# Patient Record
Sex: FEMALE | Race: WHITE | Employment: OTHER | URBAN - METROPOLITAN AREA
[De-identification: names, ages, dates, MRNs, and addresses within clinical notes are randomized per-mention and may not be internally consistent; named-entity substitution may affect disease eponyms.]

---

## 2017-02-22 ENCOUNTER — OFFICE VISIT (OUTPATIENT)
Dept: FAMILY MEDICINE CLINIC | Age: 82
End: 2017-02-22

## 2017-02-22 VITALS
HEIGHT: 60 IN | SYSTOLIC BLOOD PRESSURE: 160 MMHG | WEIGHT: 149.8 LBS | BODY MASS INDEX: 29.41 KG/M2 | DIASTOLIC BLOOD PRESSURE: 72 MMHG | HEART RATE: 70 BPM

## 2017-02-22 DIAGNOSIS — N18.4 CHRONIC KIDNEY DISEASE (CKD), STAGE IV (SEVERE) (HCC): Primary | ICD-10-CM

## 2017-02-22 DIAGNOSIS — H61.23 CERUMINOSIS, BILATERAL: ICD-10-CM

## 2017-02-22 DIAGNOSIS — M54.50 ACUTE BILATERAL LOW BACK PAIN WITHOUT SCIATICA: ICD-10-CM

## 2017-02-22 DIAGNOSIS — I25.10 CORONARY ARTERY DISEASE INVOLVING NATIVE CORONARY ARTERY OF NATIVE HEART WITHOUT ANGINA PECTORIS: ICD-10-CM

## 2017-02-22 DIAGNOSIS — I10 HTN (HYPERTENSION), MALIGNANT: ICD-10-CM

## 2017-02-22 PROCEDURE — 4040F PNEUMOC VAC/ADMIN/RCVD: CPT | Performed by: FAMILY MEDICINE

## 2017-02-22 PROCEDURE — G8427 DOCREV CUR MEDS BY ELIG CLIN: HCPCS | Performed by: FAMILY MEDICINE

## 2017-02-22 PROCEDURE — G8420 CALC BMI NORM PARAMETERS: HCPCS | Performed by: FAMILY MEDICINE

## 2017-02-22 PROCEDURE — G8598 ASA/ANTIPLAT THER USED: HCPCS | Performed by: FAMILY MEDICINE

## 2017-02-22 PROCEDURE — 99214 OFFICE O/P EST MOD 30 MIN: CPT | Performed by: FAMILY MEDICINE

## 2017-02-22 PROCEDURE — 1090F PRES/ABSN URINE INCON ASSESS: CPT | Performed by: FAMILY MEDICINE

## 2017-02-22 PROCEDURE — G8400 PT W/DXA NO RESULTS DOC: HCPCS | Performed by: FAMILY MEDICINE

## 2017-02-22 PROCEDURE — 1036F TOBACCO NON-USER: CPT | Performed by: FAMILY MEDICINE

## 2017-02-22 PROCEDURE — G8484 FLU IMMUNIZE NO ADMIN: HCPCS | Performed by: FAMILY MEDICINE

## 2017-02-22 PROCEDURE — 1123F ACP DISCUSS/DSCN MKR DOCD: CPT | Performed by: FAMILY MEDICINE

## 2017-02-22 RX ORDER — TIZANIDINE HYDROCHLORIDE 2 MG/1
2 CAPSULE, GELATIN COATED ORAL NIGHTLY PRN
Qty: 20 CAPSULE | Refills: 0 | Status: SHIPPED | OUTPATIENT
Start: 2017-02-22 | End: 2018-01-08

## 2017-02-22 RX ORDER — CARVEDILOL 12.5 MG/1
12.5 TABLET ORAL 2 TIMES DAILY WITH MEALS
Qty: 60 TABLET | Refills: 5 | Status: SHIPPED | OUTPATIENT
Start: 2017-02-22 | End: 2018-03-23 | Stop reason: SDUPTHER

## 2017-05-08 ENCOUNTER — OFFICE VISIT (OUTPATIENT)
Dept: FAMILY MEDICINE CLINIC | Age: 82
End: 2017-05-08

## 2017-05-08 VITALS
HEIGHT: 60 IN | HEART RATE: 71 BPM | SYSTOLIC BLOOD PRESSURE: 176 MMHG | DIASTOLIC BLOOD PRESSURE: 66 MMHG | WEIGHT: 143 LBS | BODY MASS INDEX: 28.07 KG/M2

## 2017-05-08 DIAGNOSIS — I10 HTN (HYPERTENSION), BENIGN: Primary | ICD-10-CM

## 2017-05-08 DIAGNOSIS — I25.10 CORONARY ARTERY DISEASE INVOLVING NATIVE CORONARY ARTERY OF NATIVE HEART WITHOUT ANGINA PECTORIS: ICD-10-CM

## 2017-05-08 DIAGNOSIS — N18.4 CHRONIC KIDNEY DISEASE (CKD), STAGE IV (SEVERE) (HCC): ICD-10-CM

## 2017-05-08 DIAGNOSIS — M1A.0710 IDIOPATHIC CHRONIC GOUT OF RIGHT FOOT WITHOUT TOPHUS: ICD-10-CM

## 2017-05-08 LAB
A/G RATIO: 1.6 (ref 1.1–2.2)
ALBUMIN SERPL-MCNC: 4.4 G/DL (ref 3.4–5)
ALP BLD-CCNC: 74 U/L (ref 40–129)
ALT SERPL-CCNC: 10 U/L (ref 10–40)
ANION GAP SERPL CALCULATED.3IONS-SCNC: 18 MMOL/L (ref 3–16)
AST SERPL-CCNC: 17 U/L (ref 15–37)
BILIRUB SERPL-MCNC: 0.4 MG/DL (ref 0–1)
BUN BLDV-MCNC: 24 MG/DL (ref 7–20)
CALCIUM SERPL-MCNC: 9.3 MG/DL (ref 8.3–10.6)
CHLORIDE BLD-SCNC: 101 MMOL/L (ref 99–110)
CO2: 22 MMOL/L (ref 21–32)
CREAT SERPL-MCNC: 1.2 MG/DL (ref 0.6–1.2)
GFR AFRICAN AMERICAN: 52
GFR NON-AFRICAN AMERICAN: 43
GLOBULIN: 2.8 G/DL
GLUCOSE BLD-MCNC: 98 MG/DL (ref 70–99)
POTASSIUM SERPL-SCNC: 4.8 MMOL/L (ref 3.5–5.1)
SODIUM BLD-SCNC: 141 MMOL/L (ref 136–145)
TOTAL PROTEIN: 7.2 G/DL (ref 6.4–8.2)
URIC ACID, SERUM: 6.7 MG/DL (ref 2.6–6)

## 2017-05-08 PROCEDURE — G8420 CALC BMI NORM PARAMETERS: HCPCS | Performed by: FAMILY MEDICINE

## 2017-05-08 PROCEDURE — 1036F TOBACCO NON-USER: CPT | Performed by: FAMILY MEDICINE

## 2017-05-08 PROCEDURE — G8598 ASA/ANTIPLAT THER USED: HCPCS | Performed by: FAMILY MEDICINE

## 2017-05-08 PROCEDURE — 4040F PNEUMOC VAC/ADMIN/RCVD: CPT | Performed by: FAMILY MEDICINE

## 2017-05-08 PROCEDURE — G8427 DOCREV CUR MEDS BY ELIG CLIN: HCPCS | Performed by: FAMILY MEDICINE

## 2017-05-08 PROCEDURE — 1123F ACP DISCUSS/DSCN MKR DOCD: CPT | Performed by: FAMILY MEDICINE

## 2017-05-08 PROCEDURE — 99214 OFFICE O/P EST MOD 30 MIN: CPT | Performed by: FAMILY MEDICINE

## 2017-05-08 PROCEDURE — G8400 PT W/DXA NO RESULTS DOC: HCPCS | Performed by: FAMILY MEDICINE

## 2017-05-08 PROCEDURE — 1090F PRES/ABSN URINE INCON ASSESS: CPT | Performed by: FAMILY MEDICINE

## 2017-05-08 PROCEDURE — 36415 COLL VENOUS BLD VENIPUNCTURE: CPT | Performed by: FAMILY MEDICINE

## 2017-05-08 RX ORDER — AMLODIPINE BESYLATE 2.5 MG/1
2.5 TABLET ORAL DAILY
Qty: 30 TABLET | Refills: 3 | Status: SHIPPED | OUTPATIENT
Start: 2017-05-08 | End: 2017-11-15 | Stop reason: SDUPTHER

## 2017-05-08 RX ORDER — ATORVASTATIN CALCIUM 20 MG/1
TABLET, FILM COATED ORAL
Qty: 30 TABLET | Refills: 6 | Status: SHIPPED | OUTPATIENT
Start: 2017-05-08 | End: 2018-01-08 | Stop reason: SDUPTHER

## 2017-05-08 RX ORDER — LISINOPRIL 10 MG/1
TABLET ORAL
Qty: 30 TABLET | Refills: 5 | Status: SHIPPED | OUTPATIENT
Start: 2017-05-08 | End: 2018-03-23 | Stop reason: SDUPTHER

## 2017-05-08 RX ORDER — ALLOPURINOL 100 MG/1
100 TABLET ORAL DAILY
Qty: 30 TABLET | Refills: 5 | Status: SHIPPED | OUTPATIENT
Start: 2017-05-08 | End: 2018-03-23 | Stop reason: SDUPTHER

## 2017-06-27 RX ORDER — ASPIRIN 81 MG/1
81 TABLET, CHEWABLE ORAL DAILY
Qty: 30 TABLET | Refills: 9 | Status: ON HOLD | OUTPATIENT
Start: 2017-06-27 | End: 2021-09-12 | Stop reason: HOSPADM

## 2017-11-15 DIAGNOSIS — I10 HTN (HYPERTENSION), BENIGN: ICD-10-CM

## 2017-11-16 RX ORDER — AMLODIPINE BESYLATE 2.5 MG/1
TABLET ORAL
Qty: 90 TABLET | Refills: 3 | Status: SHIPPED | OUTPATIENT
Start: 2017-11-16 | End: 2017-12-04 | Stop reason: SDUPTHER

## 2017-12-04 ENCOUNTER — OFFICE VISIT (OUTPATIENT)
Dept: FAMILY MEDICINE CLINIC | Age: 82
End: 2017-12-04

## 2017-12-04 VITALS
DIASTOLIC BLOOD PRESSURE: 70 MMHG | HEART RATE: 79 BPM | WEIGHT: 149 LBS | HEIGHT: 60 IN | BODY MASS INDEX: 29.25 KG/M2 | OXYGEN SATURATION: 97 % | SYSTOLIC BLOOD PRESSURE: 134 MMHG | RESPIRATION RATE: 20 BRPM

## 2017-12-04 DIAGNOSIS — I10 HTN (HYPERTENSION), BENIGN: ICD-10-CM

## 2017-12-04 DIAGNOSIS — I25.10 CORONARY ARTERY DISEASE INVOLVING NATIVE CORONARY ARTERY OF NATIVE HEART WITHOUT ANGINA PECTORIS: Primary | ICD-10-CM

## 2017-12-04 DIAGNOSIS — M1A.0710 IDIOPATHIC CHRONIC GOUT OF RIGHT FOOT WITHOUT TOPHUS: ICD-10-CM

## 2017-12-04 DIAGNOSIS — R41.3 MEMORY LOSS: ICD-10-CM

## 2017-12-04 DIAGNOSIS — N18.4 CHRONIC KIDNEY DISEASE (CKD), STAGE IV (SEVERE) (HCC): ICD-10-CM

## 2017-12-04 LAB
A/G RATIO: 1.2 (ref 1.1–2.2)
ALBUMIN SERPL-MCNC: 4.3 G/DL (ref 3.4–5)
ALP BLD-CCNC: 63 U/L (ref 40–129)
ALT SERPL-CCNC: 11 U/L (ref 10–40)
ANION GAP SERPL CALCULATED.3IONS-SCNC: 21 MMOL/L (ref 3–16)
AST SERPL-CCNC: 18 U/L (ref 15–37)
BILIRUB SERPL-MCNC: <0.2 MG/DL (ref 0–1)
BUN BLDV-MCNC: 26 MG/DL (ref 7–20)
CALCIUM SERPL-MCNC: 9.6 MG/DL (ref 8.3–10.6)
CHLORIDE BLD-SCNC: 99 MMOL/L (ref 99–110)
CO2: 23 MMOL/L (ref 21–32)
CREAT SERPL-MCNC: 1.1 MG/DL (ref 0.6–1.2)
FOLATE: 14.73 NG/ML (ref 4.78–24.2)
GFR AFRICAN AMERICAN: 57
GFR NON-AFRICAN AMERICAN: 48
GLOBULIN: 3.5 G/DL
GLUCOSE BLD-MCNC: 86 MG/DL (ref 70–99)
POTASSIUM SERPL-SCNC: 4.3 MMOL/L (ref 3.5–5.1)
SODIUM BLD-SCNC: 143 MMOL/L (ref 136–145)
TOTAL PROTEIN: 7.8 G/DL (ref 6.4–8.2)
TSH REFLEX: 2.67 UIU/ML (ref 0.27–4.2)
URIC ACID, SERUM: 6.2 MG/DL (ref 2.6–6)
VITAMIN B-12: 447 PG/ML (ref 211–911)

## 2017-12-04 PROCEDURE — G8419 CALC BMI OUT NRM PARAM NOF/U: HCPCS | Performed by: FAMILY MEDICINE

## 2017-12-04 PROCEDURE — 36415 COLL VENOUS BLD VENIPUNCTURE: CPT | Performed by: FAMILY MEDICINE

## 2017-12-04 PROCEDURE — G8598 ASA/ANTIPLAT THER USED: HCPCS | Performed by: FAMILY MEDICINE

## 2017-12-04 PROCEDURE — 90732 PPSV23 VACC 2 YRS+ SUBQ/IM: CPT | Performed by: FAMILY MEDICINE

## 2017-12-04 PROCEDURE — 4040F PNEUMOC VAC/ADMIN/RCVD: CPT | Performed by: FAMILY MEDICINE

## 2017-12-04 PROCEDURE — 1036F TOBACCO NON-USER: CPT | Performed by: FAMILY MEDICINE

## 2017-12-04 PROCEDURE — 99214 OFFICE O/P EST MOD 30 MIN: CPT | Performed by: FAMILY MEDICINE

## 2017-12-04 PROCEDURE — G0009 ADMIN PNEUMOCOCCAL VACCINE: HCPCS | Performed by: FAMILY MEDICINE

## 2017-12-04 PROCEDURE — 1090F PRES/ABSN URINE INCON ASSESS: CPT | Performed by: FAMILY MEDICINE

## 2017-12-04 PROCEDURE — G8484 FLU IMMUNIZE NO ADMIN: HCPCS | Performed by: FAMILY MEDICINE

## 2017-12-04 PROCEDURE — G8428 CUR MEDS NOT DOCUMENT: HCPCS | Performed by: FAMILY MEDICINE

## 2017-12-04 PROCEDURE — 1123F ACP DISCUSS/DSCN MKR DOCD: CPT | Performed by: FAMILY MEDICINE

## 2017-12-04 PROCEDURE — G8400 PT W/DXA NO RESULTS DOC: HCPCS | Performed by: FAMILY MEDICINE

## 2017-12-04 RX ORDER — AMLODIPINE BESYLATE 2.5 MG/1
TABLET ORAL
Qty: 30 TABLET | Refills: 5 | Status: SHIPPED | OUTPATIENT
Start: 2017-12-04 | End: 2020-10-19

## 2017-12-04 ASSESSMENT — PATIENT HEALTH QUESTIONNAIRE - PHQ9
2. FEELING DOWN, DEPRESSED OR HOPELESS: 1
SUM OF ALL RESPONSES TO PHQ9 QUESTIONS 1 & 2: 1
SUM OF ALL RESPONSES TO PHQ QUESTIONS 1-9: 1
1. LITTLE INTEREST OR PLEASURE IN DOING THINGS: 0

## 2017-12-04 NOTE — PROGRESS NOTES
Chief Complaint   Patient presents with    Memory Loss     Having a hard time more recently/Missing whole episodes of time    Agitation     Because of constant reminding or questioning        HPI: Franki Gilnahider presents for evaluation and management of Memory loss. Angelica Vasquez is brought in by her daughter for complaints of progressive and worsening memory loss over the last 6 months. She notes she is having trouble losing time. But also having trouble with attention and concentration and short-term memory. She denies any depressed mood and has not had any injury or other neurologic deficits. She is taking and tolerating her medicines for her blood pressure and her cholesterol consistently. She notes no chest pain or palpitations. She is not having any gout attacks either and is compliant with her medicine for that.     She does note chronic bilateral knee pain that limits her ability to get around    ROS: no fever or chills, no cough, no sob, no chest pain, no palpitation, no abd pain, no n/v/d/c, no urinary frequency or dysuria,     Allergies   Allergen Reactions    Codeine Shortness Of Breath     Rash and dyspnea     New Prescriptions    No medications on file     Current Outpatient Prescriptions   Medication Sig Dispense Refill    amLODIPine (NORVASC) 2.5 MG tablet TAKE 1 TABLET BY MOUTH ONE TIME A DAY 30 tablet 5    aspirin 81 MG chewable tablet Take 1 tablet by mouth daily 30 tablet 9    allopurinol (ZYLOPRIM) 100 MG tablet Take 1 tablet by mouth daily 30 tablet 5    atorvastatin (LIPITOR) 20 MG tablet TAKE 1 TABLET BY MOUTH ONE TIME A DAY 30 tablet 6    lisinopril (PRINIVIL;ZESTRIL) 10 MG tablet TAKE 1 TABLET BY MOUTH ONE TIME A DAY 30 tablet 5    carvedilol (COREG) 12.5 MG tablet Take 1 tablet by mouth 2 times daily (with meals) 60 tablet 5    furosemide (LASIX) 40 MG tablet Take 0.5 tablets by mouth daily 15 tablet 5    Handicap Placard MISC by Does not apply route Patient is unable to walk side effects of prescribed medications. Barriers to medication compliance addressed. All patient questions answered. Pt voiced understanding.        RTC in one month

## 2017-12-28 ENCOUNTER — HOSPITAL ENCOUNTER (OUTPATIENT)
Dept: CT IMAGING | Age: 82
Discharge: HOME | End: 2017-12-28
Attending: FAMILY MEDICINE | Admitting: FAMILY MEDICINE

## 2017-12-28 DIAGNOSIS — R41.3 MEMORY LOSS: ICD-10-CM

## 2017-12-28 DIAGNOSIS — R41.3 OTHER AMNESIA: ICD-10-CM

## 2017-12-29 NOTE — PROGRESS NOTES
Call :  Her CT of her head showed some chronic small vessel disease. Need to work on controlling risk factors such as blood pressure and cholesterol.

## 2018-01-08 ENCOUNTER — OFFICE VISIT (OUTPATIENT)
Dept: FAMILY MEDICINE CLINIC | Age: 83
End: 2018-01-08

## 2018-01-08 VITALS
OXYGEN SATURATION: 96 % | WEIGHT: 146 LBS | HEART RATE: 81 BPM | HEIGHT: 60 IN | DIASTOLIC BLOOD PRESSURE: 82 MMHG | SYSTOLIC BLOOD PRESSURE: 136 MMHG | BODY MASS INDEX: 28.66 KG/M2

## 2018-01-08 DIAGNOSIS — N18.30 CKD (CHRONIC KIDNEY DISEASE) STAGE 3, GFR 30-59 ML/MIN (HCC): ICD-10-CM

## 2018-01-08 DIAGNOSIS — I50.22 CHRONIC SYSTOLIC HEART FAILURE (HCC): ICD-10-CM

## 2018-01-08 DIAGNOSIS — J30.1 CHRONIC ALLERGIC RHINITIS DUE TO POLLEN, UNSPECIFIED SEASONALITY: ICD-10-CM

## 2018-01-08 DIAGNOSIS — I25.10 CORONARY ARTERY DISEASE INVOLVING NATIVE CORONARY ARTERY OF NATIVE HEART WITHOUT ANGINA PECTORIS: ICD-10-CM

## 2018-01-08 DIAGNOSIS — I10 HTN (HYPERTENSION), MALIGNANT: Primary | ICD-10-CM

## 2018-01-08 DIAGNOSIS — I67.82 SUBCORTICAL MICROVASCULAR ISCHEMIC OCCLUSIVE DISEASE: ICD-10-CM

## 2018-01-08 DIAGNOSIS — R41.3 MEMORY LOSS: ICD-10-CM

## 2018-01-08 DIAGNOSIS — F32.1 MODERATE SINGLE CURRENT EPISODE OF MAJOR DEPRESSIVE DISORDER (HCC): ICD-10-CM

## 2018-01-08 PROCEDURE — G8400 PT W/DXA NO RESULTS DOC: HCPCS | Performed by: FAMILY MEDICINE

## 2018-01-08 PROCEDURE — 99214 OFFICE O/P EST MOD 30 MIN: CPT | Performed by: FAMILY MEDICINE

## 2018-01-08 PROCEDURE — G8598 ASA/ANTIPLAT THER USED: HCPCS | Performed by: FAMILY MEDICINE

## 2018-01-08 PROCEDURE — 4040F PNEUMOC VAC/ADMIN/RCVD: CPT | Performed by: FAMILY MEDICINE

## 2018-01-08 PROCEDURE — G8419 CALC BMI OUT NRM PARAM NOF/U: HCPCS | Performed by: FAMILY MEDICINE

## 2018-01-08 PROCEDURE — G8484 FLU IMMUNIZE NO ADMIN: HCPCS | Performed by: FAMILY MEDICINE

## 2018-01-08 PROCEDURE — 1123F ACP DISCUSS/DSCN MKR DOCD: CPT | Performed by: FAMILY MEDICINE

## 2018-01-08 PROCEDURE — G8427 DOCREV CUR MEDS BY ELIG CLIN: HCPCS | Performed by: FAMILY MEDICINE

## 2018-01-08 PROCEDURE — 1036F TOBACCO NON-USER: CPT | Performed by: FAMILY MEDICINE

## 2018-01-08 PROCEDURE — 1090F PRES/ABSN URINE INCON ASSESS: CPT | Performed by: FAMILY MEDICINE

## 2018-01-08 RX ORDER — AZELASTINE 1 MG/ML
2 SPRAY, METERED NASAL 2 TIMES DAILY
Qty: 1 BOTTLE | Refills: 3 | Status: SHIPPED | OUTPATIENT
Start: 2018-01-08 | End: 2020-10-19

## 2018-01-08 RX ORDER — FUROSEMIDE 40 MG/1
TABLET ORAL
Qty: 45 TABLET | Refills: 3 | Status: SHIPPED | OUTPATIENT
Start: 2018-01-08 | End: 2019-01-28 | Stop reason: SDUPTHER

## 2018-01-08 RX ORDER — ATORVASTATIN CALCIUM 20 MG/1
TABLET, FILM COATED ORAL
Qty: 90 TABLET | Refills: 3 | Status: SHIPPED | OUTPATIENT
Start: 2018-01-08 | End: 2021-12-08

## 2018-01-08 RX ORDER — FLUOXETINE HYDROCHLORIDE 20 MG/1
20 CAPSULE ORAL DAILY
Qty: 30 CAPSULE | Refills: 11 | Status: SHIPPED | OUTPATIENT
Start: 2018-01-08 | End: 2019-01-28 | Stop reason: SDUPTHER

## 2018-01-08 NOTE — PROGRESS NOTES
Chief Complaint   Patient presents with    Results     Review Lab & CT of Head       HPI: Dianne Saunders presents for evaluation and management of Poor memory, depression, and sinus congestion. Michelle Brewer notes she is feeling pretty good but has been having sinus congestion and pressure for a couple weeks now. Given her high blood pressure she has been reluctant to try any over-the-counter decongestants for this. She also notes chronic history of decreasing memory function. Struggling with recipes that require remembering addition of spices. And getting frustrated and irritable with this. She also notes that she is feeling more depressed lately, has said things like \"I don't know why I'm still alive \"to her daughter when talking about how all of her friends are dying. She denies any suicidal or homicidal ideation but mood is down.       ROS: no fever or chills, no cough, no sob, no chest pain, no palpitation, no abd pain, no n/v/d/c    Allergies   Allergen Reactions    Codeine Shortness Of Breath     Rash and dyspnea     New Prescriptions    AZELASTINE (ASTELIN) 0.1 % NASAL SPRAY    2 sprays by Nasal route 2 times daily Use in each nostril as directed    FLUOXETINE (PROZAC) 20 MG CAPSULE    Take 1 capsule by mouth daily     Current Outpatient Prescriptions   Medication Sig Dispense Refill    azelastine (ASTELIN) 0.1 % nasal spray 2 sprays by Nasal route 2 times daily Use in each nostril as directed 1 Bottle 3    FLUoxetine (PROZAC) 20 MG capsule Take 1 capsule by mouth daily 30 capsule 11    amLODIPine (NORVASC) 2.5 MG tablet TAKE 1 TABLET BY MOUTH ONE TIME A DAY 30 tablet 5    aspirin 81 MG chewable tablet Take 1 tablet by mouth daily 30 tablet 9    allopurinol (ZYLOPRIM) 100 MG tablet Take 1 tablet by mouth daily 30 tablet 5    atorvastatin (LIPITOR) 20 MG tablet TAKE 1 TABLET BY MOUTH ONE TIME A DAY 30 tablet 6    lisinopril (PRINIVIL;ZESTRIL) 10 MG tablet TAKE 1 TABLET BY MOUTH ONE TIME A DAY 30 tablet 5    carvedilol (COREG) 12.5 MG tablet Take 1 tablet by mouth 2 times daily (with meals) 60 tablet 5    furosemide (LASIX) 40 MG tablet Take 0.5 tablets by mouth daily 15 tablet 5    Handicap Placard MISC by Does not apply route Patient is unable to walk more than 250 feet before stopping to rest.    Not to exceed 5 years. DX: CHF 1 each 0    ranitidine (ZANTAC) 150 MG tablet Take 1 tablet by mouth 2 times daily. 60 tablet 1    Glucosamine-Chondroitin 500-400 MG CAPS Take 1 tablet by mouth daily. 30 capsule 1     No current facility-administered medications for this visit.         Past Medical History:   Diagnosis Date    Chronic kidney disease (CKD), stage IV (severe) ([facility])     Coronary artery disease involving native coronary artery of native heart without angina pectoris 1/5/2016    GERD (gastroesophageal reflux disease) acid reflux    Gout, chronic     Histoplasmosis     Hyperlipidemia     Hypertension     Irregular heart beat     Pneumonia     Unspecified cerebral artery occlusion with cerebral infarction 2014 , ~ 2012         Objective   /82   Pulse 81   Ht 5' (1.524 m)   Wt 146 lb (66.2 kg)   SpO2 96%   BMI 28.51 kg/m²   Wt Readings from Last 3 Encounters:   01/08/18 146 lb (66.2 kg)   12/04/17 149 lb (67.6 kg)   05/08/17 143 lb (64.9 kg)       WDWN in NAD  HEENT: NCAT, PERRL, TM's neg, Canals patent, Nares Patent , Op/OC: pink and patent   Lungs: CTAB BS Equal and Easy, no accessory muscle use  CV: RRR w/o M,R,G, PP2+, no edema  Abd:  BS+, S, ND, NT, no hsm  Psych: Judgement and insight are intact, Nl Speech and motor activity, no JESÚS, no FOI, dressed casually in street clothes  Neuro: Mini-Mental status exam is 28 out of 30, no focal motor or sensory deficits cranial nerves II through XII are intact      Chemistry        Component Value Date/Time     12/04/2017 1719    K 4.3 12/04/2017 1719    CL 99 12/04/2017 1719    CO2 23 12/04/2017 1719    BUN 26 (H) 12/04/2017 weeks  - FLUoxetine (PROZAC) 20 MG capsule; Take 1 capsule by mouth daily  Dispense: 30 capsule; Refill: 11    Discussed use, benefit, and side effects of prescribed medications. Barriers to medication compliance addressed. All patient questions answered. Pt voiced understanding.    Quality & Risk Score Accuracy - MEDICARE ADVANTAGE    Last edited 01/08/18 14:45 EST by Toby Bentley MD         RTC 4-6 weeks*

## 2018-03-23 DIAGNOSIS — M1A.0710 IDIOPATHIC CHRONIC GOUT OF RIGHT FOOT WITHOUT TOPHUS: ICD-10-CM

## 2018-03-23 DIAGNOSIS — I10 HTN (HYPERTENSION), MALIGNANT: ICD-10-CM

## 2018-03-23 DIAGNOSIS — I25.10 CORONARY ARTERY DISEASE INVOLVING NATIVE CORONARY ARTERY OF NATIVE HEART WITHOUT ANGINA PECTORIS: ICD-10-CM

## 2018-03-23 DIAGNOSIS — I10 HTN (HYPERTENSION), BENIGN: ICD-10-CM

## 2018-03-26 ENCOUNTER — TELEPHONE (OUTPATIENT)
Dept: FAMILY MEDICINE CLINIC | Age: 83
End: 2018-03-26

## 2018-03-26 DIAGNOSIS — I10 HTN (HYPERTENSION), MALIGNANT: ICD-10-CM

## 2018-03-26 DIAGNOSIS — I25.10 CORONARY ARTERY DISEASE INVOLVING NATIVE CORONARY ARTERY OF NATIVE HEART WITHOUT ANGINA PECTORIS: ICD-10-CM

## 2018-03-26 RX ORDER — CARVEDILOL 12.5 MG/1
TABLET ORAL
Qty: 30 TABLET | Refills: 4 | Status: SHIPPED | OUTPATIENT
Start: 2018-03-26 | End: 2018-03-26 | Stop reason: SDUPTHER

## 2018-03-26 RX ORDER — CARVEDILOL 12.5 MG/1
12.5 TABLET ORAL 2 TIMES DAILY
Qty: 60 TABLET | Refills: 2 | Status: SHIPPED | OUTPATIENT
Start: 2018-03-26 | End: 2020-10-19

## 2018-03-26 RX ORDER — LISINOPRIL 10 MG/1
TABLET ORAL
Qty: 30 TABLET | Refills: 4 | Status: SHIPPED | OUTPATIENT
Start: 2018-03-26 | End: 2018-11-30 | Stop reason: SDUPTHER

## 2018-03-26 RX ORDER — ALLOPURINOL 100 MG/1
TABLET ORAL
Qty: 30 TABLET | Refills: 4 | Status: SHIPPED | OUTPATIENT
Start: 2018-03-26 | End: 2018-11-30 | Stop reason: SDUPTHER

## 2018-04-24 ENCOUNTER — OFFICE VISIT (OUTPATIENT)
Dept: FAMILY MEDICINE CLINIC | Age: 83
End: 2018-04-24

## 2018-04-24 ENCOUNTER — TELEPHONE (OUTPATIENT)
Dept: FAMILY MEDICINE CLINIC | Age: 83
End: 2018-04-24

## 2018-04-24 VITALS
HEIGHT: 60 IN | BODY MASS INDEX: 29.25 KG/M2 | HEART RATE: 83 BPM | WEIGHT: 149 LBS | SYSTOLIC BLOOD PRESSURE: 192 MMHG | DIASTOLIC BLOOD PRESSURE: 70 MMHG

## 2018-04-24 DIAGNOSIS — I25.10 CORONARY ARTERY DISEASE INVOLVING NATIVE CORONARY ARTERY OF NATIVE HEART WITHOUT ANGINA PECTORIS: ICD-10-CM

## 2018-04-24 DIAGNOSIS — M1A.0710 IDIOPATHIC CHRONIC GOUT OF RIGHT FOOT WITHOUT TOPHUS: ICD-10-CM

## 2018-04-24 DIAGNOSIS — I10 HTN (HYPERTENSION), BENIGN: Primary | ICD-10-CM

## 2018-04-24 DIAGNOSIS — F32.1 MODERATE SINGLE CURRENT EPISODE OF MAJOR DEPRESSIVE DISORDER (HCC): ICD-10-CM

## 2018-04-24 LAB
A/G RATIO: 1.4 (ref 1.1–2.2)
ALBUMIN SERPL-MCNC: 4.6 G/DL (ref 3.4–5)
ALP BLD-CCNC: 64 U/L (ref 40–129)
ALT SERPL-CCNC: 15 U/L (ref 10–40)
ANION GAP SERPL CALCULATED.3IONS-SCNC: 15 MMOL/L (ref 3–16)
AST SERPL-CCNC: 21 U/L (ref 15–37)
BILIRUB SERPL-MCNC: 0.4 MG/DL (ref 0–1)
BUN BLDV-MCNC: 23 MG/DL (ref 7–20)
CALCIUM SERPL-MCNC: 9.5 MG/DL (ref 8.3–10.6)
CHLORIDE BLD-SCNC: 99 MMOL/L (ref 99–110)
CHOLESTEROL, TOTAL: 191 MG/DL (ref 0–199)
CO2: 24 MMOL/L (ref 21–32)
CREAT SERPL-MCNC: 1 MG/DL (ref 0.6–1.2)
GFR AFRICAN AMERICAN: >60
GFR NON-AFRICAN AMERICAN: 53
GLOBULIN: 3.2 G/DL
GLUCOSE BLD-MCNC: 115 MG/DL (ref 70–99)
HDLC SERPL-MCNC: 47 MG/DL (ref 40–60)
LDL CHOLESTEROL CALCULATED: 85 MG/DL
POTASSIUM SERPL-SCNC: 4.7 MMOL/L (ref 3.5–5.1)
SODIUM BLD-SCNC: 138 MMOL/L (ref 136–145)
TOTAL PROTEIN: 7.8 G/DL (ref 6.4–8.2)
TRIGL SERPL-MCNC: 295 MG/DL (ref 0–150)
URIC ACID, SERUM: 7 MG/DL (ref 2.6–6)
VLDLC SERPL CALC-MCNC: 59 MG/DL

## 2018-04-24 PROCEDURE — 99214 OFFICE O/P EST MOD 30 MIN: CPT | Performed by: FAMILY MEDICINE

## 2018-04-24 PROCEDURE — 36415 COLL VENOUS BLD VENIPUNCTURE: CPT | Performed by: FAMILY MEDICINE

## 2018-04-24 ASSESSMENT — ENCOUNTER SYMPTOMS
BACK PAIN: 1
SINUS PAIN: 1
WHEEZING: 0
CONSTIPATION: 0
ABDOMINAL PAIN: 0
SINUS PRESSURE: 1
VOMITING: 0
DIARRHEA: 1
COUGH: 1
SHORTNESS OF BREATH: 1
NAUSEA: 0
RHINORRHEA: 1

## 2018-05-04 ENCOUNTER — TELEPHONE (OUTPATIENT)
Dept: FAMILY MEDICINE CLINIC | Age: 83
End: 2018-05-04

## 2018-05-07 ENCOUNTER — OFFICE VISIT (OUTPATIENT)
Dept: FAMILY MEDICINE CLINIC | Age: 83
End: 2018-05-07

## 2018-05-07 VITALS
SYSTOLIC BLOOD PRESSURE: 121 MMHG | DIASTOLIC BLOOD PRESSURE: 68 MMHG | HEIGHT: 58 IN | WEIGHT: 154 LBS | BODY MASS INDEX: 32.32 KG/M2 | HEART RATE: 60 BPM

## 2018-05-07 DIAGNOSIS — M19.041 PRIMARY OSTEOARTHRITIS OF RIGHT HAND: Primary | ICD-10-CM

## 2018-05-07 DIAGNOSIS — M15.1 HEBERDEN NODE: ICD-10-CM

## 2018-05-07 PROCEDURE — 99213 OFFICE O/P EST LOW 20 MIN: CPT | Performed by: FAMILY MEDICINE

## 2018-05-07 RX ORDER — METHYLPREDNISOLONE 4 MG/1
TABLET ORAL
Qty: 1 KIT | Refills: 0 | Status: SHIPPED | OUTPATIENT
Start: 2018-05-07 | End: 2018-07-04

## 2018-05-07 ASSESSMENT — ENCOUNTER SYMPTOMS
SHORTNESS OF BREATH: 0
VOMITING: 0
DIARRHEA: 0
CONSTIPATION: 0
ABDOMINAL PAIN: 0
COUGH: 0
NAUSEA: 0

## 2018-10-23 ENCOUNTER — APPOINTMENT (OUTPATIENT)
Dept: GENERAL RADIOLOGY | Age: 83
End: 2018-10-23
Payer: MEDICARE

## 2018-10-23 ENCOUNTER — HOSPITAL ENCOUNTER (EMERGENCY)
Age: 83
Discharge: HOME | End: 2018-10-23
Attending: EMERGENCY MEDICINE
Payer: MEDICARE

## 2018-10-23 ENCOUNTER — APPOINTMENT (OUTPATIENT)
Dept: CT IMAGING | Age: 83
End: 2018-10-23
Payer: MEDICARE

## 2018-10-23 VITALS
DIASTOLIC BLOOD PRESSURE: 83 MMHG | TEMPERATURE: 98.3 F | BODY MASS INDEX: 28.8 KG/M2 | HEIGHT: 61 IN | HEART RATE: 64 BPM | SYSTOLIC BLOOD PRESSURE: 130 MMHG | WEIGHT: 152.56 LBS | RESPIRATION RATE: 21 BRPM | OXYGEN SATURATION: 96 %

## 2018-10-23 DIAGNOSIS — N28.9 ACUTE ON CHRONIC RENAL INSUFFICIENCY: ICD-10-CM

## 2018-10-23 DIAGNOSIS — G45.9 TIA (TRANSIENT ISCHEMIC ATTACK): Primary | ICD-10-CM

## 2018-10-23 DIAGNOSIS — N39.0 URINARY TRACT INFECTION WITHOUT HEMATURIA, SITE UNSPECIFIED: ICD-10-CM

## 2018-10-23 DIAGNOSIS — R07.9 CHEST PAIN, UNSPECIFIED TYPE: ICD-10-CM

## 2018-10-23 DIAGNOSIS — N18.9 ACUTE ON CHRONIC RENAL INSUFFICIENCY: ICD-10-CM

## 2018-10-23 LAB
A/G RATIO: 1.1 (ref 1.1–2.2)
ALBUMIN SERPL-MCNC: 4 G/DL (ref 3.4–5)
ALP BLD-CCNC: 59 U/L (ref 40–129)
ALT SERPL-CCNC: 18 U/L (ref 10–40)
ANION GAP SERPL CALCULATED.3IONS-SCNC: 17 MMOL/L (ref 3–16)
AST SERPL-CCNC: 21 U/L (ref 15–37)
BACTERIA: ABNORMAL /HPF
BASOPHILS ABSOLUTE: 0.1 K/UL (ref 0–0.2)
BASOPHILS RELATIVE PERCENT: 0.9 %
BILIRUB SERPL-MCNC: 0.6 MG/DL (ref 0–1)
BILIRUBIN URINE: NEGATIVE
BLOOD, URINE: ABNORMAL
BUN BLDV-MCNC: 29 MG/DL (ref 7–20)
CALCIUM SERPL-MCNC: 9.1 MG/DL (ref 8.3–10.6)
CHLORIDE BLD-SCNC: 99 MMOL/L (ref 99–110)
CLARITY: ABNORMAL
CO2: 23 MMOL/L (ref 21–32)
COLOR: YELLOW
CREAT SERPL-MCNC: 1.7 MG/DL (ref 0.6–1.2)
EOSINOPHILS ABSOLUTE: 0.2 K/UL (ref 0–0.6)
EOSINOPHILS RELATIVE PERCENT: 2.8 %
EPITHELIAL CELLS, UA: ABNORMAL /HPF
GFR AFRICAN AMERICAN: 35
GFR NON-AFRICAN AMERICAN: 29
GLOBULIN: 3.7 G/DL
GLUCOSE BLD-MCNC: 103 MG/DL (ref 70–99)
GLUCOSE BLD-MCNC: 98 MG/DL (ref 70–99)
GLUCOSE URINE: NEGATIVE MG/DL
HCT VFR BLD CALC: 41.4 % (ref 36–48)
HEMOGLOBIN: 14.1 G/DL (ref 12–16)
KETONES, URINE: NEGATIVE MG/DL
LEUKOCYTE ESTERASE, URINE: ABNORMAL
LIPASE: 41 U/L (ref 13–60)
LYMPHOCYTES ABSOLUTE: 2.3 K/UL (ref 1–5.1)
LYMPHOCYTES RELATIVE PERCENT: 29.5 %
MCH RBC QN AUTO: 30.7 PG (ref 26–34)
MCHC RBC AUTO-ENTMCNC: 34.1 G/DL (ref 31–36)
MCV RBC AUTO: 90.2 FL (ref 80–100)
MICROSCOPIC EXAMINATION: YES
MONOCYTES ABSOLUTE: 1.1 K/UL (ref 0–1.3)
MONOCYTES RELATIVE PERCENT: 13.3 %
NEUTROPHILS ABSOLUTE: 4.3 K/UL (ref 1.7–7.7)
NEUTROPHILS RELATIVE PERCENT: 53.5 %
NITRITE, URINE: NEGATIVE
PDW BLD-RTO: 13.5 % (ref 12.4–15.4)
PERFORMED ON: NORMAL
PH UA: 6
PLATELET # BLD: 289 K/UL (ref 135–450)
PMV BLD AUTO: 9.1 FL (ref 5–10.5)
POTASSIUM REFLEX MAGNESIUM: 4.3 MMOL/L (ref 3.5–5.1)
PRO-BNP: 1555 PG/ML (ref 0–449)
PROTEIN UA: ABNORMAL MG/DL
RBC # BLD: 4.59 M/UL (ref 4–5.2)
RBC UA: ABNORMAL /HPF (ref 0–2)
SODIUM BLD-SCNC: 139 MMOL/L (ref 136–145)
SPECIFIC GRAVITY UA: 1.02
TOTAL PROTEIN: 7.7 G/DL (ref 6.4–8.2)
TROPONIN: 0.01 NG/ML
URINE REFLEX TO CULTURE: YES
URINE TYPE: ABNORMAL
UROBILINOGEN, URINE: 0.2 E.U./DL
WBC # BLD: 8 K/UL (ref 4–11)
WBC UA: >100 /HPF (ref 0–5)

## 2018-10-23 PROCEDURE — 70450 CT HEAD/BRAIN W/O DYE: CPT

## 2018-10-23 PROCEDURE — 81001 URINALYSIS AUTO W/SCOPE: CPT

## 2018-10-23 PROCEDURE — 83690 ASSAY OF LIPASE: CPT

## 2018-10-23 PROCEDURE — 99285 EMERGENCY DEPT VISIT HI MDM: CPT

## 2018-10-23 PROCEDURE — 85025 COMPLETE CBC W/AUTO DIFF WBC: CPT

## 2018-10-23 PROCEDURE — G0378 HOSPITAL OBSERVATION PER HR: HCPCS

## 2018-10-23 PROCEDURE — 96361 HYDRATE IV INFUSION ADD-ON: CPT

## 2018-10-23 PROCEDURE — 83880 ASSAY OF NATRIURETIC PEPTIDE: CPT

## 2018-10-23 PROCEDURE — 93005 ELECTROCARDIOGRAM TRACING: CPT | Performed by: EMERGENCY MEDICINE

## 2018-10-23 PROCEDURE — 2580000003 HC RX 258: Performed by: PHYSICIAN ASSISTANT

## 2018-10-23 PROCEDURE — 87086 URINE CULTURE/COLONY COUNT: CPT

## 2018-10-23 PROCEDURE — 80053 COMPREHEN METABOLIC PANEL: CPT

## 2018-10-23 PROCEDURE — 87077 CULTURE AEROBIC IDENTIFY: CPT

## 2018-10-23 PROCEDURE — 2500000003 HC RX 250 WO HCPCS: Performed by: PHYSICIAN ASSISTANT

## 2018-10-23 PROCEDURE — 84484 ASSAY OF TROPONIN QUANT: CPT

## 2018-10-23 PROCEDURE — S0028 INJECTION, FAMOTIDINE, 20 MG: HCPCS | Performed by: PHYSICIAN ASSISTANT

## 2018-10-23 PROCEDURE — 71045 X-RAY EXAM CHEST 1 VIEW: CPT

## 2018-10-23 PROCEDURE — 96374 THER/PROPH/DIAG INJ IV PUSH: CPT

## 2018-10-23 PROCEDURE — 87186 SC STD MICRODIL/AGAR DIL: CPT

## 2018-10-23 PROCEDURE — 36415 COLL VENOUS BLD VENIPUNCTURE: CPT

## 2018-10-23 PROCEDURE — 6360000002 HC RX W HCPCS: Performed by: PHYSICIAN ASSISTANT

## 2018-10-23 PROCEDURE — 96375 TX/PRO/DX INJ NEW DRUG ADDON: CPT

## 2018-10-23 RX ORDER — ONDANSETRON 2 MG/ML
4 INJECTION INTRAMUSCULAR; INTRAVENOUS EVERY 6 HOURS PRN
Status: CANCELLED | OUTPATIENT
Start: 2018-10-23

## 2018-10-23 RX ORDER — ASPIRIN 325 MG
325 TABLET ORAL ONCE
Status: DISCONTINUED | OUTPATIENT
Start: 2018-10-23 | End: 2018-10-23 | Stop reason: HOSPADM

## 2018-10-23 RX ORDER — SODIUM CHLORIDE, SODIUM LACTATE, POTASSIUM CHLORIDE, AND CALCIUM CHLORIDE .6; .31; .03; .02 G/100ML; G/100ML; G/100ML; G/100ML
500 INJECTION, SOLUTION INTRAVENOUS ONCE
Status: COMPLETED | OUTPATIENT
Start: 2018-10-23 | End: 2018-10-23

## 2018-10-23 RX ORDER — SODIUM CHLORIDE 0.9 % (FLUSH) 0.9 %
10 SYRINGE (ML) INJECTION PRN
Status: CANCELLED | OUTPATIENT
Start: 2018-10-23

## 2018-10-23 RX ORDER — FLUOXETINE HYDROCHLORIDE 20 MG/1
20 CAPSULE ORAL DAILY
Status: CANCELLED | OUTPATIENT
Start: 2018-10-23

## 2018-10-23 RX ORDER — FAMOTIDINE 20 MG/1
20 TABLET, FILM COATED ORAL DAILY
Status: CANCELLED | OUTPATIENT
Start: 2018-10-23

## 2018-10-23 RX ORDER — ACETAMINOPHEN 325 MG/1
650 TABLET ORAL EVERY 4 HOURS PRN
Status: CANCELLED | OUTPATIENT
Start: 2018-10-23

## 2018-10-23 RX ORDER — SODIUM CHLORIDE 0.9 % (FLUSH) 0.9 %
10 SYRINGE (ML) INJECTION EVERY 12 HOURS SCHEDULED
Status: CANCELLED | OUTPATIENT
Start: 2018-10-23

## 2018-10-23 RX ORDER — ATORVASTATIN CALCIUM 20 MG/1
1 TABLET, FILM COATED ORAL DAILY
Status: CANCELLED | OUTPATIENT
Start: 2018-10-23

## 2018-10-23 RX ORDER — ONDANSETRON 2 MG/ML
4 INJECTION INTRAMUSCULAR; INTRAVENOUS EVERY 30 MIN PRN
Status: DISCONTINUED | OUTPATIENT
Start: 2018-10-23 | End: 2018-10-23 | Stop reason: HOSPADM

## 2018-10-23 RX ORDER — CARVEDILOL 6.25 MG/1
12.5 TABLET ORAL 2 TIMES DAILY
Status: CANCELLED | OUTPATIENT
Start: 2018-10-23

## 2018-10-23 RX ORDER — ALLOPURINOL 100 MG/1
1 TABLET ORAL DAILY
Status: CANCELLED | OUTPATIENT
Start: 2018-10-24

## 2018-10-23 RX ORDER — AMLODIPINE BESYLATE 5 MG/1
2.5 TABLET ORAL DAILY
Status: CANCELLED | OUTPATIENT
Start: 2018-10-24

## 2018-10-23 RX ORDER — CEFUROXIME AXETIL 250 MG/1
250 TABLET ORAL 2 TIMES DAILY
Qty: 14 TABLET | Refills: 0 | Status: SHIPPED | OUTPATIENT
Start: 2018-10-23 | End: 2018-10-30

## 2018-10-23 RX ORDER — ASPIRIN 81 MG/1
81 TABLET ORAL DAILY
Status: CANCELLED | OUTPATIENT
Start: 2018-10-24

## 2018-10-23 RX ADMIN — ONDANSETRON 4 MG: 2 INJECTION INTRAMUSCULAR; INTRAVENOUS at 18:56

## 2018-10-23 RX ADMIN — SODIUM CHLORIDE, POTASSIUM CHLORIDE, SODIUM LACTATE AND CALCIUM CHLORIDE 500 ML: 600; 310; 30; 20 INJECTION, SOLUTION INTRAVENOUS at 18:55

## 2018-10-23 RX ADMIN — FAMOTIDINE 20 MG: 10 INJECTION, SOLUTION INTRAVENOUS at 18:57

## 2018-10-23 ASSESSMENT — PAIN DESCRIPTION - LOCATION: LOCATION: CHEST

## 2018-10-23 ASSESSMENT — PAIN SCALES - GENERAL
PAINLEVEL_OUTOF10: 3
PAINLEVEL_OUTOF10: 0

## 2018-10-25 LAB
ORGANISM: ABNORMAL
URINE CULTURE, ROUTINE: ABNORMAL
URINE CULTURE, ROUTINE: ABNORMAL

## 2018-10-26 PROCEDURE — 93010 ELECTROCARDIOGRAM REPORT: CPT | Performed by: INTERNAL MEDICINE

## 2018-10-29 LAB
EKG ATRIAL RATE: 74 BPM
EKG DIAGNOSIS: NORMAL
EKG P AXIS: 2 DEGREES
EKG P-R INTERVAL: 208 MS
EKG Q-T INTERVAL: 412 MS
EKG QRS DURATION: 114 MS
EKG QTC CALCULATION (BAZETT): 457 MS
EKG R AXIS: -47 DEGREES
EKG T AXIS: 89 DEGREES
EKG VENTRICULAR RATE: 74 BPM

## 2018-10-30 ENCOUNTER — OFFICE VISIT (OUTPATIENT)
Dept: FAMILY MEDICINE CLINIC | Age: 83
End: 2018-10-30
Payer: MEDICARE

## 2018-10-30 VITALS
SYSTOLIC BLOOD PRESSURE: 125 MMHG | DIASTOLIC BLOOD PRESSURE: 72 MMHG | HEIGHT: 60 IN | BODY MASS INDEX: 30.63 KG/M2 | HEART RATE: 65 BPM | WEIGHT: 156 LBS

## 2018-10-30 DIAGNOSIS — R55 SYNCOPE, UNSPECIFIED SYNCOPE TYPE: ICD-10-CM

## 2018-10-30 DIAGNOSIS — I25.10 CORONARY ARTERY DISEASE INVOLVING NATIVE CORONARY ARTERY OF NATIVE HEART WITHOUT ANGINA PECTORIS: ICD-10-CM

## 2018-10-30 DIAGNOSIS — I10 HTN (HYPERTENSION), BENIGN: Primary | ICD-10-CM

## 2018-10-30 DIAGNOSIS — F32.1 MODERATE SINGLE CURRENT EPISODE OF MAJOR DEPRESSIVE DISORDER (HCC): ICD-10-CM

## 2018-10-30 DIAGNOSIS — N30.00 ACUTE CYSTITIS WITHOUT HEMATURIA: ICD-10-CM

## 2018-10-30 DIAGNOSIS — Z23 NEED FOR VACCINATION: ICD-10-CM

## 2018-10-30 DIAGNOSIS — K21.9 GASTROESOPHAGEAL REFLUX DISEASE WITHOUT ESOPHAGITIS: ICD-10-CM

## 2018-10-30 PROCEDURE — 90682 RIV4 VACC RECOMBINANT DNA IM: CPT | Performed by: FAMILY MEDICINE

## 2018-10-30 PROCEDURE — 99215 OFFICE O/P EST HI 40 MIN: CPT | Performed by: FAMILY MEDICINE

## 2018-10-30 PROCEDURE — G0008 ADMIN INFLUENZA VIRUS VAC: HCPCS | Performed by: FAMILY MEDICINE

## 2018-10-30 RX ORDER — PANTOPRAZOLE SODIUM 40 MG/1
40 TABLET, DELAYED RELEASE ORAL DAILY
Qty: 30 TABLET | Refills: 3 | Status: SHIPPED | OUTPATIENT
Start: 2018-10-30 | End: 2019-04-08 | Stop reason: SDUPTHER

## 2018-10-30 ASSESSMENT — ENCOUNTER SYMPTOMS
ABDOMINAL PAIN: 0
NAUSEA: 0
COUGH: 0
CONSTIPATION: 0
EYE PAIN: 0
VOMITING: 0
SORE THROAT: 0
SHORTNESS OF BREATH: 0
COLOR CHANGE: 0
RHINORRHEA: 0
DIARRHEA: 0

## 2018-11-30 DIAGNOSIS — I10 HTN (HYPERTENSION), BENIGN: ICD-10-CM

## 2018-11-30 DIAGNOSIS — M1A.0710 IDIOPATHIC CHRONIC GOUT OF RIGHT FOOT WITHOUT TOPHUS: ICD-10-CM

## 2018-11-30 DIAGNOSIS — I25.10 CORONARY ARTERY DISEASE INVOLVING NATIVE CORONARY ARTERY OF NATIVE HEART WITHOUT ANGINA PECTORIS: ICD-10-CM

## 2018-12-03 RX ORDER — ALLOPURINOL 100 MG/1
TABLET ORAL
Qty: 30 TABLET | Refills: 2 | Status: SHIPPED | OUTPATIENT
Start: 2018-12-03 | End: 2019-04-08 | Stop reason: SDUPTHER

## 2018-12-03 RX ORDER — LISINOPRIL 10 MG/1
TABLET ORAL
Qty: 30 TABLET | Refills: 2 | Status: SHIPPED | OUTPATIENT
Start: 2018-12-03 | End: 2019-04-08 | Stop reason: SDUPTHER

## 2018-12-26 ENCOUNTER — TELEPHONE (OUTPATIENT)
Dept: FAMILY MEDICINE CLINIC | Age: 83
End: 2018-12-26

## 2019-01-28 DIAGNOSIS — I50.22 CHRONIC SYSTOLIC HEART FAILURE (HCC): ICD-10-CM

## 2019-01-28 DIAGNOSIS — F32.1 MODERATE SINGLE CURRENT EPISODE OF MAJOR DEPRESSIVE DISORDER (HCC): ICD-10-CM

## 2019-01-29 RX ORDER — FUROSEMIDE 40 MG/1
TABLET ORAL
Qty: 45 TABLET | Refills: 0 | Status: SHIPPED | OUTPATIENT
Start: 2019-01-29 | End: 2020-10-19

## 2019-01-29 RX ORDER — FLUOXETINE HYDROCHLORIDE 20 MG/1
CAPSULE ORAL
Qty: 90 CAPSULE | Refills: 0 | Status: SHIPPED | OUTPATIENT
Start: 2019-01-29 | End: 2021-12-13 | Stop reason: SDUPTHER

## 2019-02-28 PROBLEM — M19.90 OSTEOARTHRITIS: Status: ACTIVE | Noted: 2019-02-28

## 2019-03-21 ENCOUNTER — CARE COORDINATION (OUTPATIENT)
Dept: CARE COORDINATION | Age: 84
End: 2019-03-21

## 2019-04-01 DIAGNOSIS — M1A.0710 IDIOPATHIC CHRONIC GOUT OF RIGHT FOOT WITHOUT TOPHUS: ICD-10-CM

## 2019-04-01 DIAGNOSIS — I10 HTN (HYPERTENSION), BENIGN: ICD-10-CM

## 2019-04-01 DIAGNOSIS — I25.10 CORONARY ARTERY DISEASE INVOLVING NATIVE CORONARY ARTERY OF NATIVE HEART WITHOUT ANGINA PECTORIS: ICD-10-CM

## 2019-04-01 DIAGNOSIS — K21.9 GASTROESOPHAGEAL REFLUX DISEASE WITHOUT ESOPHAGITIS: ICD-10-CM

## 2019-04-02 RX ORDER — ALLOPURINOL 100 MG/1
TABLET ORAL
Qty: 30 TABLET | Refills: 1 | OUTPATIENT
Start: 2019-04-02

## 2019-04-02 RX ORDER — LISINOPRIL 10 MG/1
TABLET ORAL
Qty: 30 TABLET | Refills: 1 | OUTPATIENT
Start: 2019-04-02

## 2019-04-02 RX ORDER — PANTOPRAZOLE SODIUM 40 MG/1
TABLET, DELAYED RELEASE ORAL
Qty: 30 TABLET | Refills: 2 | OUTPATIENT
Start: 2019-04-02

## 2019-04-08 RX ORDER — ALLOPURINOL 100 MG/1
TABLET ORAL
Qty: 30 TABLET | Refills: 2 | Status: ON HOLD | OUTPATIENT
Start: 2019-04-08 | End: 2020-10-23 | Stop reason: HOSPADM

## 2019-04-08 RX ORDER — LISINOPRIL 10 MG/1
TABLET ORAL
Qty: 30 TABLET | Refills: 2 | Status: SHIPPED | OUTPATIENT
Start: 2019-04-08 | End: 2020-10-19

## 2019-04-08 RX ORDER — PANTOPRAZOLE SODIUM 40 MG/1
40 TABLET, DELAYED RELEASE ORAL DAILY
Qty: 30 TABLET | Refills: 3 | Status: ON HOLD | OUTPATIENT
Start: 2019-04-08 | End: 2021-09-12 | Stop reason: HOSPADM

## 2019-04-26 ENCOUNTER — HOSPITAL ENCOUNTER (OUTPATIENT)
Dept: WOMENS IMAGING | Age: 84
Discharge: HOME | End: 2019-04-26
Payer: MEDICARE

## 2019-04-26 DIAGNOSIS — M81.0 OSTEOPOROSIS, POST-MENOPAUSAL: ICD-10-CM

## 2019-04-26 PROBLEM — M85.859 OSTEOPENIA OF HIP: Status: ACTIVE | Noted: 2019-04-26

## 2019-04-26 PROCEDURE — 77080 DXA BONE DENSITY AXIAL: CPT

## 2020-10-19 ENCOUNTER — APPOINTMENT (OUTPATIENT)
Dept: CT IMAGING | Age: 85
DRG: 291 | End: 2020-10-19
Payer: MEDICARE

## 2020-10-19 ENCOUNTER — APPOINTMENT (OUTPATIENT)
Dept: GENERAL RADIOLOGY | Age: 85
DRG: 291 | End: 2020-10-19
Payer: MEDICARE

## 2020-10-19 ENCOUNTER — HOSPITAL ENCOUNTER (INPATIENT)
Age: 85
LOS: 7 days | Discharge: HOME HEALTH | DRG: 291 | End: 2020-10-26
Attending: STUDENT IN AN ORGANIZED HEALTH CARE EDUCATION/TRAINING PROGRAM | Admitting: STUDENT IN AN ORGANIZED HEALTH CARE EDUCATION/TRAINING PROGRAM
Payer: MEDICARE

## 2020-10-19 PROBLEM — I50.9 ACUTE ON CHRONIC HEART FAILURE (HCC): Status: ACTIVE | Noted: 2020-10-19

## 2020-10-19 LAB
A/G RATIO: 1.1 (ref 1.1–2.2)
ALBUMIN SERPL-MCNC: 3.9 G/DL (ref 3.4–5)
ALP BLD-CCNC: 85 U/L (ref 40–129)
ALT SERPL-CCNC: 11 U/L (ref 10–40)
ANION GAP SERPL CALCULATED.3IONS-SCNC: 14 MMOL/L (ref 3–16)
AST SERPL-CCNC: 17 U/L (ref 15–37)
BASOPHILS ABSOLUTE: 0 K/UL (ref 0–0.2)
BASOPHILS RELATIVE PERCENT: 0 %
BILIRUB SERPL-MCNC: 0.4 MG/DL (ref 0–1)
BUN BLDV-MCNC: 32 MG/DL (ref 7–20)
CALCIUM SERPL-MCNC: 8.8 MG/DL (ref 8.3–10.6)
CHLORIDE BLD-SCNC: 99 MMOL/L (ref 99–110)
CO2: 24 MMOL/L (ref 21–32)
CREAT SERPL-MCNC: 1.4 MG/DL (ref 0.6–1.2)
EOSINOPHILS ABSOLUTE: 0.3 K/UL (ref 0–0.6)
EOSINOPHILS RELATIVE PERCENT: 2 %
GFR AFRICAN AMERICAN: 43
GFR NON-AFRICAN AMERICAN: 36
GLOBULIN: 3.7 G/DL
GLUCOSE BLD-MCNC: 111 MG/DL (ref 70–99)
HCT VFR BLD CALC: 38.5 % (ref 36–48)
HEMOGLOBIN: 12.6 G/DL (ref 12–16)
LACTIC ACID, SEPSIS: 1.5 MMOL/L (ref 0.4–1.9)
LYMPHOCYTES ABSOLUTE: 2.7 K/UL (ref 1–5.1)
LYMPHOCYTES RELATIVE PERCENT: 21 %
MCH RBC QN AUTO: 30.9 PG (ref 26–34)
MCHC RBC AUTO-ENTMCNC: 32.7 G/DL (ref 31–36)
MCV RBC AUTO: 94.3 FL (ref 80–100)
MONOCYTES ABSOLUTE: 1 K/UL (ref 0–1.3)
MONOCYTES RELATIVE PERCENT: 8 %
NEUTROPHILS ABSOLUTE: 8.9 K/UL (ref 1.7–7.7)
NEUTROPHILS RELATIVE PERCENT: 69 %
PDW BLD-RTO: 14.6 % (ref 12.4–15.4)
PLATELET # BLD: 286 K/UL (ref 135–450)
PMV BLD AUTO: 9.7 FL (ref 5–10.5)
POTASSIUM REFLEX MAGNESIUM: 4.1 MMOL/L (ref 3.5–5.1)
PRO-BNP: 6270 PG/ML (ref 0–449)
RBC # BLD: 4.09 M/UL (ref 4–5.2)
SARS-COV-2, NAAT: NOT DETECTED
SODIUM BLD-SCNC: 137 MMOL/L (ref 136–145)
TOTAL PROTEIN: 7.6 G/DL (ref 6.4–8.2)
TROPONIN: <0.01 NG/ML
TSH REFLEX: 1.65 UIU/ML (ref 0.27–4.2)
WBC # BLD: 12.9 K/UL (ref 4–11)

## 2020-10-19 PROCEDURE — 96374 THER/PROPH/DIAG INJ IV PUSH: CPT

## 2020-10-19 PROCEDURE — 94761 N-INVAS EAR/PLS OXIMETRY MLT: CPT

## 2020-10-19 PROCEDURE — 71260 CT THORAX DX C+: CPT

## 2020-10-19 PROCEDURE — 96375 TX/PRO/DX INJ NEW DRUG ADDON: CPT

## 2020-10-19 PROCEDURE — 93005 ELECTROCARDIOGRAM TRACING: CPT | Performed by: NURSE PRACTITIONER

## 2020-10-19 PROCEDURE — 6360000004 HC RX CONTRAST MEDICATION: Performed by: NURSE PRACTITIONER

## 2020-10-19 PROCEDURE — 1200000000 HC SEMI PRIVATE

## 2020-10-19 PROCEDURE — 6360000002 HC RX W HCPCS: Performed by: NURSE PRACTITIONER

## 2020-10-19 PROCEDURE — 84443 ASSAY THYROID STIM HORMONE: CPT

## 2020-10-19 PROCEDURE — 94660 CPAP INITIATION&MGMT: CPT

## 2020-10-19 PROCEDURE — 99285 EMERGENCY DEPT VISIT HI MDM: CPT

## 2020-10-19 PROCEDURE — 81001 URINALYSIS AUTO W/SCOPE: CPT

## 2020-10-19 PROCEDURE — 85025 COMPLETE CBC W/AUTO DIFF WBC: CPT

## 2020-10-19 PROCEDURE — 6370000000 HC RX 637 (ALT 250 FOR IP): Performed by: NURSE PRACTITIONER

## 2020-10-19 PROCEDURE — U0003 INFECTIOUS AGENT DETECTION BY NUCLEIC ACID (DNA OR RNA); SEVERE ACUTE RESPIRATORY SYNDROME CORONAVIRUS 2 (SARS-COV-2) (CORONAVIRUS DISEASE [COVID-19]), AMPLIFIED PROBE TECHNIQUE, MAKING USE OF HIGH THROUGHPUT TECHNOLOGIES AS DESCRIBED BY CMS-2020-01-R: HCPCS

## 2020-10-19 PROCEDURE — 94640 AIRWAY INHALATION TREATMENT: CPT

## 2020-10-19 PROCEDURE — 80053 COMPREHEN METABOLIC PANEL: CPT

## 2020-10-19 PROCEDURE — 83880 ASSAY OF NATRIURETIC PEPTIDE: CPT

## 2020-10-19 PROCEDURE — 83605 ASSAY OF LACTIC ACID: CPT

## 2020-10-19 PROCEDURE — U0002 COVID-19 LAB TEST NON-CDC: HCPCS

## 2020-10-19 PROCEDURE — 84484 ASSAY OF TROPONIN QUANT: CPT

## 2020-10-19 PROCEDURE — 2700000000 HC OXYGEN THERAPY PER DAY

## 2020-10-19 PROCEDURE — 71045 X-RAY EXAM CHEST 1 VIEW: CPT

## 2020-10-19 PROCEDURE — 36415 COLL VENOUS BLD VENIPUNCTURE: CPT

## 2020-10-19 RX ORDER — FUROSEMIDE 10 MG/ML
40 INJECTION INTRAMUSCULAR; INTRAVENOUS ONCE
Status: COMPLETED | OUTPATIENT
Start: 2020-10-19 | End: 2020-10-19

## 2020-10-19 RX ORDER — IPRATROPIUM BROMIDE AND ALBUTEROL SULFATE 2.5; .5 MG/3ML; MG/3ML
1 SOLUTION RESPIRATORY (INHALATION) ONCE
Status: DISCONTINUED | OUTPATIENT
Start: 2020-10-19 | End: 2020-10-19

## 2020-10-19 RX ORDER — DONEPEZIL HYDROCHLORIDE 5 MG/1
5 TABLET, FILM COATED ORAL NIGHTLY
COMMUNITY
End: 2021-12-13 | Stop reason: SDUPTHER

## 2020-10-19 RX ORDER — METHYLPREDNISOLONE SODIUM SUCCINATE 125 MG/2ML
125 INJECTION, POWDER, LYOPHILIZED, FOR SOLUTION INTRAMUSCULAR; INTRAVENOUS ONCE
Status: COMPLETED | OUTPATIENT
Start: 2020-10-19 | End: 2020-10-19

## 2020-10-19 RX ORDER — METOPROLOL SUCCINATE 50 MG/1
50 TABLET, EXTENDED RELEASE ORAL DAILY
Status: ON HOLD | COMMUNITY
End: 2020-10-23 | Stop reason: HOSPADM

## 2020-10-19 RX ORDER — ALBUTEROL SULFATE 2.5 MG/3ML
5 SOLUTION RESPIRATORY (INHALATION) ONCE
Status: COMPLETED | OUTPATIENT
Start: 2020-10-19 | End: 2020-10-19

## 2020-10-19 RX ORDER — DILTIAZEM HYDROCHLORIDE 5 MG/ML
10 INJECTION INTRAVENOUS ONCE
Status: DISCONTINUED | OUTPATIENT
Start: 2020-10-19 | End: 2020-10-19

## 2020-10-19 RX ORDER — LORAZEPAM 2 MG/ML
0.5 INJECTION INTRAMUSCULAR ONCE
Status: COMPLETED | OUTPATIENT
Start: 2020-10-19 | End: 2020-10-19

## 2020-10-19 RX ORDER — LORATADINE 10 MG/1
10 CAPSULE, LIQUID FILLED ORAL DAILY PRN
COMMUNITY

## 2020-10-19 RX ORDER — OMEGA-3S/DHA/EPA/FISH OIL/D3 300MG-1000
400 CAPSULE ORAL DAILY
COMMUNITY
End: 2021-12-08

## 2020-10-19 RX ORDER — ASPIRIN 81 MG/1
324 TABLET, CHEWABLE ORAL ONCE
Status: COMPLETED | OUTPATIENT
Start: 2020-10-19 | End: 2020-10-19

## 2020-10-19 RX ORDER — M-VIT,TX,IRON,MINS/CALC/FOLIC 27MG-0.4MG
1 TABLET ORAL DAILY
COMMUNITY

## 2020-10-19 RX ORDER — DILTIAZEM HYDROCHLORIDE 5 MG/ML
5 INJECTION INTRAVENOUS ONCE
Status: DISCONTINUED | OUTPATIENT
Start: 2020-10-19 | End: 2020-10-26 | Stop reason: HOSPADM

## 2020-10-19 RX ORDER — DILTIAZEM HYDROCHLORIDE 5 MG/ML
INJECTION INTRAVENOUS
Status: DISPENSED
Start: 2020-10-19 | End: 2020-10-20

## 2020-10-19 RX ORDER — FUROSEMIDE 40 MG/1
40 TABLET ORAL DAILY
Status: ON HOLD | COMMUNITY
End: 2021-10-31 | Stop reason: SDUPTHER

## 2020-10-19 RX ORDER — LISINOPRIL 10 MG/1
10 TABLET ORAL DAILY
Status: ON HOLD | COMMUNITY
End: 2021-10-31 | Stop reason: HOSPADM

## 2020-10-19 RX ORDER — IPRATROPIUM BROMIDE AND ALBUTEROL SULFATE 2.5; .5 MG/3ML; MG/3ML
3 SOLUTION RESPIRATORY (INHALATION) ONCE
Status: COMPLETED | OUTPATIENT
Start: 2020-10-19 | End: 2020-10-19

## 2020-10-19 RX ADMIN — FUROSEMIDE 40 MG: 10 INJECTION, SOLUTION INTRAMUSCULAR; INTRAVENOUS at 23:26

## 2020-10-19 RX ADMIN — ALBUTEROL SULFATE 5 MG: 2.5 SOLUTION RESPIRATORY (INHALATION) at 23:34

## 2020-10-19 RX ADMIN — NITROGLYCERIN 0.5 INCH: 20 OINTMENT TOPICAL at 23:55

## 2020-10-19 RX ADMIN — LORAZEPAM 0.5 MG: 2 INJECTION INTRAMUSCULAR; INTRAVENOUS at 23:25

## 2020-10-19 RX ADMIN — IOPAMIDOL 75 ML: 755 INJECTION, SOLUTION INTRAVENOUS at 21:56

## 2020-10-19 RX ADMIN — IPRATROPIUM BROMIDE AND ALBUTEROL SULFATE 3 AMPULE: .5; 3 SOLUTION RESPIRATORY (INHALATION) at 20:30

## 2020-10-19 RX ADMIN — ASPIRIN 324 MG: 81 TABLET, CHEWABLE ORAL at 22:31

## 2020-10-19 RX ADMIN — METHYLPREDNISOLONE SODIUM SUCCINATE 125 MG: 125 INJECTION, POWDER, FOR SOLUTION INTRAMUSCULAR; INTRAVENOUS at 20:45

## 2020-10-19 RX ADMIN — FUROSEMIDE 40 MG: 10 INJECTION, SOLUTION INTRAMUSCULAR; INTRAVENOUS at 21:43

## 2020-10-19 ASSESSMENT — PAIN DESCRIPTION - ONSET
ONSET: ON-GOING
ONSET: ON-GOING

## 2020-10-19 ASSESSMENT — PAIN DESCRIPTION - FREQUENCY
FREQUENCY: CONTINUOUS
FREQUENCY: CONTINUOUS

## 2020-10-19 ASSESSMENT — PAIN DESCRIPTION - DESCRIPTORS
DESCRIPTORS: DISCOMFORT;ACHING
DESCRIPTORS: ACHING

## 2020-10-19 ASSESSMENT — PAIN - FUNCTIONAL ASSESSMENT
PAIN_FUNCTIONAL_ASSESSMENT: ACTIVITIES ARE NOT PREVENTED
PAIN_FUNCTIONAL_ASSESSMENT: ACTIVITIES ARE NOT PREVENTED

## 2020-10-19 ASSESSMENT — PAIN DESCRIPTION - LOCATION
LOCATION: CHEST
LOCATION: CHEST

## 2020-10-19 ASSESSMENT — PAIN DESCRIPTION - PAIN TYPE
TYPE: ACUTE PAIN
TYPE: ACUTE PAIN

## 2020-10-19 ASSESSMENT — PAIN DESCRIPTION - ORIENTATION
ORIENTATION: MID
ORIENTATION: MID

## 2020-10-19 ASSESSMENT — HEART SCORE: ECG: 1

## 2020-10-19 ASSESSMENT — PAIN SCALES - GENERAL
PAINLEVEL_OUTOF10: 4
PAINLEVEL_OUTOF10: 7

## 2020-10-19 ASSESSMENT — PAIN DESCRIPTION - PROGRESSION
CLINICAL_PROGRESSION: RAPIDLY WORSENING
CLINICAL_PROGRESSION: NOT CHANGED

## 2020-10-20 LAB
ANION GAP SERPL CALCULATED.3IONS-SCNC: 16 MMOL/L (ref 3–16)
BACTERIA: ABNORMAL /HPF
BASE EXCESS ARTERIAL: -4.5 MMOL/L (ref -3–3)
BASE EXCESS ARTERIAL: -5.2 MMOL/L (ref -3–3)
BASOPHILS ABSOLUTE: 0 K/UL (ref 0–0.2)
BASOPHILS RELATIVE PERCENT: 0.1 %
BILIRUBIN URINE: NEGATIVE
BLOOD, URINE: NEGATIVE
BUN BLDV-MCNC: 34 MG/DL (ref 7–20)
C DIFF TOXIN/ANTIGEN: NORMAL
CALCIUM SERPL-MCNC: 8.9 MG/DL (ref 8.3–10.6)
CARBOXYHEMOGLOBIN ARTERIAL: 0.6 % (ref 0–1.5)
CARBOXYHEMOGLOBIN ARTERIAL: 0.7 % (ref 0–1.5)
CHLORIDE BLD-SCNC: 100 MMOL/L (ref 99–110)
CLARITY: CLEAR
CO2: 21 MMOL/L (ref 21–32)
COLOR: YELLOW
CREAT SERPL-MCNC: 1.6 MG/DL (ref 0.6–1.2)
EKG ATRIAL RATE: 119 BPM
EKG ATRIAL RATE: 89 BPM
EKG ATRIAL RATE: 91 BPM
EKG DIAGNOSIS: NORMAL
EKG P AXIS: 36 DEGREES
EKG P AXIS: 54 DEGREES
EKG P-R INTERVAL: 152 MS
EKG P-R INTERVAL: 90 MS
EKG Q-T INTERVAL: 390 MS
EKG Q-T INTERVAL: 416 MS
EKG Q-T INTERVAL: 420 MS
EKG QRS DURATION: 158 MS
EKG QRS DURATION: 162 MS
EKG QRS DURATION: 166 MS
EKG QTC CALCULATION (BAZETT): 511 MS
EKG QTC CALCULATION (BAZETT): 516 MS
EKG QTC CALCULATION (BAZETT): 548 MS
EKG R AXIS: -39 DEGREES
EKG R AXIS: -44 DEGREES
EKG R AXIS: -47 DEGREES
EKG T AXIS: 113 DEGREES
EKG T AXIS: 115 DEGREES
EKG T AXIS: 121 DEGREES
EKG VENTRICULAR RATE: 119 BPM
EKG VENTRICULAR RATE: 91 BPM
EKG VENTRICULAR RATE: 91 BPM
EOSINOPHILS ABSOLUTE: 0 K/UL (ref 0–0.6)
EOSINOPHILS RELATIVE PERCENT: 0 %
EPITHELIAL CELLS, UA: 1 /HPF (ref 0–5)
GFR AFRICAN AMERICAN: 37
GFR NON-AFRICAN AMERICAN: 31
GLUCOSE BLD-MCNC: 160 MG/DL (ref 70–99)
GLUCOSE URINE: NEGATIVE MG/DL
HCO3 ARTERIAL: 21.3 MMOL/L (ref 21–29)
HCO3 ARTERIAL: 21.7 MMOL/L (ref 21–29)
HCT VFR BLD CALC: 36.6 % (ref 36–48)
HEMOGLOBIN, ART, EXTENDED: 12.6 G/DL (ref 12–16)
HEMOGLOBIN, ART, EXTENDED: 12.9 G/DL (ref 12–16)
HEMOGLOBIN: 11.8 G/DL (ref 12–16)
HYALINE CASTS: 0 /LPF (ref 0–8)
KETONES, URINE: NEGATIVE MG/DL
LEUKOCYTE ESTERASE, URINE: ABNORMAL
LYMPHOCYTES ABSOLUTE: 0.5 K/UL (ref 1–5.1)
LYMPHOCYTES RELATIVE PERCENT: 6.2 %
MCH RBC QN AUTO: 30.7 PG (ref 26–34)
MCHC RBC AUTO-ENTMCNC: 32.3 G/DL (ref 31–36)
MCV RBC AUTO: 94.9 FL (ref 80–100)
METHEMOGLOBIN ARTERIAL: 0.5 %
METHEMOGLOBIN ARTERIAL: 0.7 %
MICROSCOPIC EXAMINATION: YES
MONOCYTES ABSOLUTE: 0.1 K/UL (ref 0–1.3)
MONOCYTES RELATIVE PERCENT: 1.1 %
NEUTROPHILS ABSOLUTE: 7.4 K/UL (ref 1.7–7.7)
NEUTROPHILS RELATIVE PERCENT: 92.6 %
NITRITE, URINE: NEGATIVE
O2 CONTENT ARTERIAL: 18 ML/DL
O2 CONTENT ARTERIAL: 19 ML/DL
O2 SAT, ARTERIAL: 100 %
O2 SAT, ARTERIAL: 100 %
O2 THERAPY: ABNORMAL
O2 THERAPY: ABNORMAL
PCO2 ARTERIAL: 43.3 MMHG (ref 35–45)
PCO2 ARTERIAL: 44.2 MMHG (ref 35–45)
PDW BLD-RTO: 14.7 % (ref 12.4–15.4)
PH ARTERIAL: 7.29 (ref 7.35–7.45)
PH ARTERIAL: 7.31 (ref 7.35–7.45)
PH UA: 5.5 (ref 5–8)
PLATELET # BLD: 260 K/UL (ref 135–450)
PMV BLD AUTO: 9.9 FL (ref 5–10.5)
PO2 ARTERIAL: 156 MMHG (ref 75–108)
PO2 ARTERIAL: 408 MMHG (ref 75–108)
POTASSIUM REFLEX MAGNESIUM: 4.4 MMOL/L (ref 3.5–5.1)
PROTEIN UA: 30 MG/DL
RBC # BLD: 3.86 M/UL (ref 4–5.2)
RBC UA: 0 /HPF (ref 0–4)
SARS-COV-2: NOT DETECTED
SODIUM BLD-SCNC: 137 MMOL/L (ref 136–145)
SPECIFIC GRAVITY UA: 1.02 (ref 1–1.03)
TCO2 ARTERIAL: 22.6 MMOL/L
TCO2 ARTERIAL: 23 MMOL/L
TROPONIN: <0.01 NG/ML
URINE REFLEX TO CULTURE: ABNORMAL
URINE TYPE: ABNORMAL
UROBILINOGEN, URINE: 0.2 E.U./DL
WBC # BLD: 8 K/UL (ref 4–11)
WBC UA: 9 /HPF (ref 0–5)

## 2020-10-20 PROCEDURE — 6370000000 HC RX 637 (ALT 250 FOR IP): Performed by: NURSE PRACTITIONER

## 2020-10-20 PROCEDURE — 80048 BASIC METABOLIC PNL TOTAL CA: CPT

## 2020-10-20 PROCEDURE — 36415 COLL VENOUS BLD VENIPUNCTURE: CPT

## 2020-10-20 PROCEDURE — 87449 NOS EACH ORGANISM AG IA: CPT

## 2020-10-20 PROCEDURE — 6360000002 HC RX W HCPCS: Performed by: NURSE PRACTITIONER

## 2020-10-20 PROCEDURE — 2580000003 HC RX 258: Performed by: NURSE PRACTITIONER

## 2020-10-20 PROCEDURE — 2060000000 HC ICU INTERMEDIATE R&B

## 2020-10-20 PROCEDURE — 87324 CLOSTRIDIUM AG IA: CPT

## 2020-10-20 PROCEDURE — 94761 N-INVAS EAR/PLS OXIMETRY MLT: CPT

## 2020-10-20 PROCEDURE — 6360000002 HC RX W HCPCS: Performed by: INTERNAL MEDICINE

## 2020-10-20 PROCEDURE — 85025 COMPLETE CBC W/AUTO DIFF WBC: CPT

## 2020-10-20 PROCEDURE — 94660 CPAP INITIATION&MGMT: CPT

## 2020-10-20 PROCEDURE — 2700000000 HC OXYGEN THERAPY PER DAY

## 2020-10-20 PROCEDURE — 6360000002 HC RX W HCPCS: Performed by: STUDENT IN AN ORGANIZED HEALTH CARE EDUCATION/TRAINING PROGRAM

## 2020-10-20 PROCEDURE — 99223 1ST HOSP IP/OBS HIGH 75: CPT | Performed by: INTERNAL MEDICINE

## 2020-10-20 PROCEDURE — 93010 ELECTROCARDIOGRAM REPORT: CPT | Performed by: INTERNAL MEDICINE

## 2020-10-20 PROCEDURE — 82803 BLOOD GASES ANY COMBINATION: CPT

## 2020-10-20 PROCEDURE — 36600 WITHDRAWAL OF ARTERIAL BLOOD: CPT

## 2020-10-20 RX ORDER — PROMETHAZINE HYDROCHLORIDE 25 MG/1
12.5 TABLET ORAL EVERY 6 HOURS PRN
Status: DISCONTINUED | OUTPATIENT
Start: 2020-10-20 | End: 2020-10-26 | Stop reason: HOSPADM

## 2020-10-20 RX ORDER — FLUOXETINE HYDROCHLORIDE 20 MG/1
20 CAPSULE ORAL DAILY
Status: DISCONTINUED | OUTPATIENT
Start: 2020-10-21 | End: 2020-10-26 | Stop reason: HOSPADM

## 2020-10-20 RX ORDER — ASPIRIN 81 MG/1
81 TABLET, CHEWABLE ORAL DAILY
Status: DISCONTINUED | OUTPATIENT
Start: 2020-10-20 | End: 2020-10-26 | Stop reason: HOSPADM

## 2020-10-20 RX ORDER — METOPROLOL SUCCINATE 50 MG/1
50 TABLET, EXTENDED RELEASE ORAL DAILY
Status: DISCONTINUED | OUTPATIENT
Start: 2020-10-20 | End: 2020-10-21

## 2020-10-20 RX ORDER — FUROSEMIDE 10 MG/ML
60 INJECTION INTRAMUSCULAR; INTRAVENOUS ONCE
Status: COMPLETED | OUTPATIENT
Start: 2020-10-20 | End: 2020-10-20

## 2020-10-20 RX ORDER — POLYETHYLENE GLYCOL 3350 17 G/17G
17 POWDER, FOR SOLUTION ORAL DAILY PRN
Status: DISCONTINUED | OUTPATIENT
Start: 2020-10-20 | End: 2020-10-26 | Stop reason: HOSPADM

## 2020-10-20 RX ORDER — FUROSEMIDE 10 MG/ML
60 INJECTION INTRAMUSCULAR; INTRAVENOUS 2 TIMES DAILY
Status: DISCONTINUED | OUTPATIENT
Start: 2020-10-20 | End: 2020-10-21

## 2020-10-20 RX ORDER — DONEPEZIL HYDROCHLORIDE 5 MG/1
5 TABLET, FILM COATED ORAL NIGHTLY
Status: DISCONTINUED | OUTPATIENT
Start: 2020-10-20 | End: 2020-10-26 | Stop reason: HOSPADM

## 2020-10-20 RX ORDER — SODIUM CHLORIDE 0.9 % (FLUSH) 0.9 %
10 SYRINGE (ML) INJECTION EVERY 12 HOURS SCHEDULED
Status: DISCONTINUED | OUTPATIENT
Start: 2020-10-20 | End: 2020-10-26 | Stop reason: HOSPADM

## 2020-10-20 RX ORDER — ACETAMINOPHEN 650 MG/1
650 SUPPOSITORY RECTAL EVERY 6 HOURS PRN
Status: DISCONTINUED | OUTPATIENT
Start: 2020-10-20 | End: 2020-10-26 | Stop reason: HOSPADM

## 2020-10-20 RX ORDER — ATORVASTATIN CALCIUM 20 MG/1
20 TABLET, FILM COATED ORAL NIGHTLY
Status: DISCONTINUED | OUTPATIENT
Start: 2020-10-20 | End: 2020-10-26 | Stop reason: HOSPADM

## 2020-10-20 RX ORDER — ALLOPURINOL 100 MG/1
50 TABLET ORAL DAILY
Status: DISCONTINUED | OUTPATIENT
Start: 2020-10-20 | End: 2020-10-26 | Stop reason: HOSPADM

## 2020-10-20 RX ORDER — PANTOPRAZOLE SODIUM 40 MG/1
40 TABLET, DELAYED RELEASE ORAL
Status: DISCONTINUED | OUTPATIENT
Start: 2020-10-20 | End: 2020-10-26 | Stop reason: HOSPADM

## 2020-10-20 RX ORDER — ONDANSETRON 2 MG/ML
4 INJECTION INTRAMUSCULAR; INTRAVENOUS EVERY 6 HOURS PRN
Status: DISCONTINUED | OUTPATIENT
Start: 2020-10-20 | End: 2020-10-26 | Stop reason: HOSPADM

## 2020-10-20 RX ORDER — FUROSEMIDE 10 MG/ML
40 INJECTION INTRAMUSCULAR; INTRAVENOUS DAILY
Status: DISCONTINUED | OUTPATIENT
Start: 2020-10-20 | End: 2020-10-20

## 2020-10-20 RX ORDER — ACETAMINOPHEN 325 MG/1
650 TABLET ORAL EVERY 6 HOURS PRN
Status: DISCONTINUED | OUTPATIENT
Start: 2020-10-20 | End: 2020-10-26 | Stop reason: HOSPADM

## 2020-10-20 RX ORDER — SODIUM CHLORIDE 0.9 % (FLUSH) 0.9 %
10 SYRINGE (ML) INJECTION PRN
Status: DISCONTINUED | OUTPATIENT
Start: 2020-10-20 | End: 2020-10-26 | Stop reason: HOSPADM

## 2020-10-20 RX ORDER — FUROSEMIDE 10 MG/ML
60 INJECTION INTRAMUSCULAR; INTRAVENOUS DAILY
Status: DISCONTINUED | OUTPATIENT
Start: 2020-10-21 | End: 2020-10-20

## 2020-10-20 RX ADMIN — SODIUM CHLORIDE, PRESERVATIVE FREE 10 ML: 5 INJECTION INTRAVENOUS at 20:27

## 2020-10-20 RX ADMIN — ENOXAPARIN SODIUM 40 MG: 40 INJECTION SUBCUTANEOUS at 01:57

## 2020-10-20 RX ADMIN — METOPROLOL SUCCINATE 50 MG: 50 TABLET, EXTENDED RELEASE ORAL at 16:07

## 2020-10-20 RX ADMIN — SODIUM CHLORIDE, PRESERVATIVE FREE 10 ML: 5 INJECTION INTRAVENOUS at 08:38

## 2020-10-20 RX ADMIN — ATORVASTATIN CALCIUM 20 MG: 20 TABLET, FILM COATED ORAL at 20:25

## 2020-10-20 RX ADMIN — ENOXAPARIN SODIUM 30 MG: 30 INJECTION SUBCUTANEOUS at 08:33

## 2020-10-20 RX ADMIN — ASPIRIN 81 MG: 81 TABLET, CHEWABLE ORAL at 08:41

## 2020-10-20 RX ADMIN — DONEPEZIL HYDROCHLORIDE 5 MG: 5 TABLET, FILM COATED ORAL at 20:25

## 2020-10-20 RX ADMIN — FUROSEMIDE 60 MG: 10 INJECTION, SOLUTION INTRAMUSCULAR; INTRAVENOUS at 22:28

## 2020-10-20 RX ADMIN — ACETAMINOPHEN 650 MG: 325 TABLET ORAL at 20:25

## 2020-10-20 RX ADMIN — PANTOPRAZOLE SODIUM 40 MG: 40 TABLET, DELAYED RELEASE ORAL at 13:12

## 2020-10-20 RX ADMIN — FUROSEMIDE 40 MG: 10 INJECTION, SOLUTION INTRAMUSCULAR; INTRAVENOUS at 08:38

## 2020-10-20 RX ADMIN — ALLOPURINOL 50 MG: 100 TABLET ORAL at 13:12

## 2020-10-20 RX ADMIN — FUROSEMIDE 60 MG: 10 INJECTION, SOLUTION INTRAMUSCULAR; INTRAVENOUS at 18:38

## 2020-10-20 ASSESSMENT — PAIN SCALES - GENERAL
PAINLEVEL_OUTOF10: 0
PAINLEVEL_OUTOF10: 3
PAINLEVEL_OUTOF10: 0

## 2020-10-20 ASSESSMENT — PAIN DESCRIPTION - FREQUENCY: FREQUENCY: INTERMITTENT

## 2020-10-20 ASSESSMENT — PAIN DESCRIPTION - ONSET: ONSET: PROGRESSIVE

## 2020-10-20 ASSESSMENT — PAIN DESCRIPTION - PROGRESSION: CLINICAL_PROGRESSION: RAPIDLY WORSENING

## 2020-10-20 ASSESSMENT — PAIN DESCRIPTION - LOCATION: LOCATION: HEAD

## 2020-10-20 ASSESSMENT — PAIN DESCRIPTION - ORIENTATION: ORIENTATION: ANTERIOR;UPPER

## 2020-10-20 ASSESSMENT — PAIN - FUNCTIONAL ASSESSMENT: PAIN_FUNCTIONAL_ASSESSMENT: ACTIVITIES ARE NOT PREVENTED

## 2020-10-20 ASSESSMENT — PAIN DESCRIPTION - DESCRIPTORS: DESCRIPTORS: ACHING;HEADACHE

## 2020-10-20 ASSESSMENT — PAIN DESCRIPTION - PAIN TYPE: TYPE: ACUTE PAIN

## 2020-10-20 NOTE — CARE COORDINATION
INITIAL CASE MANAGEMENT ASSESSMENT    Reviewed chart, patient is confused, call to her daughter, Karsten Tracy, who states [facility]. POA for the patient and also the patient's spouse, to assess possible discharge needs. Explained Case Management role/services. Living Situation: confirmed address, pt and her spouse live with her daughter and grandchildren and great-grandchildren in a 2 story home with 1st floor living, 3 steps to enter    ADLs: independent with family assisting as needed due to forgetfulness     DME: has a rollator and cane that she uses as needed    PT/OT Recs: not ordered at this time, may need evaluation prior to D/C - [employer] message sent to Dr. Natalie Corrales to see if order is needed     Active Services: none     Transportation: daughter and granddaughter transport,  also drives as needed, patient does not drive     Medications: confirmed [employer], uses [facility] [address] without issues    PCP: FERNANDO Boateng      HD/PD: N/A    PLAN/COMMENTS: Plan is for her to return home with family. Request sent for possible PT/OT evaluation. Will need to follow for recommendations. CM provided contact information for patient or family to call with any questions. CM will follow and assist as needed.     Electronically signed by Yordan Houston RN on 10/20/2020 at 4:30 PM

## 2020-10-20 NOTE — CONSULTS
[address]    [facility]  1935 October 20, 2020    Reason for Consult: CHF    CC: Shortness of breath    HPI:  The patient is 80 y.o. female with a past medical history significant for dementia, CKD and essential hypertension who presented to [facility] ED with shortness of breath and chest pain. Renata Gross states that evens family thought she was having a heart attack. She says rudy couldn't breath and she had pain in the middle of her chest. She was coughing. The patient diud noit think she was having a heart attack. She does not knw why she was short of breath but had jhust woken up. She states that she normall sleeps a little elevated. She does relate occasional episodic chest pain. The pain weill occur at rest. She us unsure of how long the pain lasted. The pain did worsen with exertion. She was \"haviong a hard time \"catching my breath\". Review of Systems:  Constitutional: No fatigue, weakness, night sweats or fever. HEENT: No new vision difficulties or ringing in the ears. Respiratory: No new SOB, PND, orthopnea or cough. Cardiovascular: See HPI   GI: No n/v, diarrhea, constipation, abdominal pain or changes in bowel habits. No melena, no hematochezia  : No urinary frequency, urgency, incontinence, hematuria or dysuria. Skin: No cyanosis or skin lesions. Musculoskeletal: No new muscle or joint pain. Neurological: No syncope or TIA-like symptoms.   Psychiatric: No anxiety, insomnia or depression     Past Medical History:   Diagnosis Date    Chronic kidney disease [facility]CKD[facility] IV (severe) ([facility])     Coronary artery disease involving native coronary artery of native heart without angina pectoris 1/5/2016    GERD (gastroesophageal reflux disease) acid reflux    Gout, chronic     Histoplasmosis     Hyperlipidemia     Hypertension     Irregular heart beat     Osteoarthritis 2/28/2019    Knee, leg     Osteopenia of hip 04/26/2019    Pneumonia  Unspecified cerebral artery occlusion with cerebral infarction 2014 , ~ 2012     Past Surgical History:   Procedure Laterality Date    BLADDER SUSPENSION      CHOLECYSTECTOMY  Orthopedic surgery Left arm    HYSTERECTOMY      TUBAL LIGATION       Family History   Problem Relation Age of Onset    High Blood Pressure Mother     Heart Disease Mother     Diabetes Mother     High Blood Pressure Father     Heart Disease Father     Cancer Father         Brain    Cancer Sister         lymphoma    Cancer Brother         colon    Cancer Maternal Uncle      Social History     Tobacco Use    Smoking status: Passive Smoke Exposure - Never Smoker    Smokeless tobacco: Never Used   Substance Use Topics    Alcohol use: No     Comment: 1 yearly    Drug use: No       Allergies   Allergen Reactions    Codeine Shortness Of Breath     Rash and dyspnea    Morphine And Related      Current Facility-Administered Medications   Medication Dose Route Frequency Provider Last Rate Last Dose    allopurinol (ZYLOPRIM) tablet 50 mg  50 mg Oral Daily Katie Latif, APRN - CNP        aspirin chewable tablet 81 mg  81 mg Oral Daily Katie Latif APRN - CNP   81 mg at 10/20/20 0841    atorvastatin (LIPITOR) tablet 20 mg  1 tablet Oral Daily Katie Latif, APRN - CNP        donepezil (ARICEPT) tablet 5 mg  5 mg Oral Nightly Katie Latif APRN - CNP        FLUoxetine (PROZAC) capsule 20 mg  20 mg Oral Daily Katie Latif, APRN - CNP        metoprolol succinate (TOPROL XL) extended release tablet 50 mg  50 mg Oral Daily Katie Latif, APRN - CNP        pantoprazole (PROTONIX) tablet 40 mg  40 mg Oral Daily Katie Latif, APRN - CNP        furosemide (LASIX) injection 40 mg  40 mg Intravenous Daily FERNANDO Brenner - CNP   40 mg at 10/20/20 0838    sodium chloride flush 0.9 % injection 10 mL  10 mL Intravenous 2 times per day FERNANDO Gtz CNP   10 mL at 10/20/20 0838    sodium chloride flush 0.9 % injection 10 mL  10 mL Intravenous PRN Katie S Puthoff, APRN - CNP        acetaminophen (TYLENOL) tablet 650 mg  650 mg Oral Q6H PRN Katie S Puthoff, APRN - CNP        Or    acetaminophen (TYLENOL) suppository 650 mg  650 mg Rectal Q6H PRN Katie S Puthoff, APRN - CNP        polyethylene glycol (GLYCOLAX) packet 17 g  17 g Oral Daily PRN Katie S Puthoff, APRN - CNP        promethazine (PHENERGAN) tablet 12.5 mg  12.5 mg Oral Q6H PRN Katie S Puthoff, APRN - CNP        Or    ondansetron (ZOFRAN) injection 4 mg  4 mg Intravenous Q6H PRN Katie S Puthoff, APRN - CNP        perflutren lipid microspheres (DEFINITY) injection 1.65 mg  1.5 mL Intravenous ONCE PRN Katie S Puthoff, APRN - CNP        enoxaparin (LOVENOX) injection 30 mg  30 mg Subcutaneous Daily Samir Carmichael DO   30 mg at 10/20/20 9685    dilTIAZem injection 5 mg  5 mg Intravenous Once Marciana Erp, APRN - CNP        Followed by   Danae Antunez dilTIAZem 125 mg in dextrose 5 % 125 mL infusion  5 mg/hr Intravenous Continuous Marciana Erp, APRN - CNP   Stopped at 10/20/20 0131       Physical Exam:   /64   Pulse 71   Temp 97.6 °F (36.4 °C) (Axillary)   Resp 18   Ht 5' (1.524 m)   Wt 147 lb 14.9 oz (67.1 kg)   SpO2 99%   BMI 28.89 kg/m²     Intake/Output Summary (Last 24 hours) at 10/20/2020 0848  Last data filed at 10/20/2020 1387  Gross per 24 hour   Intake 10 ml   Output 550 ml   Net -540 ml     Wt Readings from Last 2 Encounters:   10/20/20 147 lb 14.9 oz (67.1 kg)   10/30/18 156 lb (70.8 kg)     Constitutional: She is oriented to person, place, and time. She appears well-developed and well-nourished. In no acute distress. Head: Normocephalic and atraumatic. Neck: Neck supple. No JVD present. Carotid bruit is not present. No mass and no thyromegaly present. No lymphadenopathy present. Cardiovascular: Normal rate, regular rhythm, normal heart sounds and intact distal pulses.   Exam reveals no gallop and no friction rub. No murmur heard. Pulmonary/Chest: Diminished breath sounds bilaterally with crackles and some wheezes. No access muscle use. .   Abdominal: Soft, non-tender. Bowel sounds and aorta are normal. She exhibits no organomegaly, mass or bruit. Extremities: No edema, cyanosis, or clubbing. Pulses are 2+ radial/carotid/dorsalis pedis and posterior tibial bilaterally. Neurological: She is alert and oriented to person, place, and time. She has normal reflexes. No cranial nerve deficit. Coordination normal.   Skin: Skin is warm and dry. There is no rash or diaphoresis. Psychiatric: She has a normal mood and affect. Her speech is normal and behavior is normal.     EKG Interpretation: Sinus rhythm with LBBB, occasional PVC    Lab Review:   Lab Results   Component Value Date    TRIG 295 04/24/2018    HDL 47 04/24/2018    HDL 38 11/03/2011    LDLCALC 85 04/24/2018    LDLDIRECT 67 09/28/2016    LABVLDL 59 04/24/2018     Lab Results   Component Value Date     10/20/2020    K 4.4 10/20/2020    BUN 34 10/20/2020    CREATININE 1.6 10/20/2020     NT pro-BNP 6270  Troponin negative    Recent Labs     10/19/20  2037 10/20/20  0619   WBC 12.9* 8.0   HGB 12.6 11.8*   HCT 38.5 36.6    260     Echo 5/20/14:  Normal left ventricle size, wall thickness and systolic function with an    estimated ejection fraction of 55%. No regional wall motion abnormalities    are seen.   Rachel Seller is reversal of E/A inflow velocities across the mitral valve    suggesting impaired left ventricular relaxation.    Mitral annular calcification is present.    Aortic valve appears sclerotic but opens adequately.      CTPA 10/19/20:  Negative for acute pulmonary embolism.         Features of volume overload/heart failure, with severe interstitial and    alveolar pulmonary edema with small bilateral pleural effusions.         Multivessel coronary calcifications, mild-to-moderate along the LAD territory.         Shotty

## 2020-10-20 NOTE — ED NOTES
Patient called out stating she needed to use the bathroom. Patient had an incontinent episode of stool. Patient cleaned up, clean brief applied, pure wick placed. Patient resting comfortably on stretcher, no needs at this time.       Alysia Grant RN  10/20/20 8378

## 2020-10-20 NOTE — ED PROVIDER NOTES
EKG Interpretation    Interpreted by emergency department physician  Time read: 2023    Rhythm: Atrial fibrillation with controlled response  Ventricular Rate: 91  QRS Axis: -44  Ectopy: Occasional PVC  Conduction: Atrial fibrillation with controlled ventricular response and left bundle branch block with LVH and left axis deviation  ST Segments: Consistent with left bundle branch block  T Waves: Consistent with left bundle branch block  Q Waves: Consistent with left bundle branch block    Other findings: Motion artifact but EKG is readable    Compared to EKG on: 10/23/2018 and appears changed which was sinus rhythm at that time and there was not a left bundle branch block    Clinical Impression: Atrial fibrillation with controlled ventricular response and left bundle branch block with LVH left axis deviation which is new from previous EKG on 10/23/2018. [address]     EKG Interpretation    Interpreted by emergency department physician  Time read: 2113    Rhythm: Atrial fibrillation with controlled response  Ventricular Rate: 91  QRS Axis: -47  Ectopy: None  Conduction: Atrial fibrillation with controlled ventricular response. There is left bundle branch block with left axis deviation  ST Segments: Consistent with left bundle branch block  T Waves: Consistent with left bundle branch block  Q Waves: Consistent with left bundle branch block    Other findings: Motion artifact but EKG is readable    Compared to EKG on: 10/23/2018 and appears changed    Clinical Impression: Atrial fibrillation with controlled ventricular spots with left bundle branch block and left axis deviation which is new from the previous EKG on 10/23/2018. However, this EKG is unchanged from the previous EKG earlier today at 2023.     [address] [place] [address]  10/19/20 2121

## 2020-10-20 NOTE — ED NOTES
Patient arrived from home for SOB. Patient has NRB in place upon arrival with EMS. Her o2 saturation is 100% on NRB. Work of breathing in normal. Patient weaned to 4 liters per NC and tolerating well. Able to speak in full sentences, answers questions appropriately.       Arsen Duran RN  10/20/20 0173

## 2020-10-20 NOTE — H&P
Hospital Medicine History & Physical      PCP: Carlo Simmons MD    Date of Admission: 10/19/2020    Date of Service: Pt seen/examined on 10/19/2020 and Admitted to Inpatient     Chief Complaint: Chest pain, shortness of breath      History Of Present Illness: The patient is a 80 y.o. female who presents to [facility] with history of CKD, CAD, GERD, gout, histoplasmosis, hyperlipidemia, hypertension    Presented to the emergency department with an acute onset of chest pain shortness of breath. Was transported from home via EMS. Patient lives with daughter and granddaughter. She states that she developed some chest pain earlier this morning. Has had worsening trouble breathing over the course of the day. No history of COPD, or cigarette abuse. Denies any cough, fevers or chills. Chest pain is substernal and does not radiate. Cannot tell me whether or not it worsens with exertion. No nausea or vomiting. No abdominal pain. The duration has been constant progressively worsening since the onset. The context was that the symptoms started spontaneously. No alleviating factors. Arrived on oxygen satting 94% and was clearly tachypneic with some accessory muscle use. Does not typically require oxygen supplementation. Came to the ED via EMS for further evaluation and treatment. Did receive 1 DuoNeb in route. ED work-up revealed: Acute on chronic heart failure with a BNP of 6270. EKG indicates left bundle branch block with a left axis deviation. Slightly leukocytosis at 12.9. Patient was given several nebulizer treatments, aspirin and Lasix. Tsai catheter had been ordered but not placed. He was also noted to be consistent with CKD and a BUN of 32 creatinine of 1.44 and a GFR of 36.   Chest x-ray reveals pulmonary vascular congestion and the CTA of the chest was negative. ED also notes that although the patient does not smoke she has multiple family members within the household that do smoke and she is exposed to secondhand smoke. On my exam: Patient was tachypneic, dyspneic, and respiratory distress. I ordered BiPAP, nebulizer treatment, Nitropaste, repeat EKG and repeat troponin. I stated patient's bedside until she stabilized. Blood pressure did improve. I did not identify her to be in atrial fibrillation but rather sinus tach with a left bundle branch block and a left axis deviation which is new from an EKG comparison in 2018. CRITICAL CARE: Because of high probability of sudden clinical deterioration of the patient's condition or  further deterioration, critical care time included my full attention to the patient's condition, including chart data review, documentation, medication ordering, reviewing the patient's old records, reevaluation patient's cardiac, pulmonary and neurological status. Reassessment of vital signs. Consultations with my attending physician. Ordering, interpreting reviewing diagnostic testing. Therefore, my critical care time was 60 minutes of direct attention to the patient's condition did not include time spent on procedures.     CODE STATUS: Full  H POA: Unknown        Past Medical History:        Diagnosis Date    Chronic kidney disease (CKD), stage IV (severe) (HCC)     Coronary artery disease involving native coronary artery of native heart without angina pectoris 1/5/2016    GERD (gastroesophageal reflux disease) acid reflux    Gout, chronic     Histoplasmosis     Hyperlipidemia     Hypertension     Irregular heart beat     Osteoarthritis 2/28/2019    Knee, leg     Osteopenia of hip 04/26/2019    Pneumonia     Unspecified cerebral artery occlusion with cerebral infarction 2014 , ~ 2012       Past Surgical History:        Procedure Laterality Date    BLADDER SUSPENSION      CHOLECYSTECTOMY  Orthopedic surgery Left arm    HYSTERECTOMY      TUBAL LIGATION         Medications Prior to Admission:    Prior to Admission medications    Medication Sig Start Date End Date Taking? Authorizing Provider   Multiple Vitamins-Minerals (THERAPEUTIC MULTIVITAMIN-MINERALS) tablet Take 1 tablet by mouth daily    Historical Provider, MD   donepezil (ARICEPT) 5 MG tablet Take 5 mg by mouth nightly    Historical Provider, MD   furosemide (LASIX) 20 MG tablet Take 20 mg by mouth daily    Historical Provider, MD   lisinopril (PRINIVIL;ZESTRIL) 2.5 MG tablet Take 2.5 mg by mouth daily    Historical Provider, MD   loratadine (CLARITIN) 10 MG capsule Take 10 mg by mouth daily    Historical Provider, MD   metoprolol succinate (TOPROL XL) 50 MG extended release tablet Take 50 mg by mouth daily    Historical Provider, MD   vitamin D3 (CHOLECALCIFEROL) 10 MCG (400 UNIT) TABS tablet Take 400 Units by mouth daily    Historical Provider, MD   allopurinol (ZYLOPRIM) 100 MG tablet TAKE 1 TABLET BY MOUTH ONE TIME A DAY  Patient taking differently: Take 50 mg by mouth daily TAKE 1 TABLET BY MOUTH ONE TIME A DAY  4/8/19   Gurdeep Carbajal MD   pantoprazole (PROTONIX) 40 MG tablet Take 1 tablet by mouth daily 4/8/19 4/7/20  Gurdeep Carbajal MD   FLUoxetine (PROZAC) 20 MG capsule TAKE 1 CAPSULE BY MOUTH ONE TIME A DAY  1/29/19   Gurdeep Carbajal MD   atorvastatin (LIPITOR) 20 MG tablet TAKE 1 TABLET BY MOUTH ONE TIME A DAY  1/8/18   Gurdeep Carbajal MD   aspirin 81 MG chewable tablet Take 1 tablet by mouth daily 6/27/17   Colton Bains MD   Handicap Placard MISC by Does not apply route Patient is unable to walk more than 250 feet before stopping to rest.    Not to exceed 5 years. DX: CHF 8/17/16   Gurdeep Carbajal MD       Allergies:  Codeine and Morphine and related    Social History:  The patient currently lives at home with family. TOBACCO:   reports that she is a non-smoker but has been exposed to tobacco smoke.  She has never used smokeless tobacco.  ETOH:   reports no history of alcohol use. Family History:  Reviewed in detail and negative for DM, Early CAD, Cancer, CVA. Positive as follows:        Problem Relation Age of Onset    High Blood Pressure Mother     Heart Disease Mother     Diabetes Mother     High Blood Pressure Father     Heart Disease Father     Cancer Father         Brain    Cancer Sister         lymphoma    Cancer Brother         colon    Cancer Maternal Uncle        REVIEW OF SYSTEMS:   Positive for shortness of breath and chest pain and as noted in the HPI. All other systems reviewed and negative. PHYSICAL EXAM:    BP (!) 135/51   Pulse 80   Temp 97.4 °F (36.3 °C) (Oral)   Resp 17   Wt 154 lb 12.2 oz (70.2 kg)   SpO2 100%   BMI 30.23 kg/m²     General appearance: Respiratory distress  HEENT Normal cephalic, atraumatic without obvious deformity. Pupils equal, round, and reactive to light. Extra ocular muscles intact. Conjunctivae/corneas clear. Neck: Supple,   Trachea midline without thyromegaly or adenopathy with full range of motion. Positive JVD  Lungs: Respiratory distress, tachypneic, dyspneic, retracting. Heart: Grade III murmur LSB 2nd ICS. Tachycardic, regular rate and rhythm, diaphoretic  Central peripheral pulses  Abdomen: Soft, non-tender or non-distended without rigidity or guarding and positive bowel sounds all four quadrants. Extremities: No clubbing, cyanosis, or edema bilaterally. Full range of motion without deformity and normal gait intact. Skin: Skin color, texture, turgor normal.  No rashes or lesions. Neurologic: Alert and oriented X 3, neurovascularly intact with sensory/motor intact upper extremities/lower extremities, bilaterally. Cranial nerves: II-XII intact, grossly non-focal.  Mental status: Alert, oriented, thought content appropriate.   Capillary Refill: Acceptable  < 3 seconds  Peripheral Pulses: +3 Easily felt, not easily obliterated with pressure      CXR:  I have reviewed the CXR with the following interpretation: Pulmonary vascular congestion  EKG:  I have reviewed the EKG with the following interpretation: Left bundle branch block, tachycardia, left axis deviation. All to Po EKGs done in the ED no additional changes. EKG from 2018 sinus rhythm narrow complex    CBC   Recent Labs     10/19/20  2037   WBC 12.9*   HGB 12.6   HCT 38.5         RENAL  Recent Labs     10/19/20  2037      K 4.1   CL 99   CO2 24   BUN 32*   CREATININE 1.4*     LFT'S  Recent Labs     10/19/20  2037   AST 17   ALT 11   BILITOT 0.4   ALKPHOS 85     COAG  No results for input(s): INR in the last 72 hours.   CARDIAC ENZYMES  Recent Labs     10/19/20  2037 10/19/20  2325   TROPONINI <0.01 <0.01       U/A:    Lab Results   Component Value Date    COLORU YELLOW 10/19/2020    WBCUA 9 10/19/2020    RBCUA 0 10/19/2020    BACTERIA 1+ 10/19/2020    CLARITYU Clear 10/19/2020    SPECGRAV 1.016 10/19/2020    LEUKOCYTESUR SMALL 10/19/2020    BLOODU Negative 10/19/2020    GLUCOSEU Negative 10/19/2020    GLUCOSEU Negative 06/13/2011    AMORPHOUS Present 06/13/2011       ABG    Lab Results   Component Value Date    LIG0HBA 21.7 10/20/2020    BEART -4.5 10/20/2020    T3NJQHSA 100.0 10/20/2020    PHART 7.308 10/20/2020    KTS2EQL 43.3 10/20/2020    PO2ART 408.0 10/20/2020    ZYT8DXR 23.0 10/20/2020           Active Hospital Problems    Diagnosis Date Noted    Acute on chronic heart failure ([facility] [address] [I50.9] 10/19/2020         PHYSICIANS CERTIFICATION:    I certify that Cinda Martin is expected to be hospitalized for more than 2 midnights based on the following assessment and plan:      ASSESSMENT/PLAN:    Acute respiratory distress: 2/2-> acute on chronic heart failure vs ? MI  Placed on BiPAP-> wean as tolerates   Nitropaste, Lasix  ABG pending    Acute on chronic heart failure: BNP: Greater than 6270  Nitropaste, BiPAP, Lasix  Tsai catheter and strict I&O, daily weight  ECHO in am  Cardiology consulted      Chest pain: Unstable angina, acute coronary syndrome, evolving MI  Cardiology consulted from ED. New LBBB  Given new EKG changes they do not recommend heparinization at this time but will rather trend her response and troponins and see her in the a.m. Trop x2 <0.01, will trend  Anticoagulation therapy: Lovenox  Nitropaste    LHC: 2011  GXT stress test: 2011  Echo 2014:  Normal left ventricle size, wall thickness and systolic function with an    estimated ejection fraction of 55%. No regional wall motion abnormalities    are seen.    There is reversal of E/A inflow velocities across the mitral valve    suggesting impaired left ventricular relaxation.    Mitral annular calcification is present.    Aortic valve appears sclerotic but opens adequately      Shortness of breath: 2/2 A on C HF vs evolving  MI  Continue nebulizer treatments and BiPAP    CKD: BUN 32, creatinine 144, GFR 36  Hold lisinopril 2.5 mg        DVT Prophylaxis: Lovenox  Diet: No diet orders on file  Code Status: Prior  PT/OT Eval Status: to be determined, normally independent at home. Dispo - admitted, PCU       Lucille Latif, APRN - CNP    Thank you Delta Wilks MD for the opportunity to be involved in this patient's care. If you have any questions or concerns please feel free to contact me at [phone].

## 2020-10-20 NOTE — PLAN OF CARE
Problem: Skin Integrity:  Goal: Absence of new skin breakdown  Description: Absence of new skin breakdown  Outcome: Ongoing  Skin assessment completed every shift. Pt assessed for incontinence, appropriate barrier cream applied prn. Pt encouraged to turn/rotate every 2 hours. Assistance provided if pt unable to do so themselves. Problem: Falls - Risk of:  Goal: Will remain free from falls  Description: Will remain free from falls  Outcome: Ongoing  Fall risk precautions in place. Bed in lowest position with wheels locked,bed alarm in place and activated, non-skid socks on pt, fall risk ID on pt, call light in reach, pt encouraged to call before getting out of bed and for any other needs or complaints. Problem: OXYGENATION/RESPIRATORY FUNCTION  Goal: Patient will achieve/maintain normal respiratory rate/effort  Description: Respiratory rate and effort will be within normal limits for the patient  Outcome: Ongoing  Pt is having SOB, oxygen being administered per protocol. On BiPAP, being monitored by respiratory therapy. Breathing treatments being administered as ordered by MD. Pt instructed to notify nurse of increase SOB. Assess mucosa for signs of cyanosis, monitor O2 sats per protocol.

## 2020-10-20 NOTE — PROGRESS NOTES
4 Eyes Skin Assessment     NAME:  Cinda Martin  YOB: 1935  MEDICAL RECORD NUMBER:  [id number]    The patient is being assess for  Admission    I agree that 2 RN's have performed a thorough Head to Toe Skin Assessment on the patient. ALL assessment sites listed below have been assessed. Areas assessed by both nurses:    Head, Face, Ears, Shoulders, Back, Chest, Arms, Elbows, Hands, Sacrum. Buttock, Coccyx, Ischium and Legs. Feet and Heels        Does the Patient have a Wound?  No noted wound(s); blanchable redness to coccyx and heels       Edwardo Prevention initiated:  Yes   Wound Care Orders initiated:  NA    Pressure Injury (Stage 3,4, Unstageable, DTI, NWPT, and Complex wounds) if present place consult order under [de-identified] NA    New and Established Ostomies if present place consult order under : NA      Nurse 1 eSignature: Electronically signed by Gweneth Schilder, RN on 10/20/20 at 2:35 AM EDT    **SHARE this note so that the co-signing nurse is able to place an eSignature**    Nurse 2 eSignature: Electronically signed by Sally Lundberg RN on 10/20/20 at 4:54 AM EDT

## 2020-10-20 NOTE — ED PROVIDER NOTES
 Hyperlipidemia     Hypertension     Irregular heart beat     Osteoarthritis 2/28/2019    Knee, leg     Osteopenia of hip 04/26/2019    Pneumonia     Unspecified cerebral artery occlusion with cerebral infarction 2014 , ~ 2012     Past Surgical History:   Procedure Laterality Date    BLADDER SUSPENSION      CHOLECYSTECTOMY  Orthopedic surgery Left arm    HYSTERECTOMY      TUBAL LIGATION         CURRENT MEDICATIONS  (may include discharge medications prescribed in the ED)  Current Outpatient Rx   Medication Sig Dispense Refill    Multiple Vitamins-Minerals (THERAPEUTIC MULTIVITAMIN-MINERALS) tablet Take 1 tablet by mouth daily      donepezil (ARICEPT) 5 MG tablet Take 5 mg by mouth nightly      furosemide (LASIX) 20 MG tablet Take 20 mg by mouth daily      lisinopril (PRINIVIL;ZESTRIL) 2.5 MG tablet Take 2.5 mg by mouth daily      loratadine (CLARITIN) 10 MG capsule Take 10 mg by mouth daily      metoprolol succinate (TOPROL XL) 50 MG extended release tablet Take 50 mg by mouth daily      vitamin D3 (CHOLECALCIFEROL) 10 MCG (400 UNIT) TABS tablet Take 400 Units by mouth daily      allopurinol (ZYLOPRIM) 100 MG tablet TAKE 1 TABLET BY MOUTH ONE TIME A DAY (Patient taking differently: Take 50 mg by mouth daily TAKE 1 TABLET BY MOUTH ONE TIME A DAY ) 30 tablet 2    pantoprazole (PROTONIX) 40 MG tablet Take 1 tablet by mouth daily 30 tablet 3    FLUoxetine (PROZAC) 20 MG capsule TAKE 1 CAPSULE BY MOUTH ONE TIME A DAY  90 capsule 0    atorvastatin (LIPITOR) 20 MG tablet TAKE 1 TABLET BY MOUTH ONE TIME A DAY  90 tablet 3    aspirin 81 MG chewable tablet Take 1 tablet by mouth daily 30 tablet 9    Handicap Placard MISC by Does not apply route Patient is unable to walk more than 250 feet before stopping to rest.    Not to exceed 5 years.   DX: CHF 1 each 0       ALLERGIES    Allergies   Allergen Reactions    Codeine Shortness Of Breath     Rash and dyspnea    Morphine And Related        SOCIAL & FAMILY HISTORY    Social History     Socioeconomic History    Marital status:      Spouse name: Not on file    Number of children: Not on file    Years of education: Not on file    Highest education level: Not on file   Occupational History    Not on file   Social Needs    Financial resource strain: Not on file    Food insecurity     Worry: Not on file     Inability: Not on file    Transportation needs     Medical: Not on file     Non-medical: Not on file   Tobacco Use    Smoking status: Passive Smoke Exposure - Never Smoker    Smokeless tobacco: Never Used   Substance and Sexual Activity    Alcohol use: No     Comment: 1 yearly    Drug use: No    Sexual activity: Yes     Partners: Male   Lifestyle    Physical activity     Days per week: Not on file     Minutes per session: Not on file    Stress: Not on file   Relationships    Social connections     Talks on phone: Not on file     Gets together: Not on file     Attends Bahai service: Not on file     Active member of club or organization: Not on file     Attends meetings of clubs or organizations: Not on file     Relationship status: Not on file    Intimate partner violence     Fear of current or ex partner: Not on file     Emotionally abused: Not on file     Physically abused: Not on file     Forced sexual activity: Not on file   Other Topics Concern    Not on file   Social History Narrative    Not on file     Family History   Problem Relation Age of Onset    High Blood Pressure Mother     Heart Disease Mother     Diabetes Mother     High Blood Pressure Father     Heart Disease Father     Cancer Father         Brain    Cancer Sister         lymphoma    Cancer Brother         colon    Cancer Maternal Uncle        PHYSICAL EXAM    VITAL SIGNS: BP (!) 202/174   Pulse 94   Temp 97.4 °F (36.3 °C) (Oral)   Resp 22   Wt 154 lb 12.2 oz (70.2 kg)   SpO2 100%   BMI 30.23 kg/m²    Constitutional:  Well developed, well nourished, tachypneic, with some accessory muscle use, conversational dyspnea as well   HENT:  Atraumatic, dry mucus membranes  Neck: supple, no JVD   Respiratory:  Lungs are diminished in bilateral posterior bases, expiratory wheezes throughout the rest of the lung fields anterior and posteriorly, as stated above some accessory muscle use with conversational dyspnea  Cardiovascular:  regular rate, hypertensive at 202/147, no murmurs  Vascular: Radial and DP pulses 2+ and equal bilaterally  GI:  Soft, nontender, normal bowel sounds  Musculoskeletal:  no lower extremity edema, no lower extremity asymmetry, no calf tenderness, no thigh tenderness, no acute deformities  Integument:  Skin is warm and dry, no petechiae   Neurologic:  Alert & oriented, no slurred speech  Psych: Pleasant affect, no hallucinations    EKG    Please see the physician note for EKG interpretation.     LABS  Results for orders placed or performed during the hospital encounter of 10/19/20   CBC Auto Differential   Result Value Ref Range    WBC 12.9 (H) 4.0 - 11.0 K/uL    RBC 4.09 4.00 - 5.20 M/uL    Hemoglobin 12.6 12.0 - 16.0 g/dL    Hematocrit 38.5 36.0 - 48.0 %    MCV 94.3 80.0 - 100.0 fL    MCH 30.9 26.0 - 34.0 pg    MCHC 32.7 31.0 - 36.0 g/dL    RDW 14.6 12.4 - 15.4 %    Platelets 957 048 - 448 K/uL    MPV 9.7 5.0 - 10.5 fL    Neutrophils % 69.0 %    Lymphocytes % 21.0 %    Monocytes % 8.0 %    Eosinophils % 2.0 %    Basophils % 0.0 %    Neutrophils Absolute 8.9 (H) 1.7 - 7.7 K/uL    Lymphocytes Absolute 2.7 1.0 - 5.1 K/uL    Monocytes Absolute 1.0 0.0 - 1.3 K/uL    Eosinophils Absolute 0.3 0.0 - 0.6 K/uL    Basophils Absolute 0.0 0.0 - 0.2 K/uL   Comprehensive Metabolic Panel w/ Reflex to MG   Result Value Ref Range    Sodium 137 136 - 145 mmol/L    Potassium reflex Magnesium 4.1 3.5 - 5.1 mmol/L    Chloride 99 99 - 110 mmol/L    CO2 24 21 - 32 mmol/L    Anion Gap 14 3 - 16    Glucose 111 (H) 70 - 99 mg/dL    BUN 32 (H) 7 - 20 mg/dL    CREATININE 1.4 significant life-threatening deterioration of the patient's condition requiring my urgent intervention. Total critical care time was at least 25 minutes. This includes vital sign monitoring, pulse oximetry monitoring, telemetry monitoring, clinical response to the IV medications, reviewing the nursing notes, consultation time, dictation/documentation time, and interpretation of the labwork. This excludes any separately billable procedures performed. The critical care time above also includes time spent obtaining history from electronic chart, as the patient was unable to provide the history AND the history obtained was directly relevant to the care of the patient. Patient is afebrile and nontoxic in appearance however does appear to have some new oxygen requirements as well as conversational dyspnea. She has expiratory wheezes and does appear to sound tight. Solu-Medrol and duo nebs were ordered as well as 324 mg of aspirin given her complaint of chest pain. Labs reveal a slight leukocytosis of 12.9. No anemia. No severe electrolyte derangements. Stable CKD with creatinine 1.4, BUN 32 and GFR 36. TSH not elevated. No lactic acidosis. CXR findings as above. CT findings as above. EKG interpreted by physician. Troponin negative. BNP is elevated at 6270 with evidence of pulmonary vascular congestion and acute CHF on both CT scan and chest x-ray. Therefore Tsai catheter order was placed as well as IV diuresis. Heart score 6 with no stress test or echo or cath since 2011. Covid-19 was done rapid, this is pending for admission. Consultation with hospitalist at [phone]: I contacted the hospitalist via [employer], and the patient was accepted for admission. FINAL IMPRESSION    1. New onset a-fib ([address]    2. Congestive heart failure, unspecified HF chronicity, unspecified heart failure type ([address]    3. Dyspnea and respiratory abnormalities    4.  Chest pain, unspecified type PLAN  Admission to the hospital    (Please note that this note was completed with a voice recognition program.  Every attempt was made to edit the dictations, but inevitably there remain words that are mis-transcribed.)       FERNANDO Pizano - CNP  10/19/20 2445

## 2020-10-20 NOTE — PLAN OF CARE
Problem: Skin Integrity:  Goal: Will show no infection signs and symptoms  Description: Will show no infection signs and symptoms  Outcome: Ongoing  Goal: Absence of new skin breakdown  Description: Absence of new skin breakdown  Outcome: Ongoing     Problem: Falls - Risk of:  Goal: Will remain free from falls  Description: Will remain free from falls  Outcome: Ongoing  Goal: Absence of physical injury  Description: Absence of physical injury  Outcome: Ongoing     Problem: OXYGENATION/RESPIRATORY FUNCTION  Goal: Patient will maintain patent airway  Outcome: Ongoing  Goal: Patient will achieve/maintain normal respiratory rate/effort  Description: Respiratory rate and effort will be within normal limits for the patient  Outcome: Ongoing     Problem: HEMODYNAMIC STATUS  Goal: Patient has stable vital signs and fluid balance  Outcome: Ongoing     Problem: FLUID AND ELECTROLYTE IMBALANCE  Goal: Fluid and electrolyte balance are achieved/maintained  Outcome: Ongoing     Problem: ACTIVITY INTOLERANCE/IMPAIRED MOBILITY  Goal: Mobility/activity is maintained at optimum level for patient  Outcome: Ongoing     Problem: Airway Clearance - Ineffective  Goal: Achieve or maintain patent airway  Outcome: Ongoing     Problem: Gas Exchange - Impaired  Goal: Absence of hypoxia  Outcome: Ongoing  Goal: Promote optimal lung function  Outcome: Ongoing     Problem: Breathing Pattern - Ineffective  Goal: Ability to achieve and maintain a regular respiratory rate  Outcome: Ongoing     Problem:  Body Temperature -  Risk of, Imbalanced  Goal: Ability to maintain a body temperature within defined limits  Outcome: Ongoing  Goal: Will regain or maintain usual level of consciousness  Outcome: Ongoing  Goal: Complications related to the disease process, condition or treatment will be avoided or minimized  Outcome: Ongoing     Problem: Isolation Precautions - Risk of Spread of Infection  Goal: Prevent transmission of infection  Outcome: Ongoing Problem: Nutrition Deficits  Goal: Optimize nutrtional status  Outcome: Ongoing     Problem: Risk for Fluid Volume Deficit  Goal: Maintain normal heart rhythm  Outcome: Ongoing  Goal: Maintain absence of muscle cramping  Outcome: Ongoing  Goal: Maintain normal serum potassium, sodium, calcium, phosphorus, and pH  Outcome: Ongoing     Problem: Loneliness or Risk for Loneliness  Goal: Demonstrate positive use of time alone when socialization is not possible  Outcome: Ongoing     Problem: Fatigue  Goal: Verbalize increase energy and improved vitality  Outcome: Ongoing     Problem: Patient Education: Go to Patient Education Activity  Goal: Patient/Family Education  Outcome: Ongoing

## 2020-10-20 NOTE — PROGRESS NOTES
Comprehensive Nutrition Assessment    Type and Reason for Visit:  Initial(CHF protocol)    Nutrition Recommendations/Plan:   Have available in discharge paperwork, diet therapy for CHF  Attempt to f/u with pt & family regarding ed needs for CHF    Nutrition Assessment:  Pt with pmh of dementia, CKD (4), HTN, HLD, CVA, CHF, adm with chest pain & sob. Found to be in new onset A-fib with heart failure, but is also a COVID rule out. Diet adv to 2 gm Na. With altered mental status, will offer written information in discharge packet, for reference, should pt be discharged to home with family assistance. Malnutrition Assessment:  Malnutrition Status:  Insufficient data    Context:  Acute Illness      Estimated Daily Nutrient Needs:  Energy (kcal):  0500-0281 (25-30 x ABW 67 kg); Weight Used for Energy Requirements:        Protein (g):  54-67 (.8-1 x ABW (adj for CKD); Weight Used for Protein Requirements:           Fluid (ml/day):  per provider; Weight Used for Fluid Requirements:         Nutrition Related Findings:  Noted BM on 10/20. Noted trace edema to BLE      Wounds:  None       Current Nutrition Therapies:    DIET LOW SODIUM 2 GM; Anthropometric Measures:  · Height: 5' (152.4 cm)  · Current Body Weight: 148 lb (67.1 kg)   · Admission Body Weight: 155 lb (70.3 kg)    · Usual Body Weight: (140-150s)     · Ideal Body Weight: 100 lbs; % Ideal Body Weight 148 %   · BMI: 28.9  · Adjusted Body Weight:  ; No Adjustment   · BMI Categories: Overweight (BMI 25.0-29. 9)       Nutrition Diagnosis:   No nutrition diagnosis at this time     Nutrition Interventions:   Food and/or Nutrient Delivery:  Continue Current Diet  Nutrition Education/Counseling:  (education not appropriate at this time)   Coordination of Nutrition Care:  Continued Inpatient Monitoring    Goals:  consume >/= to 50 %       Nutrition Monitoring and Evaluation:   Food/Nutrient Intake Outcomes:  Food and Nutrient Intake  Physical Signs/Symptoms Outcomes:  Biochemical Data, Diarrhea, Constipation, Fluid Status or Edema, Weight, Skin, Nutrition Focused Physical Findings     Discharge Planning:     Too soon to determine     Electronically signed by Essie Harden RD, LD on 10/20/20 at 2:04 PM EDT    Contact: [phone]

## 2020-10-20 NOTE — EC ADMISSION CRITERIA
Admission Criteria    [facility] Guideline: Heart Failure was utilized. Based on the indications selected for the patient, the bed status of Admit to Inpatient was determined to be MET    The following indications were selected as present at the time of evaluation of the patient:   - Admission is indicated by:    - Cardiac arrhythmias or findings of immediate concern as indicated by:     - Any heart rhythm and:      - Continuous long-term ECG monitoring needed (eg, initiation of drug requiring monitoring for more than 24 hours)    - Tachypnea that persists despite emergency department and observation care treatment    Admission Criteria documentation entered by: [address], 24th edition, Copyright © 2020 [address] [employer] All Rights Reserved.  2020-10-19T23:06:12-04:00

## 2020-10-20 NOTE — PROGRESS NOTES
Current ABG      Results for Willy Thomas (MRN [id number]) as of 10/20/2020 03:24   Ref.  Range 10/20/2020 02:00   pH, Arterial Latest Ref Range: 7.350 - 7.450  7.292 (L)   pCO2, Arterial Latest Ref Range: 35.0 - 45.0 mmHg 44.2   pO2, Arterial Latest Ref Range: 75.0 - 108.0 mmHg 156.0 (H)   HCO3, Arterial Latest Ref Range: 21.0 - 29.0 mmol/L 21.3     Message sent to physician

## 2020-10-20 NOTE — ED NOTES
Bed: A-15  Expected date:   Expected time:   Means of arrival:   Comments:  [id number] Shawn Tafoya RN  10/19/20 2009

## 2020-10-20 NOTE — PROGRESS NOTES
Pt arrived to floor via stretcher from ED and transferred to bed. Telemetry box #74 activated and confirmed with [facility]. Patient oriented to room and use of call light. Call light and personal items within reach. Admission and assessment initiated. POC and education initiated and reviewed with patient. Patient currently on BiPAP, tolerating well. Drowsy but easily arousable. Denied further needs or questions at this time. Will continue to monitor.     Electronically signed by Luis Haider RN on 10/20/2020 at 2:34 AM

## 2020-10-20 NOTE — PROGRESS NOTES
Bedside eval: Blood pressure 116/48, heart rate 87, pulse oximetry 100%, respiratory rate 16-18. Skin is warm and dry. Patient awakens but is no longer distressed. Central peripheral pulses are strong and regular. She is tolerating the BiPAP rather well. Heart tones revealing a positive murmur left sternal border grade 3/5, second intercostal space. We will continue BiPAP therapy. ABG has been drawn and will be evaluated. We will plan on ordering an echocardiogram for the a.m.

## 2020-10-20 NOTE — PROGRESS NOTES
Hospitalist Progress Note      PCP: Heide Garcia MD    Date of Admission: 10/19/2020    Hospital Course: 81yo F hx CKD,CAD,GERD who presents with acute chest pain and dyspnea. She is noted to have new onset CHF. Subjective:  She has difficulty hearing which is chronic. She has cough. She has chronic chest pain. No n/v. She had bowel movement today. Medications:  Reviewed    Infusion Medications    dilTIAZem Stopped (10/20/20 0131)     Scheduled Medications    allopurinol  50 mg Oral Daily    aspirin  81 mg Oral Daily    atorvastatin  20 mg Oral Nightly    donepezil  5 mg Oral Nightly    FLUoxetine  20 mg Oral Daily    metoprolol succinate  50 mg Oral Daily    pantoprazole  40 mg Oral QAM AC    sodium chloride flush  10 mL Intravenous 2 times per day    enoxaparin  30 mg Subcutaneous Daily    [START ON 10/21/2020] furosemide  60 mg Intravenous Daily    dilTIAZem  5 mg Intravenous Once     PRN Meds: sodium chloride flush, acetaminophen **OR** acetaminophen, polyethylene glycol, promethazine **OR** ondansetron, perflutren lipid microspheres      Intake/Output Summary (Last 24 hours) at 10/20/2020 1125  Last data filed at 10/20/2020 1124  Gross per 24 hour   Intake 10 ml   Output 850 ml   Net -840 ml       Physical Exam Performed:    /64   Pulse 71   Temp 97.6 °F (36.4 °C) (Axillary)   Resp 18   Ht 5' (1.524 m)   Wt 147 lb 14.9 oz (67.1 kg)   SpO2 99%   BMI 28.89 kg/m²     General appearance: No apparent distress, appears stated age and cooperative. Elderly female  HEENT: Pupils equal, round, and reactive to light. Conjunctivae/corneas clear. Neck: Supple, with full range of motion. No jugular venous distention. Trachea midline. Respiratory:  Normal respiratory effort. Clear to auscultation, bilaterally without Rales/Wheezes/Rhonchi. Cardiovascular: Regular rate and rhythm with normal S1/S2,+murmur, rubs or gallops.   Abdomen: Soft, non-tender, non-distended with normal bowel sounds. Musculoskeletal: No clubbing, cyanosis or edema bilaterally. Full range of motion without deformity. Skin: Skin color, texture, turgor normal.  No rashes or lesions. Neurologic:  Neurovascularly intact without any focal sensory/motor deficits. Cranial nerves: II-XII intact, grossly non-focal.  Psychiatric: Alert and oriented, thought content appropriate, normal insight      Labs:   Recent Labs     10/19/20  2037 10/20/20  0619   WBC 12.9* 8.0   HGB 12.6 11.8*   HCT 38.5 36.6    260     Recent Labs     10/19/20  2037 10/20/20  0618    137   K 4.1 4.4   CL 99 100   CO2 24 21   BUN 32* 34*   CREATININE 1.4* 1.6*   CALCIUM 8.8 8.9     Recent Labs     10/19/20  2037   AST 17   ALT 11   BILITOT 0.4   ALKPHOS 85     No results for input(s): INR in the last 72 hours. Recent Labs     10/19/20  2037 10/19/20  2325   TROPONINI <0.01 <0.01       Urinalysis:      Lab Results   Component Value Date    NITRU Negative 10/19/2020    WBCUA 9 10/19/2020    BACTERIA 1+ 10/19/2020    RBCUA 0 10/19/2020    BLOODU Negative 10/19/2020    SPECGRAV 1.016 10/19/2020    GLUCOSEU Negative 10/19/2020    GLUCOSEU Negative 06/13/2011       Radiology:  CT CHEST PULMONARY EMBOLISM W CONTRAST   Final Result   Negative for acute pulmonary embolism. Features of volume overload/heart failure, with severe interstitial and   alveolar pulmonary edema with small bilateral pleural effusions. Multivessel coronary calcifications, mild-to-moderate along the LAD territory. Shotty mediastinal lymph nodes are presumably reactive. XR CHEST PORTABLE   Final Result   Vascular congestion and interstitial opacities, suggestive of pulmonary   edema. Atypical pneumonia is a differential, less likely possibility.                  Assessment/Plan:    Active Hospital Problems    Diagnosis    Acute on chronic heart failure ([facility] [address] [I50.9]     Acute hypoxic respiratory failure-due to CHF,seen on CT chest.No PE on CTA chest.  -Placed on BIPAP on admission,weaned off. Off oxygen.  -Continue IV diuresis     Acute on chronic heart failure EF 55%  -Continue lasix  -D/C nitropaste from ER  -FU Echo  -Maintain goodwin  -strict I&O, daily weights  -Appreciate Cardiology Consultation    Chest pain-?angina vs musculoskeletal.Calcifications noted in multivessels on CTA chest.  -Troponin negative  -No ischemic workup per Cardiology  -Continue ASA, statin and B blocker     CKD stage 3  -Cr stable  -Pt will need Nephrology consultation for establishment of care  -Hold lisinopril for now    Addendum:  Diarrhea  -C.diff negative  -Supportive care        DVT Prophylaxis: Lovenox  Diet: DIET LOW SODIUM 2 GM;  Code Status: Full Code    PT/OT Eval Status: as needed    [facility]

## 2020-10-21 LAB
ANION GAP SERPL CALCULATED.3IONS-SCNC: 15 MMOL/L (ref 3–16)
BUN BLDV-MCNC: 54 MG/DL (ref 7–20)
CALCIUM SERPL-MCNC: 9.2 MG/DL (ref 8.3–10.6)
CHLORIDE BLD-SCNC: 97 MMOL/L (ref 99–110)
CO2: 26 MMOL/L (ref 21–32)
CREAT SERPL-MCNC: 1.9 MG/DL (ref 0.6–1.2)
GFR AFRICAN AMERICAN: 30
GFR NON-AFRICAN AMERICAN: 25
GLUCOSE BLD-MCNC: 98 MG/DL (ref 70–99)
POTASSIUM REFLEX MAGNESIUM: 3.8 MMOL/L (ref 3.5–5.1)
PRO-BNP: 8277 PG/ML (ref 0–449)
SODIUM BLD-SCNC: 138 MMOL/L (ref 136–145)

## 2020-10-21 PROCEDURE — 99232 SBSQ HOSP IP/OBS MODERATE 35: CPT | Performed by: NURSE PRACTITIONER

## 2020-10-21 PROCEDURE — 97535 SELF CARE MNGMENT TRAINING: CPT

## 2020-10-21 PROCEDURE — 6370000000 HC RX 637 (ALT 250 FOR IP): Performed by: NURSE PRACTITIONER

## 2020-10-21 PROCEDURE — 80048 BASIC METABOLIC PNL TOTAL CA: CPT

## 2020-10-21 PROCEDURE — 6370000000 HC RX 637 (ALT 250 FOR IP): Performed by: INTERNAL MEDICINE

## 2020-10-21 PROCEDURE — 36415 COLL VENOUS BLD VENIPUNCTURE: CPT

## 2020-10-21 PROCEDURE — 6360000002 HC RX W HCPCS: Performed by: STUDENT IN AN ORGANIZED HEALTH CARE EDUCATION/TRAINING PROGRAM

## 2020-10-21 PROCEDURE — 2060000000 HC ICU INTERMEDIATE R&B

## 2020-10-21 PROCEDURE — 97165 OT EVAL LOW COMPLEX 30 MIN: CPT

## 2020-10-21 PROCEDURE — 83880 ASSAY OF NATRIURETIC PEPTIDE: CPT

## 2020-10-21 PROCEDURE — 97116 GAIT TRAINING THERAPY: CPT

## 2020-10-21 PROCEDURE — 6370000000 HC RX 637 (ALT 250 FOR IP): Performed by: HOSPITALIST

## 2020-10-21 PROCEDURE — 93306 TTE W/DOPPLER COMPLETE: CPT

## 2020-10-21 PROCEDURE — 6360000002 HC RX W HCPCS: Performed by: INTERNAL MEDICINE

## 2020-10-21 PROCEDURE — 97530 THERAPEUTIC ACTIVITIES: CPT

## 2020-10-21 PROCEDURE — 94760 N-INVAS EAR/PLS OXIMETRY 1: CPT

## 2020-10-21 PROCEDURE — 97161 PT EVAL LOW COMPLEX 20 MIN: CPT

## 2020-10-21 PROCEDURE — 2580000003 HC RX 258: Performed by: NURSE PRACTITIONER

## 2020-10-21 RX ORDER — HYDRALAZINE HYDROCHLORIDE 20 MG/ML
10 INJECTION INTRAMUSCULAR; INTRAVENOUS EVERY 6 HOURS PRN
Status: DISCONTINUED | OUTPATIENT
Start: 2020-10-21 | End: 2020-10-21

## 2020-10-21 RX ORDER — LABETALOL HYDROCHLORIDE 5 MG/ML
10 INJECTION, SOLUTION INTRAVENOUS EVERY 4 HOURS PRN
Status: DISCONTINUED | OUTPATIENT
Start: 2020-10-21 | End: 2020-10-26 | Stop reason: HOSPADM

## 2020-10-21 RX ORDER — HYDRALAZINE HYDROCHLORIDE 10 MG/1
10 TABLET, FILM COATED ORAL EVERY 12 HOURS SCHEDULED
Status: DISCONTINUED | OUTPATIENT
Start: 2020-10-21 | End: 2020-10-21

## 2020-10-21 RX ORDER — HYDRALAZINE HYDROCHLORIDE 10 MG/1
10 TABLET, FILM COATED ORAL EVERY 8 HOURS SCHEDULED
Status: DISCONTINUED | OUTPATIENT
Start: 2020-10-21 | End: 2020-10-26 | Stop reason: HOSPADM

## 2020-10-21 RX ORDER — CARVEDILOL 12.5 MG/1
12.5 TABLET ORAL 2 TIMES DAILY WITH MEALS
Status: DISCONTINUED | OUTPATIENT
Start: 2020-10-21 | End: 2020-10-26 | Stop reason: HOSPADM

## 2020-10-21 RX ORDER — HEPARIN SODIUM 5000 [USP'U]/ML
5000 INJECTION, SOLUTION INTRAVENOUS; SUBCUTANEOUS EVERY 8 HOURS SCHEDULED
Status: DISCONTINUED | OUTPATIENT
Start: 2020-10-21 | End: 2020-10-26 | Stop reason: HOSPADM

## 2020-10-21 RX ADMIN — PANTOPRAZOLE SODIUM 40 MG: 40 TABLET, DELAYED RELEASE ORAL at 06:48

## 2020-10-21 RX ADMIN — ATORVASTATIN CALCIUM 20 MG: 20 TABLET, FILM COATED ORAL at 20:31

## 2020-10-21 RX ADMIN — CARVEDILOL 12.5 MG: 12.5 TABLET, FILM COATED ORAL at 09:35

## 2020-10-21 RX ADMIN — HYDRALAZINE HYDROCHLORIDE 10 MG: 10 TABLET, FILM COATED ORAL at 14:45

## 2020-10-21 RX ADMIN — FLUOXETINE 20 MG: 20 CAPSULE ORAL at 09:35

## 2020-10-21 RX ADMIN — ALLOPURINOL 50 MG: 100 TABLET ORAL at 09:35

## 2020-10-21 RX ADMIN — HEPARIN SODIUM 5000 UNITS: 5000 INJECTION INTRAVENOUS; SUBCUTANEOUS at 20:32

## 2020-10-21 RX ADMIN — SODIUM CHLORIDE, PRESERVATIVE FREE 10 ML: 5 INJECTION INTRAVENOUS at 09:34

## 2020-10-21 RX ADMIN — SODIUM CHLORIDE, PRESERVATIVE FREE 10 ML: 5 INJECTION INTRAVENOUS at 20:33

## 2020-10-21 RX ADMIN — CARVEDILOL 12.5 MG: 12.5 TABLET, FILM COATED ORAL at 18:03

## 2020-10-21 RX ADMIN — MICONAZOLE NITRATE: 20 CREAM TOPICAL at 23:49

## 2020-10-21 RX ADMIN — DONEPEZIL HYDROCHLORIDE 5 MG: 5 TABLET, FILM COATED ORAL at 20:31

## 2020-10-21 RX ADMIN — ENOXAPARIN SODIUM 30 MG: 30 INJECTION SUBCUTANEOUS at 09:34

## 2020-10-21 RX ADMIN — HYDRALAZINE HYDROCHLORIDE 10 MG: 10 TABLET, FILM COATED ORAL at 20:31

## 2020-10-21 RX ADMIN — ASPIRIN 81 MG: 81 TABLET, CHEWABLE ORAL at 09:35

## 2020-10-21 NOTE — PLAN OF CARE
Problem: Skin Integrity:  Goal: Absence of new skin breakdown  10/21/2020 0134 by Ashok Blevins RN  Outcome: Met This Shift  Note: Skin assessment completed every shift. Pt assessed for incontinence, appropriate barrier cream applied prn. Pt encouraged to turn/rotate every 2 hours. Assistance provided if pt unable to do so themselves. Problem: Falls - Risk of:  Goal: Will remain free from falls  10/21/2020 0134 by Ashok Blevins RN  Outcome: Met This Shift  Note: Fall risk assessment completed every shift. All precautions in place. Pt has call light within reach at all times. Room clear of clutter. Pt aware to call for assistance when getting up. Bed alarm on. No falls this shift. Problem: OXYGENATION/RESPIRATORY FUNCTION  Goal: Patient will achieve/maintain normal respiratory rate/effort  10/21/2020 0134 by Ashok Blevins RN  Outcome: Met This Shift  Note: No respiratory distress noted. Problem: OXYGENATION/RESPIRATORY FUNCTION  Goal: Patient will maintain patent airway  10/21/2020 0134 by Ashok Blevins RN  Note: Is on RA. Respirations easy with saturations in the normal limits. Problem: Pain:  Goal: Pain level will decrease  Outcome: Ongoing  Note: Pain level and characteristics of pain assessed. Patient included in decisions related to pain management. Pain medications given as ordered after other non-medication management options have been attempted. Effectiveness of pain medications assessed and ineffective pain management reported to MD as needed. Tylenol was effective for HA. No further complaints voiced.      Problem: Breathing Pattern - Ineffective  Goal: Ability to achieve and maintain a regular respiratory rate  10/21/2020 0134 by Ashok Blevins RN  Outcome: Completed  10/20/2020 1949 by Lawrence Belcher RN  Outcome: Ongoing     Problem: Isolation Precautions - Risk of Spread of Infection  Goal: Prevent transmission of infection  10/21/2020 0134 by

## 2020-10-21 NOTE — PROGRESS NOTES
mood  Peripheral Pulses: +2 palpable, equal bilaterally       Labs:   Recent Labs     10/19/20  2037 10/20/20  0619   WBC 12.9* 8.0   HGB 12.6 11.8*   HCT 38.5 36.6    260     Recent Labs     10/19/20  2037 10/20/20  0618 10/21/20  0625    137 138   K 4.1 4.4 3.8   CL 99 100 97*   CO2 24 21 26   BUN 32* 34* 54*   CREATININE 1.4* 1.6* 1.9*   CALCIUM 8.8 8.9 9.2     Recent Labs     10/19/20  2037   AST 17   ALT 11   BILITOT 0.4   ALKPHOS 85     No results for input(s): INR in the last 72 hours. Recent Labs     10/19/20  2037 10/19/20  2325   TROPONINI <0.01 <0.01       Urinalysis:      Lab Results   Component Value Date    NITRU Negative 10/19/2020    WBCUA 9 10/19/2020    BACTERIA 1+ 10/19/2020    RBCUA 0 10/19/2020    BLOODU Negative 10/19/2020    SPECGRAV 1.016 10/19/2020    GLUCOSEU Negative 10/19/2020    GLUCOSEU Negative 06/13/2011       Radiology:  CT CHEST PULMONARY EMBOLISM W CONTRAST   Final Result   Negative for acute pulmonary embolism. Features of volume overload/heart failure, with severe interstitial and   alveolar pulmonary edema with small bilateral pleural effusions. Multivessel coronary calcifications, mild-to-moderate along the LAD territory. Shotty mediastinal lymph nodes are presumably reactive. XR CHEST PORTABLE   Final Result   Vascular congestion and interstitial opacities, suggestive of pulmonary   edema. Atypical pneumonia is a differential, less likely possibility. Assessment/Plan:    Active Hospital Problems    Diagnosis    Acute on chronic heart failure ([facility] [address] [I50.9]     Patient is a 30-year-old female with past medical history of CAD, hyperlipidemia and dementia CKD hypertension who presents to hospital for suspected new onset CHF.     Assessment  Acute hypoxic respiratory failure on admission likely secondary to CHF  Acute on chronic diastolic heart failure  Chest pain  SULY on CKD stage III    Plan  Patient is currently on room air, creatinine trending up, will hold off on IV diuresis for now, continue Coreg, statin, Cardizem  Patient is status post 60 IV Lasix once 10/20  Continue hydralazine, Protonix, continue to monitor BMP  Nephrology was consulted, cardiology, CHF coordinator following  EF shows normal ventricular size, with slightly decreased systolic function, EF 11%, mild aortic stenosis  DVT prophylaxis on heparin  Continue on low-sodium diet  Diet: DIET LOW SODIUM 2 GM; 1500 ml  Code Status: Full Code    PT/OT Eval Status: ordered    Dispo -likely discharge 1 to 2 days, continue to monitor Hilario Peña MD

## 2020-10-21 NOTE — PROGRESS NOTES
[address]   Daily Progress Note      Admit Date:  10/19/2020    CC: SOB    HPI:   True Lopez is a 80 y.o. female with PMH CAD, histoplasmosis, HLP, dementia, CKD, HTN. Presented to [facility] with SOB and atypical chest pain. CXR and Chest CT suggested pulm edema. Sob improved with diuresis but developed SULY. Dr. Brennen Lockhart consulted today. Patient is pleasantly confused. Today she denies SOB and chest pain. No edema. She is on RA. She is ambulating in the room without difficulty. Lasix is held D/T SULY. Review of Systems:   General: Denies fever, chills, fatigue, weakness  Skin: Denies skin changes, rash, itching, lesions.   HEENT: Denies headache, dizziness, vision changes, nosebleeds, sore throat, nasal drainage  RESP: Denies cough, sputum, dyspnea, wheeze, snoring  CARD: Denies palpitations,  Murmur, chest pain  GI:Denies nausea, vomiting, heartburn, loss of appetite, change in bowels  : Denies frequency, pain, incontinence, polyuria  VASC: Denies claudication, leg cramps, clots   MUSC/SKEL: Denies pain, stiffness, arthritis  PSYCH: Denies anxiety, depression, stress  NEURO: Denies numbness, tingling, weakness,change in mood or memory  HEME: Denies abn bruising, bleeding, anemia  ENDO: Denies intolerance to heat, cold, excessive thirst or hunger, hx thyroid disease    Objective:   BP (!) 151/66   Pulse 69   Temp 98.1 °F (36.7 °C) (Oral)   Resp 18   Ht 5' (1.524 m)   Wt 146 lb 9.7 oz (66.5 kg)   SpO2 96%   BMI 28.63 kg/m²        Intake/Output Summary (Last 24 hours) at 10/21/2020 1341  Last data filed at 10/21/2020 0173  Gross per 24 hour   Intake 390 ml   Output 2900 ml   Net -2510 ml     I/O since adm: Neg 3.3L    WEIGHT:Admit Weight: 154 lb 12.2 oz (70.2 kg)         Today  Weight: 146 lb 9.7 oz (66.5 kg)   DRY WEIGHT:  Wt Readings from Last 3 Encounters:   10/21/20 146 lb 9.7 oz (66.5 kg)   10/30/18 156 lb (70.8 kg)   10/23/18 152 lb 8.9 oz (69.2 kg)       Physical Exam:  GEN: Results   Component Value Date    APTT 39.4 06/12/2011     Pro-BNP:    Lab Results   Component Value Date    PROBNP 8,277 10/21/2020    PROBNP 6,270 10/19/2020    PROBNP 1,555 10/23/2018     Parameters:   > 450 pg/mL under age 48  > 900 pg/mL ages 54-65  > 1800 pg/mL over age 76    ENZYMES:  Lab Results   Component Value Date    CKTOTAL 36 03/07/2014    CKMB <0.2 11/03/2011    TROPONINI <0.01 10/19/2020   Results for Ivonne Diana (MRN [id number]) as of 10/21/2020 13:37   Ref. Range 10/19/2020 20:37 10/19/2020 23:25   Troponin Latest Ref Range: <0.01 ng/mL <0.01 <0.01       FASTING LIPID PANEL:  Lab Results   Component Value Date    CHOL 191 04/24/2018    HDL 47 04/24/2018    HDL 38 11/03/2011    LDLCALC 85 04/24/2018    TRIG 295 04/24/2018       Diagnostics:    EKG:   Sinus tachycardia with short WI with occasional Premature ventricular complexes and Fusion complexesLeft axis deviationLeft bundle branch blockConfirmed by JAXON MCMAHON, Krystyna Orlando ([id number]) on 10/20/2020 9:18:14 AM     Echo 5/20/14:  Normal left ventricle size, wall thickness and systolic function with an    estimated ejection fraction of 55%. No regional wall motion abnormalities    are seen.   Elysia Land is reversal of E/A inflow velocities across the mitral valve    suggesting impaired left ventricular relaxation.    Mitral annular calcification is present.    Aortic valve appears sclerotic but opens adequately.      CTPA 10/19/20:  Negative for acute pulmonary embolism.         Features of volume overload/heart failure, with severe interstitial and    alveolar pulmonary edema with small bilateral pleural effusions.         Multivessel coronary calcifications, mild-to-moderate along the LAD territory.         Shotty mediastinal lymph nodes are presumably reactive.       CXR 10/19/20:  Impression    Vascular congestion and interstitial opacities, suggestive of pulmonary    edema.  Atypical pneumonia is a differential, less likely possibility. Assessment/Plan:    1.) Acute on chronic diastolic heart failure: SOB improved but continues with crackles bilateral bases. On RA. BUN/creat suggest intravascularly dry. BNP elevated- possible from SULY. Discussed with Dr. Perri Massey, [address] [place] [address]. Plan to continue to hold lasix today, monitor S/S and renal fx. May benefit from [address] [employer] could be from histoplasmosis? NYHA Class II   Stage B  Diuretic: none  Beta Blocker: coreg  SGLT2 Inhibitor: outpatient  2gm Na diet, daily weight, 64 oz fluid restriction  Avoid NSAIDS and other nephrotoxic meds  Cardiac Rehab: Not indicated for EF>35%  ICD: Not indicated for EF>35%  [facility] Referral: NO- dementia, disoriented   HF RN referral for education     2.) Chest pain: Atypical, troponins normal, EKG without ischemia    3.) Hypertension: Lisinopril PTA. Hold for SULY. Start hydralazine. Goal BP <130/80.   Non pharmacologic interventions include:   -weight loss  -heart healthy low sodium and low fat diet that consist of mostly fruits, vegetables and grains (Dash diet)  -limited amount of alcohol (no more than 1 drink/day for women, 2 drinks/day for men)  -regular physical activity  -no smoking  -stress reduction    Electronically signed by FERNANDO Barajas - CNP on 10/21/2020 at 1:41 PM

## 2020-10-21 NOTE — PROGRESS NOTES
Physical Therapy    Facility/Department: [address] PROGRESSIVE CARE  Initial Assessment/Treatment Note    NAME: Vinod Arnold  : 1935  MRN: [id number]    Date of Service: 10/21/2020    Discharge Recommendations:  Vinod Arnold scored a 20/24 on the AM-PAC short mobility form. Current research shows that an AM-PAC score of 18 or greater is typically associated with a discharge to the patient's home setting. Based on the patient's AM-PAC score and their current functional mobility deficits, it is recommended that the patient have 2-3 sessions per week of Physical Therapy at d/c to increase the patient's independence. At this time, this patient demonstrates the endurance and safety to discharge home. Please see assessment section for further patient specific details. If patient discharges prior to next session this note will serve as a discharge summary. Please see below for the latest assessment towards goals. 24 hour supervision or assist   PT Equipment Recommendations  Equipment Needed: No    Assessment   Body structures, Functions, Activity limitations: Decreased functional mobility ; Decreased safe awareness  Assessment: Pt is an 80 y.o. female with diagnosis of acute on chronic heart failure presenting with decreased functional mobility and safety awareness. Pt is currently requiring CGA-SBA for functional mobility, which is a decline from reported baseline. Pt reporting that she is functioning near her baseline and will have 24 hr supervision from her  at d/c, as well as intermittent assist from her daughter and grandkids. Pt will benefit from continued therapy to maximize safety and independence.   Treatment Diagnosis: decreased functional mobility due to acute on chronic heart failure  Prognosis: Good  Decision Making: Low Complexity  Patient Education: Role of PT, goals, d/c recommendations, pt verbalized understanding  REQUIRES PT FOLLOW UP: Yes  Activity Tolerance  Activity Tolerance: Patient Tolerated treatment well       Patient Diagnosis(es): The primary encounter diagnosis was New onset a-fib ([facility] [address] Diagnoses of Congestive heart failure, unspecified HF chronicity, unspecified heart failure type ([address] Dyspnea and respiratory abnormalities, and Chest pain, unspecified type were also pertinent to this visit. has a past medical history of Chronic kidney disease (CKD), stage IV (severe) ([address] Coronary artery disease involving native coronary artery of native heart without angina pectoris, GERD (gastroesophageal reflux disease), Gout, chronic, Histoplasmosis, Hyperlipidemia, Hypertension, Irregular heart beat, Osteoarthritis, Osteopenia of hip, Pneumonia, and Unspecified cerebral artery occlusion with cerebral infarction. has a past surgical history that includes Hysterectomy; Cholecystectomy (Orthopedic surgery Left arm); Tubal ligation; and bladder suspension. Restrictions  Position Activity Restriction  Other position/activity restrictions: up with assist  Vision/Hearing  Vision: Impaired  Vision Exceptions: Wears glasses at all times  Hearing: Exceptions to [facility]  Hearing Exceptions: Bilateral hearing aid     Subjective  General  Chart Reviewed: Yes  Patient assessed for rehabilitation services?: Yes  Additional Pertinent Hx: Pt is an 80 y.o. female admitted to [facility] on 10/19/20 with complaints of SOB and chest pain. Pt found to have new onset of CHF. PMH: cerebral artery occlusion, OA, HTN, hyperlipidemia, CAD. Family / Caregiver Present: No  Referring Practitioner: Nawaf Mcgraw MD  Diagnosis: acute on chronic heart failure  Follows Commands: Within Functional Limits(however pt impulsive)  General Comment  Comments: Pt found supine in bed upon PT arrival.  Pt agreeable to therapy session. Subjective  Subjective: \"I use my cane when I think about it. \"  Pain Screening  Patient Currently in Pain: Denies  Vital Signs  Patient Currently in Pain: Denies Orientation  Orientation  Overall Orientation Status: Impaired  Orientation Level: Oriented to place;Oriented to person(oriented to date with increased time and cues, can name the president, confusion throughout session)  Social/Functional History  Social/Functional History  Lives With: Spouse(daughter and grandchildren)  Type of Home: House  Home Layout: Multi-level, Able to Live on Main level with bedroom/bathroom  Home Access: Stairs to enter with rails  Entrance Stairs - Number of Steps: 3 DOUG  Home Equipment: Gillermina Mitten, 4 wheeled walker  ADL Assistance: Independent  Homemaking Assistance: Needs assistance(shares with housekeeping, does not cook)  Ambulation Assistance: Independent(sometimes with cane or walker)  Transfer Assistance: Independent  Active : No  Leisure & Hobbies: watching tv, listening to the radio, [employer]  Additional Comments: Pt states that her  is home 24 hrs a day. Her daughter works during the day. She has older grandchildren that are able to assist intermittently.   Cognition        Objective          AROM RLE (degrees)  RLE AROM: WFL  AROM LLE (degrees)  LLE AROM : WFL  Strength RLE  Strength RLE: WFL  Comment: based on functional mobility  Strength LLE  Strength LLE: WFL  Comment: based on functional mobility     Sensation  Overall Sensation Status: WFL(intact to light touch over all BLE dermatomes, pt denies numbness/tingling)  Bed mobility  Supine to Sit: Supervision(HOB elevated, use of bedrail, impulsive )  Sit to Supine: Unable to assess(left in chair at end of session)  Scooting: Supervision(to scoot to EOB in sitting)  Transfers  Sit to Stand: Contact guard assistance(from EOB to no AD)  Stand to sit: Contact guard assistance(cues for hand placement)  Ambulation  Ambulation?: Yes  Ambulation 1  Surface: level tile  Device: No Device  Assistance: Contact guard assistance  Quality of Gait: impulsive, wide TESFAYE, decreased B step height/length  Distance: 36'  Comments: Cues for awareness of christa. Pt reporting that she is ambulating at her baseline. Stairs/Curb  Stairs?: No     Balance  Comments: CGA-SBA for standing balance with no UE support        Plan   Plan  Times per week: 2-3  Times per day: Daily  Current Treatment Recommendations: Strengthening, Neuromuscular Re-education, Home Exercise Program, ROM, Safety Education & Training, Balance Training, Endurance Training, Patient/Caregiver Education & Training, Functional Mobility Training, Equipment Evaluation, Education, & procurement, Transfer Training, Gait Training, Stair training  Safety Devices  Type of devices: All fall risk precautions in place, Call light within reach, Chair alarm in place, Gait belt, Left in chair, Nurse notified    G-Code       OutComes Score                                                  AM-PAC Score  AM-PAC Inpatient Mobility Raw Score : 20 (10/21/20 0855)  AM-PAC Inpatient T-Scale Score : 47.67 (10/21/20 0855)  Mobility Inpatient CMS 0-100% Score: 35.83 (10/21/20 0855)  Mobility Inpatient CMS G-Code Modifier : Vicky Moe (10/21/20 6402)          Goals  Short term goals  Time Frame for Short term goals: Discharge  Short term goal 1: Pt will perform all bed mobility with Johann  Short term goal 2: Pt will perform sit to/from stand with or without LRAD and supervision  Short term goal 3: Pt will ambulate 100' with or without LRAD and supervision  Short term goal 4: Pt will ascend/descend 3 stairs with unilateral HR and SBA  Patient Goals   Patient goals : none stated       Therapy Time   Individual Concurrent Group Co-treatment   Time In 0800         Time Out 0850         Minutes 50              Timed Code Treatment Minutes:   35    Total Treatment Minutes:  [address], PT    This note to serve as discharge summary if patient discharged before next session.

## 2020-10-21 NOTE — CARE COORDINATION
Sw call to pt daughter, Nathan Arnold ([phone]) to discuss PT rec for HHPT. Daughter in agreement at this time. Discussed options for Luis  and CMS list. Daughter would like referral to [facility]. The Plan for Transition of Care is related to the following treatment goals: independent at home    The patient representative  was provided with a choice of provider and agrees with the discharge plan. [x] Yes [] No    Freedom of choice list was provided with basic dialogue that supports the patient's individualized plan of care/goals, treatment preferences and shares the quality data associated with the providers. [x] Yes [] No    Referral faxed to [facility], call to [facility] to confirm referral - [facility] to follow.      Electronically signed by LANRE Diaz, LUIGI on 10/21/2020 at 10:26 AM

## 2020-10-21 NOTE — CONSULTS
Office: [phone]       Fax: [phone]      Nephrology Initial Consult Note        Patient's Name: Amauri Salazar Date: 10/19/2020  Date of Visit: 10/21/2020    Reason for Consult: SULY/CKD  Requesting Physician:  Columba Rosas MD  PCP: FERNANDO Rodas NP    Chief Complaint:  shortness of breath      History of Present Illness:      Cheyenne Roblero is a 80 y.o. female with PMHx of hypertension, CKD, coronary artery disease, gout who was hospitalized on 10/19/2020 with complaints of shortness of breath   Patient unable to provide full history due to confusion. Had chest pain. Denies dysuria, hematuria. Noted to be tachypneic. Chest x-ray reveals pulmonary vascular congestion and the CTA of the chest was negative for pulmonary embolism. Given iv lasix. Creat rising.   Now having diarrhea  NSAID use: unknown  IV contrast: CT chest 10/219  Home meds reviewed and noted to be on lasix, ACE inhibitor     Past Medical History:   Diagnosis Date    Chronic kidney disease (CKD), stage IV (severe) (HCC)     Coronary artery disease involving native coronary artery of native heart without angina pectoris 1/5/2016    GERD (gastroesophageal reflux disease) acid reflux    Gout, chronic     Histoplasmosis     Hyperlipidemia     Hypertension     Irregular heart beat     Osteoarthritis 2/28/2019    Knee, leg     Osteopenia of hip 04/26/2019    Pneumonia     Unspecified cerebral artery occlusion with cerebral infarction 2014 , ~ 2012       Past Surgical History:   Procedure Laterality Date    BLADDER SUSPENSION      CHOLECYSTECTOMY  Orthopedic surgery Left arm    HYSTERECTOMY      TUBAL LIGATION         Family History   Problem Relation Age of Onset    High Blood Pressure Mother     Heart Disease Mother     Diabetes Mother     High Blood Pressure Father     Heart Disease Father     Cancer Father         Brain    Cancer Sister         lymphoma    Cancer Brother         colon    Cancer Maternal Uncle         reports that she is a non-smoker but has been exposed to tobacco smoke. She has never used smokeless tobacco. She reports that she does not drink alcohol or use drugs. Medications: Allergies:  Codeine and Morphine and related    Scheduled Meds:   carvedilol  12.5 mg Oral BID WC    allopurinol  50 mg Oral Daily    aspirin  81 mg Oral Daily    atorvastatin  20 mg Oral Nightly    donepezil  5 mg Oral Nightly    FLUoxetine  20 mg Oral Daily    pantoprazole  40 mg Oral QAM AC    sodium chloride flush  10 mL Intravenous 2 times per day    enoxaparin  30 mg Subcutaneous Daily    dilTIAZem  5 mg Intravenous Once     Continuous Infusions:    Labs:  CBC:   Recent Labs     10/19/20  2037 10/20/20  0619   WBC 12.9* 8.0   HGB 12.6 11.8*    260     Ca/Mg/Phos:   Recent Labs     10/19/20  2037 10/20/20  0618 10/21/20  0625   CALCIUM 8.8 8.9 9.2     UA:  Recent Labs     10/19/20  2357   COLORU YELLOW   CLARITYU Clear   GLUCOSEU Negative   BILIRUBINUR Negative   KETUA Negative   SPECGRAV 1.016   BLOODU Negative   PHUR 5.5   PROTEINU 30*   UROBILINOGEN 0.2   NITRU Negative   LEUKOCYTESUR SMALL*   LABMICR YES   URINETYPE NotGiven      Urine Microscopic:   Recent Labs     10/19/20  2357   BACTERIA 1+*   HYALCAST 0   WBCUA 9*   RBCUA 0   EPIU 1     Urine Chemistry: No results for input(s): CLUR, LABCREA, PROTEINUR, NAUR in the last 72 hours.       ROS:     All systems reviewed and negative except as in HPI    Objective:     Vitals: BP (!) 170/79   Pulse 61   Temp 97.8 °F (36.6 °C) (Oral)   Resp 18   Ht 5' (1.524 m)   Wt 146 lb 9.7 oz (66.5 kg)   SpO2 94%   BMI 28.63 kg/m²    Wt Readings from Last 3 Encounters:   10/21/20 146 lb 9.7 oz (66.5 kg)   10/30/18 156 lb (70.8 kg)   10/23/18 152 lb 8.9 oz (69.2 kg)      24HR INTAKE/OUTPUT: Intake/Output Summary (Last 24 hours) at 10/21/2020 0925  Last data filed at 10/21/2020 5809  Gross per 24 hour   Intake 390 ml   Output 3200 ml   Net -2810 ml     Constitutional:  awake, NAD  HEENT:  MMM, No icterus  Neck: no bruits, No JVD  Cardiovascular:  S1, S2 reg  Respiratory: Diminished at bases   Abdomen:  +BS, soft, NT, ND  Ext: + lower extremity edema  Psychiatric: mood and affect anxious  Skin: dry/intact  CNS: confused, hard of hearing, no agitation    IMAGING:  CT CHEST PULMONARY EMBOLISM W CONTRAST   Final Result   Negative for acute pulmonary embolism. Features of volume overload/heart failure, with severe interstitial and   alveolar pulmonary edema with small bilateral pleural effusions. Multivessel coronary calcifications, mild-to-moderate along the LAD territory. Shotty mediastinal lymph nodes are presumably reactive. XR CHEST PORTABLE   Final Result   Vascular congestion and interstitial opacities, suggestive of pulmonary   edema. Atypical pneumonia is a differential, less likely possibility. Assessment :     1. SULY on CKD 3b  -Non-Oliguric  -Baseline creat: 1.4-1.7 Now 1.6->1.9  -UA prot +, otherwise bland  -Volume: Hypervolemic   -Electrolytes: No Dyskalemia  -Acid-Base: AGMA and Metabolic alkalosis    Recent Labs     10/19/20  2037 10/20/20  0618 10/21/20  0625   BUN 32* 34* 54*   CREATININE 1.4* 1.6* 1.9*     Recent Labs     10/19/20  2037 10/20/20  0618 10/21/20  0625    137 138   K 4.1 4.4 3.8   CO2 24 21 26         2. HTN  -Blood pressure high  -on Coreg, hydralazine    BP Readings from Last 1 Encounters:   10/21/20 (!) 151/66       3. HFrEF  -TTE (10/21/2020): LVEF 40-45%, G2DD, mod MR, mild AS    4. CAD    5. Gout  -takes Allopurinol    Plan:     - Hold Lisinopril  - May hold lasix today  - increase Hydralazine  - monitor BMP    -Monitor I/O, UOP  -Maintain MAP>65  -Avoid nephrotoxin, if able.   -Dose meds to current eGFR    Thank you for allowing us to participate in care of Shayy Geronimo . We will continue to follow. Feel free to contact me with any questions.       Ernie Partida  10/21/2020    [facility] [address]  Office : [phone]  Fax :[phone]

## 2020-10-21 NOTE — PROGRESS NOTES
Occupational Therapy   Occupational Therapy Initial Assessment and Discharge Summary  Date: 10/21/2020   Patient Name: Derik Lindo  MRN: [id number]     : 1935    Date of Service: 10/21/2020    Discharge Recommendations:  Defer OT at this time, Home with assist PRN       Assessment   Assessment: Pt presents with SOB, found with new onset Afib and CHF. Pt was independent PTA with mobility and self care with occasional cues due to dementia. Pt demonstrated supervision with mobility and self care. No OT goals identified, has sufficient supervision at home. D/C OT  Prognosis: Good  Decision Making: Low Complexity  History: see above  Exam: mobility, LB dressing, ROM/MMT  Assistance / Modification: supervision  OT Education: OT Role;Plan of Care  Barriers to Learning: STM deficits  REQUIRES OT FOLLOW UP: No  Activity Tolerance  Activity Tolerance: Patient Tolerated treatment well  Safety Devices  Safety Devices in place: Yes  Type of devices: Call light within reach; Chair alarm in place;Nurse notified; Left in chair           Patient Diagnosis(es): The primary encounter diagnosis was New onset a-fib ([address] Diagnoses of Congestive heart failure, unspecified HF chronicity, unspecified heart failure type ([address] Dyspnea and respiratory abnormalities, and Chest pain, unspecified type were also pertinent to this visit. has a past medical history of Chronic kidney disease (CKD), stage IV (severe) ([address] Coronary artery disease involving native coronary artery of native heart without angina pectoris, GERD (gastroesophageal reflux disease), Gout, chronic, Histoplasmosis, Hyperlipidemia, Hypertension, Irregular heart beat, Osteoarthritis, Osteopenia of hip, Pneumonia, and Unspecified cerebral artery occlusion with cerebral infarction. has a past surgical history that includes Hysterectomy; Cholecystectomy (Orthopedic surgery Left arm); Tubal ligation; and bladder suspension.            Restrictions  Position Activity Restriction  Other position/activity restrictions: up with assist    Subjective   General  Chart Reviewed: Yes, Orders, Progress Notes  Patient assessed for rehabilitation services?: Yes  Additional Pertinent Hx: PEr Leslee Pabon CNP, \"Daisy Calabrese is a 80 y.o. female who presents with shortness of breath. Onset was a. She states that she developed some chest pain earlier this morning. Has had worsening trouble breathing over the course of the day. No history of COPD, or cigarette abuse. Denies any cough, fevers or chills. Chest pain is substernal and does not radiate. Cannot tell me whether or not it worsens with exertion. No nausea or vomiting. No abdominal pain. The duration has been constant progressively worsening since the onset. The context was that the symptoms started spontaneously. No alleviating factors. Arrived on oxygen satting 94% and was clearly tachypneic with some accessory muscle use. Does not typically require oxygen supplementation. Came to the ED via EMS for further evaluation and treatment. Did receive 1 DuoNeb in route. \"  Family / Caregiver Present: No  Referring Practitioner: Dr. Claudette Mederos  Subjective  Subjective: Pt seen in room, no complaints    Social/Functional History  Social/Functional History  Lives With: Spouse(daughter and grandchildren)  Type of Home: House  Home Layout: Multi-level, Able to Live on Main level with bedroom/bathroom  Home Access: Stairs to enter with rails  Entrance Stairs - Number of Steps: 3 DOUG  Home Equipment: Agnes Newcomer, 4 wheeled walker  ADL Assistance: Independent  Homemaking Assistance: Needs assistance(shares with housekeeping, does not cook)  Ambulation Assistance: Independent(sometimes with cane or walker)  Transfer Assistance: Independent  Active : No  Leisure & Hobbies: watching tv, listening to the radio, embroidery  Additional Comments: Pt states that her  is home 24 hrs a day.   Her daughter works identified.   Has sufficient assist at home  Patient Goals   Patient goals : Pt's goal is to return home asap       Therapy Time   Individual Concurrent Group Co-treatment   Time In 0800         Time Out 0850         Minutes 50         Timed Code Treatment Minutes: Arslan 50, OT

## 2020-10-21 NOTE — CONSULTS
Consult received  Full note to follow    Kera Enter  10/21/2020    [facility] [address]  Office : [phone]  Fax :[phone]

## 2020-10-22 LAB
ANION GAP SERPL CALCULATED.3IONS-SCNC: 15 MMOL/L (ref 3–16)
BUN BLDV-MCNC: 55 MG/DL (ref 7–20)
CALCIUM SERPL-MCNC: 9 MG/DL (ref 8.3–10.6)
CHLORIDE BLD-SCNC: 99 MMOL/L (ref 99–110)
CO2: 24 MMOL/L (ref 21–32)
CREAT SERPL-MCNC: 1.5 MG/DL (ref 0.6–1.2)
GFR AFRICAN AMERICAN: 40
GFR NON-AFRICAN AMERICAN: 33
GLUCOSE BLD-MCNC: 110 MG/DL (ref 70–99)
POTASSIUM REFLEX MAGNESIUM: 3.7 MMOL/L (ref 3.5–5.1)
SODIUM BLD-SCNC: 138 MMOL/L (ref 136–145)

## 2020-10-22 PROCEDURE — 6370000000 HC RX 637 (ALT 250 FOR IP): Performed by: INTERNAL MEDICINE

## 2020-10-22 PROCEDURE — 6370000000 HC RX 637 (ALT 250 FOR IP): Performed by: HOSPITALIST

## 2020-10-22 PROCEDURE — 6360000002 HC RX W HCPCS: Performed by: INTERNAL MEDICINE

## 2020-10-22 PROCEDURE — 99232 SBSQ HOSP IP/OBS MODERATE 35: CPT | Performed by: NURSE PRACTITIONER

## 2020-10-22 PROCEDURE — 94760 N-INVAS EAR/PLS OXIMETRY 1: CPT

## 2020-10-22 PROCEDURE — 6370000000 HC RX 637 (ALT 250 FOR IP): Performed by: NURSE PRACTITIONER

## 2020-10-22 PROCEDURE — 2060000000 HC ICU INTERMEDIATE R&B

## 2020-10-22 PROCEDURE — 2580000003 HC RX 258: Performed by: NURSE PRACTITIONER

## 2020-10-22 PROCEDURE — 80048 BASIC METABOLIC PNL TOTAL CA: CPT

## 2020-10-22 RX ORDER — FUROSEMIDE 20 MG/1
20 TABLET ORAL DAILY
Status: DISCONTINUED | OUTPATIENT
Start: 2020-10-23 | End: 2020-10-26 | Stop reason: HOSPADM

## 2020-10-22 RX ADMIN — CARVEDILOL 12.5 MG: 12.5 TABLET, FILM COATED ORAL at 17:19

## 2020-10-22 RX ADMIN — SODIUM CHLORIDE, PRESERVATIVE FREE 10 ML: 5 INJECTION INTRAVENOUS at 09:31

## 2020-10-22 RX ADMIN — MICONAZOLE NITRATE: 20 CREAM TOPICAL at 09:32

## 2020-10-22 RX ADMIN — ASPIRIN 81 MG: 81 TABLET, CHEWABLE ORAL at 09:30

## 2020-10-22 RX ADMIN — HYDRALAZINE HYDROCHLORIDE 10 MG: 10 TABLET, FILM COATED ORAL at 06:29

## 2020-10-22 RX ADMIN — HEPARIN SODIUM 5000 UNITS: 5000 INJECTION INTRAVENOUS; SUBCUTANEOUS at 15:11

## 2020-10-22 RX ADMIN — HYDRALAZINE HYDROCHLORIDE 10 MG: 10 TABLET, FILM COATED ORAL at 21:21

## 2020-10-22 RX ADMIN — ATORVASTATIN CALCIUM 20 MG: 20 TABLET, FILM COATED ORAL at 21:15

## 2020-10-22 RX ADMIN — PANTOPRAZOLE SODIUM 40 MG: 40 TABLET, DELAYED RELEASE ORAL at 06:29

## 2020-10-22 RX ADMIN — FLUOXETINE 20 MG: 20 CAPSULE ORAL at 09:30

## 2020-10-22 RX ADMIN — HEPARIN SODIUM 5000 UNITS: 5000 INJECTION INTRAVENOUS; SUBCUTANEOUS at 21:21

## 2020-10-22 RX ADMIN — DONEPEZIL HYDROCHLORIDE 5 MG: 5 TABLET, FILM COATED ORAL at 21:15

## 2020-10-22 RX ADMIN — ALLOPURINOL 50 MG: 100 TABLET ORAL at 09:30

## 2020-10-22 RX ADMIN — MICONAZOLE NITRATE: 20 CREAM TOPICAL at 21:16

## 2020-10-22 RX ADMIN — CARVEDILOL 12.5 MG: 12.5 TABLET, FILM COATED ORAL at 09:30

## 2020-10-22 RX ADMIN — SODIUM CHLORIDE, PRESERVATIVE FREE 10 ML: 5 INJECTION INTRAVENOUS at 21:16

## 2020-10-22 RX ADMIN — HYDRALAZINE HYDROCHLORIDE 10 MG: 10 TABLET, FILM COATED ORAL at 15:11

## 2020-10-22 RX ADMIN — HEPARIN SODIUM 5000 UNITS: 5000 INJECTION INTRAVENOUS; SUBCUTANEOUS at 06:29

## 2020-10-22 ASSESSMENT — PAIN SCALES - GENERAL
PAINLEVEL_OUTOF10: 0
PAINLEVEL_OUTOF10: 0

## 2020-10-22 NOTE — PLAN OF CARE
Problem: Skin Integrity:  Goal: Will show no infection signs and symptoms  Description: Will show no infection signs and symptoms  Outcome: Ongoing  Goal: Absence of new skin breakdown  Description: Absence of new skin breakdown  Outcome: Ongoing     Problem: Falls - Risk of:  Goal: Will remain free from falls  Description: Will remain free from falls  Outcome: Ongoing  Goal: Absence of physical injury  Description: Absence of physical injury  Outcome: Ongoing     Problem: OXYGENATION/RESPIRATORY FUNCTION  Goal: Patient will maintain patent airway  Outcome: Ongoing  Goal: Patient will achieve/maintain normal respiratory rate/effort  Description: Respiratory rate and effort will be within normal limits for the patient  Outcome: Ongoing     Problem: HEMODYNAMIC STATUS  Goal: Patient has stable vital signs and fluid balance  Outcome: Ongoing     Problem: FLUID AND ELECTROLYTE IMBALANCE  Goal: Fluid and electrolyte balance are achieved/maintained  Outcome: Ongoing     Problem: ACTIVITY INTOLERANCE/IMPAIRED MOBILITY  Goal: Mobility/activity is maintained at optimum level for patient  Outcome: Ongoing     Problem: Pain:  Goal: Pain level will decrease  Description: Pain level will decrease  Outcome: Ongoing

## 2020-10-22 NOTE — PROGRESS NOTES
Tsai cath removed per protocol, pt complaining of burning at site prior to removal. Pt tolerated removal well.  Call light given and informed to call when needed to use the bathroom, bed alarm on, verbalized understanding  Electronically signed by Belén Mckeon RN on 10/22/2020 at 1:03 PM

## 2020-10-22 NOTE — PLAN OF CARE
Problem: Skin Integrity:  Goal: Will show no infection signs and symptoms  Description: Will show no infection signs and symptoms  Outcome: Ongoing  Goal: Absence of new skin breakdown  Description: Absence of new skin breakdown  Outcome: Ongoing     Problem: Falls - Risk of:  Goal: Will remain free from falls  Description: Will remain free from falls  Outcome: Ongoing  Goal: Absence of physical injury  Description: Absence of physical injury  Outcome: Ongoing     Problem: OXYGENATION/RESPIRATORY FUNCTION  Goal: Patient will maintain patent airway  Outcome: Ongoing  Goal: Patient will achieve/maintain normal respiratory rate/effort  Description: Respiratory rate and effort will be within normal limits for the patient  Outcome: Ongoing     Problem: HEMODYNAMIC STATUS  Goal: Patient has stable vital signs and fluid balance  Outcome: Ongoing     Problem: FLUID AND ELECTROLYTE IMBALANCE  Goal: Fluid and electrolyte balance are achieved/maintained  Outcome: Ongoing     Problem: ACTIVITY INTOLERANCE/IMPAIRED MOBILITY  Goal: Mobility/activity is maintained at optimum level for patient  Outcome: Ongoing     Problem: Pain:  Goal: Pain level will decrease  Description: Pain level will decrease  Outcome: Ongoing  Goal: Control of acute pain  Description: Control of acute pain  Outcome: Ongoing  Goal: Control of chronic pain  Description: Control of chronic pain  Outcome: Ongoing

## 2020-10-22 NOTE — PROGRESS NOTES
Office: [phone]       Fax: [phone]      Nephrology Progress Note        Patient's Name: Perez Martinez Date: 10/19/2020  Date of Visit: 10/22/2020    Reason for Consult: SULY/CKD       Subjective:      Saad Saba is a 80 y.o. female with PMHx of hypertension, CKD, coronary artery disease, gout who was hospitalized on 10/19/2020 with complaints of shortness of breath   Patient unable to provide full history due to confusion. Had chest pain. Denies dysuria, hematuria. Noted to be tachypneic. Chest x-ray reveals pulmonary vascular congestion and the CTA of the chest was negative for pulmonary embolism. Given iv lasix. Creat rising. Now having diarrhea  NSAID use: unknown  IV contrast: CT chest 10/219  Home meds reviewed and noted to be on lasix, ACE inhibitor     INTERVAL HISTORY    Feels same  Shortness of breath: No   UOP: Fair  Creat: trending down  confused      Medications:    Allergies:  Codeine and Morphine and related    Scheduled Meds:   carvedilol  12.5 mg Oral BID WC    hydrALAZINE  10 mg Oral 3 times per day    heparin (porcine)  5,000 Units Subcutaneous 3 times per day    miconazole   Topical BID    allopurinol  50 mg Oral Daily    aspirin  81 mg Oral Daily    atorvastatin  20 mg Oral Nightly    donepezil  5 mg Oral Nightly    FLUoxetine  20 mg Oral Daily    pantoprazole  40 mg Oral QAM AC    sodium chloride flush  10 mL Intravenous 2 times per day    dilTIAZem  5 mg Intravenous Once     Continuous Infusions:    Labs:  CBC:   Recent Labs     10/19/20  2037 10/20/20  0619   WBC 12.9* 8.0   HGB 12.6 11.8*    260     Ca/Mg/Phos:   Recent Labs     10/20/20  0618 10/21/20  0625 10/22/20  0557   CALCIUM 8.9 9.2 9.0     UA:  Recent Labs     10/19/20  [address] Negative   BILIRUBINUR Negative   KETUA Negative   SPECGRAV 1.016   BLOODU Negative   PHUR 5.5   PROTEINU 30*   UROBILINOGEN 0.2   NITRU Negative   LEUKOCYTESUR SMALL*   LABMICR YES   Cherlynn Standard NotGiven      Urine Microscopic:   Recent Labs     10/19/20  2357   BACTERIA 1+*   HYALCAST 0   WBCUA 9*   RBCUA 0   EPIU 1       Objective:     Vitals: BP (!) 143/59   Pulse 60   Temp 98.1 °F (36.7 °C) (Oral)   Resp 16   Ht 5' (1.524 m)   Wt 146 lb 9.7 oz (66.5 kg)   SpO2 95%   BMI 28.63 kg/m²    Wt Readings from Last 3 Encounters:   10/22/20 146 lb 9.7 oz (66.5 kg)   10/30/18 156 lb (70.8 kg)   10/23/18 152 lb 8.9 oz (69.2 kg)      24HR INTAKE/OUTPUT:      Intake/Output Summary (Last 24 hours) at 10/22/2020 1175  Last data filed at 10/22/2020 0421  Gross per 24 hour   Intake 120 ml   Output 1300 ml   Net -1180 ml     Constitutional:  awake, NAD  HEENT:  MMM, No icterus  Neck: no bruits, No JVD  Cardiovascular:  S1, S2 reg  Respiratory: Diminished at bases   Abdomen:  +BS, soft, NT, ND  Ext: + lower extremity edema  CNS: confused, hard of hearing, no agitation    IMAGING:  CT CHEST PULMONARY EMBOLISM W CONTRAST   Final Result   Negative for acute pulmonary embolism. Features of volume overload/heart failure, with severe interstitial and   alveolar pulmonary edema with small bilateral pleural effusions. Multivessel coronary calcifications, mild-to-moderate along the LAD territory. Shotty mediastinal lymph nodes are presumably reactive. XR CHEST PORTABLE   Final Result   Vascular congestion and interstitial opacities, suggestive of pulmonary   edema. Atypical pneumonia is a differential, less likely possibility.              Assessment :     1. SULY on CKD 3b  -Non-Oliguric  -Baseline creat: 1.4-1.7 Now 1.6->1.9->1.5  -UA prot +, otherwise bland  -Volume: Hypervolemic   -Electrolytes: No Dyskalemia  -Acid-Base: AGMA and Metabolic alkalosis    Recent Labs     10/19/20  2037 10/20/20  0618 10/21/20  6900 10/22/20  0568 BUN 32* 34* 54* 55*   CREATININE 1.4* 1.6* 1.9* 1.5*     Recent Labs     10/19/20  2037 10/20/20  0618 10/21/20  0625 10/22/20  0557    137 138 138   K 4.1 4.4 3.8 3.7   CO2 24 21 26 24         2. HTN  -Blood pressure high  -on Coreg, hydralazine    BP Readings from Last 1 Encounters:   10/22/20 (!) 143/59       3. HFrEF  -TTE (10/21/2020): LVEF 40-45%, G2DD, mod MR, mild AS    4. CAD    5. Gout  -takes Allopurinol    Plan:     - Hold Lisinopril  - May resume lasix   - increased Hydralazine  - monitor BMP    -Monitor I/O, UOP  -Maintain MAP>65  -Avoid nephrotoxin, if able. -Dose meds to current eGFR    Thank you for allowing us to participate in care of Katherine 27 . We will continue to follow. Feel free to contact me with any questions.       Lena Chirinos  10/22/2020    [facility] [address]  Office : [phone]  Fax :[phone]

## 2020-10-22 NOTE — PROGRESS NOTES
[address]   Daily Progress Note      Admit Date:  10/19/2020    CC: SOB    HPI:   Rema Lorenzo is a 80 y.o. female with PMH CAD, histoplasmosis, HLP, dementia, CKD, HTN. Presented to [facility] with SOB and atypical chest pain. CXR and Chest CT suggested pulm edema. Sob improved with diuresis but developed SULY. Dr. Scott Heart consulted today. Patient is pleasantly confused. Today she denies SOB and chest pain. No edema. She is on RA. She is ambulating in the room without difficulty. Lasix is held D/T SULY. Review of Systems:   General: Denies fever, chills, fatigue, weakness  Skin: Denies skin changes, rash, itching, lesions.   HEENT: Denies headache, dizziness, vision changes, nosebleeds, sore throat, nasal drainage  RESP: Denies cough, sputum, dyspnea, wheeze, snoring  CARD: Denies palpitations,  Murmur, chest pain  GI:Denies nausea, vomiting, heartburn, loss of appetite, change in bowels  : Denies frequency, pain, incontinence, polyuria  VASC: Denies claudication, leg cramps, clots   MUSC/SKEL: Denies pain, stiffness, arthritis  PSYCH: Denies anxiety, depression, stress  NEURO: Denies numbness, tingling, weakness,change in mood or memory  HEME: Denies abn bruising, bleeding, anemia  ENDO: Denies intolerance to heat, cold, excessive thirst or hunger, hx thyroid disease    Objective:   BP (!) 145/68   Pulse 70   Temp 98.3 °F (36.8 °C) (Oral)   Resp 16   Ht 5' (1.524 m)   Wt 146 lb 9.7 oz (66.5 kg)   SpO2 95%   BMI 28.63 kg/m²        Intake/Output Summary (Last 24 hours) at 10/22/2020 0733  Last data filed at 10/22/2020 0421  Gross per 24 hour   Intake 120 ml   Output 1300 ml   Net -1180 ml     I/O since adm: Neg 4L    WEIGHT:Admit Weight: 154 lb 12.2 oz (70.2 kg)         Today  Weight: 146 lb 9.7 oz (66.5 kg)   DRY WEIGHT:  Wt Readings from Last 3 Encounters:   10/22/20 146 lb 9.7 oz (66.5 kg)   10/30/18 156 lb (70.8 kg)   10/23/18 152 lb 8.9 oz (69.2 kg)       Physical Exam:  GEN: Appears well, no acute distress  SKIN: Pink, warm, dry. Nails without clubbing. HEENT: PERRLA. Normocephalic, atraumatic. Neck supple. No adenopathy. LUNG: AP diameter normal. Crackles bilateral bases. No wheeze or ronchi. Respiratory effort normal.  HEART: S1S2 A/R. No JVD. No carotid bruit. No murmur, rub or gallop. ABD: Soft, nontender. +BS X 4 quads. No hepatomegaly. EXT: Radial and pedal pulses 2+ and symmetric. Without varicosities. No edema. MUSCSKEL: Good ROM X4 extremities. No deformity. NEURO: A/O X3. Calm and cooperative. Telemetry: NSR HR 68 Freq PVCs    Medications:    carvedilol  12.5 mg Oral BID WC    hydrALAZINE  10 mg Oral 3 times per day    heparin (porcine)  5,000 Units Subcutaneous 3 times per day    miconazole   Topical BID    allopurinol  50 mg Oral Daily    aspirin  81 mg Oral Daily    atorvastatin  20 mg Oral Nightly    donepezil  5 mg Oral Nightly    FLUoxetine  20 mg Oral Daily    pantoprazole  40 mg Oral QAM AC    sodium chloride flush  10 mL Intravenous 2 times per day    dilTIAZem  5 mg Intravenous Once       labetalol, sodium chloride flush, acetaminophen **OR** acetaminophen, polyethylene glycol, promethazine **OR** ondansetron, perflutren lipid microspheres    Lab Data: I have reviewed all labs below today.    CBC:   Recent Labs     10/19/20  2037 10/20/20  0619   WBC 12.9* 8.0   HGB 12.6 11.8*   HCT 38.5 36.6   MCV 94.3 94.9    260     BMP:   Recent Labs     10/20/20  0618 10/21/20  0625 10/22/20  0557    138 138   K 4.4 3.8 3.7    97* 99   CO2 21 26 24   BUN 34* 54* 55*   CREATININE 1.6* 1.9* 1.5*     GLUCOSE:   Recent Labs     10/20/20  0618 10/21/20  0625 10/22/20  0557   GLUCOSE 160* 98 110*     LIVER PROFILE:   Lab Results   Component Value Date    AST 17 10/19/2020    ALT 11 10/19/2020    LIPASE 41.0 10/23/2018    LABALBU 3.9 10/19/2020    BILIDIR 0.1 05/19/2014    BILITOT 0.4 10/19/2020    ALKPHOS 85 10/19/2020     PT/INR:   Lab pulmonary    edema.  Atypical pneumonia is a differential, less likely possibility. Assessment/Plan:    1.) Acute on chronic diastolic heart failure: SOB improved but continues with crackles bilateral bases. On RA. BUN/creat suggest intravascularly dry. BNP elevated- possible from SULY. Discussed with Dr. Alverto Benson, Neph,lasix held. Will monitor S/S and renal fx. Adventitious BS could be from histoplasmosis? NYHA Class II   Stage B  Diuretic: none  Beta Blocker: coreg  SGLT2 Inhibitor: outpatient  2gm Na diet, daily weight, 64 oz fluid restriction  Avoid NSAIDS and other nephrotoxic meds  Cardiac Rehab: Not indicated for EF>35%  ICD: Not indicated for EF>35%  [facility] Referral: NO- dementia, disoriented   HF RN referral for education     2.) Chest pain: Atypical, troponins normal, EKG without ischemia    3.) Hypertension: Lisinopril PTA. Hold for SULY. Start hydralazine. Goal BP <130/80.   Non pharmacologic interventions include:   -weight loss  -heart healthy low sodium and low fat diet that consist of mostly fruits, vegetables and grains (Dash diet)  -limited amount of alcohol (no more than 1 drink/day for women, 2 drinks/day for men)  -regular physical activity  -no smoking  -stress reduction    Electronically signed by FERNANDO Bermeo CNP on 10/22/2020 at 7:33 AM

## 2020-10-23 LAB
ANION GAP SERPL CALCULATED.3IONS-SCNC: 15 MMOL/L (ref 3–16)
BACTERIA: ABNORMAL /HPF
BILIRUBIN URINE: NEGATIVE
BLOOD, URINE: ABNORMAL
BUN BLDV-MCNC: 48 MG/DL (ref 7–20)
CALCIUM SERPL-MCNC: 8.7 MG/DL (ref 8.3–10.6)
CHLORIDE BLD-SCNC: 100 MMOL/L (ref 99–110)
CLARITY: ABNORMAL
CO2: 22 MMOL/L (ref 21–32)
COLOR: YELLOW
CREAT SERPL-MCNC: 1.6 MG/DL (ref 0.6–1.2)
EPITHELIAL CELLS, UA: 7 /HPF (ref 0–5)
GFR AFRICAN AMERICAN: 37
GFR NON-AFRICAN AMERICAN: 31
GLUCOSE BLD-MCNC: 107 MG/DL (ref 70–99)
GLUCOSE URINE: NEGATIVE MG/DL
HYALINE CASTS: 1 /LPF (ref 0–8)
KETONES, URINE: NEGATIVE MG/DL
LEUKOCYTE ESTERASE, URINE: ABNORMAL
MICROSCOPIC EXAMINATION: YES
NITRITE, URINE: POSITIVE
PH UA: 6 (ref 5–8)
POTASSIUM REFLEX MAGNESIUM: 3.7 MMOL/L (ref 3.5–5.1)
PRO-BNP: 1817 PG/ML (ref 0–449)
PROTEIN UA: 30 MG/DL
RBC UA: ABNORMAL /HPF (ref 0–4)
SODIUM BLD-SCNC: 137 MMOL/L (ref 136–145)
SPECIFIC GRAVITY UA: 1.01 (ref 1–1.03)
URINE REFLEX TO CULTURE: YES
URINE TYPE: ABNORMAL
UROBILINOGEN, URINE: 0.2 E.U./DL
WBC UA: >900 /HPF (ref 0–5)

## 2020-10-23 PROCEDURE — 83880 ASSAY OF NATRIURETIC PEPTIDE: CPT

## 2020-10-23 PROCEDURE — 87086 URINE CULTURE/COLONY COUNT: CPT

## 2020-10-23 PROCEDURE — 87186 SC STD MICRODIL/AGAR DIL: CPT

## 2020-10-23 PROCEDURE — 99233 SBSQ HOSP IP/OBS HIGH 50: CPT | Performed by: INTERNAL MEDICINE

## 2020-10-23 PROCEDURE — 2060000000 HC ICU INTERMEDIATE R&B

## 2020-10-23 PROCEDURE — 2580000003 HC RX 258: Performed by: FAMILY MEDICINE

## 2020-10-23 PROCEDURE — 6370000000 HC RX 637 (ALT 250 FOR IP): Performed by: HOSPITALIST

## 2020-10-23 PROCEDURE — 36415 COLL VENOUS BLD VENIPUNCTURE: CPT

## 2020-10-23 PROCEDURE — 80048 BASIC METABOLIC PNL TOTAL CA: CPT

## 2020-10-23 PROCEDURE — 6360000002 HC RX W HCPCS: Performed by: INTERNAL MEDICINE

## 2020-10-23 PROCEDURE — 2580000003 HC RX 258: Performed by: NURSE PRACTITIONER

## 2020-10-23 PROCEDURE — 6370000000 HC RX 637 (ALT 250 FOR IP): Performed by: INTERNAL MEDICINE

## 2020-10-23 PROCEDURE — 6370000000 HC RX 637 (ALT 250 FOR IP): Performed by: NURSE PRACTITIONER

## 2020-10-23 PROCEDURE — 81001 URINALYSIS AUTO W/SCOPE: CPT

## 2020-10-23 PROCEDURE — 87077 CULTURE AEROBIC IDENTIFY: CPT

## 2020-10-23 PROCEDURE — 6360000002 HC RX W HCPCS: Performed by: FAMILY MEDICINE

## 2020-10-23 PROCEDURE — 97530 THERAPEUTIC ACTIVITIES: CPT

## 2020-10-23 PROCEDURE — 97116 GAIT TRAINING THERAPY: CPT

## 2020-10-23 RX ORDER — CARVEDILOL 12.5 MG/1
12.5 TABLET ORAL 2 TIMES DAILY WITH MEALS
Qty: 60 TABLET | Refills: 3 | Status: SHIPPED | OUTPATIENT
Start: 2020-10-23 | End: 2021-03-29 | Stop reason: SDUPTHER

## 2020-10-23 RX ORDER — LISINOPRIL 5 MG/1
2.5 TABLET ORAL DAILY
Status: DISCONTINUED | OUTPATIENT
Start: 2020-10-23 | End: 2020-10-26 | Stop reason: HOSPADM

## 2020-10-23 RX ORDER — ALLOPURINOL 100 MG/1
50 TABLET ORAL DAILY
Qty: 30 TABLET | Refills: 3 | Status: ON HOLD | OUTPATIENT
Start: 2020-10-24 | End: 2021-09-10

## 2020-10-23 RX ADMIN — HYDRALAZINE HYDROCHLORIDE 10 MG: 10 TABLET, FILM COATED ORAL at 22:08

## 2020-10-23 RX ADMIN — MICONAZOLE NITRATE: 20 CREAM TOPICAL at 22:09

## 2020-10-23 RX ADMIN — DONEPEZIL HYDROCHLORIDE 5 MG: 5 TABLET, FILM COATED ORAL at 22:08

## 2020-10-23 RX ADMIN — HYDRALAZINE HYDROCHLORIDE 10 MG: 10 TABLET, FILM COATED ORAL at 06:28

## 2020-10-23 RX ADMIN — CARVEDILOL 12.5 MG: 12.5 TABLET, FILM COATED ORAL at 16:57

## 2020-10-23 RX ADMIN — ALLOPURINOL 50 MG: 100 TABLET ORAL at 09:45

## 2020-10-23 RX ADMIN — HYDRALAZINE HYDROCHLORIDE 10 MG: 10 TABLET, FILM COATED ORAL at 15:14

## 2020-10-23 RX ADMIN — SODIUM CHLORIDE, PRESERVATIVE FREE 10 ML: 5 INJECTION INTRAVENOUS at 22:09

## 2020-10-23 RX ADMIN — FUROSEMIDE 20 MG: 20 TABLET ORAL at 09:45

## 2020-10-23 RX ADMIN — MICONAZOLE NITRATE: 20 CREAM TOPICAL at 09:45

## 2020-10-23 RX ADMIN — CEFTRIAXONE 1 G: 1 INJECTION, POWDER, FOR SOLUTION INTRAMUSCULAR; INTRAVENOUS at 15:15

## 2020-10-23 RX ADMIN — HEPARIN SODIUM 5000 UNITS: 5000 INJECTION INTRAVENOUS; SUBCUTANEOUS at 22:08

## 2020-10-23 RX ADMIN — HEPARIN SODIUM 5000 UNITS: 5000 INJECTION INTRAVENOUS; SUBCUTANEOUS at 06:28

## 2020-10-23 RX ADMIN — CARVEDILOL 12.5 MG: 12.5 TABLET, FILM COATED ORAL at 09:45

## 2020-10-23 RX ADMIN — HEPARIN SODIUM 5000 UNITS: 5000 INJECTION INTRAVENOUS; SUBCUTANEOUS at 15:14

## 2020-10-23 RX ADMIN — LISINOPRIL 2.5 MG: 5 TABLET ORAL at 09:49

## 2020-10-23 RX ADMIN — ASPIRIN 81 MG: 81 TABLET, CHEWABLE ORAL at 09:45

## 2020-10-23 RX ADMIN — SODIUM CHLORIDE, PRESERVATIVE FREE 10 ML: 5 INJECTION INTRAVENOUS at 09:46

## 2020-10-23 RX ADMIN — FLUOXETINE 20 MG: 20 CAPSULE ORAL at 09:45

## 2020-10-23 RX ADMIN — ATORVASTATIN CALCIUM 20 MG: 20 TABLET, FILM COATED ORAL at 22:08

## 2020-10-23 RX ADMIN — PANTOPRAZOLE SODIUM 40 MG: 40 TABLET, DELAYED RELEASE ORAL at 06:28

## 2020-10-23 ASSESSMENT — PAIN SCALES - GENERAL
PAINLEVEL_OUTOF10: 0
PAINLEVEL_OUTOF10: 0

## 2020-10-23 NOTE — PROGRESS NOTES
normal.  No rashes or lesions. Neurologic:  Neurovascularly intact without any focal sensory/motor deficits. Cranial nerves: II-XII intact, grossly non-focal.  Psychiatric: Alert and oriented, thought content appropriate, normal insight  Capillary Refill: Brisk,< 3 seconds   Peripheral Pulses: +2 palpable, equal bilaterally       Labs:   Recent Labs     10/20/20  0619   WBC 8.0   HGB 11.8*   HCT 36.6        Recent Labs     10/20/20  0618 10/21/20  0625 10/22/20  0557    138 138   K 4.4 3.8 3.7    97* 99   CO2 21 26 24   BUN 34* 54* 55*   CREATININE 1.6* 1.9* 1.5*   CALCIUM 8.9 9.2 9.0     No results for input(s): AST, ALT, BILIDIR, BILITOT, ALKPHOS in the last 72 hours. No results for input(s): INR in the last 72 hours. Recent Labs     10/19/20  2325   TROPONINI <0.01       Urinalysis:      Lab Results   Component Value Date    NITRU Negative 10/19/2020    WBCUA 9 10/19/2020    BACTERIA 1+ 10/19/2020    RBCUA 0 10/19/2020    BLOODU Negative 10/19/2020    SPECGRAV 1.016 10/19/2020    GLUCOSEU Negative 10/19/2020    GLUCOSEU Negative 06/13/2011       Radiology:  CT CHEST PULMONARY EMBOLISM W CONTRAST   Final Result   Negative for acute pulmonary embolism. Features of volume overload/heart failure, with severe interstitial and   alveolar pulmonary edema with small bilateral pleural effusions. Multivessel coronary calcifications, mild-to-moderate along the LAD territory. Shotty mediastinal lymph nodes are presumably reactive. XR CHEST PORTABLE   Final Result   Vascular congestion and interstitial opacities, suggestive of pulmonary   edema. Atypical pneumonia is a differential, less likely possibility.                  Assessment/Plan:    Active Hospital Problems    Diagnosis Date Noted    Acute on chronic heart failure ([facility] [address] [I50.9] 10/19/2020       Patient is a 15-year-old female with past medical history of CAD, hyperlipidemia and dementia CKD hypertension who presents to hospital for suspected new onset CHF.     Assessment  Acute hypoxic respiratory failure on admission likely secondary to CHF  Acute on chronic diastolic heart failure  Chest pain  SULY on CKD stage III     Plan  Patient is currently on room air, creatinine trending up, will hold off on IV diuresis for now, continue Coreg, statin, Cardizem  Patient is status post 60 IV Lasix once 10/20  Continue hydralazine, Protonix, continue to monitor BMP  Nephrology was consulted, cardiology, CHF coordinator following  EF shows normal ventricular size, with slightly decreased systolic function, EF 54%, mild aortic stenosis      DVT Prophylaxis: Heparin  Diet: DIET LOW SODIUM 2 GM; 1500 ml  Code Status: Full Code    PT/OT Eval Status: ordered    Dispo - anticipate D/C tomorrow    Pj Moody MD

## 2020-10-23 NOTE — DISCHARGE INSTR - COC
Continuity of Care Form    Patient Name: Vinicius Viera   :  1935  MRN:  [id number]    Admit date:  10/19/2020  Discharge date: 10/26/2020    Code Status Order: Full Code   Advance Directives:   [facility] Documentation       Date/Time Healthcare Directive Type of Healthcare Directive Copy in [address] Agent's Name Healthcare Agent's Phone Number    10/20/20 1915  Unknown, patient unable to respond due to medical condition -- -- -- -- --            Admitting Physician:  Miguelito Goldstein DO  PCP: FERNANDO Cooper NP    Discharging Nurse: Nevin Salmon   Discharging Hospital Unit/Room#: M1S-4685/5262-01  Discharging Unit Phone Number: [phone]    Emergency Contact:   Extended Emergency Contact Information  Primary Emergency Contact: Nathan Tovar  Address: [address] [place] [address] Phone: [phone]  Relation: Spouse  Secondary Emergency Contact: Karissa Martinez  Address: [address] Phone: [phone]  Relation: Child    Past Surgical History:  Past Surgical History:   Procedure Laterality Date    BLADDER SUSPENSION      CHOLECYSTECTOMY  Orthopedic surgery Left arm    HYSTERECTOMY      TUBAL LIGATION         Immunization History:   Immunization History   Administered Date(s) Administered    Influenza, Quadv, Recombinant, IM PF (Flublok 18 yrs and older) 10/30/2018    Pneumococcal Conjugate 13-valent (Lindbergh Lot) 2016    Pneumococcal Polysaccharide (Bpjhuryri18) 2017       Active Problems:  Patient Active Problem List   Diagnosis Code    HTN (hypertension), benign I10    Elbow pain M25.529    Knee pain, bilateral M25.561, M25.562    Primary localized osteoarthrosis, lower leg M17.10    Rotator cuff (capsule) sprain G57.251Q    Coronary artery disease involving native coronary artery of native heart without angina pectoris I25.10 shifts: In: 480 [P.O.:480]  Out: 275 [Urine:275]    Safety Concerns: At Risk for Falls    Impairments/Disabilities:      Hearing    Nutrition Therapy:  Current Nutrition Therapy:   - Oral Diet:  Low Sodium (2gm)    Routes of Feeding: Oral  Liquids: No Restrictions  Daily Fluid Restriction: yes - amount 1500 ml  Last Modified Barium Swallow with Video (Video Swallowing Test): not done    Treatments at the Time of Hospital Discharge:   Respiratory Treatments: N/A  Oxygen Therapy:  is not on home oxygen therapy. Ventilator:    - No ventilator support     Heart Failure Instructions for Daily Management  Patient was treated for acute on chronic combined systolic and diastolic heart failure. she  will require the following:     Please weigh daily on the same scale and approximately the same time of day. Report weight gain of 3 pounds/day or 5 pounds/week to : Brandon [phone].  Please use hospital discharge weight as baseline reference.  Please monitor for signs and symptoms of and report to MD:  o Worsening Heart Failure: sudden weight gain, shortness of breath, lower extremity or general edema/swelling, abdominal bloating/swelling, inability to lie flat, intolerance to usual activity, or cough (especially at night). Report these finding even if no increase in weight.  o Dehydration:  having difficulty or a decrease in urination, dizziness, worsening fatigue, or new onset/worsening of generalized weakness.  Please continue a LOW SODIUM diet and LIMIT fluid intake to 48 - 64 ounces ( 1.5 - 2 liters) per day. [facility] Call Brandon [phone] and/or Sushma Yi @ [phone] with any questions or concerns.  Please continue heart failure education to patient and family/support system.  See After Visit Summary for hospital follow up appointment details.    Consider spiritual care referral for support and/or completion of advance directives [phone].  Consider: [address] telehealth program if patient agreeable and able to participate, palliative care consult for ongoing goals of care, end of life, and/or chronic disease management discussions and referral to [facility] [phone]) once SNF/HHC complete.     HOME HEALTH CARE: LEVEL 3 SAFETY   Home health agency to establish plan of care for patient over 60 day period   [address] Initial home SN evaluation visit to occur within 24-48 hours for:  1)  medication management  2)  VS and clinical assessment  3)  S&S chronic disease exacerbation education + when to contact MD/NP  4)  care coordination   Medication Reconciliation during 1st SN visit   PT/OT/Speech    Evaluations in home within 24-48 hours of discharge to include DME and home safety   Perla Spann Frontload therapy 5 days, then 3x a week    OT to evaluate if patient has [address] needs for personal care     evaluation within 24-48 hours to evaluate resources & insurance for potential AL, [place], LTC, and Medicaid options    Palliative Care referral within 5 days of hospital discharge   PCP Visit scheduled within 3 - 7 days of hospital discharge    [address] (If patient is agreeable and meets guidelines)      Rehab Therapies: Physical Therapy, Occupational Therapy and Nurse  Weight Bearing Status/Restrictions: No weight bearing restirctions  Other Medical Equipment (for information only, NOT a DME order):  cane  Other Treatments: N/A    Patient's personal belongings (please select all that are sent with patient):  {[facility] DME Belongings:[id number]:::0}    RN SIGNATURE:  Electronically signed by Sunitha Harvey RN on 10/26/20 at 3:51 PM EDT    CASE MANAGEMENT/SOCIAL WORK SECTION    Inpatient Status Date: 10/19/2020    Readmission Risk Assessment Score:  14     Discharging to Facility/ Agency    Name:  [facility] care     Address: [address] [facility] [address] [facility] [address] Myla Fontanez Phone: [phone]   Fax: [phone]    / signature: Electronically signed by Ghulam Pandya RN on 10/23/20 at 10:24 AM EDT    PHYSICIAN SECTION    Prognosis: Good    Condition at Discharge: Stable    Rehab Potential (if transferring to Rehab): Good    Recommended Labs or Other Treatments After Discharge:   Home PT and OT:  Evaluate and treat  Home nursing:  Medication changes    Physician Certification: I certify the above information and transfer of Eric Calvert  is necessary for the continuing treatment of the diagnosis listed and that she requires [address] for less 30 days.      Update Admission H&P: No change in H&P    PHYSICIAN SIGNATURE:  Electronically signed by Courtney Hernandez MD on 10/26/20 at 10:06 AM EDT

## 2020-10-23 NOTE — PROGRESS NOTES
Physical Therapy  Facility/Department: [address] PROGRESSIVE CARE  Daily Treatment Note  NAME: Vinicius Viera  : 1935  MRN: [id number]    Date of Service: 10/23/2020    Discharge Recommendations:  Home with assist PRN, Home with Home health PT, S Level 1   PT Equipment Recommendations  Equipment Needed: No  Vinicius Viera scored a 23/24 on the AM-PAC short mobility form. Current research shows that an AM-PAC score of 18 or greater is typically associated with a discharge to the patient's home setting. Based on the patient's AM-PAC score and their current functional mobility deficits, it is recommended that the patient have 2-3 sessions per week of Physical Therapy at d/c to increase the patient's independence. At this time, this patient demonstrates the endurance and safety to discharge home with home services and a follow up treatment frequency of 2-3x/wk. Please see assessment section for further patient specific details. If patient discharges prior to next session this note will serve as a discharge summary. Please see below for the latest assessment towards goals. Assessment   Body structures, Functions, Activity limitations: Decreased functional mobility ; Decreased safe awareness  Assessment: Pt is an 80 y.o. female with diagnosis of acute on chronic heart failure presenting with decreased functional mobility and safety awareness. Pt is currently requiring CGA-SBA for functional mobility, which is a decline from reported baseline. Pt reporting that she is functioning near her baseline and will have 24 hr supervision from her  at d/c, as well as intermittent assist from her daughter and grandkids. Today pt was I with bed mobility, SBA for transfers and ambulation 120' with SPC, and was less impulsive. Pt will benefit from continued therapy to maximize safety and independence but is safe to go home with family support when medically ready.   Prognosis: Good  REQUIRES PT FOLLOW UP: Supine: Unable to assess  Scooting: Unable to assess  Transfers  Sit to Stand: Stand by assistance  Stand to sit: Stand by assistance  Ambulation  Ambulation?: Yes  More Ambulation?: No  Ambulation 1  Surface: level tile  Device: Single point cane  Assistance: Stand by assistance  Quality of Gait: not impulsive this date, better gait pattern with sl decreased step height, even pace and pattern  Gait Deviations: Decreased step height  Distance: 120'  Stairs/Curb  Stairs?: No     Balance  Posture: Good  Sitting - Static: Good  Sitting - Dynamic: Good  Standing - Static: Good  Standing - Dynamic: Good;-  Exercises  Hip Flexion: 10  Knee Long Arc Quad: 10  Ankle Pumps: 10      AM-PAC Score  AM-PAC Inpatient Mobility Raw Score : 23 (10/23/20 1049)  AM-PAC Inpatient T-Scale Score : 56.93 (10/23/20 1049)  Mobility Inpatient CMS 0-100% Score: 11.2 (10/23/20 1049)  Mobility Inpatient CMS G-Code Modifier : CI (10/23/20 1049)     Goals  Short term goals  Time Frame for Short term goals: Discharge  Short term goal 1: Pt will perform all bed mobility with Johann met 10-23  Short term goal 2: Pt will perform sit to/from stand with or without LRAD and supervision ongoing  Short term goal 3: Pt will ambulate 100' with or without LRAD and supervision ongoing  Short term goal 4: Pt will ascend/descend 3 stairs with unilateral HR and SBA  Patient Goals   Patient goals : none stated    Plan    Plan  Times per week: 2-3  Times per day: Daily  Current Treatment Recommendations: Strengthening, Neuromuscular Re-education, Home Exercise Program, ROM, Safety Education & Training, Balance Training, Endurance Training, Patient/Caregiver Education & Training, Functional Mobility Training, Equipment Evaluation, Education, & procurement, Transfer Training, Gait Training, Stair training  Safety Devices  Type of devices:  All fall risk precautions in place, Call light within reach, Chair alarm in place, Gait belt, Left in chair, Nurse notified

## 2020-10-23 NOTE — PROGRESS NOTES
LASIX     Handicap Placard Misc  by Does not apply route Patient is unable to walk more than 250 feet before stopping to rest.    Not to exceed 5 years.   DX: CHF     lisinopril 2.5 MG tablet  Commonly known as:  PRINIVIL;ZESTRIL     loratadine 10 MG capsule  Commonly known as:  CLARITIN     pantoprazole 40 MG tablet  Commonly known as:  Protonix  Take 1 tablet by mouth daily     therapeutic multivitamin-minerals tablet     vitamin D3 10 MCG (400 UNIT) Tabs tablet  Commonly known as:  CHOLECALCIFEROL        STOP taking these medications    metoprolol succinate 50 MG extended release tablet  Commonly known as:  TOPROL XL           Where to Get Your Medications      [id number] [place], [address] [address] [address]    Phone:  [phone]   · allopurinol 100 MG tablet  · carvedilol 12.5 MG tablet         MARIA TERESA [facility] [place]  Heart Failure Discharge Medication Reconciliation Program  [phone]

## 2020-10-23 NOTE — CARE COORDINATION
D/C order noted. Nurse is waiting to check urine prior to patient D/C. Spoke with Yolie with [facility], she can accept the patient for services. Family will transport when she is ready. Electronically signed by Harish Jacobs RN on 10/23/2020 at 1:22 PM    Reviewed IMM in the room with the patient. She denies any additional D/C needs.     Electronically signed by Harish Jacobs RN on 10/23/2020 at 1:46 PM

## 2020-10-23 NOTE — PROGRESS NOTES
Pt with much urinary frequncy, c/o burning, odorous/cloudy urine. Dr Manuel Edwards notified.  Electronically signed by Lacy Kerns RN on 10/23/2020 at 10:07 AM

## 2020-10-23 NOTE — PROGRESS NOTES
Hospitalist Progress Note      PCP: Marylene Setting, APRN - NP    Date of Admission: 10/19/2020    Chief Complaint: CHF    Hospital Course:     Subjective: Restart low-dose Lasix per cardiology. Urinalysis concerning for UTI      Medications:  Reviewed    Infusion Medications   Scheduled Medications    lisinopril  2.5 mg Oral Daily    cefTRIAXone (ROCEPHIN) IV  1 g Intravenous Q24H    furosemide  20 mg Oral Daily    carvedilol  12.5 mg Oral BID WC    hydrALAZINE  10 mg Oral 3 times per day    heparin (porcine)  5,000 Units Subcutaneous 3 times per day    miconazole   Topical BID    allopurinol  50 mg Oral Daily    aspirin  81 mg Oral Daily    atorvastatin  20 mg Oral Nightly    donepezil  5 mg Oral Nightly    FLUoxetine  20 mg Oral Daily    pantoprazole  40 mg Oral QAM AC    sodium chloride flush  10 mL Intravenous 2 times per day    dilTIAZem  5 mg Intravenous Once     PRN Meds: labetalol, sodium chloride flush, acetaminophen **OR** acetaminophen, polyethylene glycol, promethazine **OR** ondansetron, perflutren lipid microspheres      Intake/Output Summary (Last 24 hours) at 10/23/2020 1715  Last data filed at 10/23/2020 1704  Gross per 24 hour   Intake 600 ml   Output 250 ml   Net 350 ml       Exam:    BP (!) 144/73   Pulse 61   Temp 97.5 °F (36.4 °C) (Oral)   Resp 17   Ht 5' (1.524 m)   Wt 147 lb 4.3 oz (66.8 kg)   SpO2 99%   BMI 28.76 kg/m²     General appearance: No apparent distress, appears stated age and cooperative. HEENT: Pupils equal, round, and reactive to light. Conjunctivae/corneas clear. Neck: Supple, with full range of motion. No jugular venous distention. Trachea midline. Respiratory:  Normal respiratory effort. Clear to auscultation, bilaterally without Rales/Wheezes/Rhonchi. Cardiovascular: Regular rate and rhythm with normal S1/S2 without murmurs, rubs or gallops. Abdomen: Soft, non-tender, non-distended with normal bowel sounds.   Musculoskeletal: No clubbing,

## 2020-10-23 NOTE — PROGRESS NOTES
[facility]   Daily Progress Note      Admit Date:  10/19/2020      Subjective:   Ms. Barron Rush is an 80 y.o. female with a past medical history significant for dementia, CKD and essential hypertension who presented to [facility] ED with shortness of breath and chest pain. Marco Smith states that evens family thought she was having a heart attack. She says rudy couldn't breath and she had pain in the middle of her chest. She was coughing. The patient diud noit think she was having a heart attack. She does not knw why she was short of breath but had jhust woken up. Interval History:  Aby Nick is on room air now and reports feeling well. She wants to go home. No events overnight.        Objective:     BP (!) 120/54   Pulse 63   Temp 98 °F (36.7 °C) (Oral)   Resp 16   Ht 5' (1.524 m)   Wt 147 lb 4.3 oz (66.8 kg)   SpO2 94%   BMI 28.76 kg/m²      Intake/Output Summary (Last 24 hours) at 10/23/2020 1444  Last data filed at 10/23/2020 0943  Gross per 24 hour   Intake 840 ml   Output 225 ml   Net 615 ml       Physical Exam:  General:  Awake, alert, NAD  Skin:  Warm and dry  Neck:  JVD<8, no carotid bruits  Chest:  Clear to auscultation, no wheezes/rhonchi/rales  Cardiovascular:  RRR, normal S1/S2, no M/R/G  Abdomen:  Soft, nontender, +bowel sounds  Extremities:  No edema  Pulses: 2+ bilat carotid    2+ bilat radial    2+ bilat femoral        Medications:    lisinopril  2.5 mg Oral Daily    furosemide  20 mg Oral Daily    carvedilol  12.5 mg Oral BID WC    hydrALAZINE  10 mg Oral 3 times per day    heparin (porcine)  5,000 Units Subcutaneous 3 times per day    miconazole   Topical BID    allopurinol  50 mg Oral Daily    aspirin  81 mg Oral Daily    atorvastatin  20 mg Oral Nightly    donepezil  5 mg Oral Nightly    FLUoxetine  20 mg Oral Daily    pantoprazole  40 mg Oral QAM AC    sodium chloride flush  10 mL Intravenous 2 times per day    dilTIAZem  5 mg Intravenous Once         Lab Data:  CBC: No results for input(s): WBC, HGB, PLT in the last 72 hours. BMP:    Recent Labs     10/21/20  0625 10/22/20  0557 10/23/20  0606    138 137   K 3.8 3.7 3.7   CO2 26 24 22   BUN 54* 55* 48*   CREATININE 1.9* 1.5* 1.6*   FASTING LIPID PANEL:  Lab Results   Component Value Date    CHOL 191 04/24/2018    HDL 47 04/24/2018    HDL 38 11/03/2011    TRIG 295 04/24/2018       Echo 5/20/14:  Normal left ventricle size, wall thickness and systolic function with an    estimated ejection fraction of 55%. No regional wall motion abnormalities    are seen.   Ranulfo Shantel is reversal of E/A inflow velocities across the mitral valve    suggesting impaired left ventricular relaxation.    Mitral annular calcification is present.    Aortic valve appears sclerotic but opens adequately.      CTPA 10/19/20:  Negative for acute pulmonary embolism.         Features of volume overload/heart failure, with severe interstitial and    alveolar pulmonary edema with small bilateral pleural effusions.         Multivessel coronary calcifications, mild-to-moderate along the LAD territory.         Shotty mediastinal lymph nodes are presumably reactive.        Echo 10/19/20:  Normal left ventricular size and wall thickness. Global left ventricular systolic function is mildly decreased with ejection   fraction estimated at 45%. Normal right ventricular size and function. Normal left ventricular size and wall thickness. Global left ventricular function is mildly decreased with ejection fraction   estimated from 40-45%. Evidence of grade 2 diastolic dysfunction appreciated. The left atrium is mildly dilated. Moderate mitral regurgitation is present. There is moderate fibrocalcific sclerosis of the aortic valve with mean   gradient across the aortic valve of 16 mmHg suggesting probable mild aortic   stenosis.       Assessment:  1. Acute on chronic combined systolic and diastolic CHF  2. Chest Pain, atypical  3.  Chronic Kidney

## 2020-10-23 NOTE — PLAN OF CARE
Problem: Skin Integrity:  Goal: Will show no infection signs and symptoms  Description: Will show no infection signs and symptoms  Outcome: Ongoing     Problem: Falls - Risk of:  Goal: Will remain free from falls  Description: Will remain free from falls  Outcome: Ongoing     Problem: OXYGENATION/RESPIRATORY FUNCTION  Goal: Patient will maintain patent airway  Outcome: Ongoing     Problem: HEMODYNAMIC STATUS  Goal: Patient has stable vital signs and fluid balance  Outcome: Ongoing     Problem: FLUID AND ELECTROLYTE IMBALANCE  Goal: Fluid and electrolyte balance are achieved/maintained  Outcome: Ongoing     Problem: ACTIVITY INTOLERANCE/IMPAIRED MOBILITY  Goal: Mobility/activity is maintained at optimum level for patient  Outcome: Ongoing     Problem: Pain:  Goal: Pain level will decrease  Description: Pain level will decrease  Outcome: Ongoing     Problem: Urinary Elimination:  Goal: Signs and symptoms of infection will decrease  Description: Signs and symptoms of infection will decrease  Outcome: Ongoing

## 2020-10-23 NOTE — CARE COORDINATION
[facility]  Received referral from case management   Spoke with patent regarding [facility] services, Faxed orders to [address] care to see patient by   10/25/2020  [facility] LPN    [facility] CTN Cell [phone], Fax [phone]

## 2020-10-23 NOTE — PLAN OF CARE
Problem: Skin Integrity:  Goal: Will show no infection signs and symptoms  Description: Will show no infection signs and symptoms    Outcome: Ongoing    Goal: Absence of new skin breakdown  Description: Absence of new skin breakdown    Outcome: Ongoing     Problem: Falls - Risk of:  Goal: Will remain free from falls  Description: Will remain free from falls    Outcome: Ongoing    Goal: Absence of physical injury  Description: Absence of physical injury    Outcome: Ongoing       Problem: OXYGENATION/RESPIRATORY FUNCTION  Goal: Patient will maintain patent airway    Outcome: Ongoing    Goal: Patient will achieve/maintain normal respiratory rate/effort  Description: Respiratory rate and effort will be within normal limits for the patient    Outcome: Ongoing    Problem: HEMODYNAMIC STATUS  Goal: Patient has stable vital signs and fluid balance    Outcome: Ongoing       Problem: FLUID AND ELECTROLYTE IMBALANCE  Goal: Fluid and electrolyte balance are achieved/maintained    Outcome: Ongoing       Problem: ACTIVITY INTOLERANCE/IMPAIRED MOBILITY  Goal: Mobility/activity is maintained at optimum level for patient    Outcome: Ongoing       Problem: Pain:  Goal: Pain level will decrease  Description: Pain level will decrease  Goal: Control of acute pain  Description: Control of acute pain  Outcome: Ongoing  Goal: Control of chronic pain  Description: Control of chronic pain  Outcome: Ongoing

## 2020-10-23 NOTE — PROGRESS NOTES
Office: [phone]       Fax: [phone]      Nephrology Progress Note        Patient's Name: Valarie Mosher Date: 10/19/2020  Date of Visit: 10/23/2020    Reason for Consult: SULY/CKD       Subjective:      Rosaura Henderson is a 80 y.o. female with PMHx of hypertension, CKD, coronary artery disease, gout who was hospitalized on 10/19/2020 with complaints of shortness of breath   Patient unable to provide full history due to confusion. Had chest pain. Denies dysuria, hematuria. Noted to be tachypneic. Chest x-ray reveals pulmonary vascular congestion and the CTA of the chest was negative for pulmonary embolism. Given iv lasix. Creat rising. Now having diarrhea  NSAID use: unknown  IV contrast: CT chest 10/219  Home meds reviewed and noted to be on lasix, ACE inhibitor     INTERVAL HISTORY    Feels same  Shortness of breath: No   UOP: Fair  Creat: stable  confused      Medications: Allergies:  Codeine and Morphine and related    Scheduled Meds:   furosemide  20 mg Oral Daily    carvedilol  12.5 mg Oral BID WC    hydrALAZINE  10 mg Oral 3 times per day    heparin (porcine)  5,000 Units Subcutaneous 3 times per day    miconazole   Topical BID    allopurinol  50 mg Oral Daily    aspirin  81 mg Oral Daily    atorvastatin  20 mg Oral Nightly    donepezil  5 mg Oral Nightly    FLUoxetine  20 mg Oral Daily    pantoprazole  40 mg Oral QAM AC    sodium chloride flush  10 mL Intravenous 2 times per day    dilTIAZem  5 mg Intravenous Once     Continuous Infusions:    Labs:  CBC:   No results for input(s): WBC, HGB, PLT in the last 72 hours.   Ca/Mg/Phos:   Recent Labs     10/21/20  0625 10/22/20  0557 10/23/20  0606   CALCIUM 9.2 9.0 8.7         Objective:     Vitals: BP (!) 156/64   Pulse 72   Temp 98.4 °F (36.9 °C) (Oral)   Resp 16   Ht 5' (1.524 m)   Wt 147 lb 4.3 oz (66.8 kg)   SpO2 92%   BMI 28.76 kg/m²    Wt Readings from Last 3 Encounters:   10/23/20 147 lb 4.3 oz (66.8 kg)   10/30/18 156 lb (70.8 kg)   10/23/18 152 lb 8.9 oz (69.2 kg)      24HR INTAKE/OUTPUT:      Intake/Output Summary (Last 24 hours) at 10/23/2020 0911  Last data filed at 10/22/2020 2206  Gross per 24 hour   Intake 480 ml   Output 275 ml   Net 205 ml     Constitutional:  awake, NAD  HEENT:  MMM, No icterus  Neck: no bruits, No JVD  Cardiovascular:  S1, S2 reg  Respiratory: Diminished at bases   Abdomen:  +BS, soft, NT, ND  Ext: + lower extremity edema  CNS: confused, hard of hearing, no agitation    IMAGING:  CT CHEST PULMONARY EMBOLISM W CONTRAST   Final Result   Negative for acute pulmonary embolism. Features of volume overload/heart failure, with severe interstitial and   alveolar pulmonary edema with small bilateral pleural effusions. Multivessel coronary calcifications, mild-to-moderate along the LAD territory. Shotty mediastinal lymph nodes are presumably reactive. XR CHEST PORTABLE   Final Result   Vascular congestion and interstitial opacities, suggestive of pulmonary   edema. Atypical pneumonia is a differential, less likely possibility. Assessment :     1. SULY on CKD 3b  -Non-Oliguric  -Baseline creat: 1.4-1.7 Now 1.6->1.9->1.5  -UA prot +, otherwise bland  -Volume: Hypervolemic   -Electrolytes: No Dyskalemia  -Acid-Base: AGMA and Metabolic alkalosis    Recent Labs     10/21/20  0625 10/22/20  0557 10/23/20  0606   BUN 54* 55* 48*   CREATININE 1.9* 1.5* 1.6*     Recent Labs     10/21/20  0625 10/22/20  0557 10/23/20  0606    138 137   K 3.8 3.7 3.7   CO2 26 24 22         2. HTN  -Blood pressure high  -on Coreg, hydralazine    BP Readings from Last 1 Encounters:   10/23/20 (!) 156/64       3. HFrEF  -TTE (10/21/2020): LVEF 40-45%, G2DD, mod MR, mild AS    4. CAD    5.  Gout  -takes Allopurinol    Plan:     - may resume

## 2020-10-24 LAB
ANION GAP SERPL CALCULATED.3IONS-SCNC: 13 MMOL/L (ref 3–16)
BUN BLDV-MCNC: 42 MG/DL (ref 7–20)
CALCIUM SERPL-MCNC: 8.9 MG/DL (ref 8.3–10.6)
CHLORIDE BLD-SCNC: 104 MMOL/L (ref 99–110)
CO2: 22 MMOL/L (ref 21–32)
CREAT SERPL-MCNC: 1.7 MG/DL (ref 0.6–1.2)
GFR AFRICAN AMERICAN: 35
GFR NON-AFRICAN AMERICAN: 29
GLUCOSE BLD-MCNC: 114 MG/DL (ref 70–99)
POTASSIUM REFLEX MAGNESIUM: 4.2 MMOL/L (ref 3.5–5.1)
SODIUM BLD-SCNC: 139 MMOL/L (ref 136–145)

## 2020-10-24 PROCEDURE — 80048 BASIC METABOLIC PNL TOTAL CA: CPT

## 2020-10-24 PROCEDURE — 6370000000 HC RX 637 (ALT 250 FOR IP): Performed by: HOSPITALIST

## 2020-10-24 PROCEDURE — 2580000003 HC RX 258: Performed by: NURSE PRACTITIONER

## 2020-10-24 PROCEDURE — 2580000003 HC RX 258: Performed by: FAMILY MEDICINE

## 2020-10-24 PROCEDURE — 6370000000 HC RX 637 (ALT 250 FOR IP): Performed by: NURSE PRACTITIONER

## 2020-10-24 PROCEDURE — 6360000002 HC RX W HCPCS: Performed by: FAMILY MEDICINE

## 2020-10-24 PROCEDURE — 2060000000 HC ICU INTERMEDIATE R&B

## 2020-10-24 PROCEDURE — 6360000002 HC RX W HCPCS: Performed by: INTERNAL MEDICINE

## 2020-10-24 PROCEDURE — 6370000000 HC RX 637 (ALT 250 FOR IP): Performed by: INTERNAL MEDICINE

## 2020-10-24 PROCEDURE — 94760 N-INVAS EAR/PLS OXIMETRY 1: CPT

## 2020-10-24 PROCEDURE — 36415 COLL VENOUS BLD VENIPUNCTURE: CPT

## 2020-10-24 RX ADMIN — MICONAZOLE NITRATE: 20 CREAM TOPICAL at 09:36

## 2020-10-24 RX ADMIN — PANTOPRAZOLE SODIUM 40 MG: 40 TABLET, DELAYED RELEASE ORAL at 06:48

## 2020-10-24 RX ADMIN — ATORVASTATIN CALCIUM 20 MG: 20 TABLET, FILM COATED ORAL at 22:31

## 2020-10-24 RX ADMIN — ALLOPURINOL 50 MG: 100 TABLET ORAL at 09:36

## 2020-10-24 RX ADMIN — FLUOXETINE 20 MG: 20 CAPSULE ORAL at 09:36

## 2020-10-24 RX ADMIN — CARVEDILOL 12.5 MG: 12.5 TABLET, FILM COATED ORAL at 16:28

## 2020-10-24 RX ADMIN — MICONAZOLE NITRATE: 20 CREAM TOPICAL at 22:31

## 2020-10-24 RX ADMIN — LISINOPRIL 2.5 MG: 5 TABLET ORAL at 09:36

## 2020-10-24 RX ADMIN — HEPARIN SODIUM 5000 UNITS: 5000 INJECTION INTRAVENOUS; SUBCUTANEOUS at 06:48

## 2020-10-24 RX ADMIN — HEPARIN SODIUM 5000 UNITS: 5000 INJECTION INTRAVENOUS; SUBCUTANEOUS at 22:32

## 2020-10-24 RX ADMIN — SODIUM CHLORIDE, PRESERVATIVE FREE 10 ML: 5 INJECTION INTRAVENOUS at 22:32

## 2020-10-24 RX ADMIN — CARVEDILOL 12.5 MG: 12.5 TABLET, FILM COATED ORAL at 09:36

## 2020-10-24 RX ADMIN — ASPIRIN 81 MG: 81 TABLET, CHEWABLE ORAL at 09:36

## 2020-10-24 RX ADMIN — DONEPEZIL HYDROCHLORIDE 5 MG: 5 TABLET, FILM COATED ORAL at 22:31

## 2020-10-24 RX ADMIN — SODIUM CHLORIDE, PRESERVATIVE FREE 10 ML: 5 INJECTION INTRAVENOUS at 09:37

## 2020-10-24 RX ADMIN — HYDRALAZINE HYDROCHLORIDE 10 MG: 10 TABLET, FILM COATED ORAL at 22:31

## 2020-10-24 RX ADMIN — CEFTRIAXONE 1 G: 1 INJECTION, POWDER, FOR SOLUTION INTRAMUSCULAR; INTRAVENOUS at 16:28

## 2020-10-24 RX ADMIN — HYDRALAZINE HYDROCHLORIDE 10 MG: 10 TABLET, FILM COATED ORAL at 16:27

## 2020-10-24 RX ADMIN — HYDRALAZINE HYDROCHLORIDE 10 MG: 10 TABLET, FILM COATED ORAL at 06:48

## 2020-10-24 RX ADMIN — FUROSEMIDE 20 MG: 20 TABLET ORAL at 09:36

## 2020-10-24 RX ADMIN — HEPARIN SODIUM 5000 UNITS: 5000 INJECTION INTRAVENOUS; SUBCUTANEOUS at 16:28

## 2020-10-24 ASSESSMENT — PAIN SCALES - GENERAL: PAINLEVEL_OUTOF10: 0

## 2020-10-24 NOTE — PROGRESS NOTES
Hospitalist Progress Note      PCP: FERNANDO Jeong - TOM    Date of Admission: 10/19/2020    Chief Complaint: CHF    Hospital Course:     Subjective: Day 2 of IV antibiotics for UTI      Medications:  Reviewed    Infusion Medications   Scheduled Medications    lisinopril  2.5 mg Oral Daily    cefTRIAXone (ROCEPHIN) IV  1 g Intravenous Q24H    furosemide  20 mg Oral Daily    carvedilol  12.5 mg Oral BID WC    hydrALAZINE  10 mg Oral 3 times per day    heparin (porcine)  5,000 Units Subcutaneous 3 times per day    miconazole   Topical BID    allopurinol  50 mg Oral Daily    aspirin  81 mg Oral Daily    atorvastatin  20 mg Oral Nightly    donepezil  5 mg Oral Nightly    FLUoxetine  20 mg Oral Daily    pantoprazole  40 mg Oral QAM AC    sodium chloride flush  10 mL Intravenous 2 times per day    dilTIAZem  5 mg Intravenous Once     PRN Meds: labetalol, sodium chloride flush, acetaminophen **OR** acetaminophen, polyethylene glycol, promethazine **OR** ondansetron, perflutren lipid microspheres      Intake/Output Summary (Last 24 hours) at 10/24/2020 1846  Last data filed at 10/24/2020 0654  Gross per 24 hour   Intake 300 ml   Output 500 ml   Net -200 ml       Exam:    BP (!) 144/65   Pulse 65   Temp 98.2 °F (36.8 °C) (Oral)   Resp 18   Ht 5' (1.524 m)   Wt 147 lb 4.3 oz (66.8 kg)   SpO2 97%   BMI 28.76 kg/m²     General appearance: No apparent distress, appears stated age and cooperative. HEENT: Pupils equal, round, and reactive to light. Conjunctivae/corneas clear. Neck: Supple, with full range of motion. No jugular venous distention. Trachea midline. Respiratory:  Normal respiratory effort. Clear to auscultation, bilaterally without Rales/Wheezes/Rhonchi. Cardiovascular: Regular rate and rhythm with normal S1/S2 without murmurs, rubs or gallops. Abdomen: Soft, non-tender, non-distended with normal bowel sounds. Musculoskeletal: No clubbing, cyanosis or edema bilaterally.   Full range of motion without deformity. Skin: Skin color, texture, turgor normal.  No rashes or lesions. Neurologic:  Neurovascularly intact without any focal sensory/motor deficits. Cranial nerves: II-XII intact, grossly non-focal.  Psychiatric: Alert and oriented, thought content appropriate, normal insight  Capillary Refill: Brisk,< 3 seconds   Peripheral Pulses: +2 palpable, equal bilaterally       Labs:   No results for input(s): WBC, HGB, HCT, PLT in the last 72 hours. Recent Labs     10/22/20  0557 10/23/20  0606 10/24/20  0640    137 139   K 3.7 3.7 4.2   CL 99 100 104   CO2 24 22 22   BUN 55* 48* 42*   CREATININE 1.5* 1.6* 1.7*   CALCIUM 9.0 8.7 8.9     No results for input(s): AST, ALT, BILIDIR, BILITOT, ALKPHOS in the last 72 hours. No results for input(s): INR in the last 72 hours. No results for input(s): [place] in the last 72 hours. Urinalysis:      Lab Results   Component Value Date    NITRU POSITIVE 10/23/2020    WBCUA >900 10/23/2020    BACTERIA 3+ 10/23/2020    RBCUA 5-10 10/23/2020    BLOODU MODERATE 10/23/2020    SPECGRAV 1.012 10/23/2020    GLUCOSEU Negative 10/23/2020    GLUCOSEU Negative 06/13/2011       Radiology:  CT CHEST PULMONARY EMBOLISM W CONTRAST   Final Result   Negative for acute pulmonary embolism. Features of volume overload/heart failure, with severe interstitial and   alveolar pulmonary edema with small bilateral pleural effusions. Multivessel coronary calcifications, mild-to-moderate along the LAD territory. Shotty mediastinal lymph nodes are presumably reactive. XR CHEST PORTABLE   Final Result   Vascular congestion and interstitial opacities, suggestive of pulmonary   edema. Atypical pneumonia is a differential, less likely possibility.                  Assessment/Plan:    Active Hospital Problems    Diagnosis Date Noted    Acute on chronic heart failure ([facility] [address] [I50.9] 10/19/2020       Patient is a 54-year-old female with past medical history of CAD, hyperlipidemia and dementia CKD hypertension who presents to hospital for suspected new onset CHF.     Assessment  Acute hypoxic respiratory failure on admission likely secondary to CHF  Acute on chronic diastolic heart failure  Chest pain  SULY on CKD stage III  UTI acute     Plan  Patient is currently on room air, creatinine trending up, will hold off on IV diuresis for now, continue Coreg, statin, Cardizem  Patient is status post 60 IV Lasix once 10/20  Continue hydralazine, Protonix, continue to monitor BMP  Nephrology was consulted, cardiology, CHF coordinator following  EF shows normal ventricular size, with slightly decreased systolic function, EF 85%, mild aortic stenosis  Ceftriaxone started on 10/23    DVT Prophylaxis: Heparin  Diet: DIET LOW SODIUM 2 GM; 1500 ml  Code Status: Full Code    PT/OT Eval Status: ordered    Dispo -treating for UTI, can transition to oral antibiotics for D/C on 10/25    Paty Ngo MD

## 2020-10-24 NOTE — PLAN OF CARE
Problem: Skin Integrity:  Goal: Will show no infection signs and symptoms  Description: Will show no infection signs and symptoms  Outcome: Ongoing  Goal: Absence of new skin breakdown  Description: Absence of new skin breakdown  Outcome: Ongoing     Problem: Falls - Risk of:  Goal: Will remain free from falls  Description: Will remain free from falls  Outcome: Ongoing  Goal: Absence of physical injury  Description: Absence of physical injury  Outcome: Ongoing     Problem: OXYGENATION/RESPIRATORY FUNCTION  Goal: Patient will maintain patent airway  Outcome: Ongoing  Goal: Patient will achieve/maintain normal respiratory rate/effort  Description: Respiratory rate and effort will be within normal limits for the patient  Outcome: Ongoing     Problem: HEMODYNAMIC STATUS  Goal: Patient has stable vital signs and fluid balance  Outcome: Ongoing     Problem: FLUID AND ELECTROLYTE IMBALANCE  Goal: Fluid and electrolyte balance are achieved/maintained  Outcome: Ongoing     Problem: ACTIVITY INTOLERANCE/IMPAIRED MOBILITY  Goal: Mobility/activity is maintained at optimum level for patient  Outcome: Ongoing     Problem: Pain:  Goal: Pain level will decrease  Description: Pain level will decrease  Outcome: Ongoing  Goal: Control of acute pain  Description: Control of acute pain  Outcome: Ongoing  Goal: Control of chronic pain  Description: Control of chronic pain  Outcome: Ongoing     Problem: Urinary Elimination:  Goal: Signs and symptoms of infection will decrease  Description: Signs and symptoms of infection will decrease  Outcome: Ongoing

## 2020-10-24 NOTE — PROGRESS NOTES
Office: [phone]       Fax: [phone]      Nephrology Progress Note        Patient's Name: Claudette Furrow Date: 10/19/2020  Date of Visit: 10/24/2020    Reason for Consult: SULY/CKD       Subjective:      Faisal Ferguson is a 80 y.o. female with PMHx of hypertension, CKD, coronary artery disease, gout who was hospitalized on 10/19/2020 with complaints of shortness of breath   Patient unable to provide full history due to confusion. Had chest pain. Denies dysuria, hematuria. Noted to be tachypneic. Chest x-ray reveals pulmonary vascular congestion and the CTA of the chest was negative for pulmonary embolism. Given iv lasix. Creat rising. Now having diarrhea  NSAID use: unknown  IV contrast: CT chest 10/219  Home meds reviewed and noted to be on lasix, ACE inhibitor     INTERVAL HISTORY    Feels same  Shortness of breath: No   UOP: Fair  Creat: stable       Medications: Allergies:  Codeine and Morphine and related    Scheduled Meds:   lisinopril  2.5 mg Oral Daily    cefTRIAXone (ROCEPHIN) IV  1 g Intravenous Q24H    furosemide  20 mg Oral Daily    carvedilol  12.5 mg Oral BID WC    hydrALAZINE  10 mg Oral 3 times per day    heparin (porcine)  5,000 Units Subcutaneous 3 times per day    miconazole   Topical BID    allopurinol  50 mg Oral Daily    aspirin  81 mg Oral Daily    atorvastatin  20 mg Oral Nightly    donepezil  5 mg Oral Nightly    FLUoxetine  20 mg Oral Daily    pantoprazole  40 mg Oral QAM AC    sodium chloride flush  10 mL Intravenous 2 times per day    dilTIAZem  5 mg Intravenous Once     Continuous Infusions:    Labs:  CBC:   No results for input(s): WBC, HGB, PLT in the last 72 hours.   Ca/Mg/Phos:   Recent Labs     10/22/20  0557 10/23/20  0606 10/24/20  0640   CALCIUM 9.0 8.7 8.9         Objective:     Vitals: BP (!) 142/61   Pulse 67   Temp 97.9 °F (36.6 °C) (Oral)   Resp 16   Ht 5' (1.524 m)   Wt 147 lb 4.3 oz (66.8 kg)   SpO2 94%   BMI 28.76 kg/m²    Wt Readings from Last 3 Encounters:   10/24/20 147 lb 4.3 oz (66.8 kg)   10/30/18 156 lb (70.8 kg)   10/23/18 152 lb 8.9 oz (69.2 kg)      24HR INTAKE/OUTPUT:      Intake/Output Summary (Last 24 hours) at 10/24/2020 1013  Last data filed at 10/24/2020 0654  Gross per 24 hour   Intake 300 ml   Output 550 ml   Net -250 ml     Constitutional:  awake, NAD  HEENT:  MMM, No icterus  Neck: no bruits, No JVD  Cardiovascular:  S1, S2 reg  Respiratory: Diminished at bases   Abdomen:  +BS, soft, NT, ND  Ext: + lower extremity edema  CNS: confused, hard of hearing, no agitation    IMAGING:  CT CHEST PULMONARY EMBOLISM W CONTRAST   Final Result   Negative for acute pulmonary embolism. Features of volume overload/heart failure, with severe interstitial and   alveolar pulmonary edema with small bilateral pleural effusions. Multivessel coronary calcifications, mild-to-moderate along the LAD territory. Shotty mediastinal lymph nodes are presumably reactive. XR CHEST PORTABLE   Final Result   Vascular congestion and interstitial opacities, suggestive of pulmonary   edema. Atypical pneumonia is a differential, less likely possibility. Assessment :     1. SULY on CKD 3b  -Non-Oliguric  -Baseline creat: 1.4-1.7 Now 1.6->1.9->1.5  -UA prot +, otherwise bland  -Volume: Hypervolemic   -Electrolytes: No Dyskalemia  -Acid-Base: AGMA and Metabolic alkalosis    Recent Labs     10/22/20  0557 10/23/20  0606 10/24/20  0640   BUN 55* 48* 42*   CREATININE 1.5* 1.6* 1.7*     Recent Labs     10/22/20  0557 10/23/20  0606 10/24/20  0640    137 139   K 3.7 3.7 4.2   CO2 24 22 22         2.  HTN  -Blood pressure high  -on Coreg, hydralazine    BP Readings from Last 1 Encounters:   10/24/20 (!) 142/61       3. HFrEF  -TTE (10/21/2020): LVEF 40-45%, G2DD, mod MR, mild AS    4. CAD    5. Gout  -takes Allopurinol    Plan:     -  resumed Lisinopril  - continue lasix at current dose   - continue  Hydralazine  - monitor BMP    -Monitor I/O, UOP  -Maintain MAP>65  -Avoid nephrotoxin, if able.   -Dose meds to current eGFR      Rosa Ramirez MD   10/24/2020    [facility]  [address]  Office : [phone]  Fax :[phone]

## 2020-10-25 LAB
ANION GAP SERPL CALCULATED.3IONS-SCNC: 12 MMOL/L (ref 3–16)
BANDED NEUTROPHILS RELATIVE PERCENT: 1 % (ref 0–7)
BASOPHILS ABSOLUTE: 0.1 K/UL (ref 0–0.2)
BASOPHILS RELATIVE PERCENT: 1 %
BUN BLDV-MCNC: 36 MG/DL (ref 7–20)
CALCIUM SERPL-MCNC: 9.2 MG/DL (ref 8.3–10.6)
CHLORIDE BLD-SCNC: 106 MMOL/L (ref 99–110)
CO2: 23 MMOL/L (ref 21–32)
CREAT SERPL-MCNC: 1.5 MG/DL (ref 0.6–1.2)
EOSINOPHILS ABSOLUTE: 0.2 K/UL (ref 0–0.6)
EOSINOPHILS RELATIVE PERCENT: 2 %
GFR AFRICAN AMERICAN: 40
GFR NON-AFRICAN AMERICAN: 33
GLUCOSE BLD-MCNC: 121 MG/DL (ref 70–99)
HCT VFR BLD CALC: 35.4 % (ref 36–48)
HEMATOLOGY PATH CONSULT: YES
HEMOGLOBIN: 11.6 G/DL (ref 12–16)
LYMPHOCYTES ABSOLUTE: 2.7 K/UL (ref 1–5.1)
LYMPHOCYTES RELATIVE PERCENT: 23 %
MCH RBC QN AUTO: 30.7 PG (ref 26–34)
MCHC RBC AUTO-ENTMCNC: 32.9 G/DL (ref 31–36)
MCV RBC AUTO: 93.5 FL (ref 80–100)
MONOCYTES ABSOLUTE: 1.7 K/UL (ref 0–1.3)
MONOCYTES RELATIVE PERCENT: 14 %
NEUTROPHILS ABSOLUTE: 7.1 K/UL (ref 1.7–7.7)
NEUTROPHILS RELATIVE PERCENT: 59 %
ORGANISM: ABNORMAL
ORGANISM: ABNORMAL
PDW BLD-RTO: 14.4 % (ref 12.4–15.4)
PLATELET # BLD: 257 K/UL (ref 135–450)
PMV BLD AUTO: 10.3 FL (ref 5–10.5)
POTASSIUM REFLEX MAGNESIUM: 4.2 MMOL/L (ref 3.5–5.1)
RBC # BLD: 3.78 M/UL (ref 4–5.2)
RBC # BLD: NORMAL 10*6/UL
SODIUM BLD-SCNC: 141 MMOL/L (ref 136–145)
URINE CULTURE, ROUTINE: ABNORMAL
URINE CULTURE, ROUTINE: ABNORMAL
WBC # BLD: 11.9 K/UL (ref 4–11)

## 2020-10-25 PROCEDURE — 94761 N-INVAS EAR/PLS OXIMETRY MLT: CPT

## 2020-10-25 PROCEDURE — 2580000003 HC RX 258: Performed by: FAMILY MEDICINE

## 2020-10-25 PROCEDURE — 36415 COLL VENOUS BLD VENIPUNCTURE: CPT

## 2020-10-25 PROCEDURE — 6370000000 HC RX 637 (ALT 250 FOR IP): Performed by: NURSE PRACTITIONER

## 2020-10-25 PROCEDURE — 6360000002 HC RX W HCPCS: Performed by: INTERNAL MEDICINE

## 2020-10-25 PROCEDURE — 6370000000 HC RX 637 (ALT 250 FOR IP): Performed by: HOSPITALIST

## 2020-10-25 PROCEDURE — 6370000000 HC RX 637 (ALT 250 FOR IP): Performed by: INTERNAL MEDICINE

## 2020-10-25 PROCEDURE — 6360000002 HC RX W HCPCS: Performed by: FAMILY MEDICINE

## 2020-10-25 PROCEDURE — 2060000000 HC ICU INTERMEDIATE R&B

## 2020-10-25 PROCEDURE — 80048 BASIC METABOLIC PNL TOTAL CA: CPT

## 2020-10-25 PROCEDURE — 2580000003 HC RX 258: Performed by: NURSE PRACTITIONER

## 2020-10-25 PROCEDURE — 85025 COMPLETE CBC W/AUTO DIFF WBC: CPT

## 2020-10-25 RX ADMIN — DONEPEZIL HYDROCHLORIDE 5 MG: 5 TABLET, FILM COATED ORAL at 21:10

## 2020-10-25 RX ADMIN — PANTOPRAZOLE SODIUM 40 MG: 40 TABLET, DELAYED RELEASE ORAL at 06:36

## 2020-10-25 RX ADMIN — ALLOPURINOL 50 MG: 100 TABLET ORAL at 08:08

## 2020-10-25 RX ADMIN — HEPARIN SODIUM 5000 UNITS: 5000 INJECTION INTRAVENOUS; SUBCUTANEOUS at 21:10

## 2020-10-25 RX ADMIN — CARVEDILOL 12.5 MG: 12.5 TABLET, FILM COATED ORAL at 16:45

## 2020-10-25 RX ADMIN — MICONAZOLE NITRATE: 20 CREAM TOPICAL at 21:10

## 2020-10-25 RX ADMIN — HYDRALAZINE HYDROCHLORIDE 10 MG: 10 TABLET, FILM COATED ORAL at 21:10

## 2020-10-25 RX ADMIN — SODIUM CHLORIDE, PRESERVATIVE FREE 10 ML: 5 INJECTION INTRAVENOUS at 08:08

## 2020-10-25 RX ADMIN — SODIUM CHLORIDE, PRESERVATIVE FREE 10 ML: 5 INJECTION INTRAVENOUS at 21:10

## 2020-10-25 RX ADMIN — HYDRALAZINE HYDROCHLORIDE 10 MG: 10 TABLET, FILM COATED ORAL at 13:02

## 2020-10-25 RX ADMIN — HEPARIN SODIUM 5000 UNITS: 5000 INJECTION INTRAVENOUS; SUBCUTANEOUS at 13:02

## 2020-10-25 RX ADMIN — MICONAZOLE NITRATE: 20 CREAM TOPICAL at 08:08

## 2020-10-25 RX ADMIN — FUROSEMIDE 20 MG: 20 TABLET ORAL at 08:08

## 2020-10-25 RX ADMIN — LISINOPRIL 2.5 MG: 5 TABLET ORAL at 08:08

## 2020-10-25 RX ADMIN — HYDRALAZINE HYDROCHLORIDE 10 MG: 10 TABLET, FILM COATED ORAL at 06:36

## 2020-10-25 RX ADMIN — ATORVASTATIN CALCIUM 20 MG: 20 TABLET, FILM COATED ORAL at 21:10

## 2020-10-25 RX ADMIN — ASPIRIN 81 MG: 81 TABLET, CHEWABLE ORAL at 08:07

## 2020-10-25 RX ADMIN — CARVEDILOL 12.5 MG: 12.5 TABLET, FILM COATED ORAL at 08:08

## 2020-10-25 RX ADMIN — HEPARIN SODIUM 5000 UNITS: 5000 INJECTION INTRAVENOUS; SUBCUTANEOUS at 06:36

## 2020-10-25 RX ADMIN — FLUOXETINE 20 MG: 20 CAPSULE ORAL at 08:07

## 2020-10-25 RX ADMIN — CEFTRIAXONE 1 G: 1 INJECTION, POWDER, FOR SOLUTION INTRAMUSCULAR; INTRAVENOUS at 16:45

## 2020-10-25 ASSESSMENT — PAIN SCALES - GENERAL: PAINLEVEL_OUTOF10: 0

## 2020-10-25 NOTE — PROGRESS NOTES
Office: [phone]       Fax: [phone]      Nephrology Progress Note        Patient's Name: Viviana Orellana Date: 10/19/2020  Date of Visit: 10/25/2020    Reason for Consult: SULY/CKD       Subjective:      Tamera Pacheco is a 80 y.o. female with PMHx of hypertension, CKD, coronary artery disease, gout who was hospitalized on 10/19/2020 with complaints of shortness of breath   Patient unable to provide full history due to confusion. Had chest pain. Denies dysuria, hematuria. Noted to be tachypneic. Chest x-ray reveals pulmonary vascular congestion and the CTA of the chest was negative for pulmonary embolism. Given iv lasix. Creat rising. Now having diarrhea  NSAID use: unknown  IV contrast: CT chest 10/219  Home meds reviewed and noted to be on lasix, ACE inhibitor     INTERVAL HISTORY    Feels same  Shortness of breath: No   UOP: Fair  Creat: stable       Medications:    Allergies:  Codeine and Morphine and related    Scheduled Meds:   lisinopril  2.5 mg Oral Daily    cefTRIAXone (ROCEPHIN) IV  1 g Intravenous Q24H    furosemide  20 mg Oral Daily    carvedilol  12.5 mg Oral BID WC    hydrALAZINE  10 mg Oral 3 times per day    heparin (porcine)  5,000 Units Subcutaneous 3 times per day    miconazole   Topical BID    allopurinol  50 mg Oral Daily    aspirin  81 mg Oral Daily    atorvastatin  20 mg Oral Nightly    donepezil  5 mg Oral Nightly    FLUoxetine  20 mg Oral Daily    pantoprazole  40 mg Oral QAM AC    sodium chloride flush  10 mL Intravenous 2 times per day    dilTIAZem  5 mg Intravenous Once     Continuous Infusions:    Labs:  CBC:   Recent Labs     10/25/20  0542   WBC 11.9*   HGB 11.6*        Ca/Mg/Phos:   Recent Labs     10/23/20  0606 10/24/20  0640 10/25/20  0542   CALCIUM 8.7 8.9 9.2         Objective:     Vitals: BP (!) 162/56   Pulse 68   Temp 97.4 °F (36.3 °C) (Axillary)   Resp 16   Ht 5' (1.524 m)   Wt 147 lb 0.8 oz (66.7 kg)   SpO2 97%   BMI 28.72 kg/m²    Wt Readings from Last 3 Encounters:   10/25/20 147 lb 0.8 oz (66.7 kg)   10/30/18 156 lb (70.8 kg)   10/23/18 152 lb 8.9 oz (69.2 kg)      24HR INTAKE/OUTPUT:      Intake/Output Summary (Last 24 hours) at 10/25/2020 0839  Last data filed at 10/25/2020 0654  Gross per 24 hour   Intake --   Output 300 ml   Net -300 ml     Constitutional:  awake, NAD  HEENT:  MMM, No icterus  Neck: no bruits, No JVD  Cardiovascular:  S1, S2 reg  Respiratory: Diminished at bases   Abdomen:  +BS, soft, NT, ND  Ext: + lower extremity edema  CNS: confused, hard of hearing, no agitation    IMAGING:  CT CHEST PULMONARY EMBOLISM W CONTRAST   Final Result   Negative for acute pulmonary embolism. Features of volume overload/heart failure, with severe interstitial and   alveolar pulmonary edema with small bilateral pleural effusions. Multivessel coronary calcifications, mild-to-moderate along the LAD territory. Shotty mediastinal lymph nodes are presumably reactive. XR CHEST PORTABLE   Final Result   Vascular congestion and interstitial opacities, suggestive of pulmonary   edema. Atypical pneumonia is a differential, less likely possibility. Assessment :     1. SULY on CKD 3b  -Non-Oliguric  -Baseline creat: 1.4-1.7 Now 1.6->1.9->1.5  -UA prot +, otherwise bland  -Volume: Hypervolemic   -Electrolytes: No Dyskalemia  -Acid-Base: AGMA and Metabolic alkalosis    Recent Labs     10/23/20  0606 10/24/20  0640 10/25/20  0542   BUN 48* 42* 36*   CREATININE 1.6* 1.7* 1.5*     Recent Labs     10/23/20  0606 10/24/20  0640 10/25/20  0542    139 141   K 3.7 4.2 4.2   CO2 22 22 23         2. HTN  -Blood pressure high  -on Coreg, hydralazine    BP Readings from Last 1 Encounters:   10/25/20 (!) 162/56       3.  HFrEF  -TTE (10/21/2020): LVEF 40-45%, G2DD, mod MR, mild AS    4. CAD    5. Gout  -takes Allopurinol    Plan:     -  resumed Lisinopril  - continue lasix at current dose   - continue  Hydralazine  - monitor BMP  - stable from nephrology standpoint   -Monitor I/O, UOP  -Maintain MAP>65  -Avoid nephrotoxin, if able.   -Dose meds to current eGFR      Kevin Villatoro MD   10/25/2020    [facility] [address] [place] [address] [address]  Office : [phone]  Fax :[phone]

## 2020-10-25 NOTE — PLAN OF CARE
Problem: Skin Integrity:  Goal: Will show no infection signs and symptoms  Description: Will show no infection signs and symptoms  10/25/2020 1029 by Marquise Rebolledo RN  Outcome: Ongoing  10/25/2020 0503 by James Beard RN  Outcome: Ongoing  Goal: Absence of new skin breakdown  Description: Absence of new skin breakdown  10/25/2020 1029 by Marquise Rebolledo RN  Outcome: Ongoing  10/25/2020 0503 by James Beard RN  Outcome: Ongoing     Problem: Falls - Risk of:  Goal: Will remain free from falls  Description: Will remain free from falls  10/25/2020 1029 by Marquise Rebolledo RN  Outcome: Ongoing  10/25/2020 0503 by James Beard RN  Outcome: Ongoing  Goal: Absence of physical injury  Description: Absence of physical injury  10/25/2020 1029 by Marquise Rebolledo RN  Outcome: Ongoing  10/25/2020 0503 by James Beard RN  Outcome: Ongoing     Problem: OXYGENATION/RESPIRATORY FUNCTION  Goal: Patient will maintain patent airway  10/25/2020 1029 by Marquise Rebolledo RN  Outcome: Ongoing  10/25/2020 0503 by James Beard RN  Outcome: Ongoing  Goal: Patient will achieve/maintain normal respiratory rate/effort  Description: Respiratory rate and effort will be within normal limits for the patient  10/25/2020 1029 by Marquise Rebolledo RN  Outcome: Ongoing  10/25/2020 0503 by James Beard RN  Outcome: Ongoing     Problem: HEMODYNAMIC STATUS  Goal: Patient has stable vital signs and fluid balance  10/25/2020 1029 by Marquise Rebolledo RN  Outcome: Ongoing  10/25/2020 0503 by James Beard RN  Outcome: Ongoing     Problem: FLUID AND ELECTROLYTE IMBALANCE  Goal: Fluid and electrolyte balance are achieved/maintained  10/25/2020 1029 by Marquise Rebolledo RN  Outcome: Ongoing  10/25/2020 0503 by James Beard RN  Outcome: Ongoing     Problem: ACTIVITY INTOLERANCE/IMPAIRED MOBILITY  Goal: Mobility/activity is maintained at optimum level for patient  10/25/2020 1029 by Marquise Rebolledo RN  Outcome: Ongoing  10/25/2020 0503 by James Beard RN  Outcome:

## 2020-10-26 VITALS
WEIGHT: 148.3 LBS | RESPIRATION RATE: 20 BRPM | SYSTOLIC BLOOD PRESSURE: 124 MMHG | BODY MASS INDEX: 29.12 KG/M2 | TEMPERATURE: 97.9 F | HEIGHT: 60 IN | DIASTOLIC BLOOD PRESSURE: 64 MMHG | OXYGEN SATURATION: 95 % | HEART RATE: 57 BPM

## 2020-10-26 LAB
ANION GAP SERPL CALCULATED.3IONS-SCNC: 16 MMOL/L (ref 3–16)
BASOPHILS ABSOLUTE: 0 K/UL (ref 0–0.2)
BASOPHILS RELATIVE PERCENT: 0.4 %
BUN BLDV-MCNC: 35 MG/DL (ref 7–20)
CALCIUM SERPL-MCNC: 8.6 MG/DL (ref 8.3–10.6)
CHLORIDE BLD-SCNC: 101 MMOL/L (ref 99–110)
CO2: 19 MMOL/L (ref 21–32)
CREAT SERPL-MCNC: 1.9 MG/DL (ref 0.6–1.2)
EOSINOPHILS ABSOLUTE: 0.2 K/UL (ref 0–0.6)
EOSINOPHILS RELATIVE PERCENT: 1.9 %
GFR AFRICAN AMERICAN: 30
GFR NON-AFRICAN AMERICAN: 25
GLUCOSE BLD-MCNC: 157 MG/DL (ref 70–99)
HCT VFR BLD CALC: 33.2 % (ref 36–48)
HEMATOLOGY PATH CONSULT: NO
HEMATOLOGY PATH CONSULT: NORMAL
HEMOGLOBIN: 10.9 G/DL (ref 12–16)
LYMPHOCYTES ABSOLUTE: 3.2 K/UL (ref 1–5.1)
LYMPHOCYTES RELATIVE PERCENT: 24.8 %
MCH RBC QN AUTO: 30.6 PG (ref 26–34)
MCHC RBC AUTO-ENTMCNC: 32.9 G/DL (ref 31–36)
MCV RBC AUTO: 93.1 FL (ref 80–100)
MONOCYTES ABSOLUTE: 1.8 K/UL (ref 0–1.3)
MONOCYTES RELATIVE PERCENT: 13.5 %
NEUTROPHILS ABSOLUTE: 7.8 K/UL (ref 1.7–7.7)
NEUTROPHILS RELATIVE PERCENT: 59.4 %
PDW BLD-RTO: 14.2 % (ref 12.4–15.4)
PLATELET # BLD: 250 K/UL (ref 135–450)
PMV BLD AUTO: 9.9 FL (ref 5–10.5)
POTASSIUM REFLEX MAGNESIUM: 3.7 MMOL/L (ref 3.5–5.1)
RBC # BLD: 3.56 M/UL (ref 4–5.2)
SODIUM BLD-SCNC: 136 MMOL/L (ref 136–145)
WBC # BLD: 13.1 K/UL (ref 4–11)

## 2020-10-26 PROCEDURE — 6370000000 HC RX 637 (ALT 250 FOR IP): Performed by: NURSE PRACTITIONER

## 2020-10-26 PROCEDURE — 2580000003 HC RX 258: Performed by: NURSE PRACTITIONER

## 2020-10-26 PROCEDURE — 6370000000 HC RX 637 (ALT 250 FOR IP): Performed by: HOSPITALIST

## 2020-10-26 PROCEDURE — 6360000002 HC RX W HCPCS: Performed by: INTERNAL MEDICINE

## 2020-10-26 PROCEDURE — 6370000000 HC RX 637 (ALT 250 FOR IP): Performed by: INTERNAL MEDICINE

## 2020-10-26 PROCEDURE — 36415 COLL VENOUS BLD VENIPUNCTURE: CPT

## 2020-10-26 PROCEDURE — 80048 BASIC METABOLIC PNL TOTAL CA: CPT

## 2020-10-26 PROCEDURE — 94761 N-INVAS EAR/PLS OXIMETRY MLT: CPT

## 2020-10-26 PROCEDURE — 85025 COMPLETE CBC W/AUTO DIFF WBC: CPT

## 2020-10-26 RX ORDER — CIPROFLOXACIN 500 MG/1
250 TABLET, FILM COATED ORAL 2 TIMES DAILY
Qty: 14 TABLET | Refills: 0 | Status: SHIPPED | OUTPATIENT
Start: 2020-10-26 | End: 2020-10-31

## 2020-10-26 RX ORDER — DIPHENHYDRAMINE HCL 25 MG
25 TABLET ORAL ONCE
Status: COMPLETED | OUTPATIENT
Start: 2020-10-26 | End: 2020-10-26

## 2020-10-26 RX ADMIN — MICONAZOLE NITRATE: 20 CREAM TOPICAL at 08:38

## 2020-10-26 RX ADMIN — SODIUM CHLORIDE, PRESERVATIVE FREE 10 ML: 5 INJECTION INTRAVENOUS at 08:38

## 2020-10-26 RX ADMIN — LISINOPRIL 2.5 MG: 5 TABLET ORAL at 08:36

## 2020-10-26 RX ADMIN — HEPARIN SODIUM 5000 UNITS: 5000 INJECTION INTRAVENOUS; SUBCUTANEOUS at 14:13

## 2020-10-26 RX ADMIN — HEPARIN SODIUM 5000 UNITS: 5000 INJECTION INTRAVENOUS; SUBCUTANEOUS at 06:42

## 2020-10-26 RX ADMIN — ALLOPURINOL 50 MG: 100 TABLET ORAL at 08:36

## 2020-10-26 RX ADMIN — ASPIRIN 81 MG: 81 TABLET, CHEWABLE ORAL at 08:32

## 2020-10-26 RX ADMIN — CARVEDILOL 12.5 MG: 12.5 TABLET, FILM COATED ORAL at 08:36

## 2020-10-26 RX ADMIN — HYDRALAZINE HYDROCHLORIDE 10 MG: 10 TABLET, FILM COATED ORAL at 06:42

## 2020-10-26 RX ADMIN — HYDRALAZINE HYDROCHLORIDE 10 MG: 10 TABLET, FILM COATED ORAL at 14:12

## 2020-10-26 RX ADMIN — FLUOXETINE 20 MG: 20 CAPSULE ORAL at 08:36

## 2020-10-26 RX ADMIN — DIPHENHYDRAMINE HCL 25 MG: 25 TABLET ORAL at 02:58

## 2020-10-26 RX ADMIN — PANTOPRAZOLE SODIUM 40 MG: 40 TABLET, DELAYED RELEASE ORAL at 06:42

## 2020-10-26 RX ADMIN — FUROSEMIDE 20 MG: 20 TABLET ORAL at 08:36

## 2020-10-26 NOTE — PROGRESS NOTES
Physician Progress Note      Ashlee Thompson  CSN #:                  [id number]  :                       1935  ADMIT DATE:       10/19/2020 8:08 PM  [address] [place] DATE:  RESPONDING  PROVIDER #:        Sharmaine Rivera MD          QUERY TEXT:    Pt admitted with acute on chronic CHF. ECHO shows EF 55%. If possible,   please document in progress notes and discharge summary further specificity   regarding the type of CHF:    The medical record reflects the following:  Risk Factors: HTN, CKD, CAD  Clinical Indicators: BNP 6270, documented acute on chronic CHF, ECHO shows EF   55%  Treatment: Toprol, IV Lasix    Thank you,  Lorri Mix, RN, BSN, CCDS  [email or web address]  Options provided:  -- Acute on Chronic Diastolic CHF/HFpEF  -- Acute on Chronic Systolic and Diastolic CHF  -- Other - I will add my own diagnosis  -- Disagree - Not applicable / Not valid  -- Disagree - Clinically unable to determine / Unknown  -- Refer to Clinical Documentation Reviewer    PROVIDER RESPONSE TEXT:    This patient is in acute on chronic systolic and diastolic CHF.     Query created by: Jenna Boast on 10/22/2020 6:48 AM      Electronically signed by:  Sharmaine Rivera MD 10/25/2020 9:37 PM

## 2020-10-26 NOTE — PROGRESS NOTES
Vitals: /83   Pulse 83   Temp 97.9 °F (36.6 °C) (Oral)   Resp 16   Ht 5' (1.524 m)   Wt 148 lb 4.8 oz (67.3 kg)   SpO2 96%   BMI 28.96 kg/m²    Wt Readings from Last 3 Encounters:   10/26/20 148 lb 4.8 oz (67.3 kg)   10/30/18 156 lb (70.8 kg)   10/23/18 152 lb 8.9 oz (69.2 kg)      24HR INTAKE/OUTPUT:      Intake/Output Summary (Last 24 hours) at 10/26/2020 0857  Last data filed at 10/26/2020 0077  Gross per 24 hour   Intake 330 ml   Output 350 ml   Net -20 ml     Constitutional:  awake, NAD  HEENT:  MMM, No icterus  Neck: no bruits, No JVD  Cardiovascular:  S1, S2 reg  Respiratory: Diminished at bases   Abdomen:  +BS, soft, NT, ND  Ext: + lower extremity edema  CNS: confused, hard of hearing, no agitation    IMAGING:  CT CHEST PULMONARY EMBOLISM W CONTRAST   Final Result   Negative for acute pulmonary embolism. Features of volume overload/heart failure, with severe interstitial and   alveolar pulmonary edema with small bilateral pleural effusions. Multivessel coronary calcifications, mild-to-moderate along the LAD territory. Shotty mediastinal lymph nodes are presumably reactive. XR CHEST PORTABLE   Final Result   Vascular congestion and interstitial opacities, suggestive of pulmonary   edema. Atypical pneumonia is a differential, less likely possibility. Assessment :     1. SULY on CKD 3b  -Non-Oliguric  -Baseline creat: 1.4-1.7 Now 1.6->1.9->1.5->1.9  -UA prot +, otherwise bland  -Volume: Hypervolemic   -Electrolytes: No Dyskalemia  -Acid-Base: AGMA and Metabolic alkalosis    Recent Labs     10/24/20  0640 10/25/20  0542 10/26/20  0502   BUN 42* 36* 35*   CREATININE 1.7* 1.5* 1.9*     Recent Labs     10/24/20  0640 10/25/20  0542 10/26/20  0502    141 136   K 4.2 4.2 3.7   CO2 22 23 19*         2. HTN  -Blood pressure at goal   -on Coreg, hydralazine    BP Readings from Last 1 Encounters:   10/26/20 125/83       3.  HFrEF  -TTE (10/21/2020): LVEF 40-45%, G2DD, mod MR, mild AS    4. CAD    5. Gout  -takes Allopurinol    Plan:     - resumed Lisinopril  - continue lasix   - Hydralazine  - monitor BMP    -Monitor I/O, UOP  -Maintain MAP>65  -Avoid nephrotoxin, if able. -Dose meds to current eGFR    Thank you for allowing us to participate in care of Katherine Barrett . We will continue to follow. Feel free to contact me with any questions.       Hermilo Archer  10/26/2020    [facility]  [address]  Office : [phone]  Fax :[phone]

## 2020-10-26 NOTE — CARE COORDINATION
DISCHARGE SUMMARY     DATE OF DISCHARGE: 10/26/2020    DISCHARGE DESTINATION: Home with family    [address] [facility], [address] [place] [address]  Level of Care: Level 3 home care  Report Number: [phone]  Fax Number:  [phone]    TRANSPORTATION:   Company Name:  [employer]  Relationship: Daughter  Stacey Murphy up Time: TBD by patient, family and/or medical staff. Phone Number: [phone]    COMMENTS: SW spoke with daughter via telephone and she is in agreement with discharge. No further needs noted unless indicated by patient, family and/or medical staff. Respectfully submitted,    JESSIKA MckeeS  [facility]   [phone]    Electronically signed by IRVING Amezquita on 10/26/2020 at 3:31 PM

## 2020-10-26 NOTE — CARE COORDINATION
[facility]  Faxed up-dated orders to [facility].   Patient to be seen by 10/28/2020  Judith Adams LPN  CTN with  [facility],  [address], Fax [phone]

## 2020-10-26 NOTE — PROGRESS NOTES
This RN prepared pt for discharge to home at 1526. This RN provided discharge education regarding medication regimen, and home care. RN spent 15 minutes providing CHF specific education. Pt verbalized understanding. Denies questions. No LDAs present at discharge. Discontinued IV without complications. Pt. tolerated well.   Pt is being transported by daughter, Milly Rowland to home

## 2020-10-26 NOTE — PROGRESS NOTES
Hospitalist Progress Note      PCP: FERNANDO Caceres - NP    Date of Admission: 10/19/2020    Chief Complaint: CHF    Hospital Course:     Subjective: Day 3 of IV antibiotics for UTI      Medications:  Reviewed    Infusion Medications   Scheduled Medications    lisinopril  2.5 mg Oral Daily    cefTRIAXone (ROCEPHIN) IV  1 g Intravenous Q24H    furosemide  20 mg Oral Daily    carvedilol  12.5 mg Oral BID WC    hydrALAZINE  10 mg Oral 3 times per day    heparin (porcine)  5,000 Units Subcutaneous 3 times per day    miconazole   Topical BID    allopurinol  50 mg Oral Daily    aspirin  81 mg Oral Daily    atorvastatin  20 mg Oral Nightly    donepezil  5 mg Oral Nightly    FLUoxetine  20 mg Oral Daily    pantoprazole  40 mg Oral QAM AC    sodium chloride flush  10 mL Intravenous 2 times per day    dilTIAZem  5 mg Intravenous Once     PRN Meds: labetalol, sodium chloride flush, acetaminophen **OR** acetaminophen, polyethylene glycol, promethazine **OR** ondansetron, perflutren lipid microspheres      Intake/Output Summary (Last 24 hours) at 10/25/2020 2341  Last data filed at 10/25/2020 1101  Gross per 24 hour   Intake 200 ml   Output 200 ml   Net 0 ml       Exam:    BP (!) 145/70   Pulse 71   Temp 98.7 °F (37.1 °C) (Oral)   Resp 16   Ht 5' (1.524 m)   Wt 147 lb 0.8 oz (66.7 kg)   SpO2 95%   BMI 28.72 kg/m²     General appearance: No apparent distress, appears stated age and cooperative. HEENT: Pupils equal, round, and reactive to light. Conjunctivae/corneas clear. Neck: Supple, with full range of motion. No jugular venous distention. Trachea midline. Respiratory:  Normal respiratory effort. Clear to auscultation, bilaterally without Rales/Wheezes/Rhonchi. Cardiovascular: Regular rate and rhythm with normal S1/S2 without murmurs, rubs or gallops. Abdomen: Soft, non-tender, non-distended with normal bowel sounds. Musculoskeletal: No clubbing, cyanosis or edema bilaterally.   Full range of motion without deformity. Skin: Skin color, texture, turgor normal.  No rashes or lesions. Neurologic:  Neurovascularly intact without any focal sensory/motor deficits. Cranial nerves: II-XII intact, grossly non-focal.  Psychiatric: Alert and oriented, thought content appropriate, normal insight  Capillary Refill: Brisk,< 3 seconds   Peripheral Pulses: +2 palpable, equal bilaterally       Labs:   Recent Labs     10/25/20  0542   WBC 11.9*   HGB 11.6*   HCT 35.4*        Recent Labs     10/23/20  0606 10/24/20  0640 10/25/20  0542    139 141   K 3.7 4.2 4.2    104 106   CO2 22 22 23   BUN 48* 42* 36*   CREATININE 1.6* 1.7* 1.5*   CALCIUM 8.7 8.9 9.2     No results for input(s): AST, ALT, BILIDIR, BILITOT, ALKPHOS in the last 72 hours. No results for input(s): INR in the last 72 hours. No results for input(s): Lisa Lowers in the last 72 hours. Urinalysis:      Lab Results   Component Value Date    NITRU POSITIVE 10/23/2020    WBCUA >900 10/23/2020    BACTERIA 3+ 10/23/2020    RBCUA 5-10 10/23/2020    BLOODU MODERATE 10/23/2020    SPECGRAV 1.012 10/23/2020    GLUCOSEU Negative 10/23/2020    GLUCOSEU Negative 06/13/2011       Radiology:  CT CHEST PULMONARY EMBOLISM W CONTRAST   Final Result   Negative for acute pulmonary embolism. Features of volume overload/heart failure, with severe interstitial and   alveolar pulmonary edema with small bilateral pleural effusions. Multivessel coronary calcifications, mild-to-moderate along the LAD territory. Shotty mediastinal lymph nodes are presumably reactive. XR CHEST PORTABLE   Final Result   Vascular congestion and interstitial opacities, suggestive of pulmonary   edema. Atypical pneumonia is a differential, less likely possibility.                  Assessment/Plan:    Active Hospital Problems    Diagnosis Date Noted    Acute on chronic heart failure ([facility] [address] [I50.9] 10/19/2020       Patient is a 80-year-old female with past medical history of CAD, hyperlipidemia and dementia CKD hypertension who presents to hospital for suspected new onset CHF.     Assessment  Acute hypoxic respiratory failure on admission likely secondary to CHF  Acute on chronic diastolic heart failure  Chest pain  SULY on CKD stage III  UTI acute     Plan  Patient is currently on room air, creatinine trending up, will hold off on IV diuresis for now, continue Coreg, statin, Cardizem  Patient is status post 60 IV Lasix once 10/20  Continue hydralazine, Protonix, continue to monitor BMP  Nephrology was consulted, cardiology, CHF coordinator following  EF shows normal ventricular size, with slightly decreased systolic function, EF 71%, mild aortic stenosis  Ceftriaxone started on 10/23    DVT Prophylaxis: Heparin  Diet: DIET LOW SODIUM 2 GM; 1500 ml  Code Status: Full Code    PT/OT Eval Status: ordered    Dispo -treating for UTI, anticipate D/C on 10/26    Keith Daniel MD

## 2020-10-26 NOTE — DISCHARGE SUMMARY
Hospitalist Discharge Summary    Patient ID:  True Lopez  [id number]  80 y.o.  1935    Admit date: 10/19/2020    Discharge date: 10/26/2020    Disposition: home    Admission Diagnoses:   Patient Active Problem List   Diagnosis    HTN (hypertension), benign    Elbow pain    Knee pain, bilateral    Primary localized osteoarthrosis, lower leg    Rotator cuff (capsule) sprain    Coronary artery disease involving native coronary artery of native heart without angina pectoris    Idiopathic chronic gout of right foot    Chronic kidney disease (CKD), stage IV (severe) (HCC)    Moderate single current episode of major depressive disorder ([facility]    TIA (transient ischemic attack)    GERD (gastroesophageal reflux disease)    Osteoarthritis    Osteopenia of hip    Acute on chronic heart failure ([facility]       Discharge Diagnoses: Principal Problem:    Acute on chronic heart failure ([facility]  Resolved Problems:    * No resolved hospital problems. *      Code Status:  Full Code    Condition:  Stable    Discharge Diet: Diet:  DIET LOW SODIUM 2 GM; 1500 ml  Dietary Nutrition Supplements: Low Volume Supplement    PCP to do list: Follow up monocytosis    Hospital Course:     HFrEF with acute exacerbation  EF 40-45%, G2DD. Treated with IV lasix with good diuresis. Seen by cardiology, recommend discharge on lasix 20 mg, ACE, BB, outpatient follow up. UTI  Culture with klebisella, ceftriaxone inpatient and will discharge on cipro based on culture results.      Acute Kidney Injury  Cr up to 1.9 from baseline 1.4. Followed by nephrology in patient, will follow up outpatient.      Hypertension  Stable. Continue home meds.     Gout  Continue allopurinol. Monocytosis  Mild x 2 days. Path reviewed, notes rare cells suspicious for blasts which may be seen in setting of infection, drugs, tissue injury. Suspect secondary to UTI but if persistent may warrant outpatient oncology consult.      Discharge Medications: Current Discharge Medication List      START taking these medications    Details   ciprofloxacin (CIPRO) 500 MG tablet Take 0.5 tablets by mouth 2 times daily for 5 days  Qty: 14 tablet, Refills: 0           Current Discharge Medication List      CONTINUE these medications which have CHANGED    Details   carvedilol (COREG) 12.5 MG tablet Take 1 tablet by mouth 2 times daily (with meals)  Qty: 60 tablet, Refills: 3      allopurinol (ZYLOPRIM) 100 MG tablet Take 0.5 tablets by mouth daily  Qty: 30 tablet, Refills: 3           Current Discharge Medication List      CONTINUE these medications which have NOT CHANGED    Details   Multiple Vitamins-Minerals (THERAPEUTIC MULTIVITAMIN-MINERALS) tablet Take 1 tablet by mouth daily      donepezil (ARICEPT) 5 MG tablet Take 5 mg by mouth nightly      furosemide (LASIX) 20 MG tablet Take 20 mg by mouth daily      lisinopril (PRINIVIL;ZESTRIL) 2.5 MG tablet Take 2.5 mg by mouth daily      loratadine (CLARITIN) 10 MG capsule Take 10 mg by mouth daily as needed       vitamin D3 (CHOLECALCIFEROL) 10 MCG (400 UNIT) TABS tablet Take 400 Units by mouth daily      pantoprazole (PROTONIX) 40 MG tablet Take 1 tablet by mouth daily  Qty: 30 tablet, Refills: 3    Associated Diagnoses: Gastroesophageal reflux disease without esophagitis      FLUoxetine (PROZAC) 20 MG capsule TAKE 1 CAPSULE BY MOUTH ONE TIME A DAY   Qty: 90 capsule, Refills: 0    Associated Diagnoses:  Moderate single current episode of major depressive disorder (HCC)      atorvastatin (LIPITOR) 20 MG tablet TAKE 1 TABLET BY MOUTH ONE TIME A DAY   Qty: 90 tablet, Refills: 3    Associated Diagnoses: Coronary artery disease involving native coronary artery of native heart without angina pectoris      aspirin 81 MG chewable tablet Take 1 tablet by mouth daily  Qty: 30 tablet, Refills: 9      Handicap Placard MISC by Does not apply route Patient is unable to walk more than 250 feet before stopping to rest.    Not to exceed 5 years.  DX: CHF  Qty: 1 each, Refills: 0    Associated Diagnoses: Primary localized osteoarthrosis, lower leg, unspecified laterality           Current Discharge Medication List      STOP taking these medications       metoprolol succinate (TOPROL XL) 50 MG extended release tablet Comments:   Reason for Stopping:         azelastine (ASTELIN) 0.1 % nasal spray Comments:   Reason for Stopping:         amLODIPine (NORVASC) 2.5 MG tablet Comments:   Reason for Stopping:               Procedures: None     Assessment on Discharge: Stable, improved     Discharge Exam:  /82   Pulse 65   Temp 98 °F (36.7 °C) (Oral)   Resp 16   Ht 5' (1.524 m)   Wt 148 lb 4.8 oz (67.3 kg)   SpO2 96%   BMI 28.96 kg/m²     General appearance: No apparent distress, appears stated age and cooperative. HEENT: Pupils equal, round, and reactive to light. Conjunctivae/corneas clear. [place]  Neck: Supple, with full range of motion. Trachea midline. Respiratory:  Normal respiratory effort. Clear to auscultation, bilaterally without Rales/Wheezes/Rhonchi. Cardiovascular: Regular rate and rhythm with normal S1/S2 without murmurs, rubs or gallops. Abdomen: Soft, non-tender, non-distended with normal bowel sounds. Musculoskelatal: No clubbing, cyanosis or edema bilaterally. Full range of motion without deformity. Skin: Skin color, texture, turgor normal.  No rashes or lesions. Neurologic:  Neurovascularly intact without any focal sensory/motor deficits.  Cranial nerves: II-XII intact, grossly non-focal.  Psychiatric: Alert and oriented, thought content appropriate, normal insight    Pertinent Studies During Hospital Stay:    Radiology:  Xr Chest Portable    Result Date: 10/19/2020  EXAMINATION: ONE XRAY VIEW OF THE CHEST 10/19/2020 8:19 pm COMPARISON: 10/23/2018 HISTORY: ORDERING SYSTEM PROVIDED HISTORY: CP/SOB TECHNOLOGIST PROVIDED HISTORY: Reason for exam:->CP/SOB Reason for Exam: chest pain  sob  possible covid FINDINGS: The cardiac thickening throughout the lungs. Dependent atelectasis. Bronchial walls are thickened. Clear central airways. Upper Abdomen: Status post cholecystectomy. Soft Tissues/Bones: No enlarged axillary supraclavicular lymph nodes. No acute soft tissue or osseous abnormality. Partially imaged left humeral head orthopedic hardware. Negative for acute pulmonary embolism. Features of volume overload/heart failure, with severe interstitial and alveolar pulmonary edema with small bilateral pleural effusions. Multivessel coronary calcifications, mild-to-moderate along the LAD territory. Shotty mediastinal lymph nodes are presumably reactive.        Last Labs on Discharge:     Recent Results (from the past 24 hour(s))   Basic Metabolic Panel w/ Reflex to MG    Collection Time: 10/26/20  5:02 AM   Result Value Ref Range    Sodium 136 136 - 145 mmol/L    Potassium reflex Magnesium 3.7 3.5 - 5.1 mmol/L    Chloride 101 99 - 110 mmol/L    CO2 19 (L) 21 - 32 mmol/L    Anion Gap 16 3 - 16    Glucose 157 (H) 70 - 99 mg/dL    BUN 35 (H) 7 - 20 mg/dL    CREATININE 1.9 (H) 0.6 - 1.2 mg/dL    GFR Non-African American 25 (A) >60    GFR  30 (A) >60    Calcium 8.6 8.3 - 10.6 mg/dL   CBC Auto Differential    Collection Time: 10/26/20  5:02 AM   Result Value Ref Range    WBC 13.1 (H) 4.0 - 11.0 K/uL    RBC 3.56 (L) 4.00 - 5.20 M/uL    Hemoglobin 10.9 (L) 12.0 - 16.0 g/dL    Hematocrit 33.2 (L) 36.0 - 48.0 %    MCV 93.1 80.0 - 100.0 fL    MCH 30.6 26.0 - 34.0 pg    MCHC 32.9 31.0 - 36.0 g/dL    RDW 14.2 12.4 - 15.4 %    Platelets 190 803 - 213 K/uL    MPV 9.9 5.0 - 10.5 fL    Path Consult No     Neutrophils % 59.4 %    Lymphocytes % 24.8 %    Monocytes % 13.5 %    Eosinophils % 1.9 %    Basophils % 0.4 %    Neutrophils Absolute 7.8 (H) 1.7 - 7.7 K/uL    Lymphocytes Absolute 3.2 1.0 - 5.1 K/uL    Monocytes Absolute 1.8 (H) 0.0 - 1.3 K/uL    Eosinophils Absolute 0.2 0.0 - 0.6 K/uL    Basophils Absolute 0.0 0.0 - 0.2 K/uL Follow up: with FERNANDO Caceres NP    Note that more  than 30 minutes was spent in preparing discharge papers, discussing discharge with patient, medication review, etc.    Thank you FERNANDO Caceres NP for the opportunity to be involved in this patient's care. If you have any questions or concerns please feel free to contact me at [phone]. Electronically signed by Rebeca Jo MD on 10/26/2020 at 3:12 PM    This note was transcribed using [employer]. Please disregard any translational errors.

## 2020-10-26 NOTE — PLAN OF CARE
Problem: Nutrition  Goal: Optimal nutrition therapy  Outcome: Ongoing   Nutrition Problem #1: Inadequate oral intake  Intervention: Food and/or Nutrient Delivery: Continue Current Diet, Start Oral Nutrition Supplement  Nutritional Goals: consume >/= to 50 %

## 2020-10-26 NOTE — PROGRESS NOTES
Comprehensive Nutrition Assessment    Type and Reason for Visit:  Reassess    Nutrition Recommendations/Plan:   Continue Low Na diet with 1500 mL FR. Start Ensure compact QD. Nutrition Assessment:  Follow-up. Pt with varied intakes but normally >50% when pt does eat. Pt's SOB had improved with diuresing but devloped SULY. Pt now with UTI and receiving antibiotics. Will trial supplement d/t varied intakes. Malnutrition Assessment:  Malnutrition Status:  Insufficient data      Estimated Daily Nutrient Needs:  Energy (kcal):  7880-1353 (25-30 x ABW 67 kg)  Protein (g):  54-67 (.8-1 x ABW (adj for CKD)  Fluid (ml/day):  per provider    Nutrition Related Findings:  BM 10/23; trace BLE edema      Wounds:  None       Current Nutrition Therapies:    DIET LOW SODIUM 2 GM; 1500 ml    Anthropometric Measures:  · Height: 5' (152.4 cm)  · Current Body Weight: 148 lb (67.1 kg)   · Admission Body Weight: 155 lb (70.3 kg)    · Usual Body Weight: (140-150s)     · Ideal Body Weight: 100 lbs; % Ideal Body Weight 148 %   · BMI: 28.9  · BMI Categories: Overweight (BMI 25.0-29. 9)       Nutrition Diagnosis:   · Inadequate oral intake related to (unknown etiology) as evidenced by (varied intakes)      Nutrition Interventions:   Food and/or Nutrient Delivery:  Continue Current Diet, Start Oral Nutrition Supplement  Nutrition Education/Counseling:  No recommendation at this time   Coordination of Nutrition Care:  Continued Inpatient Monitoring    Goals:  consume >/= to 50 %       Nutrition Monitoring and Evaluation:   Food/Nutrient Intake Outcomes:  Food and Nutrient Intake, Supplement Intake  Physical Signs/Symptoms Outcomes:  Biochemical Data, Diarrhea, Constipation, Fluid Status or Edema, Weight, Skin, Nutrition Focused Physical Findings     Discharge Planning:     Too soon to determine     Electronically signed by Darby Melgar RD, LD on 10/26/20 at 12:46 PM EDT    Contact: [phone]

## 2020-10-27 ENCOUNTER — TELEPHONE (OUTPATIENT)
Dept: PHARMACY | Facility: CLINIC | Age: 85
End: 2020-10-27

## 2020-10-27 ENCOUNTER — CARE COORDINATION (OUTPATIENT)
Dept: CASE MANAGEMENT | Age: 85
End: 2020-10-27

## 2020-10-27 PROCEDURE — 1111F DSCHRG MED/CURRENT MED MERGE: CPT

## 2020-10-27 NOTE — TELEPHONE ENCOUNTER
CLINICAL PHARMACY NOTE  Post-Discharge Transitions of Care (DANTE)    Non-face-to-face services provided:  Assessment and support for treatment adherence and medication management-medication reconciliation    Subjective/Objective:  Eduardo Zazueta is a 80 y.o. female. Patient was discharged from [facility] on 10/26/2020 with a diagnosis of CHF exacerbation. Patient outreach to review discharge medications and provide medication review and management. Spoke with daughter    Allergies   Allergen Reactions    Codeine Shortness Of Breath     Rash and dyspnea    Morphine And Related        Discharge Medications (as per discharging medication list): There are NEW medications for you. START taking them after you leave the hospital   ciprofloxacin (CIPRO) 500 MG tablet  · Daughter unaware new medications prescribed at discharge. Currently at work - will  today. Take 0.5 tablets by mouth 2 times daily for 5 days     carvedilol (COREG) 12.5 MG tablet  · Daughter unaware new medications prescribed at discharge. Currently at work - will  today. Take 1 tablet by mouth 2 times daily (with meals)      You told us you were taking these medications at home, but the amount or how often you take this medication has CHANGED   allopurinol (ZYLOPRIM) 100 MG tablet  · Pt was taking allopurinol 50 mg daily PTA. Informed daughter new prescription with updated instructions sent to the pharmacy.   Take 0.5 tablets by mouth daily     These are medications you told us you were taking at home, CONTINUE taking them after you leave the hospital   Medication Sig    Multiple Vitamins-Minerals (THERAPEUTIC MULTIVITAMIN-MINERALS) tablet Take 1 tablet by mouth daily    donepezil (ARICEPT) 5 MG tablet Take 5 mg by mouth nightly    furosemide (LASIX) 20 MG tablet Take 20 mg by mouth daily    lisinopril (PRINIVIL;ZESTRIL) 2.5 MG tablet Take 2.5 mg by mouth daily    loratadine (CLARITIN) 10 MG capsule Take 10 mg by mouth daily significant interactions identified via [facility] as category D or higher.    - Renal Dosing: No renal adjustments necessary.    - Follow up appointment date (7 days for more severe illness, 14 days for others):    · Patient has an upcoming appointment on 10/28/2020 with [facility].     Thank you,    Robert Turcios, PharmD, [facility]  Direct: [phone]  Department, toll free [phone], option 7          For Pharmacy Admin Tracking Only    TCM Call Made?: Yes  [facility] ([facility] Select Patient?: Yes  Total # of Interventions Recommended: 1 -   Total # Interventions Accepted: 1  Intervention Severity:   - Level 1 Intervention Present?: No   - Level 2 #: 0   - Level 3 #: 1  Outreach Status: Review Complete  Care Coordinator Outreach to Patient?: No  Provider Contacted?: No  Time Spent (min): 30

## 2020-10-27 NOTE — CARE COORDINATION
[facility] Transitions Initial Follow Up Call    Call within 2 business days of discharge: Yes    Patient: Vinicius Viera Patient : 1935   MRN: [id number]  Reason for Admission: new onset A fib  Discharge Date: 10/26/20 RARS: Readmission Risk Score: 17      Last Discharge [facility]       Complaint Diagnosis Description Type Department Provider    10/19/20 Shortness of Breath New onset a-fib ([address] . .. ED to Hosp-Admission (Discharged) (ADMITTED) LIZA Villanueva MD; Richadr Pancake [place] .. Facility: [facility]    Non-face-to-face services provided:  Obtained and reviewed discharge summary and/or continuity of care documents  Communication with home health agencies or other community services the patient is currently using-[facility]. Care Transitions 24 Hour Call    Care Transitions Interventions     Attempted to reach patient via phone for initial post hospital transition call. Unable to leave , home phone continued to ring and mobile phone was a non working number.     Spoke with Yovani Jean-Baptiste from [facility] who confirmed they had referral for this patient.    ,    Follow Up  Future Appointments   Date Time Provider Helen Kaba   10/28/2020  2:00 PM Cecilia Parra, FERNANDO - CNP [facility] KINGA Moe RN

## 2020-10-28 ENCOUNTER — CARE COORDINATION (OUTPATIENT)
Dept: CASE MANAGEMENT | Age: 85
End: 2020-10-28

## 2020-10-28 NOTE — CARE COORDINATION
[facility] Transitions Initial Follow Up Call    Call within 2 business days of discharge: Yes    Patient: Anna Le Patient : 1935   MRN: [id number]  Reason for Admission: new onset a fib  Discharge Date: 10/26/20 RARS: Readmission Risk Score: 17      Last Discharge [facility]       Complaint Diagnosis Description Type Department Provider    10/19/20 Shortness of Breath New onset a-fib ([address] . .. ED to Hosp-Admission (Discharged) (ADMITTED) LIZA 5N Goyo Young MD; Jonathan Cavazos. .. Facility: [facility]    Non-face-to-face services provided:  Obtained and reviewed discharge summary and/or continuity of care documents    Care Transitions 24 Hour Call    Care Transitions Interventions     Second attempt made to reach patient for initial post hospital transition call. Unable to leave VM, home phone just continued to ring and mobile phone stated not a working number.             Follow Up  Future Appointments   Date Time Provider Helen Kaba   10/28/2020  2:00 PM Cecilia Foreman, APRN - ELLIS Cedeño RN

## 2020-10-29 ENCOUNTER — CARE COORDINATION (OUTPATIENT)
Dept: CASE MANAGEMENT | Age: 85
End: 2020-10-29

## 2020-10-29 NOTE — CARE COORDINATION
[facility] Transitions Initial Follow Up Call    Call within 2 business days of discharge: Yes    Patient: Shayy Geronimo Patient : 1935   MRN: [id number]  Reason for Admission: A fib  Discharge Date: 10/26/20 RARS: Readmission Risk Score: 17      Last Discharge [facility]       Complaint Diagnosis Description Type Department Provider    10/19/20 Shortness of Breath New onset a-fib ([address] . .. ED to Hosp-Admission (Discharged) (ADMITTED) LIZA Gibson MD; Delma Ramirez. .. Facility:  [facility]    Non-face-to-face services provided:  Obtained and reviewed discharge summary and/or continuity of care documents    Care Transitions 24 Hour Call    Care Transitions Interventions     Unable to reach patient x 3 attempts. Unable to leave VM, home phone continued to ring and mobile phone was a non working number. No further CTN outreach will be made. Follow Up  No future appointments.     Anjana Hernandez RN

## 2020-11-17 ENCOUNTER — TELEPHONE (OUTPATIENT)
Dept: NEPHROLOGY | Age: 85
End: 2020-11-17

## 2020-11-17 NOTE — TELEPHONE ENCOUNTER
Called pt  Spoke to her daughter  Pt seems to be doing fine. Per daughter, she has referral to see nephrologist at [facility] (?from PCP)  I left my information with her, in case the patient decides to follow with us in future.     Morteza Mir

## 2020-11-28 ENCOUNTER — APPOINTMENT (OUTPATIENT)
Dept: CT IMAGING | Age: 85
DRG: 291 | End: 2020-11-28
Payer: MEDICARE

## 2020-11-28 ENCOUNTER — HOSPITAL ENCOUNTER (INPATIENT)
Age: 85
LOS: 2 days | Discharge: HOME | DRG: 291 | End: 2020-11-30
Attending: EMERGENCY MEDICINE | Admitting: INTERNAL MEDICINE
Payer: MEDICARE

## 2020-11-28 ENCOUNTER — APPOINTMENT (OUTPATIENT)
Dept: GENERAL RADIOLOGY | Age: 85
DRG: 291 | End: 2020-11-28
Payer: MEDICARE

## 2020-11-28 PROBLEM — I50.9 ACUTE ON CHRONIC CONGESTIVE HEART FAILURE (HCC): Status: ACTIVE | Noted: 2020-11-28

## 2020-11-28 LAB
A/G RATIO: 1.3 (ref 1.1–2.2)
ALBUMIN SERPL-MCNC: 4.5 G/DL (ref 3.4–5)
ALP BLD-CCNC: 74 U/L (ref 40–129)
ALT SERPL-CCNC: <5 U/L (ref 10–40)
ANION GAP SERPL CALCULATED.3IONS-SCNC: 15 MMOL/L (ref 3–16)
AST SERPL-CCNC: 18 U/L (ref 15–37)
BACTERIA: ABNORMAL /HPF
BASE EXCESS VENOUS: -3.1 MMOL/L (ref -3–3)
BASOPHILS ABSOLUTE: 0.1 K/UL (ref 0–0.2)
BASOPHILS RELATIVE PERCENT: 0.6 %
BILIRUB SERPL-MCNC: 0.3 MG/DL (ref 0–1)
BILIRUBIN URINE: NEGATIVE
BLOOD, URINE: NEGATIVE
BUN BLDV-MCNC: 25 MG/DL (ref 7–20)
CALCIUM SERPL-MCNC: 9.2 MG/DL (ref 8.3–10.6)
CHLORIDE BLD-SCNC: 108 MMOL/L (ref 99–110)
CLARITY: ABNORMAL
CO2: 20 MMOL/L (ref 21–32)
COLOR: YELLOW
CREAT SERPL-MCNC: 1.2 MG/DL (ref 0.6–1.2)
D DIMER: 381 NG/ML DDU (ref 0–229)
EOSINOPHILS ABSOLUTE: 0.5 K/UL (ref 0–0.6)
EOSINOPHILS RELATIVE PERCENT: 2.6 %
EPITHELIAL CELLS, UA: ABNORMAL /HPF (ref 0–5)
GFR AFRICAN AMERICAN: 52
GFR NON-AFRICAN AMERICAN: 43
GLOBULIN: 3.4 G/DL
GLUCOSE BLD-MCNC: 150 MG/DL (ref 70–99)
GLUCOSE URINE: NEGATIVE MG/DL
HCO3 VENOUS: 23.9 MMOL/L (ref 23–29)
HCT VFR BLD CALC: 38.4 % (ref 36–48)
HEMOGLOBIN: 12.6 G/DL (ref 12–16)
INR BLD: 0.97 (ref 0.86–1.14)
KETONES, URINE: NEGATIVE MG/DL
LACTIC ACID, SEPSIS: 1.9 MMOL/L (ref 0.4–1.9)
LACTIC ACID, SEPSIS: 3.7 MMOL/L (ref 0.4–1.9)
LEUKOCYTE ESTERASE, URINE: NEGATIVE
LYMPHOCYTES ABSOLUTE: 6.1 K/UL (ref 1–5.1)
LYMPHOCYTES RELATIVE PERCENT: 33 %
MCH RBC QN AUTO: 30.7 PG (ref 26–34)
MCHC RBC AUTO-ENTMCNC: 32.7 G/DL (ref 31–36)
MCV RBC AUTO: 94 FL (ref 80–100)
MICROSCOPIC EXAMINATION: YES
MONOCYTES ABSOLUTE: 2.4 K/UL (ref 0–1.3)
MONOCYTES RELATIVE PERCENT: 12.9 %
NEUTROPHILS ABSOLUTE: 9.4 K/UL (ref 1.7–7.7)
NEUTROPHILS RELATIVE PERCENT: 50.9 %
NITRITE, URINE: NEGATIVE
O2 SAT, VEN: 81 %
O2 THERAPY: ABNORMAL
PCO2, VEN: 52.9 MMHG (ref 40–50)
PDW BLD-RTO: 14.5 % (ref 12.4–15.4)
PH UA: 5.5 (ref 5–8)
PH VENOUS: 7.26 (ref 7.35–7.45)
PLATELET # BLD: 278 K/UL (ref 135–450)
PMV BLD AUTO: 10.4 FL (ref 5–10.5)
PO2, VEN: 53 MMHG (ref 25–40)
POTASSIUM REFLEX MAGNESIUM: 4.6 MMOL/L (ref 3.5–5.1)
PRO-BNP: 9986 PG/ML (ref 0–449)
PROCALCITONIN: 0.07 NG/ML (ref 0–0.15)
PROTEIN UA: ABNORMAL MG/DL
PROTHROMBIN TIME: 11.3 SEC (ref 10–13.2)
RBC # BLD: 4.09 M/UL (ref 4–5.2)
RBC UA: ABNORMAL /HPF (ref 0–4)
SARS-COV-2, NAAT: NOT DETECTED
SODIUM BLD-SCNC: 143 MMOL/L (ref 136–145)
SPECIFIC GRAVITY UA: 1.01 (ref 1–1.03)
TCO2 CALC VENOUS: 26 MMOL/L
TOTAL PROTEIN: 7.9 G/DL (ref 6.4–8.2)
TROPONIN: <0.01 NG/ML
URINE REFLEX TO CULTURE: YES
URINE TYPE: ABNORMAL
UROBILINOGEN, URINE: 0.2 E.U./DL
WBC # BLD: 18.5 K/UL (ref 4–11)
WBC UA: ABNORMAL /HPF (ref 0–5)

## 2020-11-28 PROCEDURE — 71260 CT THORAX DX C+: CPT

## 2020-11-28 PROCEDURE — 6360000002 HC RX W HCPCS: Performed by: INTERNAL MEDICINE

## 2020-11-28 PROCEDURE — 6360000004 HC RX CONTRAST MEDICATION: Performed by: EMERGENCY MEDICINE

## 2020-11-28 PROCEDURE — 6370000000 HC RX 637 (ALT 250 FOR IP): Performed by: INTERNAL MEDICINE

## 2020-11-28 PROCEDURE — 80053 COMPREHEN METABOLIC PANEL: CPT

## 2020-11-28 PROCEDURE — 85025 COMPLETE CBC W/AUTO DIFF WBC: CPT

## 2020-11-28 PROCEDURE — 51701 INSERT BLADDER CATHETER: CPT

## 2020-11-28 PROCEDURE — 85379 FIBRIN DEGRADATION QUANT: CPT

## 2020-11-28 PROCEDURE — 83880 ASSAY OF NATRIURETIC PEPTIDE: CPT

## 2020-11-28 PROCEDURE — 6370000000 HC RX 637 (ALT 250 FOR IP): Performed by: EMERGENCY MEDICINE

## 2020-11-28 PROCEDURE — 83605 ASSAY OF LACTIC ACID: CPT

## 2020-11-28 PROCEDURE — 2580000003 HC RX 258: Performed by: INTERNAL MEDICINE

## 2020-11-28 PROCEDURE — 84145 PROCALCITONIN (PCT): CPT

## 2020-11-28 PROCEDURE — 99285 EMERGENCY DEPT VISIT HI MDM: CPT

## 2020-11-28 PROCEDURE — 71045 X-RAY EXAM CHEST 1 VIEW: CPT

## 2020-11-28 PROCEDURE — 2700000000 HC OXYGEN THERAPY PER DAY

## 2020-11-28 PROCEDURE — 96375 TX/PRO/DX INJ NEW DRUG ADDON: CPT

## 2020-11-28 PROCEDURE — 84484 ASSAY OF TROPONIN QUANT: CPT

## 2020-11-28 PROCEDURE — 36415 COLL VENOUS BLD VENIPUNCTURE: CPT

## 2020-11-28 PROCEDURE — 85610 PROTHROMBIN TIME: CPT

## 2020-11-28 PROCEDURE — 87040 BLOOD CULTURE FOR BACTERIA: CPT

## 2020-11-28 PROCEDURE — 81001 URINALYSIS AUTO W/SCOPE: CPT

## 2020-11-28 PROCEDURE — 93005 ELECTROCARDIOGRAM TRACING: CPT | Performed by: EMERGENCY MEDICINE

## 2020-11-28 PROCEDURE — 96367 TX/PROPH/DG ADDL SEQ IV INF: CPT

## 2020-11-28 PROCEDURE — 87086 URINE CULTURE/COLONY COUNT: CPT

## 2020-11-28 PROCEDURE — 6360000002 HC RX W HCPCS: Performed by: EMERGENCY MEDICINE

## 2020-11-28 PROCEDURE — 2580000003 HC RX 258: Performed by: EMERGENCY MEDICINE

## 2020-11-28 PROCEDURE — 96365 THER/PROPH/DIAG IV INF INIT: CPT

## 2020-11-28 PROCEDURE — 51798 US URINE CAPACITY MEASURE: CPT

## 2020-11-28 PROCEDURE — 82803 BLOOD GASES ANY COMBINATION: CPT

## 2020-11-28 PROCEDURE — 6360000002 HC RX W HCPCS: Performed by: ANESTHESIOLOGY

## 2020-11-28 PROCEDURE — 94760 N-INVAS EAR/PLS OXIMETRY 1: CPT

## 2020-11-28 PROCEDURE — U0002 COVID-19 LAB TEST NON-CDC: HCPCS

## 2020-11-28 PROCEDURE — 1200000000 HC SEMI PRIVATE

## 2020-11-28 PROCEDURE — 94640 AIRWAY INHALATION TREATMENT: CPT

## 2020-11-28 RX ORDER — CETIRIZINE HYDROCHLORIDE 10 MG/1
10 TABLET ORAL DAILY
Status: DISCONTINUED | OUTPATIENT
Start: 2020-11-29 | End: 2020-11-30 | Stop reason: HOSPADM

## 2020-11-28 RX ORDER — METHYLPREDNISOLONE SODIUM SUCCINATE 125 MG/2ML
125 INJECTION, POWDER, LYOPHILIZED, FOR SOLUTION INTRAMUSCULAR; INTRAVENOUS ONCE
Status: COMPLETED | OUTPATIENT
Start: 2020-11-28 | End: 2020-11-28

## 2020-11-28 RX ORDER — ALLOPURINOL 100 MG/1
50 TABLET ORAL DAILY
Status: DISCONTINUED | OUTPATIENT
Start: 2020-11-29 | End: 2020-11-30 | Stop reason: HOSPADM

## 2020-11-28 RX ORDER — FUROSEMIDE 10 MG/ML
20 INJECTION INTRAMUSCULAR; INTRAVENOUS 2 TIMES DAILY
Status: DISCONTINUED | OUTPATIENT
Start: 2020-11-28 | End: 2020-11-30

## 2020-11-28 RX ORDER — IPRATROPIUM BROMIDE AND ALBUTEROL SULFATE 2.5; .5 MG/3ML; MG/3ML
1 SOLUTION RESPIRATORY (INHALATION)
Status: DISCONTINUED | OUTPATIENT
Start: 2020-11-28 | End: 2020-11-30 | Stop reason: HOSPADM

## 2020-11-28 RX ORDER — ACETAMINOPHEN 650 MG/1
650 SUPPOSITORY RECTAL EVERY 6 HOURS PRN
Status: DISCONTINUED | OUTPATIENT
Start: 2020-11-28 | End: 2020-11-30 | Stop reason: HOSPADM

## 2020-11-28 RX ORDER — ONDANSETRON 2 MG/ML
4 INJECTION INTRAMUSCULAR; INTRAVENOUS EVERY 6 HOURS PRN
Status: DISCONTINUED | OUTPATIENT
Start: 2020-11-28 | End: 2020-11-30 | Stop reason: HOSPADM

## 2020-11-28 RX ORDER — POLYETHYLENE GLYCOL 3350 17 G/17G
17 POWDER, FOR SOLUTION ORAL DAILY PRN
Status: DISCONTINUED | OUTPATIENT
Start: 2020-11-28 | End: 2020-11-30 | Stop reason: HOSPADM

## 2020-11-28 RX ORDER — OMEGA-3S/DHA/EPA/FISH OIL/D3 300MG-1000
400 CAPSULE ORAL DAILY
Status: DISCONTINUED | OUTPATIENT
Start: 2020-11-29 | End: 2020-11-30 | Stop reason: HOSPADM

## 2020-11-28 RX ORDER — PROMETHAZINE HYDROCHLORIDE 25 MG/1
12.5 TABLET ORAL EVERY 6 HOURS PRN
Status: DISCONTINUED | OUTPATIENT
Start: 2020-11-28 | End: 2020-11-30 | Stop reason: HOSPADM

## 2020-11-28 RX ORDER — SODIUM CHLORIDE 0.9 % (FLUSH) 0.9 %
10 SYRINGE (ML) INJECTION PRN
Status: DISCONTINUED | OUTPATIENT
Start: 2020-11-28 | End: 2020-11-30 | Stop reason: HOSPADM

## 2020-11-28 RX ORDER — FLUOXETINE HYDROCHLORIDE 20 MG/1
20 CAPSULE ORAL DAILY
Status: DISCONTINUED | OUTPATIENT
Start: 2020-11-29 | End: 2020-11-30 | Stop reason: HOSPADM

## 2020-11-28 RX ORDER — CARVEDILOL 12.5 MG/1
12.5 TABLET ORAL 2 TIMES DAILY WITH MEALS
Status: DISCONTINUED | OUTPATIENT
Start: 2020-11-28 | End: 2020-11-30 | Stop reason: HOSPADM

## 2020-11-28 RX ORDER — ATORVASTATIN CALCIUM 20 MG/1
20 TABLET, FILM COATED ORAL DAILY
Status: DISCONTINUED | OUTPATIENT
Start: 2020-11-29 | End: 2020-11-30 | Stop reason: HOSPADM

## 2020-11-28 RX ORDER — FUROSEMIDE 10 MG/ML
40 INJECTION INTRAMUSCULAR; INTRAVENOUS ONCE
Status: COMPLETED | OUTPATIENT
Start: 2020-11-28 | End: 2020-11-28

## 2020-11-28 RX ORDER — DONEPEZIL HYDROCHLORIDE 5 MG/1
5 TABLET, FILM COATED ORAL NIGHTLY
Status: DISCONTINUED | OUTPATIENT
Start: 2020-11-28 | End: 2020-11-30 | Stop reason: HOSPADM

## 2020-11-28 RX ORDER — SODIUM CHLORIDE 0.9 % (FLUSH) 0.9 %
10 SYRINGE (ML) INJECTION EVERY 12 HOURS SCHEDULED
Status: DISCONTINUED | OUTPATIENT
Start: 2020-11-28 | End: 2020-11-30 | Stop reason: HOSPADM

## 2020-11-28 RX ORDER — LISINOPRIL 5 MG/1
2.5 TABLET ORAL DAILY
Status: DISCONTINUED | OUTPATIENT
Start: 2020-11-29 | End: 2020-11-29

## 2020-11-28 RX ORDER — ACETAMINOPHEN 325 MG/1
650 TABLET ORAL EVERY 6 HOURS PRN
Status: DISCONTINUED | OUTPATIENT
Start: 2020-11-28 | End: 2020-11-30 | Stop reason: HOSPADM

## 2020-11-28 RX ORDER — HYDRALAZINE HYDROCHLORIDE 20 MG/ML
10 INJECTION INTRAMUSCULAR; INTRAVENOUS EVERY 6 HOURS PRN
Status: DISCONTINUED | OUTPATIENT
Start: 2020-11-28 | End: 2020-11-30 | Stop reason: HOSPADM

## 2020-11-28 RX ORDER — ASPIRIN 81 MG/1
81 TABLET, CHEWABLE ORAL DAILY
Status: DISCONTINUED | OUTPATIENT
Start: 2020-11-29 | End: 2020-11-30 | Stop reason: HOSPADM

## 2020-11-28 RX ORDER — IPRATROPIUM BROMIDE AND ALBUTEROL SULFATE 2.5; .5 MG/3ML; MG/3ML
1 SOLUTION RESPIRATORY (INHALATION) ONCE
Status: COMPLETED | OUTPATIENT
Start: 2020-11-28 | End: 2020-11-28

## 2020-11-28 RX ADMIN — IPRATROPIUM BROMIDE AND ALBUTEROL SULFATE 1 AMPULE: .5; 3 SOLUTION RESPIRATORY (INHALATION) at 11:58

## 2020-11-28 RX ADMIN — IOPAMIDOL 100 ML: 755 INJECTION, SOLUTION INTRAVENOUS at 13:28

## 2020-11-28 RX ADMIN — FUROSEMIDE 40 MG: 10 INJECTION, SOLUTION INTRAMUSCULAR; INTRAVENOUS at 13:54

## 2020-11-28 RX ADMIN — DONEPEZIL HYDROCHLORIDE 5 MG: 5 TABLET, FILM COATED ORAL at 21:13

## 2020-11-28 RX ADMIN — HYDRALAZINE HYDROCHLORIDE 10 MG: 20 INJECTION INTRAMUSCULAR; INTRAVENOUS at 22:36

## 2020-11-28 RX ADMIN — ENOXAPARIN SODIUM 30 MG: 30 INJECTION SUBCUTANEOUS at 21:13

## 2020-11-28 RX ADMIN — CEFEPIME HYDROCHLORIDE 2 G: 2 INJECTION, POWDER, FOR SOLUTION INTRAVENOUS at 13:55

## 2020-11-28 RX ADMIN — CARVEDILOL 12.5 MG: 12.5 TABLET, FILM COATED ORAL at 18:11

## 2020-11-28 RX ADMIN — IPRATROPIUM BROMIDE AND ALBUTEROL SULFATE 1 AMPULE: .5; 3 SOLUTION RESPIRATORY (INHALATION) at 21:15

## 2020-11-28 RX ADMIN — METHYLPREDNISOLONE SODIUM SUCCINATE 125 MG: 125 INJECTION, POWDER, FOR SOLUTION INTRAMUSCULAR; INTRAVENOUS at 11:58

## 2020-11-28 RX ADMIN — FUROSEMIDE 20 MG: 10 INJECTION, SOLUTION INTRAMUSCULAR; INTRAVENOUS at 18:11

## 2020-11-28 RX ADMIN — VANCOMYCIN HYDROCHLORIDE 1000 MG: 1 INJECTION, POWDER, LYOPHILIZED, FOR SOLUTION INTRAVENOUS at 14:22

## 2020-11-28 RX ADMIN — SODIUM CHLORIDE, PRESERVATIVE FREE 10 ML: 5 INJECTION INTRAVENOUS at 21:13

## 2020-11-28 ASSESSMENT — PAIN SCALES - GENERAL: PAINLEVEL_OUTOF10: 0

## 2020-11-28 ASSESSMENT — PAIN - FUNCTIONAL ASSESSMENT: PAIN_FUNCTIONAL_ASSESSMENT: ACTIVITIES ARE NOT PREVENTED

## 2020-11-28 NOTE — PROGRESS NOTES
Checking on patient Q2H for nutrition needs, hygiene needs, comfort measures, mobility, fall risk interventions, and safe environment. All precautions and interventions in place. Educated patient on use of call light and telephone. Patient verbalizes understanding. Call light/telephone in reach.   Electronically signed by Jeanne Tripathi RN on 11/28/2020 at 4:57 PM

## 2020-11-28 NOTE — ED PROVIDER NOTES
[facility] ENCOUNTER      Pt Name: Claudia Serrato  MRN: [id number]  [place] 1935  Date of evaluation: 11/28/2020  Provider: Vanessa Jiang, [address] [place] [address]       Chief Complaint   Patient presents with    Respiratory Distress     pT PRESTENTS WITH WHEEZING AND NCHEST PAIN         HISTORY OF PRESENT ILLNESS   (Location/Symptom, Timing/Onset, Context/Setting, Quality, Duration, Modifying Factors, Severity)  Note limiting factors. Claudia Serrato is a 80 y.o. female who presents to the emergency department with a complaint of shortness of breath that began this morning. She complains of wheezing. She does complain of some tightness in her chest but denies any chest pain. She denies any fever, chills, cough, sputum production. She denies any headache, body aches, earache, sore throat or sinus drainage. She denies any exposure to COVID-19. The patient was recently hospitalized for CHF at the end of October. She is compliant with her medications. She denies any recent weight gain. She denies any headache, weakness, numbness, syncope or palpitations. She denies any history of asthma, or COPD. She does not wear oxygen at home. She does not smoke. Local history is significant for hypertension, coronary artery disease, TIA, stage IV chronic kidney disease, and CHF. Nursing Notes were reviewed. HPI        REVIEW OF SYSTEMS    (2-9 systems for level 4, 10 or more for level 5)       Constitutional: Negative for fever or chills. HENT: Negative for rhinorrhea and sore throat. Eyes: Negative for redness or drainage. Gastrointestinal: Negative for abdominal pain. Negative for vomiting or diarrhea. Genitourinary: Negative for flank pain. Negative for dysuria. Negative for hematuria. Neurological: Negative for headache. Musculoskeletal:  Negative edema. Hematological: Negative for adenopathy.        All systems are reviewed and are negative except for those listed above in the history of present illness and ROS. PAST MEDICAL HISTORY     Past Medical History:   Diagnosis Date    Chronic kidney disease (CKD), stage IV (severe) ([address]     Coronary artery disease involving native coronary artery of native heart without angina pectoris 1/5/2016    GERD (gastroesophageal reflux disease) acid reflux    Gout, chronic     Histoplasmosis     Hyperlipidemia     Hypertension     Irregular heart beat     Osteoarthritis 2/28/2019    Knee, leg     Osteopenia of hip 04/26/2019    Pneumonia     Unspecified cerebral artery occlusion with cerebral infarction 2014 , ~ 2012         SURGICAL HISTORY       Past Surgical History:   Procedure Laterality Date    BLADDER SUSPENSION      CHOLECYSTECTOMY  Orthopedic surgery Left arm    HYSTERECTOMY      TUBAL LIGATION           CURRENT MEDICATIONS       Previous Medications    ALLOPURINOL (ZYLOPRIM) 100 MG TABLET    Take 0.5 tablets by mouth daily    ASPIRIN 81 MG CHEWABLE TABLET    Take 1 tablet by mouth daily    ATORVASTATIN (LIPITOR) 20 MG TABLET    TAKE 1 TABLET BY MOUTH ONE TIME A DAY     CARVEDILOL (COREG) 12.5 MG TABLET    Take 1 tablet by mouth 2 times daily (with meals)    DONEPEZIL (ARICEPT) 5 MG TABLET    Take 5 mg by mouth nightly    FLUOXETINE (PROZAC) 20 MG CAPSULE    TAKE 1 CAPSULE BY MOUTH ONE TIME A DAY     FUROSEMIDE (LASIX) 20 MG TABLET    Take 20 mg by mouth daily    HANDICAP PLACARD MISC    by Does not apply route Patient is unable to walk more than 250 feet before stopping to rest.    Not to exceed 5 years.   DX: CHF    LISINOPRIL (PRINIVIL;ZESTRIL) 2.5 MG TABLET    Take 2.5 mg by mouth daily    LORATADINE (CLARITIN) 10 MG CAPSULE    Take 10 mg by mouth daily as needed     MULTIPLE VITAMINS-MINERALS (THERAPEUTIC MULTIVITAMIN-MINERALS) TABLET    Take 1 tablet by mouth daily    PANTOPRAZOLE (PROTONIX) 40 MG TABLET    Take 1 tablet by mouth daily    VITAMIN D3 (CHOLECALCIFEROL) for level 5)     ED Triage Vitals [11/28/20 1145]   BP Temp Temp Source Pulse Resp SpO2 Height Weight   -- 97.6 °F (36.4 °C) Oral 120 (!) 32 (!) 89 % 5' 2\" (1.575 m) 150 lb (68 kg)         Physical Exam   Constitutional: Awake and alert. Acute respiratory distress. Head: No visible evidence of trauma. Normocephalic. Eyes: Pupils equal and reactive. No photophobia. Conjunctiva normal.    HENT: Oral mucosa moist.  Airway patent. Pharynx without erythema. Nares were clear. Neck:  Soft and supple. Nontender. Heart:  Regular rate and rhythm. No murmur. Lungs: Markedly diminished breath sounds bilaterally with diffuse coarse expiratory wheezes and bibasilar rales. Accessory muscle use and conversational dyspnea was noted. She was able to say only 2 or 3 words at a time. Chest: Chest wall non-tender. No evidence of trauma. Abdomen:  Soft, nondistended, bowel sounds present. Nontender. No guarding rigidity or rebound. No masses. Musculoskeletal: Extremities non-tender with full range of motion. Radial and dorsalis pedis pulses were intact. No calf tenderness erythema or edema. Neurological: Alert and oriented x 3. Speech clear. Cranial nerves II-XII intact. No facial droop. No acute focal motor or sensory deficits. Skin: Skin is warm and dry. No rash. Lymphatic:  No lympadenopathy. Psychiatric: Normal mood and affect. Behavior is normal.         DIAGNOSTIC RESULTS     EKG: All EKG's are interpreted by the Emergency Department Physician who either signs or Co-signs this chart in the absence of a cardiologist.    Sinus tachycardia. Rate 124. NY interval 100 ms. QRS duration 156 ms. QTc 540 ms.  R axis -45 degrees. Left bundle branch block. Occasional PVCs. EKG was compared to a prior tracing from October 19, 2020. Left bundle branch block is pre-existing and is not new. EKG is identical in appearance.       RADIOLOGY:   Non-plain film images such as CT, Ultrasound and MRI are read by the radiologistOsamn Heredia radiographic images are visualized and preliminarily interpreted by the emergency physician with the below findings:        Interpretation per the Radiologist below, if available at the time of this note:    CT CHEST PULMONARY EMBOLISM W CONTRAST   Final Result   No evidence of pulmonary embolism. Pulmonary edema but no significant pleural effusion. Only a trace amount of   pleural fluid. Coexistent bronchitis may be present. XR CHEST PORTABLE   Final Result   Bilateral interstitial opacities very similar to the comparison. This is   favored to be due to acute interstitial edema or a separate chronic   interstitial process, although bilateral pneumonia could potentially have   this appearance.                ED BEDSIDE ULTRASOUND:   Performed by ED Physician - none    LABS:  Labs Reviewed   CBC WITH AUTO DIFFERENTIAL - Abnormal; Notable for the following components:       Result Value    WBC 18.5 (*)     Neutrophils Absolute 9.4 (*)     Lymphocytes Absolute 6.1 (*)     Monocytes Absolute 2.4 (*)     All other components within normal limits    Narrative:     Performed at:  [facility]  [address] [facility], [facility]   Phone [phone]   COMPREHENSIVE METABOLIC PANEL W/ REFLEX TO MG FOR LOW K - Abnormal; Notable for the following components:    CO2 20 (*)     Glucose 150 (*)     BUN 25 (*)     GFR Non- 43 (*)     GFR  52 (*)     ALT <5 (*)     All other components within normal limits    Narrative:     Performed at:  [facility]  [address] [facility], [facility]   Phone [phone]   BRAIN NATRIURETIC PEPTIDE - Abnormal; Notable for the following components:    Pro-BNP 9,986 (*)     All other components within normal limits    Narrative:     Performed at:  [facility]  [address] [place] [address]., [place], [facility]   Phone [phone]   D-DIMER, QUANTITATIVE - Abnormal; Notable for the following components:    D-Dimer, Quant 381 (*)     All other components within normal limits    Narrative:     Performed at:  [facility]  [address], [facility]   Phone [phone]   BLOOD GAS, VENOUS - Abnormal; Notable for the following components:    pH, Rasheed 7.263 (*)     pCO2, Rasheed 52.9 (*)     pO2, Rasheed 53.0 (*)     Base Excess, Rasheed -3.1 (*)     All other components within normal limits    Narrative:     Performed at:  [facility]  [address] [place], [facility]   Phone [phone]   LACTATE, SEPSIS - Abnormal; Notable for the following components:    Lactic Acid, Sepsis 3.7 (*)     All other components within normal limits    Narrative:     Performed at:  [facility]  [address] [place], [facility]   Phone [phone]   CULTURE, BLOOD 1   CULTURE, BLOOD 2   COVID-19    Narrative:     Performed at:  [place] [address]   Phone [phone]   TROPONIN    Narrative:     Performed at:  [address] [facility]  [address] [place], [place]   Phone [phone]   PROTIME-INR    Narrative:     Performed at:  [address] [facility] [address] [facility] [address] [facility]  [address] [place], [place]   Phone [phone]   LACTATE, SEPSIS    Narrative:     Performed at:  [facility]  [address] [place], [facility]   Phone [phone]   URINE RT REFLEX TO CULTURE       All other labs were within normal range or not returned as of this dictation.     EMERGENCY DEPARTMENT COURSE and DIFFERENTIAL DIAGNOSIS/MDM:   Vitals:    Vitals:    11/28/20 1233 11/28/20 1300 11/28/20 1358 DISPOSITION/PLAN   DISPOSITION        PATIENT REFERRED TO:  No follow-up provider specified. DISCHARGE MEDICATIONS:  New Prescriptions    No medications on file     Controlled Substances Monitoring:     No flowsheet data found. (Please note that portions of this note were completed with a voice recognition program.  Efforts were made to edit the dictations but occasionally words are mis-transcribed. )    1859 Shane Tafoya DO (electronically signed)  Attending Emergency Physician          Daquan Quiros DO  11/28/20 0515

## 2020-11-28 NOTE — ED NOTES
Pt admitted to 3W room 3110 report called to [facility], 0/10 pain. [facility] to transport.      Mildred Last RN  11/28/20 5330

## 2020-11-28 NOTE — ED NOTES
Pt states she feels better after neb treatments and solumedrol. Pt SaO2 increase from 89% on room air to 96% on nasal cannula.      Clair Cranker, RN  11/28/20 8269

## 2020-11-28 NOTE — PROGRESS NOTES
Pt arrived to 3110 via stretcher from [facility] ED. A&Ox4. Admission questions complete. Assessment completed. Pt currently on 3L O2 nasal cannula, but not home dependent. BP hypertensive at this time. Pt has hx of hypertension, and states this isn't unusual for her. Will still inform MD. No needs voiced by pt at this time. Fall precautions in place. Call light within reach. Will continue to monitor.     Electronically signed by Jose Miguel Roa RN on 11/28/2020 at 4:26 PM

## 2020-11-28 NOTE — H&P
Hospital Medicine History & Physical      PCP: Marylene Setting, APRN - NP    Date of Admission: 11/28/2020    Date of Service: Pt seen/examined on 11/28/2020    Chief Complaint:      Chief Complaint   Patient presents with    Respiratory Distress     pT PRESTENTS WITH WHEEZING AND NCHEST PAIN       History Of Present Illness:     80-year female with past medical history of combined diastolic and systolic heart failure, hypertension presented to hospital with acute onset of shortness of breath. Patient was complaining of some chest tightness and shortness of breath to the point that she could hardly speak any words. No fevers no chills or recent exposure to anyone with COVID-19. Of note patient was admitted in October for heart exacerbation at that time echo revealed EF of 40-45% and grade 2 diastolic dysfunction. On arrival to the ER patient was noted to be visibly dyspneic, tachypneic, tachycardic. Lab work revealed lactic acid of 3.7, elevated proBNP of 9986. She had elevated complaint chest x-ray was performed which showed findings concerning for bilateral interstitial weakness concerning for pulmonary edema.   Patient was given IV Lasix as well as antibiotics for possible bronchitis and was admitted to the hospital for the management    Past Medical History:        Diagnosis Date    Chronic kidney disease (CKD), stage IV (severe) ([address]     Coronary artery disease involving native coronary artery of native heart without angina pectoris 1/5/2016    GERD (gastroesophageal reflux disease) acid reflux    Gout, chronic     Histoplasmosis     Hyperlipidemia     Hypertension     Irregular heart beat     Osteoarthritis 2/28/2019    Knee, leg     Osteopenia of hip 04/26/2019    Pneumonia     Unspecified cerebral artery occlusion with cerebral infarction 2014 , ~ 2012       Past Surgical History:        Procedure Laterality Date    BLADDER SUSPENSION      CHOLECYSTECTOMY  Orthopedic surgery Left arm  HYSTERECTOMY      TUBAL LIGATION         Medications Prior to Admission:    Prior to Admission medications    Medication Sig Start Date End Date Taking? Authorizing Provider   carvedilol (COREG) 12.5 MG tablet Take 1 tablet by mouth 2 times daily (with meals)  Patient not taking: Reported on 10/27/2020 10/23/20   Mary Kwok MD   allopurinol (ZYLOPRIM) 100 MG tablet Take 0.5 tablets by mouth daily 10/24/20   Mary Kwok MD   Multiple Vitamins-Minerals (THERAPEUTIC MULTIVITAMIN-MINERALS) tablet Take 1 tablet by mouth daily    Historical Provider, MD   donepezil (ARICEPT) 5 MG tablet Take 5 mg by mouth nightly    Historical Provider, MD   furosemide (LASIX) 20 MG tablet Take 20 mg by mouth daily    Historical Provider, MD   lisinopril (PRINIVIL;ZESTRIL) 2.5 MG tablet Take 2.5 mg by mouth daily    Historical Provider, MD   loratadine (CLARITIN) 10 MG capsule Take 10 mg by mouth daily as needed     Historical Provider, MD   vitamin D3 (CHOLECALCIFEROL) 10 MCG (400 UNIT) TABS tablet Take 400 Units by mouth daily    Historical Provider, MD   pantoprazole (PROTONIX) 40 MG tablet Take 1 tablet by mouth daily 4/8/19 4/7/20  Oma Burgos MD   FLUoxetine (PROZAC) 20 MG capsule TAKE 1 CAPSULE BY MOUTH ONE TIME A DAY  1/29/19   Oma Burgos MD   atorvastatin (LIPITOR) 20 MG tablet TAKE 1 TABLET BY MOUTH ONE TIME A DAY  1/8/18   Oma Burgos MD   aspirin 81 MG chewable tablet Take 1 tablet by mouth daily 6/27/17   Silviano Story MD   Handicap Placard MISC by Does not apply route Patient is unable to walk more than 250 feet before stopping to rest.    Not to exceed 5 years. DX: CHF 8/17/16   Oma Burgos MD       Allergies:  Codeine and Morphine and related    Social History:       reports that she is a non-smoker but has been exposed to tobacco smoke. She has never used smokeless tobacco. She reports that she does not drink alcohol or use drugs.     Family History:  Reviewed in detail and  Positive as CREATININE 1.2     LFT'S  Recent Labs     11/28/20  1140   AST 18   ALT <5*   BILITOT 0.3   ALKPHOS 74     COAG  Recent Labs     11/28/20  1140   INR 0.97     CARDIAC ENZYMES  Recent Labs     11/28/20  1140   TROPONINI <0.01       U/A:    Lab Results   Component Value Date    COLORU Yellow 11/28/2020    WBCUA 10-20 11/28/2020    RBCUA None seen 11/28/2020    BACTERIA 2+ 11/28/2020    CLARITYU SL CLOUDY 11/28/2020    SPECGRAV 1.015 11/28/2020    LEUKOCYTESUR Negative 11/28/2020    BLOODU Negative 11/28/2020    GLUCOSEU Negative 11/28/2020    GLUCOSEU Negative 06/13/2011    AMORPHOUS Present 06/13/2011       ABG    Lab Results   Component Value Date    FGO3TPU 21.3 10/20/2020    BEART -5.2 10/20/2020    E7IMZTKQ 100.0 10/20/2020    PHART 7.292 10/20/2020    OUC4IXC 44.2 10/20/2020    PO2ART 156.0 10/20/2020    ABA2QFU 22.6 10/20/2020       UA:  Recent Labs     11/28/20  1515   COLORU Yellow   PHUR 5.5   WBCUA 10-20*   RBCUA None seen   BACTERIA 2+*   CLARITYU SL CLOUDY*   SPECGRAV 1.015   LEUKOCYTESUR Negative   UROBILINOGEN 0.2   BILIRUBINUR Negative   BLOODU Negative   GLUCOSEU Negative   KETUA Negative       Microbiology:  No results for input(s): LABGRAM, LABANAE, ORG, CXSURG in the last 72 hours. Nasal Culture: No results for input(s): ORG, MRSAPCR in the last 72 hours. Blood Culture: No results for input(s): BC, BLOODCULT2, ORG in the last 72 hours. Fungal Culture:   No results for input(s): FUNGSM in the last 72 hours. No results for input(s): FUNCXBLD in the last 72 hours. CSF Culture:  No results for input(s): COLORCSF, APPEARCSF, CFTUBE, CLOTCSF, WBCCSF, RBCCSF, NEUTCSF, NUMCELLSCSF, LYMPHSCSF, MONOCSF, GLUCCSF, VOLCSF in the last 72 hours. Respiratory Culture:  No results for input(s): Karthik Pierre in the last 72 hours. AFB:No results for input(s): AFBSMEAR in the last 72 hours. Urine Culture  No results for input(s): LABURIN in the last 72 hours.     RADIOLOGY:    CT CHEST PULMONARY EMBOLISM W CONTRAST   Final Result   No evidence of pulmonary embolism. Pulmonary edema but no significant pleural effusion. Only a trace amount of   pleural fluid. Coexistent bronchitis may be present. XR CHEST PORTABLE   Final Result   Bilateral interstitial opacities very similar to the comparison. This is   favored to be due to acute interstitial edema or a separate chronic   interstitial process, although bilateral pneumonia could potentially have   this appearance. Previous medical records personally reviewed and analyzed         PHYSICIAN CERTIFICATION    I certify that Evelyn Quezada is expected to be hospitalized for >2 midnights based on the following assessment and plan:    ASSESSMENT/PLAN:    Shortness of breath secondary to acute exacerbation of heart failure  -Echo in October 2020 showed EF of 40-45% with grade 2 diastolic dysfunction  -Continue IV Lasix 20 mg twice daily  -Cardiology consulted  -Telemetry, strict input and output, fluid restriction 1.5 L   -Check procalcitonin to rule out bacterial infection, COVID-19 has been ruled out    Hypertension  -Continue lisinopril, Coreg    Dementia  -Continue donepezil    Hyperlipidemia  -Continue Lipitor    Depression  -Prozac    Gout  -Continue allopurinol      DVT Prophylaxis: lovenox  Diet: DIET LOW SODIUM 2 GM; 1500 ml  Code Status: Full Code  PT/OT Eval Status: pending    Dispo - pending clinical improvement       Minor MD Allie  The note was completed using EMR. Every effort was made to ensure accuracy; however, inadvertent computerized transcription errors may be present. Thank you FERNANDO Larson NP for the opportunity to be involved in this patient's care. If you have any questions or concerns please feel free to contact me at [phone].

## 2020-11-29 LAB
ANION GAP SERPL CALCULATED.3IONS-SCNC: 14 MMOL/L (ref 3–16)
BASOPHILS ABSOLUTE: 0.1 K/UL (ref 0–0.2)
BASOPHILS RELATIVE PERCENT: 0.7 %
BUN BLDV-MCNC: 24 MG/DL (ref 7–20)
CALCIUM SERPL-MCNC: 9.1 MG/DL (ref 8.3–10.6)
CHLORIDE BLD-SCNC: 101 MMOL/L (ref 99–110)
CO2: 22 MMOL/L (ref 21–32)
CREAT SERPL-MCNC: 1.2 MG/DL (ref 0.6–1.2)
EKG ATRIAL RATE: 124 BPM
EKG DIAGNOSIS: NORMAL
EKG P AXIS: 23 DEGREES
EKG P-R INTERVAL: 100 MS
EKG Q-T INTERVAL: 376 MS
EKG QRS DURATION: 156 MS
EKG QTC CALCULATION (BAZETT): 540 MS
EKG R AXIS: -45 DEGREES
EKG T AXIS: 99 DEGREES
EKG VENTRICULAR RATE: 124 BPM
EOSINOPHILS ABSOLUTE: 0 K/UL (ref 0–0.6)
EOSINOPHILS RELATIVE PERCENT: 0 %
GFR AFRICAN AMERICAN: 52
GFR NON-AFRICAN AMERICAN: 43
GLUCOSE BLD-MCNC: 123 MG/DL (ref 70–99)
HCT VFR BLD CALC: 35 % (ref 36–48)
HEMOGLOBIN: 12 G/DL (ref 12–16)
LYMPHOCYTES ABSOLUTE: 1.3 K/UL (ref 1–5.1)
LYMPHOCYTES RELATIVE PERCENT: 14 %
MCH RBC QN AUTO: 30.8 PG (ref 26–34)
MCHC RBC AUTO-ENTMCNC: 34.3 G/DL (ref 31–36)
MCV RBC AUTO: 89.7 FL (ref 80–100)
MONOCYTES ABSOLUTE: 0.5 K/UL (ref 0–1.3)
MONOCYTES RELATIVE PERCENT: 5.7 %
NEUTROPHILS ABSOLUTE: 7.4 K/UL (ref 1.7–7.7)
NEUTROPHILS RELATIVE PERCENT: 79.6 %
PDW BLD-RTO: 14.4 % (ref 12.4–15.4)
PLATELET # BLD: 231 K/UL (ref 135–450)
PMV BLD AUTO: 9.5 FL (ref 5–10.5)
POTASSIUM REFLEX MAGNESIUM: 4.2 MMOL/L (ref 3.5–5.1)
RBC # BLD: 3.91 M/UL (ref 4–5.2)
SODIUM BLD-SCNC: 137 MMOL/L (ref 136–145)
WBC # BLD: 9.4 K/UL (ref 4–11)

## 2020-11-29 PROCEDURE — 97530 THERAPEUTIC ACTIVITIES: CPT

## 2020-11-29 PROCEDURE — 1200000000 HC SEMI PRIVATE

## 2020-11-29 PROCEDURE — 97162 PT EVAL MOD COMPLEX 30 MIN: CPT

## 2020-11-29 PROCEDURE — 85025 COMPLETE CBC W/AUTO DIFF WBC: CPT

## 2020-11-29 PROCEDURE — 6370000000 HC RX 637 (ALT 250 FOR IP): Performed by: EMERGENCY MEDICINE

## 2020-11-29 PROCEDURE — 80048 BASIC METABOLIC PNL TOTAL CA: CPT

## 2020-11-29 PROCEDURE — 6370000000 HC RX 637 (ALT 250 FOR IP): Performed by: INTERNAL MEDICINE

## 2020-11-29 PROCEDURE — 51798 US URINE CAPACITY MEASURE: CPT

## 2020-11-29 PROCEDURE — 6360000002 HC RX W HCPCS: Performed by: INTERNAL MEDICINE

## 2020-11-29 PROCEDURE — 93010 ELECTROCARDIOGRAM REPORT: CPT | Performed by: INTERNAL MEDICINE

## 2020-11-29 PROCEDURE — 94640 AIRWAY INHALATION TREATMENT: CPT

## 2020-11-29 PROCEDURE — 36415 COLL VENOUS BLD VENIPUNCTURE: CPT

## 2020-11-29 PROCEDURE — 2580000003 HC RX 258: Performed by: INTERNAL MEDICINE

## 2020-11-29 PROCEDURE — 94760 N-INVAS EAR/PLS OXIMETRY 1: CPT

## 2020-11-29 PROCEDURE — 99223 1ST HOSP IP/OBS HIGH 75: CPT | Performed by: INTERNAL MEDICINE

## 2020-11-29 RX ORDER — LISINOPRIL 10 MG/1
10 TABLET ORAL DAILY
Status: DISCONTINUED | OUTPATIENT
Start: 2020-11-30 | End: 2020-11-30 | Stop reason: HOSPADM

## 2020-11-29 RX ORDER — LISINOPRIL 5 MG/1
5 TABLET ORAL ONCE
Status: COMPLETED | OUTPATIENT
Start: 2020-11-29 | End: 2020-11-29

## 2020-11-29 RX ORDER — LANOLIN ALCOHOL/MO/W.PET/CERES
3 CREAM (GRAM) TOPICAL NIGHTLY PRN
Status: DISCONTINUED | OUTPATIENT
Start: 2020-11-29 | End: 2020-11-30 | Stop reason: HOSPADM

## 2020-11-29 RX ADMIN — IPRATROPIUM BROMIDE AND ALBUTEROL SULFATE 1 AMPULE: .5; 3 SOLUTION RESPIRATORY (INHALATION) at 16:28

## 2020-11-29 RX ADMIN — IPRATROPIUM BROMIDE AND ALBUTEROL SULFATE 1 AMPULE: .5; 3 SOLUTION RESPIRATORY (INHALATION) at 20:55

## 2020-11-29 RX ADMIN — FUROSEMIDE 20 MG: 10 INJECTION, SOLUTION INTRAMUSCULAR; INTRAVENOUS at 17:30

## 2020-11-29 RX ADMIN — CETIRIZINE HYDROCHLORIDE 10 MG: 10 TABLET, FILM COATED ORAL at 08:56

## 2020-11-29 RX ADMIN — CARVEDILOL 12.5 MG: 12.5 TABLET, FILM COATED ORAL at 17:30

## 2020-11-29 RX ADMIN — SODIUM CHLORIDE, PRESERVATIVE FREE 10 ML: 5 INJECTION INTRAVENOUS at 08:56

## 2020-11-29 RX ADMIN — CHOLECALCIFEROL (VITAMIN D3) 10 MCG (400 UNIT) TABLET 400 UNITS: at 08:56

## 2020-11-29 RX ADMIN — DONEPEZIL HYDROCHLORIDE 5 MG: 5 TABLET, FILM COATED ORAL at 20:31

## 2020-11-29 RX ADMIN — IPRATROPIUM BROMIDE AND ALBUTEROL SULFATE 1 AMPULE: .5; 3 SOLUTION RESPIRATORY (INHALATION) at 08:27

## 2020-11-29 RX ADMIN — LISINOPRIL 2.5 MG: 5 TABLET ORAL at 08:56

## 2020-11-29 RX ADMIN — LISINOPRIL 5 MG: 5 TABLET ORAL at 13:41

## 2020-11-29 RX ADMIN — ASPIRIN 81 MG: 81 TABLET, CHEWABLE ORAL at 08:56

## 2020-11-29 RX ADMIN — ENOXAPARIN SODIUM 30 MG: 30 INJECTION SUBCUTANEOUS at 20:31

## 2020-11-29 RX ADMIN — ATORVASTATIN CALCIUM 20 MG: 20 TABLET, FILM COATED ORAL at 08:56

## 2020-11-29 RX ADMIN — FUROSEMIDE 20 MG: 10 INJECTION, SOLUTION INTRAMUSCULAR; INTRAVENOUS at 08:56

## 2020-11-29 RX ADMIN — FLUOXETINE 20 MG: 20 CAPSULE ORAL at 08:56

## 2020-11-29 RX ADMIN — CARVEDILOL 12.5 MG: 12.5 TABLET, FILM COATED ORAL at 08:56

## 2020-11-29 RX ADMIN — IPRATROPIUM BROMIDE AND ALBUTEROL SULFATE 1 AMPULE: .5; 3 SOLUTION RESPIRATORY (INHALATION) at 11:52

## 2020-11-29 RX ADMIN — SODIUM CHLORIDE, PRESERVATIVE FREE 10 ML: 5 INJECTION INTRAVENOUS at 20:30

## 2020-11-29 RX ADMIN — ALLOPURINOL 50 MG: 100 TABLET ORAL at 08:56

## 2020-11-29 ASSESSMENT — PAIN SCALES - GENERAL: PAINLEVEL_OUTOF10: 0

## 2020-11-29 NOTE — PROGRESS NOTES
Patient has voided multiple times this shift. Bladder scan done and there is 266 ml in patient bladder. Will continue to monitor.

## 2020-11-29 NOTE — PROGRESS NOTES
PT AAo x4 per this shift but forgetful. Pt tolerating PO fluids. Pt naomi pain per this shift. Pt able to ambulate to bathroom with steady gait. Pt incontinent at times; but other times pt stating having to urinate and not being able to. Pt educated about fluids restriction, medications and importance of daily weighting. B/P having high B/P per this shift. Manual at 182/101. NP Melina Perrin notified. See eMAR with NO. Effective at this time. Pt weaned to RA with SPO2 at 94 % respirations even and unlabored. Fall precautions in place. Bed alarm on. Call light within reach. Will continue to round.  Electronically signed by Glendy Multani RN on 11/29/2020 at 4:36 AM

## 2020-11-29 NOTE — PROGRESS NOTES
bilaterally without Rales/Wheezes/Rhonchi. Cardiovascular: Regular rate and rhythm with normal S1/S2 without murmurs, rubs or gallops. Abdomen: Soft, non-tender, non-distended with normal bowel sounds. Musculoskeletal: No clubbing, cyanosis or edema bilaterally. Full range of motion without deformity. Skin: Skin color, texture, turgor normal.  No rashes or lesions. Neurologic:  Neurovascularly intact without any focal sensory/motor deficits. Cranial nerves: II-XII intact, grossly non-focal.  Psychiatric: Alert and oriented, thought content appropriate, normal insight  Capillary Refill: Brisk,< 3 seconds   Peripheral Pulses: +2 palpable, equal bilaterally       Labs:   Recent Labs     11/28/20  1140 11/29/20  0443   WBC 18.5* 9.4   HGB 12.6 12.0   HCT 38.4 35.0*    231     Recent Labs     11/28/20  1140 11/29/20  0443    137   K 4.6 4.2    101   CO2 20* 22   BUN 25* 24*   CREATININE 1.2 1.2   CALCIUM 9.2 9.1     Recent Labs     11/28/20  1140   AST 18   ALT <5*   BILITOT 0.3   ALKPHOS 74     Recent Labs     11/28/20  1140   INR 0.97     Recent Labs     11/28/20  1140   TROPONINI <0.01       Urinalysis:      Lab Results   Component Value Date    NITRU Negative 11/28/2020    WBCUA 10-20 11/28/2020    BACTERIA 2+ 11/28/2020    RBCUA None seen 11/28/2020    BLOODU Negative 11/28/2020    SPECGRAV 1.015 11/28/2020    GLUCOSEU Negative 11/28/2020    GLUCOSEU Negative 06/13/2011       Radiology:  CT CHEST PULMONARY EMBOLISM W CONTRAST   Final Result   No evidence of pulmonary embolism. Pulmonary edema but no significant pleural effusion. Only a trace amount of   pleural fluid. Coexistent bronchitis may be present. XR CHEST PORTABLE   Final Result   Bilateral interstitial opacities very similar to the comparison.   This is   favored to be due to acute interstitial edema or a separate chronic   interstitial process, although bilateral pneumonia could potentially have   this

## 2020-11-29 NOTE — PROGRESS NOTES
Pt daughter Shekhar Coughlin updated on POC.  Electronically signed by Emely Streeter RN on 11/29/2020 at 5:46 AM

## 2020-11-29 NOTE — PROGRESS NOTES
Patient was up in chair after working with PT. Assisted back to bed with standby assist. Alert to self, place, and time. Disoriented to situation. Patient thinks she had a heart attack. Frequently forgetful but easily reoriented. Denies pain. Morning medications given without difficulty. Call light and belongings within reach. Bed alarm on. Will continue to monitor.     Electronically signed by Inocencio Mayer RN on 11/29/2020 at 9:02 AM

## 2020-11-29 NOTE — PROGRESS NOTES
Pt bladder scanned for  468 ml. Pt able to ambulate to bathroom and urine 100 mg of clear yellow urine and a med loose BM. Pt re bladder scanned for 366 ml of residual. Will pass on in shift report.  Electronically signed by Mariama Ann RN on 11/29/2020 at 5:11 AM

## 2020-11-29 NOTE — CONSULTS
Referring Physician: Dr. Jennifer Lino  Reason for Consultation: heart failure  Chief Complaint: short of breath    Subjective:   History of Present Illness:  Chung Fernandez is a 80 y.o. patient who presented to the hospital with complaints of shortness of breath per report. The patient has dementia and does not seem to be a reliable historian. She is oriented to time and place. Per chart review, she presented to the emergency department for shortness of breath and chest tightness. She currently states that she is in the hospital because she tripped and fell at home. No family is present bedside. She currently denies shortness of breath or chest pain and hopes to be discharged from the hospital soon. Her lab work and imaging is suggestive of acute on chronic heart failure. Her BNP is significantly above baseline. She continues to have bibasilar crackles. She reports compliance with her medications although is not particularly familiar with the names or doses. She did not keep her follow-up appointment after her hospitalization in 10/2020. She says she was unaware of having an appointment scheduled. Past Medical History:   has a past medical history of Chronic kidney disease (CKD), stage IV (severe) ([address] Coronary artery disease involving native coronary artery of native heart without angina pectoris, GERD (gastroesophageal reflux disease), Gout, chronic, Histoplasmosis, Hyperlipidemia, Hypertension, Irregular heart beat, Osteoarthritis, Osteopenia of hip, Pneumonia, and Unspecified cerebral artery occlusion with cerebral infarction. Surgical History:   has a past surgical history that includes Hysterectomy; Cholecystectomy (Orthopedic surgery Left arm); Tubal ligation; and bladder suspension. Social History:   reports that she is a non-smoker but has been exposed to tobacco smoke. She has never used smokeless tobacco. She reports that she does not drink alcohol or use drugs.      Family History:  family history includes Cancer in her brother, father, maternal uncle, and sister; Diabetes in her mother; Heart Disease in her father and mother; High Blood Pressure in her father and mother. Home Medications:  Were reviewed and are listed in nursing record and/or below  Prior to Admission medications    Medication Sig Start Date End Date Taking? Authorizing Provider   allopurinol (ZYLOPRIM) 100 MG tablet Take 0.5 tablets by mouth daily 10/24/20  Yes Tate Goyal MD   Multiple Vitamins-Minerals (THERAPEUTIC MULTIVITAMIN-MINERALS) tablet Take 1 tablet by mouth daily   Yes Historical Provider, MD   donepezil (ARICEPT) 5 MG tablet Take 5 mg by mouth nightly   Yes Historical Provider, MD   furosemide (LASIX) 20 MG tablet Take 20 mg by mouth daily   Yes Historical Provider, MD   lisinopril (PRINIVIL;ZESTRIL) 2.5 MG tablet Take 2.5 mg by mouth daily   Yes Historical Provider, MD   loratadine (CLARITIN) 10 MG capsule Take 10 mg by mouth daily as needed    Yes Historical Provider, MD   vitamin D3 (CHOLECALCIFEROL) 10 MCG (400 UNIT) TABS tablet Take 400 Units by mouth daily   Yes Historical Provider, MD   FLUoxetine (PROZAC) 20 MG capsule TAKE 1 CAPSULE BY MOUTH ONE TIME A DAY  1/29/19  Yes Neris To MD   atorvastatin (LIPITOR) 20 MG tablet TAKE 1 TABLET BY MOUTH ONE TIME A DAY  1/8/18  Yes Neris To MD   aspirin 81 MG chewable tablet Take 1 tablet by mouth daily 6/27/17  Yes Jelena Dooley MD   Handicap Placard MISC by Does not apply route Patient is unable to walk more than 250 feet before stopping to rest.    Not to exceed 5 years.   DX: CHF 8/17/16  Yes Neris To MD   carvedilol (COREG) 12.5 MG tablet Take 1 tablet by mouth 2 times daily (with meals)  Patient not taking: Reported on 10/27/2020 10/23/20   Tate Goyal MD   pantoprazole (PROTONIX) 40 MG tablet Take 1 tablet by mouth daily 4/8/19 4/7/20  Neris To MD        CURRENT Medications:  ipratropium-albuterol (Phylliss Hals) tremor. · Hematologic/Lymphatic: No abnormal bruising or bleeding, blood clots or swollen lymph nodes. · Allergic/Immunologic: No nasal congestion or hives. Objective:   PHYSICAL EXAM:    Vitals:    11/29/20 0831   BP: 167/73   Pulse: 84   Resp: 16   Temp: 98.5   SpO2: 96%    Weight: 146 lb 2.6 oz (66.3 kg)       General Appearance:  Alert, cooperative, no distress, elderly. Head:  Normocephalic, without obvious abnormality, atraumatic. Eyes:  Pupils equal and round. No scleral icterus. Mouth: Moist mucosa, no pharyngeal erythema. Nose: Nares normal. No drainage or sinus tenderness. Neck: Supple, symmetrical, trachea midline. No adenopathy. No tenderness/mass/nodules. No carotid bruit or elevated JVD. Lungs:   Respirations unlabored. Bibasilar crackles. Chest Wall:  No tenderness or deformity. Heart:  Regular rate. S1/S2 normal. No murmur, rub, or gallop. Abdomen:   Soft, non-tender, bowel sounds active. Musculoskeletal: No muscle wasting or digital clubbing. Extremities: Extremities normal, atraumatic. No cyanosis or edema. Pulses: 2+ radial and carotid pulses, symmetric. Skin: No rashes or lesions. Pysch: Normal mood and affect. Alert and oriented x 3. Seems confused to situation.    Neurologic: Normal gross motor and sensory exam.       Labs     CBC:   Lab Results   Component Value Date    WBC 9.4 11/29/2020    RBC 3.91 11/29/2020    HGB 12.0 11/29/2020    HCT 35.0 11/29/2020    MCV 89.7 11/29/2020    RDW 14.4 11/29/2020     11/29/2020     CMP:  Lab Results   Component Value Date     11/29/2020    K 4.2 11/29/2020     11/29/2020    CO2 22 11/29/2020    BUN 24 11/29/2020    CREATININE 1.2 11/29/2020    GFRAA 52 11/29/2020    GFRAA 43 02/05/2013    AGRATIO 1.3 11/28/2020    LABGLOM 43 11/29/2020    GLUCOSE 123 11/29/2020    PROT 7.9 11/28/2020    PROT 6.8 11/03/2011    CALCIUM 9.1 11/29/2020    BILITOT 0.3 11/28/2020    ALKPHOS 74 11/28/2020    AST 18 11/28/2020 ALT <5 2020     PT/INR:  No results found for: PTINR  HgBA1c:No results found for: LABA1C  Lab Results   Component Value Date    CKTOTAL 36 2014    CKMB <0.2 (L) 2011    TROPONINI <0.01 2020       Cardiac Data     EK20 Baseline artifact. Probable sinus tachycardia with fusion complexes. Left axis. LBBB. Echo: 10/2020  Normal left ventricular size and wall thickness. Global left ventricular systolic function is mildly decreased with ejection fraction estimated at 45%. Evidence of grade 2 diastolic dysfunction appreciated. The left atrium is mildly dilated. Moderate mitral regurgitation is present. There is moderate fibrocalcific sclerosis of the aortic valve with mean gradient across the aortic valve of 16 mmHg suggesting probable mild aortic stenosis. CTPA: Pulmonary edea. Coronary artery calcifications. Assessment and Plan   1) Acute on chronic diastolic heart failure. EF 45%. NYHA IV on admission. Continue IV Lasix. With dementia, exact etiology for exacerbation unknown but blood pressure poorly controlled. Continue carvedilol. Will uptitrate lisinopril as tolerated. No Aldactone with CKD. No indication for ICD with EF > 35%. 2) Essential HTN. Uncontrolled. Goal BP <130/80. Continue carvedilol. Titrate lisinopril as tolerated. 3) CKD stage III. Continue to monitor renal function with diuresis and titration of medications. 4) Dementia. Will defer management to primary team.    Overall, the problems requiring hospitalization are high in severity. Thank you for allowing us to participate in the care of Katherine Barrett. Denise Live.  Mary Herrera, [address]  2020 9:47 AM

## 2020-11-29 NOTE — PROGRESS NOTES
Physical Therapy    Facility/Department: [address] ORTHOPEDICS  Initial Assessment    NAME: Evelyn Quezada  : 1935  MRN: [id number]    Date of Service: 2020    Discharge Recommendations:  24 hour supervision or assist, Patient would benefit from continued therapy after discharge, S Level 1, Home with Home health PT     Evelyn Quezada scored a 20/24 on the AM-PAC short mobility form. Current research shows that an AM-PAC score of 18 or greater is typically associated with a discharge to the patient's home setting. Based on the patient's AM-PAC score and their current functional mobility deficits, it is recommended that the patient have 2-3 sessions per week of Physical Therapy at d/c to increase the patient's independence. At this time, this patient demonstrates the endurance and safety to discharge home with HHPT and a follow up treatment frequency of 2-3x/wk. Please see assessment section for further patient specific details. If patient discharges prior to next session this note will serve as a discharge summary. Please see below for the latest assessment towards goals. HOME HEALTH CARE: LEVEL 1 STANDARD     -Initial home health evaluation to occur within 24-48 hours, in patient home    -Home health agency to establish plan of care for patient over 60 day period    -Medication Reconciliation    -PCP Visit scheduled within seven days of discharge    -PT/OT to evaluate with goal of regaining prior level of functioning    -OT to evaluate if patient has [place] needs for personal care         Assessment   Body structures, Functions, Activity limitations: Decreased functional mobility   Assessment: 85-year female with past medical history of combined diastolic and systolic heart failure, hypertension presented to hospital on 20 with acute onset of shortness of breath.  Patient was complaining of some chest tightness and shortness of breath to the point that she could hardly speak any words. Pt with recent ECHO that showed EF 40-45%. Pt currently functioning very near her baseline. She was A&O x 4, but unable to recall bathroom setup - therefore difficult to trust if she truly does not use any AD to ambulate - she was reaching for objects to steady self during gait trial this date. Anticipate she will be able to return home with 24hr supv. She may benefit from a HHPT evaluation for home safety. Treatment Diagnosis: impaired dynamic stability  Prognosis: Good  Decision Making: Medium Complexity  History: see below  Exam: see below  Clinical Presentation: evolving  PT Education: PT Role;Plan of Care;General Safety  REQUIRES PT FOLLOW UP: Yes  Activity Tolerance  Activity Tolerance: Patient Tolerated treatment well       Patient Diagnosis(es): The primary encounter diagnosis was Acute congestive heart failure, unspecified heart failure type ([address] A diagnosis of Acute respiratory failure, unspecified whether with hypoxia or hypercapnia (HCC) was also pertinent to this visit. has a past medical history of Chronic kidney disease (CKD), stage IV (severe) ([address] Coronary artery disease involving native coronary artery of native heart without angina pectoris, GERD (gastroesophageal reflux disease), Gout, chronic, Histoplasmosis, Hyperlipidemia, Hypertension, Irregular heart beat, Osteoarthritis, Osteopenia of hip, Pneumonia, and Unspecified cerebral artery occlusion with cerebral infarction. has a past surgical history that includes Hysterectomy; Cholecystectomy (Orthopedic surgery Left arm); Tubal ligation; and bladder suspension.     Vision/Hearing  Vision: Impaired  Vision Exceptions: Wears glasses at all times  Hearing: Exceptions to [facility]  Hearing Exceptions: Bilateral hearing aid       Subjective  General  Chart Reviewed: Yes  Patient assessed for rehabilitation services?: Yes  Additional Pertinent Hx: 85-year female with past medical history of combined diastolic and systolic heart failure, deficits    Objective  Strength RLE  Strength RLE: WFL  Strength LLE  Strength LLE: WFL  Motor Control  Gross Motor?: WFL     Bed mobility  Supine to Sit: Modified independent  Sit to Supine: Unable to assess(in recliner at end of session)  Transfers  Sit to Stand: Stand by assistance  Stand to sit: Stand by assistance  Comment: Pt with initial unsteadiness - reaches for objects to steady self. Ambulation  Ambulation?: Yes  Ambulation 1  Surface: level tile  Device: No Device  Assistance: Contact guard assistance  Quality of Gait: mildly unsteady but self controlled - reaches for objects to steady self throughout. Gait Deviations: Slow Tisha;Decreased step length;Decreased step height  Distance: 10' bed to toilet then 30' toilet to recliner  Stairs/Curb  Stairs?: No     Balance  Posture: Good  Sitting - Static: Good  Sitting - Dynamic: Good  Standing - Static: Fair;+  Standing - Dynamic: Fair;+        Plan   Plan  Times per week: 3-5x/week  Current Treatment Recommendations: Functional Mobility Training  Safety Devices  Type of devices:  All fall risk precautions in place, Call light within reach, Gait belt, Patient at risk for falls, Left in chair, Chair alarm in place, Nurse notified      AM-PAC Score  AM-PAC Inpatient Mobility Raw Score : 20 (11/29/20 0816)  AM-PAC Inpatient T-Scale Score : 47.67 (11/29/20 0816)  Mobility Inpatient CMS 0-100% Score: 35.83 (11/29/20 0816)  Mobility Inpatient CMS G-Code Modifier : Mo Speaks (11/29/20 8646)          Goals  Short term goals  Time Frame for Short term goals: by acute discharge  Short term goal 1: sit<>stand independently  Short term goal 2: ambulate >50' with supv  Short term goal 3: ascend/descend 3 steps with rail and CGA  Patient Goals   Patient goals : none stated       Therapy Time   Individual Concurrent Group Co-treatment   Time In 0715         Time Out 0800         Minutes 45         Timed Code Treatment Minutes: 30 Minutes       Roberth Rincon, PT

## 2020-11-30 VITALS
DIASTOLIC BLOOD PRESSURE: 78 MMHG | HEART RATE: 63 BPM | RESPIRATION RATE: 16 BRPM | SYSTOLIC BLOOD PRESSURE: 150 MMHG | BODY MASS INDEX: 28.65 KG/M2 | WEIGHT: 145.94 LBS | HEIGHT: 60 IN | TEMPERATURE: 97.5 F | OXYGEN SATURATION: 96 %

## 2020-11-30 LAB — URINE CULTURE, ROUTINE: NORMAL

## 2020-11-30 PROCEDURE — 6370000000 HC RX 637 (ALT 250 FOR IP): Performed by: INTERNAL MEDICINE

## 2020-11-30 PROCEDURE — 94640 AIRWAY INHALATION TREATMENT: CPT

## 2020-11-30 PROCEDURE — 97530 THERAPEUTIC ACTIVITIES: CPT

## 2020-11-30 PROCEDURE — 6360000002 HC RX W HCPCS: Performed by: INTERNAL MEDICINE

## 2020-11-30 PROCEDURE — 51798 US URINE CAPACITY MEASURE: CPT

## 2020-11-30 PROCEDURE — 51701 INSERT BLADDER CATHETER: CPT

## 2020-11-30 PROCEDURE — 94760 N-INVAS EAR/PLS OXIMETRY 1: CPT

## 2020-11-30 PROCEDURE — 94761 N-INVAS EAR/PLS OXIMETRY MLT: CPT

## 2020-11-30 PROCEDURE — 2580000003 HC RX 258: Performed by: INTERNAL MEDICINE

## 2020-11-30 PROCEDURE — 97166 OT EVAL MOD COMPLEX 45 MIN: CPT

## 2020-11-30 PROCEDURE — 97535 SELF CARE MNGMENT TRAINING: CPT

## 2020-11-30 PROCEDURE — 6370000000 HC RX 637 (ALT 250 FOR IP): Performed by: NURSE PRACTITIONER

## 2020-11-30 PROCEDURE — 97116 GAIT TRAINING THERAPY: CPT

## 2020-11-30 PROCEDURE — 6370000000 HC RX 637 (ALT 250 FOR IP): Performed by: EMERGENCY MEDICINE

## 2020-11-30 PROCEDURE — 99232 SBSQ HOSP IP/OBS MODERATE 35: CPT | Performed by: NURSE PRACTITIONER

## 2020-11-30 RX ORDER — FUROSEMIDE 20 MG/1
20 TABLET ORAL DAILY
Status: DISCONTINUED | OUTPATIENT
Start: 2020-11-30 | End: 2020-11-30 | Stop reason: HOSPADM

## 2020-11-30 RX ADMIN — CARVEDILOL 12.5 MG: 12.5 TABLET, FILM COATED ORAL at 17:23

## 2020-11-30 RX ADMIN — IPRATROPIUM BROMIDE AND ALBUTEROL SULFATE 1 AMPULE: .5; 3 SOLUTION RESPIRATORY (INHALATION) at 16:44

## 2020-11-30 RX ADMIN — ASPIRIN 81 MG: 81 TABLET, CHEWABLE ORAL at 08:53

## 2020-11-30 RX ADMIN — FLUOXETINE 20 MG: 20 CAPSULE ORAL at 08:54

## 2020-11-30 RX ADMIN — LISINOPRIL 10 MG: 10 TABLET ORAL at 08:54

## 2020-11-30 RX ADMIN — SODIUM CHLORIDE, PRESERVATIVE FREE 10 ML: 5 INJECTION INTRAVENOUS at 09:00

## 2020-11-30 RX ADMIN — FUROSEMIDE 20 MG: 10 INJECTION, SOLUTION INTRAMUSCULAR; INTRAVENOUS at 08:54

## 2020-11-30 RX ADMIN — CETIRIZINE HYDROCHLORIDE 10 MG: 10 TABLET, FILM COATED ORAL at 08:53

## 2020-11-30 RX ADMIN — ALLOPURINOL 50 MG: 100 TABLET ORAL at 08:54

## 2020-11-30 RX ADMIN — CHOLECALCIFEROL (VITAMIN D3) 10 MCG (400 UNIT) TABLET 400 UNITS: at 08:54

## 2020-11-30 RX ADMIN — FUROSEMIDE 20 MG: 20 TABLET ORAL at 17:23

## 2020-11-30 RX ADMIN — CARVEDILOL 12.5 MG: 12.5 TABLET, FILM COATED ORAL at 08:54

## 2020-11-30 RX ADMIN — ATORVASTATIN CALCIUM 20 MG: 20 TABLET, FILM COATED ORAL at 08:54

## 2020-11-30 RX ADMIN — IPRATROPIUM BROMIDE AND ALBUTEROL SULFATE 1 AMPULE: .5; 3 SOLUTION RESPIRATORY (INHALATION) at 12:16

## 2020-11-30 ASSESSMENT — PAIN SCALES - GENERAL: PAINLEVEL_OUTOF10: 0

## 2020-11-30 NOTE — PROGRESS NOTES
Occupational Therapy   Occupational Therapy Initial Assessment  Date: 2020   Patient Name: Salina Encarnacion  MRN: [id number]     : 1935    Date of Service: 2020    Discharge Recommendations:  Home with assist PRN  OT Equipment Recommendations  Equipment Needed: No    Salina Encarnacion scored a 21/24 on the AM-PAC ADL Inpatient form. At this time, no further OT is recommended upon discharge due to current level of functioning. Recommend patient returns to prior setting with prior services. Assessment   Assessment: 79 y/o female admitted  with Shortness of breath secondary to acute exacerbation of heart failure. PTA pt lives at home with multiple family members and reports being independent with ADLs and functional mobility. Today, pt completed ADLs in bathroom and functional mobility around room with supervision. Pt occasionally reached for objects around room to steady self and reports she does that at home. Pt reports feeling at baseline and declining further skilled therapy. Pt will be safe to return home at discharge and reports someone is always with her. Prognosis: Good  Decision Making: Low Complexity  OT Education: OT Role;Plan of Care  No Skilled OT: At baseline function; Safe to return home; No OT goals identified  REQUIRES OT FOLLOW UP: No  Activity Tolerance  Activity Tolerance: Patient Tolerated treatment well  Activity Tolerance: declined SOB with activity and reports feeling close to baseline  Safety Devices  Safety Devices in place: Yes  Type of devices: Left in chair;Chair alarm in place;Call light within reach;Gait belt           Patient Diagnosis(es): The primary encounter diagnosis was Acute congestive heart failure, unspecified heart failure type ([address] A diagnosis of Acute respiratory failure, unspecified whether with hypoxia or hypercapnia (HCC) was also pertinent to this visit.      has a past medical history of Chronic kidney disease (CKD), stage IV (severe) ([address] Coronary artery disease involving native coronary artery of native heart without angina pectoris, GERD (gastroesophageal reflux disease), Gout, chronic, Histoplasmosis, Hyperlipidemia, Hypertension, Irregular heart beat, Osteoarthritis, Osteopenia of hip, Pneumonia, and Unspecified cerebral artery occlusion with cerebral infarction. has a past surgical history that includes Hysterectomy; Cholecystectomy (Orthopedic surgery Left arm); Tubal ligation; and bladder suspension. Restrictions  Restrictions/Precautions  Restrictions/Precautions: Fall Risk    Subjective   General  Chart Reviewed: Yes  Patient assessed for rehabilitation services?: Yes  Additional Pertinent Hx: Per H&P: \"85-year female with past medical history of combined diastolic and systolic heart failure, hypertension presented to hospital with acute onset of shortness of breath. Patient was complaining of some chest tightness and shortness of breath to the point that she could hardly speak any words. No fevers no chills or recent exposure to anyone with COVID-19. Of note patient was admitted in October for heart exacerbation at that time echo revealed EF of 40-45% and grade 2 diastolic dysfunction. On arrival to the ER patient was noted to be visibly dyspneic, tachypneic, tachycardic. Lab work revealed lactic acid of 3.7, elevated proBNP of 9986. She had elevated complaint chest x-ray was performed which showed findings concerning for bilateral interstitial weakness concerning for pulmonary edema. \"  Family / Caregiver Present: No  Referring Practitioner: [facility], MD  Diagnosis: Shortness of breath secondary to acute exacerbation of heart failure  Subjective  Subjective: Pt seen bedside and agreeable to therapy.  Pt reports feeling close to baseline and declines SOB with activity  General Comment  Comments: per RN ok for therapy    Social/Functional History  Social/Functional History  Lives With: Spouse(dtr and grandchildren)  Type of Home: House  Home Layout: Multi-level, Able to Live on Main level with bedroom/bathroom  Home Access: Stairs to enter with rails  Entrance Stairs - Number of Steps: 3 DOUG  Bathroom Shower/Tub: Tub/Shower unit  Bathroom Toilet: Standard  Bathroom Equipment: Grab bars in shower, Shower chair  Home Equipment: Johnice Appling, 4 wheeled walker  ADL Assistance: Independent  Homemaking Assistance: Needs assistance(shares with housekeeping, does not cook)  Ambulation Assistance: Independent(cane in community, no AD in home but \"furniture walks\")  Transfer Assistance: Independent  Active : No  Leisure & Hobbies: watching tv, listening to the radio, [place]  Additional Comments: Pt states that her  is home 24 hrs a day. Her daughter works during the day. She has older grandchildren that are able to assist intermittently. Pt reports 1 fall recently       Objective   Vision: Impaired  Vision Exceptions: Wears glasses at all times  Hearing: Exceptions to WFL(hearing aids not at hospital)  Hearing Exceptions: Bilateral hearing aid    Orientation  Overall Orientation Status: Within Functional Limits     Balance  Sitting Balance: Supervision  Standing Balance: Supervision  Standing Balance  Time: stood 2-3 min for toileting and 2-3 min for grooming tasks- good balance  Functional Mobility  Functional Mobility Comments: bed > bathroom > chair and chair <> doorway 3x without AD and supervision. Pt tends to reach for end of bed and doorframe when ambulating to steady self- pt reports she does that at home    Toilet Transfers  Toilet - Technique: Ambulating  Equipment Used: Standard toilet  Toilet Transfer: Supervision  Toilet Transfers Comments: grab bar support    ADL  Grooming: Supervision(standing at sink side to brush teeth, wash face, comb hair)  LE Dressing: Supervision  Toileting: Supervision(pt squats over toilet to void)  Additional Comments: Anticipate pt will be supervision for ADLs.         Bed mobility  Supine to Sit: Independent  Sit to Supine: (pt in chair at end of session)  Transfers  Sit to stand: Supervision  Stand to sit: Supervision     Cognition  Overall Cognitive Status: WFL  Perception  Overall Perceptual Status: WFL     Sensation  Overall Sensation Status: WFL        LUE AROM (degrees)  LUE AROM : WFL  Left Hand AROM (degrees)  Left Hand AROM: WFL  RUE AROM (degrees)  RUE AROM : WFL  Right Hand AROM (degrees)  Right Hand AROM: WFL     Plan   Plan  Times per week: DC acute OT    AM-PAC Score     AM-[facility] Inpatient Daily Activity Raw Score: 21 (11/30/20 1140)  AM-PAC Inpatient ADL T-Scale Score : 44.27 (11/30/20 1140)  ADL Inpatient CMS 0-100% Score: 32.79 (11/30/20 1140)  ADL Inpatient CMS G-Code Modifier : CJ (11/30/20 1140)    Goals  Short term goals  Time Frame for Short term goals: no acute OT goals identified. Patient Goals   Patient goals : to return home       Therapy Time   Individual Concurrent Group Co-treatment   Time In 0915         Time Out 0955         Minutes 40         Timed Code Treatment Minutes: 25 Minutes     This note to serve as OT d/c summary if pt is d/c-ed prior to next therapy session.     Carmine Ambrosio OTALY/L

## 2020-11-30 NOTE — DISCHARGE INSTR - DIET
Goal Sodium Intake: less than 2000 mg (unless your physician tells you a different number)   Goal Fluid Intake: less than 64 oz (unless your physician tells you a different number)    This nutrition therapy will help you feel better and support your heart. This plan focuses on:   Limiting sodium in your diet. Salt (sodium) makes your body hold water. When your body holds too much water, you can feel shortness of breath and swelling. You can prevent these symptoms by eating less salt.  Limiting fluid in your diet. For some patients, drinking too much fluid can make heart failure worse. It can cause symptoms such as shortness of breath and swelling. Limiting fluids can help relieve some of your symptoms. You can achieve these goals by:   Reading food labels to keep track of how much sodium is in the foods you eat.  Limiting foods that are high in sodium.  Checking your weight to make sure youre not retaining too much fluid. The nutrition plan for heart failure usually limits the sodium you get from food and drinks to 2,000 milligrams per day. Salt is the main source of sodium. Read the nutrition label to find out how much sodium is in 1 serving of a food.  Select foods with 140 milligrams of sodium or less per serving.  Foods with more than 300 milligrams of sodium per serving may not fit into a reduced-sodium meal plan.  Check serving sizes. If you eat more than 1 serving, you will get more sodium than the amount listed. Cutting Back on Sodium   Avoid processed foods. Eat more fresh foods. o Fresh and frozen fruits and vegetables without added juices or sauces are naturally low in sodium. o Fresh meats are lower in sodium than processed meats, such as nascimento, sausage, and hot dogs. Read the nutrition label or ask your  to help you find a fresh meat that is low in sodium.  Eat less salt, at the table and when cooking.    o Just 1 teaspoon of table salt has 2,300 milligrams of sodium. o Leave the salt out of recipes for pasta, casseroles, and soups.  Be a smart . o Look for food packages that say salt-free or sodium-free.  These items contain less than 5 milligrams of sodium per serving. o Very-low-sodium products contain less than 35 milligrams of sodium per serving. o Low-sodium products contain less than 140 milligrams of sodium per serving. o Unsalted or no added salt products may still be high in sodium. Check the nutrition label.  Add flavors to your food without adding sodium. o Try lemon juice, lime juice, fruit juice, or vinegar. o Dry or fresh herbs add flavor. Try basil, bay leaf, dill, rosemary, parsley, mirela, dry mustard, nutmeg, thyme, and paprika.  o Pepper, red pepper flakes, and cayenne pepper can add spice to your meals without adding sodium. Hot sauce contains sodium, but if you use just a drop or two, it will not add up to much.  o Buy a sodium-free seasoning blend or make your own at home.  Use caution when you eat outside your home.   o Restaurant foods can be very high in sodium. o Ask for nutrition information. Many restaurants provide nutrition facts on their menus or websites. o Let your  know that you want your food to be cooked without salt. Ask for your salad dressing and sauces to come on the side.     Fluid Restriction  Your doctor may ask you to follow a fluid restriction in addition to taking diuretics (water pills). Ask your doctor how much fluid you can have. Foods that are liquid at room temperature are considered a fluid, such as popsicles, soup, ice cream, and Jell-O.      Foods Not Recommended   Breads or crackers topped with salt   Prepackaged bread crumbs    Self-rising flours    Canned vegetables (unless they are salt free or low sodium)   Sauerkraut and pickled vegetables    Canned or dried soups (unless they are salt free or low sodium)   [place] fries, onion rings, and other fried foods  Processed cheeses    Cottage cheese    Cured meats: nascimento, ham, sausage, pepperoni, and hot dogs    Canned meats   Smoked fish and meats    Frozen meals (that have more than 600 mg sodium)   Salter butter or margarine    Any type of salt seasoning (sea salt, kosher salt, onion salt, garlic salt, seasoning blends made with salt)   Ketchup, BBQ sauce, soy sauce, Worcestershire sauce   Salsa, pickles, olives, relish   Salad dressings: ranch, blue cheese, [facility], [facility] Dietitian Office: [phone]

## 2020-11-30 NOTE — PROGRESS NOTES
Brandon   Daily Progress Note      Admit Date:  11/28/2020    CC: SOB    HPI:   Candido Hernandez is a 80 y.o. female with PMH CAD, HF, histoplasmosis, HLP, dementia, SULY/CKD, HTN. Admitted [facility] with acute on chronic HF. Presented with SOB, chest tightness, pulm edema, elevated BNP. Denies SOB, CP, orthopnea, palpitations. She does not remember coming in with SOB. She does admit to having some issues with voiding overnight. RN reports straight cath X1 yesterday but has been voiding OK today. Rn voices concern for patients compliance at home with medications. Review of Systems:   General: Denies fever, chills, fatigue, weakness  Skin: Denies skin changes, rash, itching, lesions.   HEENT: Denies headache, dizziness, vision changes, nosebleeds, sore throat, nasal drainage  RESP: Denies cough, sputum, dyspnea, wheeze, snoring  CARD: Denies palpitations,  murmur  GI:Denies nausea, vomiting, heartburn, loss of appetite, change in bowels  : Denies frequency, pain, incontinence, polyuria  VASC: Denies claudication, leg cramps, clots  MUSC/SKEL: Denies pain, stiffness, arthritis  PSYCH: Denies anxiety, depression, stress  NEURO: Denies numbness, tingling, weakness,change in mood or memory  HEME: Denies abn bruising, bleeding, anemia  ENDO: Denies intolerance to heat, cold, excessive thirst or hunger, hx thyroid disease    Objective:   BP (!) 163/75   Pulse 71   Temp 97.8 °F (36.6 °C) (Oral)   Resp 14   Ht 5' (1.524 m)   Wt 145 lb 15.1 oz (66.2 kg)   SpO2 95%   BMI 28.50 kg/m²           Intake/Output Summary (Last 24 hours) at 11/30/2020 0823  Last data filed at 11/30/2020 0438  Gross per 24 hour   Intake 480 ml   Output 1800 ml   Net -1320 ml     I/O since adm: Neg 1.9    WEIGHT:Admit Weight: 150 lb (68 kg)         Today  Weight: 145 lb 15.1 oz (66.2 kg)   DRY WEIGHT:  Wt Readings from Last 3 Encounters:   11/30/20 145 lb 15.1 oz (66.2 kg)   10/26/20 148 lb 4.8 oz (67.3 kg) 10/30/18 156 lb (70.8 kg)       Physical Exam:  GEN: Appears well, no acute distress  SKIN: Pink, warm, dry. Nails without clubbing. HEENT: PERRLA. Normocephalic, atraumatic. Neck supple. No adenopathy. LUNG: AP diameter normal. Clear bilateral. No wheeze, rales, or ronchi. Respiratory effort normal.  HEART: S1S2 A/R. No JVD. No carotid bruit. No murmur, rub or gallop. ABD: Soft, nontender. +BS X 4 quads. No hepatomegaly. EXT: Radial and pedal pulses 2+ and symmetric. Without varicosities. No edema. MUSCSKEL: Good ROM X4 extremities. No deformity. NEURO: A/forgetful. Calm and cooperative. Medications:    lisinopril  10 mg Oral Daily    ipratropium-albuterol  1 ampule Inhalation Q4H WA    aspirin  81 mg Oral Daily    atorvastatin  20 mg Oral Daily    carvedilol  12.5 mg Oral BID WC    allopurinol  50 mg Oral Daily    donepezil  5 mg Oral Nightly    FLUoxetine  20 mg Oral Daily    cetirizine  10 mg Oral Daily    vitamin D3  400 Units Oral Daily    sodium chloride flush  10 mL Intravenous 2 times per day    enoxaparin  30 mg Subcutaneous Nightly    furosemide  20 mg Intravenous BID       melatonin, sodium chloride flush, acetaminophen **OR** acetaminophen, polyethylene glycol, promethazine **OR** ondansetron, hydrALAZINE    Lab Data: I have reviewed all labs below today.    CBC:   Recent Labs     11/28/20  1140 11/29/20  0443   WBC 18.5* 9.4   HGB 12.6 12.0   HCT 38.4 35.0*   MCV 94.0 89.7    231     BMP:   Recent Labs     11/28/20  1140 11/29/20  0443    137   K 4.6 4.2    101   CO2 20* 22   BUN 25* 24*   CREATININE 1.2 1.2     GLUCOSE:   Recent Labs     11/28/20  1140 11/29/20  0443   GLUCOSE 150* 123*     LIVER PROFILE:   Lab Results   Component Value Date    AST 18 11/28/2020    ALT <5 11/28/2020    LIPASE 41.0 10/23/2018    LABALBU 4.5 11/28/2020    BILIDIR 0.1 05/19/2014    BILITOT 0.3 11/28/2020    ALKPHOS 74 11/28/2020     PT/INR:   Lab Results   Component Value Date    PROTIME 11.3 11/28/2020    INR 0.97 11/28/2020     APTT:   Lab Results   Component Value Date    APTT 39.4 06/12/2011     Pro-BNP:    Lab Results   Component Value Date    PROBNP 9,986 11/28/2020    PROBNP 1,817 10/23/2020    PROBNP 8,277 10/21/2020     Parameters:   > 450 pg/mL under age 48  > 900 pg/mL ages 54-65  > 1800 pg/mL over age 76    ENZYMES:  Lab Results   Component Value Date    CKTOTAL 36 03/07/2014    CKMB <0.2 11/03/2011    TROPONINI <0.01 11/28/2020     FASTING LIPID PANEL:  Lab Results   Component Value Date    CHOL 191 04/24/2018    HDL 47 04/24/2018    HDL 38 11/03/2011    LDLCALC 85 04/24/2018    TRIG 295 04/24/2018       Diagnostics:    EKG:   Echo 5/20/14:  Normal left ventricle size, wall thickness and systolic function with an    estimated ejection fraction of 55%. No regional wall motion abnormalities    are seen.   Yasmeen Anais is reversal of E/A inflow velocities across the mitral valve    suggesting impaired left ventricular relaxation.    Mitral annular calcification is present.    Aortic valve appears sclerotic but opens adequately.     Echo: 10/2020  Normal left ventricular size and wall thickness. Global left ventricular systolic function is mildly decreased with ejection fraction estimated at 45%. Evidence of grade 2 diastolic dysfunction appreciated. The left atrium is mildly dilated. Moderate mitral regurgitation is present.   There is moderate fibrocalcific sclerosis of the aortic valve with mean gradient across the aortic valve of 16 mmHg suggesting probable mild aortic stenosis.     CTPA 10/19/20:  Negative for acute pulmonary embolism.         Features of volume overload/heart failure, with severe interstitial and    alveolar pulmonary edema with small bilateral pleural effusions.         Multivessel coronary calcifications, mild-to-moderate along the LAD territory.         Shotty mediastinal lymph nodes are presumably reactive.       CXR 10/19/20:  Impression    Vascular congestion and interstitial opacities, suggestive of pulmonary    edema.  Atypical pneumonia is a differential, less likely possibility. Assessment/Plan:  1.) Acute on chronic diastolic heart failure: Resolved. Change IV lasix to lasix po. Concern for medication compliance D/T dementia. Social work will see today. NYHA Class II   Stage B  Diuretic: change lasix po  Beta Blocker: coreg  SGLT2 Inhibitor: outpatient  2gm Na diet, daily weight, 64 oz fluid restriction  Avoid NSAIDS and other nephrotoxic meds  Cardiac Rehab: Not indicated for EF>35%  ICD: Not indicated for EF>35%  [facility] Referral: NO- dementia, disoriented   HF RN referral for education     2.) Hypertension: Goal not met. Restarted lisinopril today. Expect improve BP with meds. Goal BP <130/80. Non pharmacologic interventions include:   -weight loss  -heart healthy low sodium and low fat diet that consist of mostly fruits, vegetables and grains (Dash diet)  -limited amount of alcohol (no more than 1 drink/day for women, 2 drinks/day for men)  -regular physical activity  -no smoking  -stress reduction    Patient is stable and cardiology will sign off. Will schedule 7 day HF follow up with cardiology.      Electronically signed by FERNANDO Panchal CNP on 11/30/2020 at 8:23 AM

## 2020-11-30 NOTE — DISCHARGE INSTR - COC
Continuity of Care Form    Patient Name: Faisal Ferguson   :  1935  MRN:  [id number]    Admit date:  2020  Discharge date:  20    Code Status Order: Full Code   Advance Directives:   [facility] Documentation       Date/Time Healthcare Directive Type of Healthcare Directive Copy in [facility] [address] Agent's Name Healthcare Agent's Phone Number    20 1646  No, patient does not have an advance directive for healthcare treatment -- -- -- -- --            Admitting Physician:  Yamilet House MD  PCP: Tiffanie Quene APRN - NP    Discharging Nurse:  Melida Santoro RN on 2020 at 5:38 PM    [address] Unit/Room#: K8S-[id number]/3110-01  Discharging Unit Phone Number: [phone]    Emergency Contact:   Extended Emergency Contact Information  Primary Emergency Contact: [place]  Address: [address] Phone: [phone]  Relation: Spouse  Secondary Emergency Contact: Karissa Martinez  Address: [address] Phone: [phone]  Relation: Child    Past Surgical History:  Past Surgical History:   Procedure Laterality Date    BLADDER SUSPENSION      CHOLECYSTECTOMY  Orthopedic surgery Left arm    HYSTERECTOMY      TUBAL LIGATION         Immunization History:   Immunization History   Administered Date(s) Administered    Influenza, Quadv, Recombinant, IM PF (Flublok 18 yrs and older) 10/30/2018    Pneumococcal Conjugate 13-valent (Glennda Whitfield) 2016    Pneumococcal Polysaccharide ([id number]) 2017       Active Problems:  Patient Active Problem List   Diagnosis Code    HTN (hypertension), benign I10    Elbow pain M25.529    Knee pain, bilateral M25.561, M25.562    Primary localized osteoarthrosis, lower leg M17.10    Rotator cuff (capsule) sprain B49.106V    Coronary artery disease involving native coronary artery of native heart without angina pectoris I25.10    Idiopathic chronic gout of right foot M1A.0710    Chronic kidney disease (CKD), stage IV (severe) (HCC) N18.4    Moderate single current episode of major depressive disorder ([facility] [address] F32.1    TIA (transient ischemic attack) G45.9    GERD (gastroesophageal reflux disease) K21.9    Osteoarthritis M19.90    Osteopenia of hip M85.859    Acute on chronic heart failure (HCC) I50.9    Acute on chronic congestive heart failure (HCC) I50.9    Essential hypertension I10    Stage 3b chronic kidney disease N18.32       Isolation/Infection:   Isolation            No Isolation          Patient Infection Status       Infection Onset Added Last Indicated Last Indicated By Review Planned Expiration Resolved Resolved By    None active    Resolved    COVID-19 Rule Out 11/28/20 11/28/20 11/28/20 COVID-19 (Ordered)   11/28/20 Rule-Out Test Resulted    C-diff Rule Out 10/20/20 10/20/20 10/20/20 Clostridium difficile toxin/antigen (Ordered)   10/20/20 Rule-Out Test Resulted    COVID-19 Rule Out 10/19/20 10/20/20 10/19/20 COVID-19 (Ordered)   10/20/20 Rule-Out Test Resulted    COVID-19 Rule Out 10/19/20 10/19/20 10/19/20 COVID-19 (Ordered)   10/19/20 Rule-Out Test Resulted            Nurse Assessment:  Last Vital Signs: BP (!) 150/78   Pulse 63   Temp 97.5 °F (36.4 °C) (Oral)   Resp 16   Ht 5' (1.524 m)   Wt 145 lb 15.1 oz (66.2 kg)   SpO2 96%   BMI 28.50 kg/m²     Last documented pain score (0-10 scale): Pain Level: 0  Last Weight:   Wt Readings from Last 1 Encounters:   11/30/20 145 lb 15.1 oz (66.2 kg)     Mental Status:  oriented, alert and recent dx of alzheimer's    IV Access:  - None    Nursing Mobility/ADLs:  Walking   Independent  Transfer  Independent  Bathing  Independent  Dressing  Independent  Toileting  Independent  Feeding  Independent  Med [address]  Independent  Med Delivery   whole    Wound Care Documentation and Therapy:        Elimination:  Continence: · Bowel: Yes  · Bladder: Yes  Urinary Catheter: None   Colostomy/Ileostomy/Ileal Conduit: No       Date of Last BM: 11/30/20      Intake/Output Summary (Last 24 hours) at 11/30/2020 1711  Last data filed at 11/30/2020 1651  Gross per 24 hour   Intake 1080 ml   Output 1775 ml   Net -695 ml     I/O last 3 completed shifts: In: 1080 [P.O.:1080]  Out: 1700 [Urine:1700]    Safety Concerns: At Risk for Falls    Impairments/Disabilities:      Hearing    Nutrition Therapy:  Current Nutrition Therapy:   - Oral Diet:  General    Routes of Feeding: Oral  Liquids: Thin Liquids  Daily Fluid Restriction: no  Last Modified Barium Swallow with Video (Video Swallowing Test): not done    Treatments at the Time of Hospital Discharge:   Respiratory Treatments: N/A  Oxygen Therapy:  is not on home oxygen therapy.   Ventilator:    - No ventilator support    Rehab Therapies: Nurse, Physical Therapy  Weight Bearing Status/Restrictions: No weight bearing restirctions  Other Medical Equipment (for information only, NOT a DME order):  cane and walker  Other Treatments: [address]: LEVEL 3 [address] to establish plan of care for patient over 60 day period   Nursing  Initial home SN evaluation visit to occur within 24-48 hours for:  1)  medication management  2)  VS and clinical assessment  3)  S&S chronic disease exacerbation education + when to contact MD/NP  4)  care coordination  Medication Reconciliation during 1st SN visit  PT/OT/Speech   Evaluations in home within 24-48 hours of discharge to include DME and home safety   Frontload therapy 5 days, then 3x a week   OT to evaluate if patient has [address] needs for personal care    evaluation within 24-48 hours to evaluate resources & insurance for potential AL, [place], LTC, and Medicaid options   Palliative Care referral within 5 days of hospital discharge   PCP Visit scheduled within 3 - 7 days of hospital discharge [address] [facility] care Vitals (If patient is agreeable and meets guidelines)      Heart Failure Instructions for Daily Management  Patient was treated for acute on chronic diastolic and systolic (EF 27-53%) heart failure. she  will require the following:     Please weigh daily on the same scale and approximately the same time of day. Report weight gain of 3 pounds/day or 5 pounds/week to : [address] [phone].  Please use hospital discharge weight as baseline reference.  Please monitor for signs and symptoms of and report to MD:  o Worsening Heart Failure: sudden weight gain, shortness of breath, lower extremity or general edema/swelling, abdominal bloating/swelling, inability to lie flat, intolerance to usual activity, or cough (especially at night). Report these finding even if no increase in weight.  o Dehydration:  having difficulty or a decrease in urination, dizziness, worsening fatigue, or new onset/worsening of generalized weakness.  Please continue a LOW SODIUM diet and LIMIT fluid intake to 48 - 64 ounces ( 1.5 - 2 liters) per day. Stacy Pérez Call [address] [phone] and/or Sushma Yi @ [phone] with any questions or concerns.  Please continue heart failure education to patient and family/support system.  See After Visit Summary for hospital follow up appointment details.  Consider spiritual care referral for support and/or completion of advance directives [phone].  Consider: [place] [address] telehealth program if patient agreeable and able to participate, palliative care consult for ongoing goals of care, end of life, and/or chronic disease management discussions and referral to [facility] [phone]) once SNF/HHC complete.       Patient's personal belongings (please select all that are sent with patient):  None    RN SIGNATURE:  Electronically signed by Cayetano Lozada RN on 11/30/20 at 5:40 PM EST    CASE MANAGEMENT/SOCIAL WORK SECTION    Inpatient Status Date: 11/28/2020    Readmission Risk Assessment Score:  Readmission Risk              Risk of Unplanned Readmission:        15           Discharging to Facility/ Agency   Name:  [facility] care    Address: [address] [place] [address]., [address] [place] [address]., [place] [address]  Phone: [phone]  Fax: [phone]    / signature: Electronically signed by Ravin Doan on 11/30/20 at 5:44 PM EST    PHYSICIAN SECTION    Prognosis: Fair    Condition at Discharge: Stable    Rehab Potential (if transferring to Rehab): Fair    Recommended Labs or Other Treatments After Discharge: Will need assistance with medication compliance    Physician Certification: I certify the above information and transfer of Chung Fernandez  is necessary for the continuing treatment of the diagnosis listed and that she requires [address] for less 30 days.      Update Admission H&P: No change in H&P    PHYSICIAN SIGNATURE:  Electronically signed by Vivi Torrez MD on 11/30/20 at 5:12 PM EST

## 2020-11-30 NOTE — CARE COORDINATION
[facility]  Received referral from case management [facility]   Faxed orders to [facility] to resume care.  Noted RN added to order for home care  Home care to see patient by   12/2/2020  Esther Gonzalez LPN    [facility] CTN Cell [phone], Fax [phone]

## 2020-11-30 NOTE — PLAN OF CARE
Problem: Falls - Risk of:  Goal: Will remain free from falls  Description: Will remain free from falls  Outcome: Ongoing     Problem: Skin Integrity:  Goal: Will show no infection signs and symptoms  Description: Will show no infection signs and symptoms  Outcome: Ongoing  Goal: Absence of new skin breakdown  Description: Absence of new skin breakdown  Outcome: Ongoing     Problem: Pain:  Goal: Control of acute pain  Description: Control of acute pain  Outcome: Ongoing     Problem: OXYGENATION/RESPIRATORY FUNCTION  Goal: Patient will maintain patent airway  Outcome: Ongoing  Goal: Patient will achieve/maintain normal respiratory rate/effort  Description: Respiratory rate and effort will be within normal limits for the patient  Outcome: Ongoing     Problem: HEMODYNAMIC STATUS  Goal: Patient has stable vital signs and fluid balance  Outcome: Ongoing     Problem: FLUID AND ELECTROLYTE IMBALANCE  Goal: Fluid and electrolyte balance are achieved/maintained  Outcome: Ongoing     Problem: ACTIVITY INTOLERANCE/IMPAIRED MOBILITY  Goal: Mobility/activity is maintained at optimum level for patient  Outcome: Ongoing
Problem: Falls - Risk of:  Goal: Will remain free from falls  Description: Will remain free from falls  Outcome: Ongoing  Note: Fall risk assessment completed . Fall precautions in place, bed alarm on, side rails 2/4 up, call light in reach, educated pt on calling for assistance when needed, room clear of clutter. Pt verbalized understanding. Problem: Skin Integrity:  Goal: Will show no infection signs and symptoms  Description: Will show no infection signs and symptoms  Outcome: Ongoing  Note: Patient is alert and oriented, afebrile, has manageable complaints of pain, skin is intact and appropriate for ethnicity in color    Goal: Absence of new skin breakdown  Description: Absence of new skin breakdown  Outcome: Ongoing  Note: Edwardo score assessed. Patient able to ambulate and turn self and repositioned patient Q2H and assessed skin. Educated patient on importance of repositioning to prevent skin issues. Problem: Pain:  Goal: Control of acute pain  Description: Control of acute pain  Outcome: Ongoing  Note: Pain /discomfort being managed with PRN analgesics per MD orders. Patient able to express presence and absence of pain and rate pain appropriately using numerical scale.
Problem: Falls - Risk of:  Goal: Will remain free from falls  Description: Will remain free from falls  Outcome: Ongoing  Note: Fall risk assessment completed. Fall precautions in place. Call light within reach. Pt educated on calling for assistance before getting up. Walkway free of clutter. Will continue to monitor. Electronically signed by Luis A Denise RN on 11/29/2020 at 3:01 PM      Problem: Skin Integrity:  Goal: Will show no infection signs and symptoms  Description: Will show no infection signs and symptoms  Outcome: Ongoing  Note: Will monitor skin and mucous membranes. Will turn patient every 2 hours, monitor for friction and sheering, and change dressings as needed. Will preform skin assessment every shift. Electronically signed by Luis A Denise RN on 11/29/2020 at 3:01 PM      Problem: Pain:  Goal: Control of acute pain  Description: Control of acute pain  Outcome: Ongoing  Note: Educated patient on pain management. Will assess patients pain level per unit protocol, and provide pain management measures as needed.    Electronically signed by Luis A Denise RN on 11/29/2020 at 3:02 PM
11/28/2020 1655 by Seamus Kingston RN  Outcome: Ongoing  Note: Pain /discomfort being managed with PRN analgesics per MD orders. Patient able to express presence and absence of pain and rate pain appropriately using numerical scale.

## 2020-11-30 NOTE — PROGRESS NOTES
PT up to bathroom stating that she is having a hard time urinating so much that her lower ABD was painful. Pt was able to urinate 200 cc of clear, yellow. Pt bladder scanned for 520 cc in bladder. Pt straight catheralized with sterile procedure per MD orders with 500 cc of yellow, clear urine out. Pt ABD less distend, soft. Pt resting now with even and easy respirations.  Electronically signed by Glendy Multani RN on 11/30/2020 at 5:29 AM

## 2020-11-30 NOTE — PROGRESS NOTES
DOUG  Bathroom Shower/Tub: Tub/Shower unit  Bathroom Toilet: Standard  Bathroom Equipment: Grab bars in shower, Shower chair  Home Equipment: Kirit Christy, 4 wheeled walker  ADL Assistance: Independent  Homemaking Assistance: Needs assistance(shares with housekeeping, does not cook)  Ambulation Assistance: Independent(cane in community, no AD in home but \"furniture walks\")  Transfer Assistance: Independent  Active : No  Leisure & Hobbies: watching tv, listening to the radio, [employer]  Additional Comments: Pt states that her  is home 24 hrs a day. Her daughter works during the day. She has older grandchildren that are able to assist intermittently. Pt reports 1 fall recently    Subjective   General  Chart Reviewed: Yes  Additional Pertinent Hx: 85-year female with past medical history of combined diastolic and systolic heart failure, hypertension presented to hospital on 11/28/20 with acute onset of shortness of breath. Patient was complaining of some chest tightness and shortness of breath to the point that she could hardly speak any words. Pt with recent ECHO that showed EF 40-45%. Response To Previous Treatment: Patient with no complaints from previous session. Family / Caregiver Present: No  Referring Practitioner: Oren Kelley MD  Subjective  Subjective: Pt in chair finishing lunch upon arrival.  Agreeable to PT treatment. Very [place].                 Objective   Bed mobility  Comment: not tested- up in chair at beginning and end of session     Transfers  Sit to Stand: Stand by assistance  Stand to sit: Stand by assistance     Ambulation  Ambulation?: Yes  More Ambulation?: No  Ambulation 1  Surface: level tile  Device: No Device  Assistance: Stand by assistance  Quality of Gait: mildly unsteady and reaches for tomas rail when available and intermittently reaches for objects in environment to steady herself; no overt LOB  Gait Deviations: Slow Tisha;Decreased step length;Decreased step height  Distance: 25' to restroom, approx 200' in hallway  Comments: recommend that pt uses her cane at home  Stairs/Curb  Stairs?: No                  Other Activities: Other (see comment)  Comment: pt used restroom during session and attempted to urinate but had difficulty voiding; RN notified           AM-PAC Score  AM-PAC Inpatient Mobility Raw Score : 20 (11/30/20 1201)  AM-PAC Inpatient T-Scale Score : 47.67 (11/30/20 1201)  Mobility Inpatient CMS 0-100% Score: 35.83 (11/30/20 1201)  Mobility Inpatient CMS G-Code Modifier : Jo Amador (11/30/20 1201)          Goals  Short term goals  Time Frame for Short term goals: by acute discharge (goals ongoing as of 11/30)  Short term goal 1: sit<>stand independently  Short term goal 2: ambulate >50' with supv  Short term goal 3: ascend/descend 3 steps with rail and CGA  Patient Goals   Patient goals : none stated    Plan    Plan  Times per week: 3-5x/week  Current Treatment Recommendations: Functional Mobility Training  Safety Devices  Type of devices:  All fall risk precautions in place, Call light within reach, Gait belt, Patient at risk for falls, Left in chair, Chair alarm in place, Nurse notified(RN Wilfrid Schilder notified)  Restraints  Initially in place: No     Therapy Time   Individual Concurrent Group Co-treatment   Time In 1135         Time Out 1200         Minutes 25         Timed Code Treatment Minutes: 25 Minutes         Electronically signed by Sloan Vrea on 11/30/2020 at 12:06 PM

## 2020-11-30 NOTE — CARE COORDINATION
Critical access hospital  Patient is active with Critical access hospital, PT only at this time.   Yolie Mendoza LPN  CTN with  [facility],  [address], Fax [phone]

## 2020-11-30 NOTE — DISCHARGE SUMMARY
CLARITIN     pantoprazole 40 MG tablet  Commonly known as:  Protonix  Take 1 tablet by mouth daily     therapeutic multivitamin-minerals tablet     vitamin D3 10 MCG (400 UNIT) Tabs tablet  Commonly known as:  CHOLECALCIFEROL              Physical Exam:    Vitals:  Vitals:    11/30/20 0757 11/30/20 1205 11/30/20 1220 11/30/20 1647   BP: (!) 163/75 (!) 150/78     Pulse: 71 63     Resp: 14 16 16 16   Temp: 97.8 °F (36.6 °C) 97.5 °F (36.4 °C)     TempSrc: Oral Oral     SpO2: 95% 97% 97% 96%   Weight:       Height:         Weight: Weight: 145 lb 15.1 oz (66.2 kg)     24 hour intake/output:    Intake/Output Summary (Last 24 hours) at 11/30/2020 1713  Last data filed at 11/30/2020 1651  Gross per 24 hour   Intake 1080 ml   Output 1775 ml   Net -695 ml       General appearance - alert, well appearing, and in no distress  Chest - clear to auscultation, no wheezes, rales or rhonchi, symmetric air entry  Heart - normal rate, regular rhythm, normal S1, S2, no murmurs, rubs, clicks or gallops  Abdomen - soft, nontender, nondistended, no masses or organomegaly  Obese: No; Protuberant: No   Neurological - alert, oriented, normal speech, no focal findings or movement disorder noted  Extremities - peripheral pulses normal, no pedal edema, no clubbing or cyanosis  Skin - normal coloration and turgor,     Radiology reports as per the Radiologist  Radiology: Xr Chest Portable    Result Date: 11/28/2020  EXAMINATION: ONE XRAY VIEW OF THE CHEST 11/28/2020 12:00 pm COMPARISON: October 19, 2020 HISTORY: ORDERING SYSTEM PROVIDED HISTORY: dyspnea TECHNOLOGIST PROVIDED HISTORY: Reason for exam:->dyspnea Reason for Exam: WHEEZING AND CHEST PAIN Acuity: Acute Type of Exam: Initial FINDINGS: Diffuse interstitial opacities are present. No definitive pleural effusion. No evidence of pneumothorax. No focal consolidation. Bilateral interstitial opacities very similar to the comparison.   This is favored to be due to acute interstitial edema or Anion Gap 15 3 - 16    Glucose 150 (H) 70 - 99 mg/dL    BUN 25 (H) 7 - 20 mg/dL    CREATININE 1.2 0.6 - 1.2 mg/dL    GFR Non- 43 (A) >60    GFR  52 (A) >60    Calcium 9.2 8.3 - 10.6 mg/dL    Total Protein 7.9 6.4 - 8.2 g/dL    Alb 4.5 3.4 - 5.0 g/dL    Albumin/Globulin Ratio 1.3 1.1 - 2.2    Total Bilirubin 0.3 0.0 - 1.0 mg/dL    Alkaline Phosphatase 74 40 - 129 U/L    ALT <5 (L) 10 - 40 U/L    AST 18 15 - 37 U/L    Globulin 3.4 g/dL   Troponin   Result Value Ref Range    Troponin <0.01 <0.01 ng/mL   Brain Natriuretic Peptide   Result Value Ref Range    Pro-BNP 9,986 (H) 0 - 449 pg/mL   D-Dimer, Quantitative   Result Value Ref Range    D-Dimer, Quant 381 (H) 0 - 229 ng/mL DDU   Blood Gas, Venous   Result Value Ref Range    pH, Rasheed 7.263 (L) 7.350 - 7.450    pCO2, Rasheed 52.9 (H) 40.0 - 50.0 mmHg    pO2, Rashede 53.0 (H) 25.0 - 40.0 mmHg    HCO3, Venous 23.9 23.0 - 29.0 mmol/L    Base Excess, Rasheed -3.1 (L) -3.0 - 3.0 mmol/L    O2 Sat, Rasheed 81 Not Established %    TC02 (Calc), Rasheed 26 Not Established mmol/L    O2 Therapy Unknown    Protime-INR   Result Value Ref Range    Protime 11.3 10.0 - 13.2 sec    INR 0.97 0.86 - 1.14   Lactate, Sepsis   Result Value Ref Range    Lactic Acid, Sepsis 1.9 0.4 - 1.9 mmol/L   Lactate, Sepsis   Result Value Ref Range    Lactic Acid, Sepsis 3.7 (H) 0.4 - 1.9 mmol/L   Microscopic Urinalysis   Result Value Ref Range    WBC, UA 10-20 (A) 0 - 5 /HPF    RBC, UA None seen 0 - 4 /HPF    Epithelial Cells, UA 6-10 (A) 0 - 5 /HPF    Bacteria, UA 2+ (A) None Seen /HPF   Procalcitonin   Result Value Ref Range    Procalcitonin 0.07 0.00 - 0.15 ng/mL   Basic Metabolic Panel w/ Reflex to MG   Result Value Ref Range    Sodium 137 136 - 145 mmol/L    Potassium reflex Magnesium 4.2 3.5 - 5.1 mmol/L    Chloride 101 99 - 110 mmol/L    CO2 22 21 - 32 mmol/L    Anion Gap 14 3 - 16    Glucose 123 (H) 70 - 99 mg/dL    BUN 24 (H) 7 - 20 mg/dL    CREATININE 1.2 0.6 - 1.2 mg/dL    GFR

## 2020-11-30 NOTE — PROGRESS NOTES
Pt rounded on this morning Q2h, whiteboard updated, pt given ice water and needs assessed. Pt A&O at this time, denies any pain or SOB, pt lung sounds clear this morning. Pt has no needs or concerns at this time hoping to discharge to home today. Call light within reach, pt verbalized understanding to call prior to ambulating. Will continue to monitor and reassess.    Electronically signed by Benitez Nunn RN on 11/30/2020 at 11:11 AM

## 2020-11-30 NOTE — CARE COORDINATION
11/30 discharge order noted-- left message with [facility] to pull orders for resumption of care.   Electronically signed by Elvi Osorio on 11/30/2020 at 5:40 PM

## 2020-11-30 NOTE — PROGRESS NOTES
Hospitalist Progress Note      PCP: FERNANDO Boateng NP    Date of Admission: 11/28/2020    Chief Complaint: SOB    Hospital Course: 85f admitted with CHF exac    Subjective: ***       Medications:  Reviewed    Infusion Medications   Scheduled Medications    furosemide  20 mg Oral Daily    lisinopril  10 mg Oral Daily    ipratropium-albuterol  1 ampule Inhalation Q4H WA    aspirin  81 mg Oral Daily    atorvastatin  20 mg Oral Daily    carvedilol  12.5 mg Oral BID WC    allopurinol  50 mg Oral Daily    donepezil  5 mg Oral Nightly    FLUoxetine  20 mg Oral Daily    cetirizine  10 mg Oral Daily    vitamin D3  400 Units Oral Daily    sodium chloride flush  10 mL Intravenous 2 times per day    enoxaparin  30 mg Subcutaneous Nightly     PRN Meds: melatonin, sodium chloride flush, acetaminophen **OR** acetaminophen, polyethylene glycol, promethazine **OR** ondansetron, hydrALAZINE      Intake/Output Summary (Last 24 hours) at 11/30/2020 1418  Last data filed at 11/30/2020 1207  Gross per 24 hour   Intake 1080 ml   Output 1600 ml   Net -520 ml       Physical Exam Performed:    BP (!) 150/78   Pulse 63   Temp 97.5 °F (36.4 °C) (Oral)   Resp 16   Ht 5' (1.524 m)   Wt 145 lb 15.1 oz (66.2 kg)   SpO2 97%   BMI 28.50 kg/m²     General appearance: No apparent distress, appears stated age and cooperative. HEENT: Pupils equal, round, and reactive to light. Conjunctivae/corneas clear. Neck: Supple, with full range of motion. No jugular venous distention. Trachea midline. Respiratory:  Normal respiratory effort. Clear to auscultation, bilaterally without Rales/Wheezes/Rhonchi. Cardiovascular: Regular rate and rhythm with normal S1/S2 without murmurs, rubs or gallops. Abdomen: Soft, non-tender, non-distended with normal bowel sounds. Musculoskeletal: No clubbing, cyanosis or edema bilaterally. Full range of motion without deformity.   Skin: Skin color, texture, turgor normal.  No rashes or lesions. Neurologic:  Neurovascularly intact without any focal sensory/motor deficits. Cranial nerves: II-XII intact, grossly non-focal.  Psychiatric: Alert and oriented, thought content appropriate, normal insight  Capillary Refill: Brisk,< 3 seconds   Peripheral Pulses: +2 palpable, equal bilaterally       Labs:   Recent Labs     11/28/20  1140 11/29/20  0443   WBC 18.5* 9.4   HGB 12.6 12.0   HCT 38.4 35.0*    231     Recent Labs     11/28/20  1140 11/29/20  0443    137   K 4.6 4.2    101   CO2 20* 22   BUN 25* 24*   CREATININE 1.2 1.2   CALCIUM 9.2 9.1     Recent Labs     11/28/20  1140   AST 18   ALT <5*   BILITOT 0.3   ALKPHOS 74     Recent Labs     11/28/20  1140   INR 0.97     Recent Labs     11/28/20  1140   TROPONINI <0.01       Urinalysis:      Lab Results   Component Value Date    NITRU Negative 11/28/2020    WBCUA 10-20 11/28/2020    BACTERIA 2+ 11/28/2020    RBCUA None seen 11/28/2020    BLOODU Negative 11/28/2020    SPECGRAV 1.015 11/28/2020    GLUCOSEU Negative 11/28/2020    GLUCOSEU Negative 06/13/2011       Radiology:  CT CHEST PULMONARY EMBOLISM W CONTRAST   Final Result   No evidence of pulmonary embolism. Pulmonary edema but no significant pleural effusion. Only a trace amount of   pleural fluid. Coexistent bronchitis may be present. XR CHEST PORTABLE   Final Result   Bilateral interstitial opacities very similar to the comparison. This is   favored to be due to acute interstitial edema or a separate chronic   interstitial process, although bilateral pneumonia could potentially have   this appearance.                  Assessment/Plan:    Active Hospital Problems    Diagnosis Date Noted    Essential hypertension [I10]     Stage 3b chronic kidney disease [N18.32]     Acute on chronic congestive heart failure ([facility] [address] [I50.9] 11/28/2020         DVT Prophylaxis: ***  Diet: DIET LOW SODIUM 2 GM; 1500 ml  Code Status: Full Code    PT/OT Eval Status: ***    Dispo - ***    Bonifacio Swartz MD

## 2020-11-30 NOTE — PROGRESS NOTES
Brief Nutrition Note     Pt seen per nutrition SOC for CHF diet education. Noted pt with hx of dementia, will leave written materials in d/c paperwork with contact information.     Electronically signed by Maxine Mistry RD, LD on 11/30/2020 at 9:30 AM

## 2020-11-30 NOTE — PROGRESS NOTES
PT AOO x4 per this shift. Pt naomi pain. Pt tolerating Po fluids. Pt able to ambulate to bathroom with steady gait. Pt naomi any needs at this time. Fall precautions in place. Bed alarm on. Call light within reach. Will continue to round.  Electronically signed by Farshad Zhang RN on 11/30/2020 at 5:32 AM

## 2020-11-30 NOTE — PROGRESS NOTES
Pt discharged to home. Transportation here with wheelchair. Accompanied by spouse. Transported in personal vehicle. Discharge instructions, Rx, and personal belongings given to pt. Explanation of discharge medications and instructions understood by verbal statement. No questions, comments or concerns at this time.    Electronically signed by Shannan Sommers RN on 11/30/2020 at 6:44 PM

## 2020-11-30 NOTE — CARE COORDINATION
INITIAL CASE MANAGEMENT ASSESSMENT    Reviewed chart, SW placed call to patient's daughter, Lissette [facility]), due to patient dementia to assess possible discharge needs. Explained Case Management role/services. Living Situation: confirmed address, lives with spouse, daughter, and grandchildren in 2 story home with 1st floor living, 3 DOUG    ADLs: Family assist with all care including medication management      DME: rollater and cane uses as needed    PT/OT Recs: 24 hour supervision or assist, Patient would benefit from continued therapy after discharge, S Level 1, Home with Home health PT      Active Services: [facility] - call to [facility], patient is active with PT services      Transportation: family transports     Medications: [facility] [address], no issues with medications, family assists with administering medications daily     PCP: FERNANDO Soriano    PLAN/COMMENTS: plan is home with family and resumption of HHC through [facility], will need PT/ RN [facility]     SW/CM provided contact information for patient or family to call with any questions. SW/CM will follow and assist as needed.     Electronically signed by LANRE Savage, MAXINEW on 11/30/2020 at 10:28 AM

## 2020-12-01 ENCOUNTER — CARE COORDINATION (OUTPATIENT)
Dept: CASE MANAGEMENT | Age: 85
End: 2020-12-01

## 2020-12-01 NOTE — PROGRESS NOTES
Physician Progress Note      PATIENT:               Jose Mcnair  CSN #:                  [id number]  :                       1935  ADMIT DATE:       2020 11:44 AM  DISCH DATE:        2020 7:18 PM  RESPONDING  PROVIDER #:        Arnulfo Pineda MD          QUERY TEXT:    Dear Dr. León Roberts,  Pt admitted with Acute exac of CHF. Pt noted to have Respiratory   distress,Tachypnea, Hypoxia and acidosis on VBG and O2 requirement . If   possible, please document in the progress notes and discharge summary if you   are evaluating and/or treating any of the following: The medical record reflects the following:  Risk Factors: Hx HTN, CAD, TIA, CKD4, CHF  Clinical Indicators:  Presents to ED for SOB, wheezing, chest tightness,   Resp=32, O2 sat=89%, ED record notes \"Markedly diminished breath sounds   bilaterally with diffuse coarse expiratory wheezes and bibasilar rales. Accessory muscle use and conversational dyspnea was noted. She was able to   say only 2 or 3 words at a time. \" CO2=20, VBG pH=7.263, PCO2=52.9, PO2=53, \"   Laboratory studies . ..consistent with acute respiratory failure\"  Treatment: 100% nonrebreather and was given 2 DuoNeb treatments and   Solu-Medrol 125 mg IV, later placed on 3L/min NC  Thank you,  Jammie Palacios RN, [email or web address]  Options provided:  -- Acute respiratory failure with hypoxia  -- Acute respiratory failure with hypercapnia  -- Chronic respiratory failure with hypoxia  -- Other - I will add my own diagnosis  -- Disagree - Not applicable / Not valid  -- Disagree - Clinically unable to determine / Unknown  -- Refer to Clinical Documentation Reviewer    PROVIDER RESPONSE TEXT:    This patient is in acute respiratory failure with hypoxia. Query created by: [facility] on 2020 1:02 PM      QUERY TEXT:    Dear Dr. León Roberts,  Patient admitted with Acute exac of CHF.    Noted documentation of  history of   combined diastolic and systolic heart failure in H and P and 11/29 Cardiology   consult note Acute on chronic diastolic heart failure  . If possible, please document in progress notes and discharge summary if you   are evaluating and /or treating any of the following: The medical record reflects the following:  Risk Factors: Hx HTN, CAD, TIA, CKD4, CHF, recent hospitalization for CHF,   10/2020  Clinical Indicators: Admit for SOB 2/2 Acute exac heart failure,   Pro-BNP=9,986, CXR-Bilateral interstitial opacities, diminished breath sounds   bilaterally with diffuse coarse expiratory wheezes and bibasilar rales, Echo   from 10/2020 EF=45% and grade 2 diastolic dysfunction  Treatment: O2, Lasix,  fluid restrictions, Cardiology consult  Thank you,  Jb Guevara RN, [email or web address]  Options provided:  -- Acute on Chronic Systolic and Diastolic CHF  -- Acute on Chronic Diastolic CHF/HFpEF  -- Other - I will add my own diagnosis  -- Disagree - Not applicable / Not valid  -- Disagree - Clinically unable to determine / Unknown  -- Refer to Clinical Documentation Reviewer    PROVIDER RESPONSE TEXT:    This patient is in acute on chronic systolic and diastolic CHF. Query created by:  Ramy Tafoya on 11/30/2020 1:17 PM      Electronically signed by:  Donald Gong MD 11/30/2020 7:51 PM

## 2020-12-01 NOTE — PROGRESS NOTES
Pharmacy Heart Failure Medication Reconciliation Note    Pt discharged from [facility] today after admission for CHF. Nabor Merino has a diagnosis of diastolic heart failure (last EF = 40-45% on 10-21-20). Pertinent Labs:  BMP:   Lab Results   Component Value Date     11/29/2020    K 4.2 11/29/2020     11/29/2020    CO2 22 11/29/2020    BUN 24 11/29/2020    CREATININE 1.2 11/29/2020     BNP:   Lab Results   Component Value Date    PROBNP 9,986 11/28/2020    PROBNP 1,817 10/23/2020    PROBNP 8,277 10/21/2020       Patient taking an ACEI / ARB / Entresto:  Yes     Patient taking a BETA BLOCKER:  Yes  Patient taking a LOOP DIURETIC: Yes  Patient taking a ALDOSTERONE RECEPTOR ANTAGONIST: No: pEf    Patient has a diagnosis of Atrial Fibrillation: No: N/A. If yes, patient is on appropriate anticoagulation:     Patient has a diagnosis of type 2 diabetes:  No:. If yes, patient prescribed the following anti-hyperglycemic medication at discharge:   SGLT2 Inhibitor: outpatient    Corrections to discharge medications include:      Discharge Medications:         Medication List      CONTINUE taking these medications    allopurinol 100 MG tablet  Commonly known as:  ZYLOPRIM  Take 0.5 tablets by mouth daily     aspirin 81 MG chewable tablet  Take 1 tablet by mouth daily     atorvastatin 20 MG tablet  Commonly known as:  LIPITOR  TAKE 1 TABLET BY MOUTH ONE TIME A DAY     carvedilol 12.5 MG tablet  Commonly known as:  COREG  Take 1 tablet by mouth 2 times daily (with meals)     donepezil 5 MG tablet  Commonly known as:  ARICEPT     FLUoxetine 20 MG capsule  Commonly known as:  PROZAC  TAKE 1 CAPSULE BY MOUTH ONE TIME A DAY     furosemide 20 MG tablet  Commonly known as:  LASIX     Handicap Placard Misc  by Does not apply route Patient is unable to walk more than 250 feet before stopping to rest.    Not to exceed 5 years.   DX: CHF     lisinopril 2.5 MG tablet  Commonly known as:  PRINIVIL;ZESTRIL     loratadine 10 MG capsule  Commonly known as:  CLARITIN     pantoprazole 40 MG tablet  Commonly known as:  Protonix  Take 1 tablet by mouth daily     therapeutic multivitamin-minerals tablet     vitamin D3 10 MCG (400 UNIT) Tabs tablet  Commonly known as:  [address] [facility], [facility]  Heart Failure Discharge Medication Reconciliation Program  [phone]

## 2020-12-02 ENCOUNTER — CARE COORDINATION (OUTPATIENT)
Dept: CASE MANAGEMENT | Age: 85
End: 2020-12-02

## 2020-12-02 LAB
BLOOD CULTURE, ROUTINE: NORMAL
CULTURE, BLOOD 2: NORMAL

## 2020-12-02 PROCEDURE — 1111F DSCHRG MED/CURRENT MED MERGE: CPT | Performed by: NURSE PRACTITIONER

## 2020-12-02 NOTE — CARE COORDINATION
[facility] Transitions Initial Follow Up Call    Call within 2 business days of discharge: Yes    Patient: Chirag Ramirez Patient : 1935   MRN: [id number]  Reason for Admission: CHF  Discharge Date: 20 RARS: Readmission Risk Score: 15      Last Discharge [address]       Complaint Diagnosis Description Type Department Provider    20 Respiratory Distress Acute congestive heart failure, unspecified heart failure type ([facility] [address] . .. ED to Hosp-Admission (Discharged) (ADMIT) Charlotte Charles MD; Cristel Thorne. .. Challenges to be reviewed by the provider   Additional needs identified to be addressed with provider No      Discussed COVID-19 related testing which was available at this time. Test results were negative. Patient informed of results, if available? Yes         Method of communication with provider : none    Was this a readmission? No    Care Transition Nurse (CTN) contacted the family by telephone to perform post hospital discharge assessment. Verified name and  with family as identifiers. Provided introduction to self, and explanation of the CTN role. CTN reviewed discharge instructions, medical action plan and red flags with family who verbalized understanding. Family given an opportunity to ask questions and does not have any further questions or concerns at this time. Were discharge instructions available to patient? Yes. Reviewed appropriate site of care based on symptoms and resources available to patient including: PCP and Specialist. The family agrees to contact the PCP office for questions related to their healthcare. Medication reconciliation was performed with family, who verbalizes understanding of administration of home medications. Advised obtaining a 90-day supply of all daily and as-needed medications. Discussed follow-up appointments. If no appointment was previously scheduled, appointment scheduling offered: NA.  Is follow up appointment scheduled within 7 days of discharge? Yes  Non-[facility] follow up appointment(s): daughter to c    Non-face-to-face services provided:  Obtained and reviewed discharge summary and/or continuity of care documents  Assessment and support for treatment adherence and medication management-1111f completed    Care Transitions 24 Hour Call    Do you have any ongoing symptoms?:  Yes  Patient-reported symptoms:  Fatigue  Do you have a copy of your discharge instructions?:  Yes  Do you have all of your prescriptions and are they filled?:  Yes  Have you scheduled your follow up appointment?:  No  Were you discharged with any Home Care or Post Acute Services:  Yes  Post Acute Services:  [facility] Transitions Interventions     Writer spoke with daughter, Cecilia Zee (on hippa form). She stated her mom was sleeping. Cecilia Zee stated her mom was doing \"OK\",\"worn out\". She stated patient has no edema and they will start weighing patient, writer educated daughter on when to call MD for weight gain. Daughter stated mom is following low sodium diet and fluid restrictions. Daughter aware this is final call and to call MDs, NPs for any medical needs.     Follow Up  Future Appointments   Date Time Provider [place] Kaba   12/7/2020  1:40 PM Cecilia Smart, APRN - CNP [facility]       Anjana Hernandez, PREM

## 2020-12-07 ENCOUNTER — OFFICE VISIT (OUTPATIENT)
Dept: CARDIOLOGY CLINIC | Age: 85
End: 2020-12-07
Payer: MEDICARE

## 2020-12-07 VITALS
DIASTOLIC BLOOD PRESSURE: 72 MMHG | HEIGHT: 61 IN | BODY MASS INDEX: 29.07 KG/M2 | WEIGHT: 154 LBS | SYSTOLIC BLOOD PRESSURE: 110 MMHG | TEMPERATURE: 97.7 F | HEART RATE: 75 BPM | OXYGEN SATURATION: 97 %

## 2020-12-07 DIAGNOSIS — I50.23 ACUTE ON CHRONIC SYSTOLIC HEART FAILURE (HCC): ICD-10-CM

## 2020-12-07 LAB
ANION GAP SERPL CALCULATED.3IONS-SCNC: 12 MMOL/L (ref 3–16)
BUN BLDV-MCNC: 30 MG/DL (ref 7–20)
CALCIUM SERPL-MCNC: 8.9 MG/DL (ref 8.3–10.6)
CHLORIDE BLD-SCNC: 105 MMOL/L (ref 99–110)
CO2: 23 MMOL/L (ref 21–32)
CREAT SERPL-MCNC: 1.4 MG/DL (ref 0.6–1.2)
GFR AFRICAN AMERICAN: 43
GFR NON-AFRICAN AMERICAN: 36
GLUCOSE BLD-MCNC: 108 MG/DL (ref 70–99)
POTASSIUM SERPL-SCNC: 4.9 MMOL/L (ref 3.5–5.1)
PRO-BNP: 1769 PG/ML (ref 0–449)
SODIUM BLD-SCNC: 140 MMOL/L (ref 136–145)

## 2020-12-07 PROCEDURE — 99214 OFFICE O/P EST MOD 30 MIN: CPT | Performed by: NURSE PRACTITIONER

## 2020-12-07 NOTE — PATIENT INSTRUCTIONS
Instructions:   1. Medications: Continue current meds  2. Labs: BNP. BMP  3. Follow up: Virtual Visit 2 weeks 12/22  4. Daily weight: Call for increase 3 lbs/day or 5 lbs/week. 5. 2 gm sodium diet:  6. Fluid Restriction: 64 oz.

## 2020-12-07 NOTE — PROGRESS NOTES
The [address] [place] [address], [address] [place] [address] [address] [address]  [phone]    PrimaryCare Doctor:  FERNANDO Agarwal NP  Primary Cardiologist: Dr. Ming Trujillo    Chief Complaint   Patient presents with    Follow-Up from Hospital     SOB on exertion         History of Present Illness:  Raphael Paulino is a 80 y.o. female with PMH CAD, HF, histoplasmosis, HLP, dementia, SULY/CKD, HTN. Lives with family. Patient was admitted to [facility] with acute on chronic HF. Diuresed 1.9L. Wt 150lbs>145lbs. Patient presents to [facility] cardiology for follow up for HF. Accompanied by her granddaughter. Forgetful but pleasant. Ambulates from room to room at home and tolerates well. Cleaned room yesterday and tolerated well. Denies CP, SOB, orthopnea, edema, LH, dizzy, syncope. Granddaughter noticed SOB today when walking from car into office today. Improves with rest.   Daily weight 158 (discharge)>156>154lbs. Review of Systems:   General: Denies fever, chills  Skin: Denies skin changes, rash, itching, lesions.   HEENT: Denies headache, dizziness, vision changes, nosebleeds, sore throat, nasal drainage  RESP: Denies cough, sputum, dyspnea, wheeze, snoring  CARD: Denies palpitations,  murmur  GI:Denies nausea, vomiting, heartburn, loss of appetite, change in bowels  : Denies frequency, pain, incontinence, polyuria  VASC: Denies claudication, leg cramps, clots  MUSC/SKEL: Denies pain, stiffness, arthritis  PSYCH: Denies anxiety, depression, stress  NEURO: Denies numbness, tingling, weakness,change in mood or memory  HEME: Denies abn bruising, bleeding, anemia  ENDO: Denies intolerance to heat, cold, excessive thirst or hunger, hx thyroid disease    /72   Pulse 75   Temp 97.7 °F (36.5 °C)   Ht 5' 1\" (1.549 m)   Wt 154 lb (69.9 kg)   SpO2 97%   BMI 29.10 kg/m²   Wt Readings from Last 3 Encounters:   12/07/20 154 lb (69.9 kg)   11/30/20 145 lb 15.1 oz (66.2 kg)   10/26/20 148 lb 4.8 oz (67.3 kg)       Physical Exam:  GEN: Appears well, no acute distress  SKIN: Pink, warm, dry. HEENT: PERRLA. Normocephalic, atraumatic. Neck supple. No adenopathy. LUNG: AP diameter normal. Clear bilateral. No wheeze, rales, or ronchi. Respiratory effort normal.  HEART: S1S2 A/R. Mild JVD. No carotid bruit. No murmur, rub or gallop. ABD: Soft, nontender. +BS X 4 quads. No hepatomegaly. EXT: Radial and pedal pulses 2+ and symmetric. Without varicosities. No edema. MUSCSKEL: Good ROM X4 extremities. No deformity. NEURO: A/O X3. Calm and cooperative. Past Medical History:   has a past medical history of Chronic kidney disease (CKD), stage IV (severe) ([facility] [address] Coronary artery disease involving native coronary artery of native heart without angina pectoris, GERD (gastroesophageal reflux disease), Gout, chronic, Histoplasmosis, Hyperlipidemia, Hypertension, Irregular heart beat, Osteoarthritis, Osteopenia of hip, Pneumonia, and Unspecified cerebral artery occlusion with cerebral infarction. Surgical History:   has a past surgical history that includes Hysterectomy; Cholecystectomy (Orthopedic surgery Left arm); Tubal ligation; and bladder suspension. Social History:   reports that she is a non-smoker but has been exposed to tobacco smoke. She has never used smokeless tobacco. She reports that she does not drink alcohol or use drugs. Family History:   Family History   Problem Relation Age of Onset    High Blood Pressure Mother     Heart Disease Mother     Diabetes Mother     High Blood Pressure Father     Heart Disease Father     Cancer Father         Brain    Cancer Sister         lymphoma    Cancer Brother         colon    Cancer Maternal Uncle        HomeMedications:  Prior to Admission medications    Medication Sig Start Date End Date Taking?  Authorizing Provider   carvedilol (COREG) 12.5 MG tablet Take 1 tablet by mouth 2 times daily (with meals) 10/23/20  Yes Aung Ambrosio MD   allopurinol (ZYLOPRIM) 100 MG tablet Take 0.5 tablets by mouth daily 10/24/20  Yes Rao Elaine MD   Multiple Vitamins-Minerals (THERAPEUTIC MULTIVITAMIN-MINERALS) tablet Take 1 tablet by mouth daily   Yes Historical Provider, MD   donepezil (ARICEPT) 5 MG tablet Take 5 mg by mouth nightly   Yes Historical Provider, MD   furosemide (LASIX) 20 MG tablet Take 20 mg by mouth daily   Yes Historical Provider, MD   lisinopril (PRINIVIL;ZESTRIL) 2.5 MG tablet Take 2.5 mg by mouth daily   Yes Historical Provider, MD   loratadine (CLARITIN) 10 MG capsule Take 10 mg by mouth daily as needed    Yes Historical Provider, MD   vitamin D3 (CHOLECALCIFEROL) 10 MCG (400 UNIT) TABS tablet Take 400 Units by mouth daily   Yes Historical Provider, MD   FLUoxetine (PROZAC) 20 MG capsule TAKE 1 CAPSULE BY MOUTH ONE TIME A DAY  1/29/19  Yes Talib Marte MD   atorvastatin (LIPITOR) 20 MG tablet TAKE 1 TABLET BY MOUTH ONE TIME A DAY  1/8/18  Yes Talib Marte MD   aspirin 81 MG chewable tablet Take 1 tablet by mouth daily 6/27/17  Yes Franky Godwin MD   Handicap Gabriela MISC by Does not apply route Patient is unable to walk more than 250 feet before stopping to rest.    Not to exceed 5 years. DX: CHF 8/17/16  Yes Talib Marte MD   pantoprazole (PROTONIX) 40 MG tablet Take 1 tablet by mouth daily 4/8/19 11/29/20  Talib Marte MD        Allergies:  Codeine and Morphine and related       LABS: Results reviewed with patient today.     CBC:   Lab Results   Component Value Date    WBC 9.4 11/29/2020    WBC 18.5 11/28/2020    WBC 13.1 10/26/2020    RBC 3.91 11/29/2020    RBC 4.09 11/28/2020    RBC 3.56 10/26/2020    HGB 12.0 11/29/2020    HGB 12.6 11/28/2020    HGB 10.9 10/26/2020    HCT 35.0 11/29/2020    HCT 38.4 11/28/2020    HCT 33.2 10/26/2020    MCV 89.7 11/29/2020    MCV 94.0 11/28/2020    MCV 93.1 10/26/2020    RDW 14.4 11/29/2020    RDW 14.5 11/28/2020    RDW 14.2 10/26/2020     11/29/2020     11/28/2020     10/26/2020     BMP:  Lab Results   Component Value Date     11/29/2020     11/28/2020     10/26/2020    K 4.2 11/29/2020    K 4.6 11/28/2020    K 3.7 10/26/2020     11/29/2020     11/28/2020     10/26/2020    CO2 22 11/29/2020    CO2 20 11/28/2020    CO2 19 10/26/2020    PHOS 3.6 05/21/2014    PHOS 3.7 05/20/2014    PHOS 2.3 11/04/2011    BUN 24 11/29/2020    BUN 25 11/28/2020    BUN 35 10/26/2020    CREATININE 1.2 11/29/2020    CREATININE 1.2 11/28/2020    CREATININE 1.9 10/26/2020     BNP:   Lab Results   Component Value Date    PROBNP 9,986 11/28/2020    PROBNP 1,817 10/23/2020    PROBNP 8,277 10/21/2020       Parameters:   > 450 pg/mL under age 48  > 900 pg/mL ages 54-65  > 1800 pg/mL over age 76    Iron Studies:  No results found for: TIBC, FERRITIN  GLUCOSE: No results for input(s): GLUCOSE in the last 72 hours. LIVER PROFILE:   Lab Results   Component Value Date    AST 18 11/28/2020    ALT <5 11/28/2020    LIPASE 41.0 10/23/2018    LABALBU 4.5 11/28/2020    BILIDIR 0.1 05/19/2014    BILITOT 0.3 11/28/2020    ALKPHOS 74 11/28/2020     PT/INR:   Lab Results   Component Value Date    PROTIME 11.3 11/28/2020    INR 0.97 11/28/2020     Cardiac Enzymes:  Lab Results   Component Value Date    CKTOTAL 36 03/07/2014    CKMB <0.2 11/03/2011    TROPONINI <0.01 11/28/2020     FASTING LIPID PANEL:  Lab Results   Component Value Date    CHOL 191 04/24/2018    HDL 47 04/24/2018    HDL 38 11/03/2011    LDLCALC 85 04/24/2018    TRIG 295 04/24/2018       Cardiac Imaging: Reports reviewed with patient today. Echo 5/20/14:  Normal left ventricle size, wall thickness and systolic function with an    estimated ejection fraction of 55%.  No regional wall motion abnormalities    are seen.   Fernando Levans is reversal of E/A inflow velocities across the mitral valve    suggesting impaired left ventricular relaxation.    Mitral annular calcification is present.    Aortic valve appears sclerotic but alcohol (no more than 1 drink/day for women, 2 drinks/day for men)  -regular physical activity  -no smoking  -stress reduction    Instructions:   1. Medications: Continue current meds  2. Labs: BNP. BMP  3. Follow up: Virtual Visit 2 weeks 12/22  4. Daily weight: Call for increase 3 lbs/day or 5 lbs/week. 5. 2 gm sodium diet:  6. Fluid Restriction: 64 oz.       I appreciate the opportunity of cooperating in the care of this individual.    LEONARDO Copeland, ELLIS, 12/7/2020,2:04 PM

## 2020-12-11 ENCOUNTER — TELEPHONE (OUTPATIENT)
Dept: CARDIOLOGY CLINIC | Age: 85
End: 2020-12-11

## 2020-12-11 NOTE — TELEPHONE ENCOUNTER
Pts daughter Karsten Tracy called to let Cecilia NP know that she had received the message about the medicine.

## 2020-12-22 ENCOUNTER — VIRTUAL VISIT (OUTPATIENT)
Dept: CARDIOLOGY CLINIC | Age: 85
End: 2020-12-22
Payer: MEDICARE

## 2020-12-22 PROCEDURE — 99214 OFFICE O/P EST MOD 30 MIN: CPT | Performed by: NURSE PRACTITIONER

## 2020-12-22 NOTE — PATIENT INSTRUCTIONS
Instructions:   1. Medications: Continue current meds  2. Labs: BNP, BMP 1 month  3. Follow up: 3 months Dr. Mynor Jiménez  4. Daily weight: Call for increase 3 lbs/day or 5 lbs/week. 5. 2 gm sodium diet:  6. Fluid Restriction: 64 oz.

## 2020-12-22 NOTE — PROGRESS NOTES
[address] [place] [address]., [address] [address]  [phone]    PrimaryCare Doctor:  FERNANDO Ferrara - NP  Primary Cardiologist: Dr. Danae Ray    Type of visit: Virtual visit using HIPAA-compliant videoconferencing technology, limited physical exam.     Chief Complaint   Patient presents with    Follow-up     2 week f/u, chest pain,mucus possible sinus infection     History of Present Illness:  Shayy Geronimo is a 80 y.o. female with PMH CAD, HF, histoplasmosis, HLP, dementia, SULY/CKD, HTN. Lives with family. Patient scheduled for virtual visit with [facility] cardiology for chronic DHF. Last hospitialization for HF 11/28/20-11/30/20. Recent BUN/creat elevated. Followed by Neph, no changes to diuretics or other meds. Patient and granddaughter unable to recall which Nephrologist she sees. Patient is accompanied by her granddaughter today. Wt  147.4 lbs today. Has been stable. Fluctuates 2-3 lbs intermittently. Walks around house. Does light housework. Tolerates well. Denies SOB, chest pain, orthopnea, abd distention, edema, fatigue. C/O upper resp \"mucus. \" Sleeps sitting up to help with mucus at times. States mucus is chronic for her. C/O leg pain from \"arthritis\" with activity. Improves with rest.   Good appetite. Drinking protein shakes. Follows 2gm Na diet and 64 fl oz restriction. Granddaughter verifies compliance. Review of Systems:   General: Denies fever, chills, fatigue, weakness  Skin: Denies skin changes, rash, itching, lesions.   HEENT: Denies headache, dizziness, vision changes, nosebleeds, sore throat, nasal drainage  RESP: Denies cough, dyspnea, wheeze, snoring  CARD: Denies palpitations,  murmur  GI:Denies nausea, vomiting, heartburn, loss of appetite, change in bowels  : Denies frequency, pain, incontinence, polyuria  VASC: Denies claudication, leg cramps, clots  MUSC/SKEL: Denies pain, stiffness, arthritis  PSYCH: Denies anxiety, depression, stress  12/07/2020     11/29/2020     11/28/2020    K 4.9 12/07/2020    K 4.2 11/29/2020    K 4.6 11/28/2020    K 3.7 10/26/2020     12/07/2020     11/29/2020     11/28/2020    CO2 23 12/07/2020    CO2 22 11/29/2020    CO2 20 11/28/2020    PHOS 3.6 05/21/2014    PHOS 3.7 05/20/2014    PHOS 2.3 11/04/2011    BUN 30 12/07/2020    BUN 24 11/29/2020    BUN 25 11/28/2020    CREATININE 1.4 12/07/2020    CREATININE 1.2 11/29/2020    CREATININE 1.2 11/28/2020     BNP:   Lab Results   Component Value Date    PROBNP 1,769 12/07/2020    PROBNP 9,986 11/28/2020    PROBNP 1,817 10/23/2020     Iron Studies:  No results found for: TIBC, FERRITIN  GLUCOSE: No results for input(s): GLUCOSE in the last 72 hours. LIVER PROFILE:   Lab Results   Component Value Date    AST 18 11/28/2020    ALT <5 11/28/2020    LIPASE 41.0 10/23/2018    LABALBU 4.5 11/28/2020    BILIDIR 0.1 05/19/2014    BILITOT 0.3 11/28/2020    ALKPHOS 74 11/28/2020     PT/INR:   Lab Results   Component Value Date    PROTIME 11.3 11/28/2020    INR 0.97 11/28/2020     Cardiac Enzymes:  Lab Results   Component Value Date    CKTOTAL 36 03/07/2014    CKMB <0.2 11/03/2011    TROPONINI <0.01 11/28/2020     FASTING LIPID PANEL:  Lab Results   Component Value Date    CHOL 191 04/24/2018    HDL 47 04/24/2018    HDL 38 11/03/2011    LDLCALC 85 04/24/2018    TRIG 295 04/24/2018       Cardiac Imaging: Reports reviewed with patient today. Echo 5/20/14:  Normal left ventricle size, wall thickness and systolic function with an    estimated ejection fraction of 55%.  No regional wall motion abnormalities    are seen.   [place] Gentleman is reversal of E/A inflow velocities across the mitral valve    suggesting impaired left ventricular relaxation.    Mitral annular calcification is present.    Aortic valve appears sclerotic but opens adequately.      Echo: 10/2020 Normal left ventricular size and wall thickness. Global left ventricular systolic function is mildly decreased with ejection fraction estimated at 45%. Evidence of grade 2 diastolic dysfunction appreciated. The left atrium is mildly dilated. Moderate mitral regurgitation is present. There is moderate fibrocalcific sclerosis of the aortic valve with mean gradient across the aortic valve of 16 mmHg suggesting probable mild aortic stenosis.       CTPA 10/19/20:  Negative for acute pulmonary embolism.         Features of volume overload/heart failure, with severe interstitial and    alveolar pulmonary edema with small bilateral pleural effusions.         Multivessel coronary calcifications, mild-to-moderate along the LAD territory.         Shotty mediastinal lymph nodes are presumably reactive.       CXR 10/19/20:  Impression    Vascular congestion and interstitial opacities, suggestive of pulmonary    edema.  Atypical pneumonia is a differential, less likely possibility.       6/2011   There is a moderate zone of decreased activity on stress images   inferolateral wall at apex which reverses on the resting images   consistent with ischemia. Assessment/Plan:    1.) Chronic diastolic heart failure: Weight stable. She is able to do moderate amount of activity at home and tolerating without SOB or CP. No other S/S of HF such as orthopnea, abd distention, edema. HF is complicated by CKD>followed by Neph. No evidence of hypervolemia today. Will continue current GDMT. Check BMP, BNP in 1 month. FU with  in 3 months. NYHA Class III  Stage B  Diuretic:  lasix 20mg QOD  Beta Blocker: coreg 12.5mg BID  SGLT2 Inhibitor: none  2gm Na diet, daily weight, 64 oz fluid restriction  Avoid NSAIDS and other nephrotoxic meds  Cardiac Rehab: Not indicated for EF>35%  ICD: Not indicated for EF>35%  Wellness Center Referral: NO- dementia, disoriented     2.) Hypertension:    Goal BP <130/80. Non pharmacologic interventions include:   -weight loss  -heart healthy low sodium and low fat diet that consist of mostly fruits, vegetables and grains (Dash diet)  -limited amount of alcohol (no more than 1 drink/day for women, 2 drinks/day for men)  -regular physical activity  -no smoking  -stress reduction    3.) CAD: No evidence of angina/ischemia  Continue asa, lipitor    Carlin Delcid is a 80 y.o. female being evaluated by a Virtual Visit (video visit) encounter to address concerns as mentioned above. A caregiver was present when appropriate. Due to this being a TeleHealth encounter (During [facility] public health emergency), evaluation of the following organ systems was limited: Vitals/Constitutional/EENT/Resp/CV/GI//MS/Neuro/Skin/Heme-Lymph-Imm. Pursuant to the emergency declaration under the [address] [place] [address], [address] [place] [address] authority and the [employer] Act, this Virtual Visit was conducted with patient's (and/or legal guardian's) consent, to reduce the patient's risk of exposure to COVID-19 and provide necessary medical care. The patient (and/or legal guardian) has also been advised to contact this office for worsening conditions or problems, and seek emergency medical treatment and/or call 911 if deemed necessary. Instructions:   1. Medications: Continue current meds  2. Labs: BNP, BMP 1 month  3. Follow up: 3 months Dr. Godfrey Weiss  4. Daily weight: Call for increase 3 lbs/day or 5 lbs/week. 5. 2 gm sodium diet:  6. Fluid Restriction: 64 oz.     I appreciate the opportunity of cooperating in the care of this individual.    LEONARDO Ortiz CNP, 12/22/2020,11:23 AM

## 2020-12-30 ENCOUNTER — TELEPHONE (OUTPATIENT)
Dept: CARDIOLOGY CLINIC | Age: 85
End: 2020-12-30

## 2020-12-31 NOTE — TELEPHONE ENCOUNTER
I saw patient on 12/7, checked her labs, her BUN and creat were slightly elevated so I decreased lasix to QOD. She saw Neph 12/9. Their note says she is taking lasix daily. Because Neph is in a different EMR, they may not have been aware of the change unless the patient told them. That could be the issue. Find out if she is taking daily or QOD and we can repeat labs. Dosing will be made based on her renal fx and S/S of hypervolemia.    Thanks

## 2020-12-31 NOTE — TELEPHONE ENCOUNTER
Dr Seema Sherman [facility] changed her Lasix to QD and we have every other day. Gayle Gauthier would like clarification on how her mother should be taking this medication. She was very frustrated and wanted us to be on the same page. Please advise.

## 2020-12-31 NOTE — TELEPHONE ENCOUNTER
Patient is taking once a day. The Neph doctor told her that it could be elevated due to taking aleve and advil and advised her to take tylenol instead. She is getting blood work and a renal is on 1/7.

## 2021-03-11 ENCOUNTER — HOSPITAL ENCOUNTER (EMERGENCY)
Age: 86
Discharge: HOME | End: 2021-03-11
Attending: EMERGENCY MEDICINE
Payer: MEDICARE

## 2021-03-11 ENCOUNTER — TELEPHONE (OUTPATIENT)
Dept: ORTHOPEDIC SURGERY | Age: 86
End: 2021-03-11

## 2021-03-11 VITALS
SYSTOLIC BLOOD PRESSURE: 163 MMHG | HEIGHT: 60 IN | BODY MASS INDEX: 29.82 KG/M2 | WEIGHT: 151.9 LBS | DIASTOLIC BLOOD PRESSURE: 61 MMHG | RESPIRATION RATE: 14 BRPM | HEART RATE: 68 BPM | OXYGEN SATURATION: 98 % | TEMPERATURE: 98.3 F

## 2021-03-11 DIAGNOSIS — L03.011 PARONYCHIA OF FINGER OF RIGHT HAND: Primary | ICD-10-CM

## 2021-03-11 PROCEDURE — 10060 I&D ABSCESS SIMPLE/SINGLE: CPT

## 2021-03-11 PROCEDURE — 99283 EMERGENCY DEPT VISIT LOW MDM: CPT

## 2021-03-11 PROCEDURE — 6370000000 HC RX 637 (ALT 250 FOR IP): Performed by: EMERGENCY MEDICINE

## 2021-03-11 RX ORDER — OXYCODONE HYDROCHLORIDE AND ACETAMINOPHEN 5; 325 MG/1; MG/1
1 TABLET ORAL EVERY 6 HOURS PRN
Qty: 16 TABLET | Refills: 0 | Status: SHIPPED | OUTPATIENT
Start: 2021-03-11 | End: 2021-03-15

## 2021-03-11 RX ORDER — CLINDAMYCIN HYDROCHLORIDE 150 MG/1
300 CAPSULE ORAL ONCE
Status: COMPLETED | OUTPATIENT
Start: 2021-03-11 | End: 2021-03-11

## 2021-03-11 RX ORDER — CLINDAMYCIN HYDROCHLORIDE 150 MG/1
450 CAPSULE ORAL 3 TIMES DAILY
Qty: 90 CAPSULE | Refills: 0 | Status: SHIPPED | OUTPATIENT
Start: 2021-03-11 | End: 2021-03-21

## 2021-03-11 RX ORDER — OXYCODONE HYDROCHLORIDE AND ACETAMINOPHEN 5; 325 MG/1; MG/1
1 TABLET ORAL ONCE
Status: COMPLETED | OUTPATIENT
Start: 2021-03-11 | End: 2021-03-11

## 2021-03-11 RX ADMIN — OXYCODONE HYDROCHLORIDE AND ACETAMINOPHEN 1 TABLET: 5; 325 TABLET ORAL at 09:47

## 2021-03-11 RX ADMIN — CLINDAMYCIN HYDROCHLORIDE 300 MG: 150 CAPSULE ORAL at 09:47

## 2021-03-11 ASSESSMENT — PAIN SCALES - GENERAL: PAINLEVEL_OUTOF10: 8

## 2021-03-11 ASSESSMENT — PAIN DESCRIPTION - ORIENTATION: ORIENTATION: RIGHT

## 2021-03-11 NOTE — ED PROVIDER NOTES
lymphoma    Cancer Brother         colon    Cancer Maternal Uncle      Social History     Socioeconomic History    Marital status:      Spouse name: Not on file    Number of children: Not on file    Years of education: Not on file    Highest education level: Not on file   Occupational History    Not on file   Social Needs    Financial resource strain: Not on file    Food insecurity     Worry: Not on file     Inability: Not on file    Transportation needs     Medical: Not on file     Non-medical: Not on file   Tobacco Use    Smoking status: Passive Smoke Exposure - Never Smoker    Smokeless tobacco: Never Used   Substance and Sexual Activity    Alcohol use: No     Comment: 1 yearly    Drug use: No    Sexual activity: Yes     Partners: Male   Lifestyle    Physical activity     Days per week: Not on file     Minutes per session: Not on file    Stress: Not on file   Relationships    Social connections     Talks on phone: Not on file     Gets together: Not on file     Attends Faith service: Not on file     Active member of club or organization: Not on file     Attends meetings of clubs or organizations: Not on file     Relationship status: Not on file    Intimate partner violence     Fear of current or ex partner: Not on file     Emotionally abused: Not on file     Physically abused: Not on file     Forced sexual activity: Not on file   Other Topics Concern    Not on file   Social History Narrative    Not on file     No current facility-administered medications for this encounter. Current Outpatient Medications   Medication Sig Dispense Refill    clindamycin (CLEOCIN) 150 MG capsule Take 3 capsules by mouth 3 times daily for 10 days 90 capsule 0    oxyCODONE-acetaminophen (PERCOCET) 5-325 MG per tablet Take 1 tablet by mouth every 6 hours as needed for Pain (CAUTION: Can cause dizziness, don't drive while taking.) for up to 4 days.  16 tablet 0    carvedilol (COREG) 12.5 MG tablet Take 1 tablet by mouth 2 times daily (with meals) 60 tablet 3    allopurinol (ZYLOPRIM) 100 MG tablet Take 0.5 tablets by mouth daily 30 tablet 3    Multiple Vitamins-Minerals (THERAPEUTIC MULTIVITAMIN-MINERALS) tablet Take 1 tablet by mouth daily      donepezil (ARICEPT) 5 MG tablet Take 5 mg by mouth nightly      furosemide (LASIX) 20 MG tablet Take 20 mg by mouth every other day      lisinopril (PRINIVIL;ZESTRIL) 2.5 MG tablet Take 2.5 mg by mouth daily      loratadine (CLARITIN) 10 MG capsule Take 10 mg by mouth daily as needed       vitamin D3 (CHOLECALCIFEROL) 10 MCG (400 UNIT) TABS tablet Take 400 Units by mouth daily      pantoprazole (PROTONIX) 40 MG tablet Take 1 tablet by mouth daily 30 tablet 3    FLUoxetine (PROZAC) 20 MG capsule TAKE 1 CAPSULE BY MOUTH ONE TIME A DAY  90 capsule 0    atorvastatin (LIPITOR) 20 MG tablet TAKE 1 TABLET BY MOUTH ONE TIME A DAY  90 tablet 3    aspirin 81 MG chewable tablet Take 1 tablet by mouth daily 30 tablet 9    Handicap Placard MISC by Does not apply route Patient is unable to walk more than 250 feet before stopping to rest.    Not to exceed 5 years. DX: CHF 1 each 0     Allergies   Allergen Reactions    Codeine Shortness Of Breath     Rash and dyspnea    Morphine And Related        REVIEW OF SYSTEMS  6 systems reviewed, pertinent positives per HPI otherwise noted to be negative    PHYSICAL EXAM   BP (!) 163/61   Pulse 68   Temp 98.3 °F (36.8 °C) (Oral)   Resp 14   Ht 5' (1.524 m)   Wt 151 lb 14.4 oz (68.9 kg)   SpO2 98%   BMI 29.67 kg/m²    GENERAL APPEARANCE: Awake and alert. Cooperative. No acute distress. HEAD: Normocephalic. Atraumatic. EYES: PERRL. EOM's grossly intact. ENT: Mucous membranes are moist.   NECK: Supple. Normal ROM. CHEST: Equal symmetric chest rise. LUNGS: Breathing is unlabored. Speaking comfortably in full sentences. ABDOMEN: Nondistended, nontender  SKIN: Warm and dry. No acute rashes.   NEUROLOGICAL: Alert and oriented. Strength is 5/5 in all extremities and sensation is intact. MUSCULOSKELETAL:  LUE: No tenderness. 2+ radial pulse. Brisk cap refill x5 digits. Sensation and motor function fully intact in the radial, ulnar, and median nerve distribution. Full range of motion of all major joints. Cardinal movements of hand fully intact. No erythema, bruising, or lacerations. Comparments are soft. RUE: Right little fingertip swelling tenderness and fluctuance noted with some erythema and warmth. There is no tenderness over the flexor aspect of the finger. Full range of motion of all major joints at this finger despite the swelling. The entire finger is minimally red proximal to the paronychia but there is no proximal streaking beyond the finger. No other finger involvement. No other RUE tenderness. 2+ radial pulse. Brisk cap refill x5 digits. Sensation and motor function fully intact in the radial, ulnar, and median nerve distribution. Full range of motion of all major joints. Cardinal movements of hand fully intact. No other erythema, bruising, or lacerations. Comparments are soft. ED COURSE/MDM  The patient's ED course was notable for right little finger paronychia as depicted above. Incision and drainage performed per the note below. I will change the patient's antibiotics to clindamycin. At this time there is no clinical evidence of flexor tenosynovitis, systemic illness or sepsis. No indication for x-ray imaging given the lack of trauma. Follow-up with hand specialist in several days for reassessment and return precautions for any new or worsening symptoms or rapid progression of infection in the interim. Warm soaks advised multiple times a day. Incision and Drainage Procedure Note    Indication: paronychia    Consent: The patient was counseled regarding the procedure, it's indications, risks, potential complications and alternatives and any questions were answered.  Consent was obtained. Procedure: The patient was positioned appropriately and the skin over the incision site was prepped with betadine. Local anesthesia was obtained with a full digital block of the right short (pinkie) finger using 1% Lidocaine without epinephrine. Using a scalpel, an incision was then made parallel to the nail plate to lift the cuticle and express bloody purulent material.  About 3cc of purulent and bloody material was expressed. Loculations were not present. The drainage cavity was then irrigated. Pressure held until bleeding stopped. The patients tetanus status was up to date and did not require a booster dose. The patient tolerated the procedure well. Complications: None      Patient was given scripts for the following medications. I counseled patient how to take these medications. New Prescriptions    CLINDAMYCIN (CLEOCIN) 150 MG CAPSULE    Take 3 capsules by mouth 3 times daily for 10 days    OXYCODONE-ACETAMINOPHEN (PERCOCET) 5-325 MG PER TABLET    Take 1 tablet by mouth every 6 hours as needed for Pain (CAUTION: Can cause dizziness, don't drive while taking.) for up to 4 days. CLINICAL IMPRESSION  1. Paronychia of finger of right hand        Blood pressure (!) 163/61, pulse 68, temperature 98.3 °F (36.8 °C), temperature source Oral, resp. rate 14, height 5' (1.524 m), weight 151 lb 14.4 oz (68.9 kg), SpO2 98 %, not currently breastfeeding. DISPOSITION    I have discussed the findings of today's workup with the patient and addressed the patient's questions and concerns. Important warning signs as well as new or worsening symptoms which would necessitate immediate return to the ED were discussed. The plan is to discharge from the ED at this time, and the patient is in stable condition. The patient acknowledged understanding is agreeable with this plan.       Follow-up with:  Osmel Jones MD  [address] [place] [id number]  [phone]    Schedule an appointment as soon as possible for a visit in 3 days  For wound re-check    [address] [facility] [place] [address]  [phone]  Go to   If symptoms worsen      This chart was created using Dragon dictation software. Efforts were made by me to ensure accuracy, however some errors may be present due to limitations of this technology.         Callie Thornton MD  03/11/21 1024

## 2021-03-11 NOTE — TELEPHONE ENCOUNTER
Spoke with Arturo Peña.  Patient is scheduled for Monday, 3/15 with GRETCHEN at [facility] office

## 2021-03-11 NOTE — TELEPHONE ENCOUNTER
PATIENT IS TRYING TO GET HER MOTHER IN SHE WAS SEEN AT [address] [place] [address] AND WAS TOLD SHE NEEDED TO GET IN NO LATER THAN THE 15TH OF MARCH. I SCHEDULE HER WITH [facility] FOR NOW ON THE 17TH BECAUSE THERE WAS NO OPENINGS FOR ANABELLE GUADARRAMA OR [facility]NA. PLEASE CALL NORBERT BACK [phone] TO LET HER KNOW IF SHE CAN BE SEEN SOONER.

## 2021-03-13 NOTE — ED NOTES
dc'd to home  Sterile dressing placed  Aware to follow up with pmd  To return worsening or changes  Daughter lives with her also aware  Sterile dressing placed     Margie Downey RN  03/12/21 9831

## 2021-03-15 ENCOUNTER — OFFICE VISIT (OUTPATIENT)
Dept: ORTHOPEDIC SURGERY | Age: 86
End: 2021-03-15
Payer: MEDICARE

## 2021-03-15 VITALS — WEIGHT: 150 LBS | HEIGHT: 60 IN | TEMPERATURE: 97.2 F | BODY MASS INDEX: 29.45 KG/M2

## 2021-03-15 DIAGNOSIS — L03.011 PARONYCHIA OF FINGER, RIGHT: Primary | ICD-10-CM

## 2021-03-15 PROCEDURE — 99203 OFFICE O/P NEW LOW 30 MIN: CPT | Performed by: ORTHOPAEDIC SURGERY

## 2021-03-15 NOTE — PROGRESS NOTES
This 80 y.o. right hand dominant retired woman is seen in referral for the ED at [facility] with a chief complaint of infection of their right little finger. This has been present for 1 week. There is no history of injury. There is associated pain, discoloration or stiffness. Treatment has been done in the ED (I&D, antibiotics, warm soaks). There is no similar involvement of other areas of the extremities. Symptoms  has significantly improved recently. The pain assessment has been reviewed and is correct. The patient's social history, past medical history, family history, medications, allergies and review of systems, entered 3/15/21, have been reviewed, and dated and are recorded in the chart. On physical examination the patient is Height: 5' (152.4 cm) tall and weighs Weight: 150 lb (68 kg). Respirations ane 18 per minute. The patient is well nourished, is oriented to time and place, demonstrates appropriate mood and affect as well as normal gait and station. There is mild swelling and erythema of the dorsal aspect of the right little finger, with evidence of recent drainage of the eponychial fold. .  There minimal associated tenderness. Range of motion of the fingers, thumbs, wrists and elbows is full and painless bilaterally. Distal sensation and circulation are normal.  All joints are stable. There are no subcutaneous masses or enlarged epitrochlear lymph nodes. Impression: Paronychia right little finger responding well to appropriate treatment. The nature of this medical problem is fully discussed with the patient and her daughter including all treatment options. All questions are answered. She is to continue treatment as recommended by the ED. No additional surgical treatment is indicated at this time. The usual course of events in the resolution of the symptoms associated with this condition is fully discussed with the patient.   As long as they progress as expected, they do not need to return for further follow up. They are, however, urged to call or return if they have questions or concerns.

## 2021-03-29 RX ORDER — CARVEDILOL 12.5 MG/1
12.5 TABLET ORAL 2 TIMES DAILY WITH MEALS
Qty: 60 TABLET | Refills: 3 | Status: ON HOLD | OUTPATIENT
Start: 2021-03-29 | End: 2021-10-31 | Stop reason: SDUPTHER

## 2021-08-24 ENCOUNTER — APPOINTMENT (OUTPATIENT)
Dept: GENERAL RADIOLOGY | Age: 86
End: 2021-08-24
Payer: MEDICARE

## 2021-08-24 ENCOUNTER — HOSPITAL ENCOUNTER (EMERGENCY)
Age: 86
Discharge: HOME | End: 2021-08-24
Payer: MEDICARE

## 2021-08-24 VITALS
SYSTOLIC BLOOD PRESSURE: 167 MMHG | TEMPERATURE: 98.7 F | HEART RATE: 75 BPM | RESPIRATION RATE: 16 BRPM | BODY MASS INDEX: 28.89 KG/M2 | DIASTOLIC BLOOD PRESSURE: 78 MMHG | WEIGHT: 147.93 LBS | OXYGEN SATURATION: 96 %

## 2021-08-24 DIAGNOSIS — R06.2 WHEEZING: ICD-10-CM

## 2021-08-24 DIAGNOSIS — R05.9 COUGH: ICD-10-CM

## 2021-08-24 DIAGNOSIS — N39.0 URINARY TRACT INFECTION WITHOUT HEMATURIA, SITE UNSPECIFIED: Primary | ICD-10-CM

## 2021-08-24 LAB
ANION GAP SERPL CALCULATED.3IONS-SCNC: 16 MMOL/L (ref 3–16)
BACTERIA: ABNORMAL /HPF
BASOPHILS ABSOLUTE: 0.1 K/UL (ref 0–0.2)
BASOPHILS RELATIVE PERCENT: 0.6 %
BILIRUBIN URINE: NEGATIVE
BLOOD, URINE: NEGATIVE
BUN BLDV-MCNC: 29 MG/DL (ref 7–20)
CALCIUM SERPL-MCNC: 8.8 MG/DL (ref 8.3–10.6)
CHLORIDE BLD-SCNC: 100 MMOL/L (ref 99–110)
CLARITY: ABNORMAL
CO2: 18 MMOL/L (ref 21–32)
COLOR: YELLOW
COMMENT UA: ABNORMAL
CREAT SERPL-MCNC: 1.8 MG/DL (ref 0.6–1.2)
EOSINOPHILS ABSOLUTE: 0.1 K/UL (ref 0–0.6)
EOSINOPHILS RELATIVE PERCENT: 1.2 %
EPITHELIAL CELLS, UA: 10 /HPF (ref 0–5)
GFR AFRICAN AMERICAN: 32
GFR NON-AFRICAN AMERICAN: 27
GLUCOSE BLD-MCNC: 117 MG/DL (ref 70–99)
GLUCOSE URINE: NEGATIVE MG/DL
HCT VFR BLD CALC: 35.8 % (ref 36–48)
HEMATOLOGY PATH CONSULT: NO
HEMOGLOBIN: 12.1 G/DL (ref 12–16)
HYALINE CASTS: 3 /LPF (ref 0–8)
KETONES, URINE: NEGATIVE MG/DL
LEUKOCYTE ESTERASE, URINE: ABNORMAL
LYMPHOCYTES ABSOLUTE: 3 K/UL (ref 1–5.1)
LYMPHOCYTES RELATIVE PERCENT: 28.4 %
MCH RBC QN AUTO: 30.7 PG (ref 26–34)
MCHC RBC AUTO-ENTMCNC: 33.8 G/DL (ref 31–36)
MCV RBC AUTO: 90.9 FL (ref 80–100)
MICROSCOPIC EXAMINATION: YES
MONOCYTES ABSOLUTE: 1.8 K/UL (ref 0–1.3)
MONOCYTES RELATIVE PERCENT: 17.4 %
MUCUS: ABNORMAL /LPF
NEUTROPHILS ABSOLUTE: 5.6 K/UL (ref 1.7–7.7)
NEUTROPHILS RELATIVE PERCENT: 52.4 %
NITRITE, URINE: NEGATIVE
PDW BLD-RTO: 14.2 % (ref 12.4–15.4)
PH UA: 5.5 (ref 5–8)
PLATELET # BLD: 343 K/UL (ref 135–450)
PMV BLD AUTO: 8.8 FL (ref 5–10.5)
POTASSIUM REFLEX MAGNESIUM: 4.4 MMOL/L (ref 3.5–5.1)
PRO-BNP: 1877 PG/ML (ref 0–449)
PROTEIN UA: NEGATIVE MG/DL
RBC # BLD: 3.94 M/UL (ref 4–5.2)
RBC UA: 4 /HPF (ref 0–4)
SODIUM BLD-SCNC: 134 MMOL/L (ref 136–145)
SPECIFIC GRAVITY UA: 1.02 (ref 1–1.03)
TROPONIN: <0.01 NG/ML
URINE REFLEX TO CULTURE: YES
URINE TYPE: ABNORMAL
UROBILINOGEN, URINE: 0.2 E.U./DL
WBC # BLD: 10.6 K/UL (ref 4–11)
WBC UA: 28 /HPF (ref 0–5)

## 2021-08-24 PROCEDURE — 99284 EMERGENCY DEPT VISIT MOD MDM: CPT

## 2021-08-24 PROCEDURE — 94760 N-INVAS EAR/PLS OXIMETRY 1: CPT

## 2021-08-24 PROCEDURE — 87086 URINE CULTURE/COLONY COUNT: CPT

## 2021-08-24 PROCEDURE — 87088 URINE BACTERIA CULTURE: CPT

## 2021-08-24 PROCEDURE — 84484 ASSAY OF TROPONIN QUANT: CPT

## 2021-08-24 PROCEDURE — 83880 ASSAY OF NATRIURETIC PEPTIDE: CPT

## 2021-08-24 PROCEDURE — 85025 COMPLETE CBC W/AUTO DIFF WBC: CPT

## 2021-08-24 PROCEDURE — 6370000000 HC RX 637 (ALT 250 FOR IP): Performed by: PHYSICIAN ASSISTANT

## 2021-08-24 PROCEDURE — 81001 URINALYSIS AUTO W/SCOPE: CPT

## 2021-08-24 PROCEDURE — 71046 X-RAY EXAM CHEST 2 VIEWS: CPT

## 2021-08-24 PROCEDURE — 96374 THER/PROPH/DIAG INJ IV PUSH: CPT

## 2021-08-24 PROCEDURE — 87186 SC STD MICRODIL/AGAR DIL: CPT

## 2021-08-24 PROCEDURE — 93005 ELECTROCARDIOGRAM TRACING: CPT | Performed by: STUDENT IN AN ORGANIZED HEALTH CARE EDUCATION/TRAINING PROGRAM

## 2021-08-24 PROCEDURE — 2580000003 HC RX 258: Performed by: PHYSICIAN ASSISTANT

## 2021-08-24 PROCEDURE — U0003 INFECTIOUS AGENT DETECTION BY NUCLEIC ACID (DNA OR RNA); SEVERE ACUTE RESPIRATORY SYNDROME CORONAVIRUS 2 (SARS-COV-2) (CORONAVIRUS DISEASE [COVID-19]), AMPLIFIED PROBE TECHNIQUE, MAKING USE OF HIGH THROUGHPUT TECHNOLOGIES AS DESCRIBED BY CMS-2020-01-R: HCPCS

## 2021-08-24 PROCEDURE — U0005 INFEC AGEN DETEC AMPLI PROBE: HCPCS

## 2021-08-24 PROCEDURE — 94640 AIRWAY INHALATION TREATMENT: CPT

## 2021-08-24 PROCEDURE — 80048 BASIC METABOLIC PNL TOTAL CA: CPT

## 2021-08-24 PROCEDURE — 6360000002 HC RX W HCPCS: Performed by: PHYSICIAN ASSISTANT

## 2021-08-24 RX ORDER — SACCHAROMYCES BOULARDII 250 MG
250 CAPSULE ORAL 2 TIMES DAILY
Qty: 14 CAPSULE | Refills: 0 | Status: SHIPPED | OUTPATIENT
Start: 2021-08-24 | End: 2021-08-31

## 2021-08-24 RX ORDER — IPRATROPIUM BROMIDE AND ALBUTEROL SULFATE 2.5; .5 MG/3ML; MG/3ML
1 SOLUTION RESPIRATORY (INHALATION) EVERY 6 HOURS PRN
Qty: 360 ML | Refills: 0 | Status: SHIPPED | OUTPATIENT
Start: 2021-08-24

## 2021-08-24 RX ORDER — BENZONATATE 100 MG/1
200 CAPSULE ORAL ONCE
Status: COMPLETED | OUTPATIENT
Start: 2021-08-24 | End: 2021-08-24

## 2021-08-24 RX ORDER — PREDNISONE 20 MG/1
40 TABLET ORAL DAILY
Qty: 8 TABLET | Refills: 0 | Status: SHIPPED | OUTPATIENT
Start: 2021-08-24 | End: 2021-08-28

## 2021-08-24 RX ORDER — 0.9 % SODIUM CHLORIDE 0.9 %
250 INTRAVENOUS SOLUTION INTRAVENOUS ONCE
Status: COMPLETED | OUTPATIENT
Start: 2021-08-24 | End: 2021-08-24

## 2021-08-24 RX ORDER — NEBULIZER ACCESSORIES
1 KIT MISCELLANEOUS DAILY PRN
Qty: 1 KIT | Refills: 0 | Status: SHIPPED | OUTPATIENT
Start: 2021-08-24

## 2021-08-24 RX ORDER — IPRATROPIUM BROMIDE AND ALBUTEROL SULFATE 2.5; .5 MG/3ML; MG/3ML
2 SOLUTION RESPIRATORY (INHALATION) ONCE
Status: COMPLETED | OUTPATIENT
Start: 2021-08-24 | End: 2021-08-24

## 2021-08-24 RX ORDER — LEVOFLOXACIN 750 MG/1
750 TABLET ORAL DAILY
Qty: 5 TABLET | Refills: 0 | Status: SHIPPED | OUTPATIENT
Start: 2021-08-24 | End: 2021-08-29

## 2021-08-24 RX ORDER — METHYLPREDNISOLONE SODIUM SUCCINATE 125 MG/2ML
125 INJECTION, POWDER, LYOPHILIZED, FOR SOLUTION INTRAMUSCULAR; INTRAVENOUS ONCE
Status: COMPLETED | OUTPATIENT
Start: 2021-08-24 | End: 2021-08-24

## 2021-08-24 RX ADMIN — IPRATROPIUM BROMIDE AND ALBUTEROL SULFATE 2 AMPULE: .5; 3 SOLUTION RESPIRATORY (INHALATION) at 19:18

## 2021-08-24 RX ADMIN — METHYLPREDNISOLONE SODIUM SUCCINATE 125 MG: 125 INJECTION, POWDER, FOR SOLUTION INTRAMUSCULAR; INTRAVENOUS at 19:32

## 2021-08-24 RX ADMIN — BENZONATATE 200 MG: 100 CAPSULE ORAL at 20:59

## 2021-08-24 RX ADMIN — SODIUM CHLORIDE 250 ML: 9 INJECTION, SOLUTION INTRAVENOUS at 19:31

## 2021-08-24 ASSESSMENT — ENCOUNTER SYMPTOMS
VOMITING: 0
ABDOMINAL PAIN: 0
WHEEZING: 1
COLOR CHANGE: 0
DIARRHEA: 1
COUGH: 1
SHORTNESS OF BREATH: 1

## 2021-08-24 ASSESSMENT — PAIN SCALES - GENERAL: PAINLEVEL_OUTOF10: 0

## 2021-08-25 ENCOUNTER — CARE COORDINATION (OUTPATIENT)
Dept: CARE COORDINATION | Age: 86
End: 2021-08-25

## 2021-08-25 LAB
EKG ATRIAL RATE: 82 BPM
EKG DIAGNOSIS: NORMAL
EKG P-R INTERVAL: 216 MS
EKG Q-T INTERVAL: 376 MS
EKG QRS DURATION: 110 MS
EKG QTC CALCULATION (BAZETT): 439 MS
EKG R AXIS: -57 DEGREES
EKG T AXIS: 93 DEGREES
EKG VENTRICULAR RATE: 82 BPM
SARS-COV-2: NOT DETECTED

## 2021-08-25 PROCEDURE — 93010 ELECTROCARDIOGRAM REPORT: CPT | Performed by: INTERNAL MEDICINE

## 2021-08-25 NOTE — ED NOTES
Urine sample collected at 2020, Covid-19 sample collected at 2030, both sent to lab. Tessalon pearls administered for cough.       Nabilparish Ortiz, PREM  08/24/21 2040

## 2021-08-25 NOTE — ED NOTES
Pt sitting O2 saturation 100% on RA; on ambulation pt O2 was 92%. Notified EDPA.       Armin Zepeda RN  08/24/21 2002

## 2021-08-25 NOTE — ED NOTES
Discharge and education instructions reviewed. Patient verbalized understanding, teach-back successful. Patient denied questions at this time. No acute distress noted. Patient instructed to follow-up as noted - return to emergency department if symptoms worsen. Patient verbalized understanding. Discharged per EDMD with discharged instructions.        Niru Lux RN  08/24/21 8867

## 2021-08-25 NOTE — ED PROVIDER NOTES
[facility]      Pt Name: Francoise Gabriel  MRN: [id number]  [place] 1935  Date of evaluation: 8/24/2021  Provider: RAMAN Lira    This patient was not seen and evaluated by the attending physician No att. providers found. CHIEF COMPLAINT       Chief Complaint   Patient presents with    URI     per the daughter for 2 weeks she has been having SOB        CRITICAL CARE TIME   I performed a total Critical Care time of 15 minutes, excluding separately reportable procedures. There was a high probability of clinically significant/life threatening deterioration in the patient's condition which required my urgent intervention. Not limited to multiple reexaminations, discussions with attending physician and consultants. HISTORY OF PRESENT ILLNESS  (Location/Symptom, Timing/Onset, Context/Setting, Quality, Duration, Modifying Factors, Severity.)   Francoise Gabriel is a 80 y.o. female who presents to the emergency department accompanied by her daughter. Daughter states that the patient has been sick for about 2 weeks. Patient's having a dry hacking cough. \"Everyone else in the house has been sick. \"  She has been vaccinated against COVID-19. She had some diarrhea after being started on amoxicillin a couple days ago for urinary tract infection. She denies chest pain or fevers. She has had some shortness of breath and wheezing. She has no prior history of asthma or COPD. History of chronic kidney disease, CAD and hypertension. Nursing Notes were reviewed and I agree. REVIEW OF SYSTEMS    (2-9 systems for level 4, 10 or more for level 5)     Review of Systems   Constitutional: Negative for fever. Respiratory: Positive for cough, shortness of breath and wheezing. Cardiovascular: Negative for chest pain and leg swelling. Gastrointestinal: Positive for diarrhea. Negative for abdominal pain and vomiting.    Musculoskeletal: Negative for gait problem. Skin: Negative for color change and rash. Neurological: Negative for weakness and numbness. Psychiatric/Behavioral: Negative for agitation, behavioral problems and confusion. Except as noted above the remainder of the review of systems was reviewed and negative.        PAST MEDICAL HISTORY         Diagnosis Date    Chronic kidney disease (CKD), stage IV (severe) ([facility] [address]     Coronary artery disease involving native coronary artery of native heart without angina pectoris 1/5/2016    GERD (gastroesophageal reflux disease) acid reflux    Gout, chronic     Histoplasmosis     Hyperlipidemia     Hypertension     Irregular heart beat     Osteoarthritis 2/28/2019    Knee, leg     Osteopenia of hip 04/26/2019    Pneumonia     Unspecified cerebral artery occlusion with cerebral infarction 2014 , ~ 2012       SURGICAL HISTORY           Procedure Laterality Date    BLADDER SUSPENSION      CHOLECYSTECTOMY  Orthopedic surgery Left arm    HYSTERECTOMY      TUBAL LIGATION         CURRENT MEDICATIONS       Discharge Medication List as of 8/24/2021  9:10 PM      CONTINUE these medications which have NOT CHANGED    Details   carvedilol (COREG) 12.5 MG tablet Take 1 tablet by mouth 2 times daily (with meals), Disp-60 tablet, R-3Normal      allopurinol (ZYLOPRIM) 100 MG tablet Take 0.5 tablets by mouth daily, Disp-30 tablet,R-3Normal      Multiple Vitamins-Minerals (THERAPEUTIC MULTIVITAMIN-MINERALS) tablet Take 1 tablet by mouth dailyHistorical Med      donepezil (ARICEPT) 5 MG tablet Take 5 mg by mouth nightlyHistorical Med      furosemide (LASIX) 20 MG tablet Take 20 mg by mouth every other dayHistorical Med      lisinopril (PRINIVIL;ZESTRIL) 2.5 MG tablet Take 2.5 mg by mouth dailyHistorical Med      loratadine (CLARITIN) 10 MG capsule Take 10 mg by mouth daily as needed Historical Med      vitamin D3 (CHOLECALCIFEROL) 10 MCG (400 UNIT) TABS tablet Take 400 Units by mouth dailyHistorical Med      pantoprazole (PROTONIX) 40 MG tablet Take 1 tablet by mouth daily, Disp-30 tablet, R-3Normal      FLUoxetine (PROZAC) 20 MG capsule TAKE 1 CAPSULE BY MOUTH ONE TIME A DAY , Disp-90 capsule, R-0Normal      atorvastatin (LIPITOR) 20 MG tablet TAKE 1 TABLET BY MOUTH ONE TIME A DAY , Disp-90 tablet, R-3Normal      aspirin 81 MG chewable tablet Take 1 tablet by mouth daily, Disp-30 tablet, R-9Normal      Handicap Placard MISC Starting 2016, Until Discontinued, Disp-1 each, R-0, PrintPatient is unable to walk more than 250 feet before stopping to rest.   Not to exceed 5 years. DX: CHF             ALLERGIES     Codeine and Morphine and related    FAMILY HISTORY           Problem Relation Age of Onset    High Blood Pressure Mother     Heart Disease Mother     Diabetes Mother     High Blood Pressure Father     Heart Disease Father     Cancer Father         Brain    Cancer Sister         lymphoma    Cancer Brother         colon    Cancer Maternal Uncle      Family Status   Relation Name Status    Mother     [facility] Father      Sister  (Not Specified)    Brother  (Not Specified)    MUnc  (Not Specified)        SOCIAL HISTORY      reports that she is a non-smoker but has been exposed to tobacco smoke. She has never used smokeless tobacco. She reports that she does not drink alcohol and does not use drugs. PHYSICAL EXAM    (up to 7 for level 4, 8 or more for level 5)     ED Triage Vitals [21 1741]   BP Temp Temp Source Pulse Resp SpO2 Height Weight   131/63 99.1 °F (37.3 °C) Temporal 86 16 91 % -- 147 lb 14.9 oz (67.1 kg)       Physical Exam  Vitals and nursing note reviewed. Constitutional:       Appearance: Normal appearance. HENT:      Head: Normocephalic and atraumatic. Mouth/Throat:      Mouth: Mucous membranes are moist.   Eyes:      Pupils: Pupils are equal, round, and reactive to light. Cardiovascular:      Rate and Rhythm: Normal rate.       Pulses: Laboratory  [address] [facility] [place] [address]   Phone [phone]   BRAIN NATRIURETIC PEPTIDE - Abnormal; Notable for the following components:    Pro-BNP 1,877 (*)     All other components within normal limits    Narrative:     Performed at:  [facility]  [address] [facility] [place] [address]   Phone [phone]   URINE RT REFLEX TO CULTURE - Abnormal; Notable for the following components:    Clarity, UA CLOUDY (*)     Leukocyte Esterase, Urine SMALL (*)     All other components within normal limits    Narrative:     Performed at:  [facility]  [address] [facility] [place] [address]   Phone [phone]   MICROSCOPIC URINALYSIS - Abnormal; Notable for the following components:    Mucus, UA Rare (*)     Bacteria, UA 1+ (*)     WBC, UA 28 (*)     Epithelial Cells, UA 10 (*)     All other components within normal limits    Narrative:     Performed at:  [facility]  [address] [facility] [place] [address]   Phone [phone]   CULTURE, URINE   TROPONIN    Narrative:     Performed at:  [facility]  [address] [facility] [place] [address]   Phone [phone]       All other labs were within normal range or not returned as of this dictation. EMERGENCY DEPARTMENT COURSE and DIFFERENTIAL DIAGNOSIS/MDM:   Vitals:    Vitals:    08/24/21 1741 08/24/21 1907 08/24/21 1919 08/24/21 2109   BP: 131/63   (!) 167/78   Pulse: 86   75   Resp: 16      Temp: 99.1 °F (37.3 °C)   98.7 °F (37.1 °C)   TempSrc: Temporal   Oral   SpO2: 91% 98% 100% 96%   Weight: 147 lb 14.9 oz (67.1 kg)        I discussed with Evelyn Quezada and/or family the exam results, diagnosis, care, prognosis, reasons to return and the importance of follow up. Patient and/or family is in full agreement with plan and all questions have been answered.   Specific discharge instructions explained, including reasons to return to the emergency department. Claudia Serrato is well appearing, non-toxic, and afebrile at the time of discharge. Patient is afebrile. At rest oxygen saturations 9200%. With ambulation dropped towards 94%. She had wheezing diffusely with improvement after breathing treatments and steroids. She is currently on low-dose amoxicillin at home for urinary tract infection. Has evidence of UTI plus cough shortness of breath. Will cover with Levaquin and steroids. I offered admission and discussed admission with the patient and daughter. They would prefer to go home. They understand return precautions including monitoring her oxygen saturations, fevers or worsening. I estimate there is LOW risk for PULMONARY EMBOLISM, PULMONARY EDEMA, PNEUMONIA, PNEUMOTHORAX, STATUS ASTHMATICUS, ACUTE RESPIRATORY FAILURE, OR ACUTE CORONARY SYNDROME, thus I consider the discharge disposition reasonable. CONSULTS:  None    PROCEDURES:  Procedures      FINAL IMPRESSION      1. Urinary tract infection without hematuria, site unspecified    2. Cough    3. Wheezing          DISPOSITION/PLAN   DISPOSITION Decision To Discharge 08/24/2021 08:51:11 PM      PATIENT REFERRED TO:  No follow-up provider specified.     DISCHARGE MEDICATIONS:  Discharge Medication List as of 8/24/2021  9:10 PM      START taking these medications    Details   levoFLOXacin (LEVAQUIN) 750 MG tablet Take 1 tablet by mouth daily for 5 days, Disp-5 tablet, R-0Print      Respiratory Therapy Supplies (NEBULIZER/TUBING/MOUTHPIECE) KIT DAILY PRN Starting Tue 8/24/2021, Disp-1 kit, R-0, Print      predniSONE (DELTASONE) 20 MG tablet Take 2 tablets by mouth daily for 4 days, Disp-8 tablet, R-0Print      ipratropium-albuterol (DUONEB) 0.5-2.5 (3) MG/3ML SOLN nebulizer solution Inhale 3 mLs into the lungs every 6 hours as needed for Shortness of Breath, Disp-360 mL, R-0Print      saccharomyces boulardii (FLORASTOR) 250 MG capsule Take 1 capsule by mouth 2 times daily for 7 days, Disp-14 capsule, R-0Print             (Please note that portions of this note were completed with a voice recognition program.  Efforts were made to edit the dictations but occasionally words are mis-transcribed.)    Angela Madrigal, [address], [address]  08/24/21 7023

## 2021-08-26 LAB
ORGANISM: ABNORMAL
URINE CULTURE, ROUTINE: ABNORMAL

## 2021-08-26 NOTE — CARE COORDINATION
Patient contacted regarding recent visit for viral symptoms. Call within 2 business days of discharge: Yes     contacted the patient by telephone to perform follow-up call. Placed another outreach call to pt regarding ED visit on 8.24.21. Left message to return call, no further attempts will be made.     Larry Toscano  [phone]

## 2021-09-09 ENCOUNTER — HOSPITAL ENCOUNTER (INPATIENT)
Age: 86
LOS: 2 days | Discharge: HOME | DRG: 871 | End: 2021-09-12
Attending: EMERGENCY MEDICINE | Admitting: STUDENT IN AN ORGANIZED HEALTH CARE EDUCATION/TRAINING PROGRAM
Payer: MEDICARE

## 2021-09-09 ENCOUNTER — APPOINTMENT (OUTPATIENT)
Dept: GENERAL RADIOLOGY | Age: 86
DRG: 871 | End: 2021-09-09
Payer: MEDICARE

## 2021-09-09 DIAGNOSIS — N17.9 AKI (ACUTE KIDNEY INJURY) (HCC): ICD-10-CM

## 2021-09-09 DIAGNOSIS — K92.2 GASTROINTESTINAL HEMORRHAGE, UNSPECIFIED GASTROINTESTINAL HEMORRHAGE TYPE: Primary | ICD-10-CM

## 2021-09-09 DIAGNOSIS — E87.20 LACTIC ACIDOSIS: ICD-10-CM

## 2021-09-09 PROCEDURE — 71045 X-RAY EXAM CHEST 1 VIEW: CPT

## 2021-09-09 RX ORDER — PANTOPRAZOLE SODIUM 40 MG/10ML
40 INJECTION, POWDER, LYOPHILIZED, FOR SOLUTION INTRAVENOUS ONCE
Status: COMPLETED | OUTPATIENT
Start: 2021-09-10 | End: 2021-09-10

## 2021-09-09 RX ORDER — 0.9 % SODIUM CHLORIDE 0.9 %
500 INTRAVENOUS SOLUTION INTRAVENOUS ONCE
Status: COMPLETED | OUTPATIENT
Start: 2021-09-10 | End: 2021-09-10

## 2021-09-09 RX ORDER — FAMOTIDINE 20 MG/1
20 TABLET, FILM COATED ORAL ONCE
Status: COMPLETED | OUTPATIENT
Start: 2021-09-10 | End: 2021-09-10

## 2021-09-09 ASSESSMENT — PAIN SCALES - GENERAL: PAINLEVEL_OUTOF10: 0

## 2021-09-10 ENCOUNTER — ANESTHESIA EVENT (OUTPATIENT)
Dept: ENDOSCOPY | Age: 86
DRG: 871 | End: 2021-09-10
Payer: MEDICARE

## 2021-09-10 ENCOUNTER — ANESTHESIA (OUTPATIENT)
Dept: ENDOSCOPY | Age: 86
DRG: 871 | End: 2021-09-10
Payer: MEDICARE

## 2021-09-10 ENCOUNTER — APPOINTMENT (OUTPATIENT)
Dept: CT IMAGING | Age: 86
DRG: 871 | End: 2021-09-10
Payer: MEDICARE

## 2021-09-10 VITALS — OXYGEN SATURATION: 100 % | SYSTOLIC BLOOD PRESSURE: 156 MMHG | DIASTOLIC BLOOD PRESSURE: 70 MMHG

## 2021-09-10 PROBLEM — K92.2 GI BLEED: Status: ACTIVE | Noted: 2021-09-10

## 2021-09-10 PROBLEM — I50.9 CHRONIC HEART FAILURE (HCC): Status: ACTIVE | Noted: 2021-09-10

## 2021-09-10 PROBLEM — A41.9 SEPSIS (HCC): Status: ACTIVE | Noted: 2021-09-10

## 2021-09-10 PROBLEM — R19.7 DIARRHEA: Status: ACTIVE | Noted: 2021-09-10

## 2021-09-10 PROBLEM — K92.2 UPPER GI BLEED: Status: ACTIVE | Noted: 2021-09-10

## 2021-09-10 LAB
A/G RATIO: 1 (ref 1.1–2.2)
ABO/RH: NORMAL
ALBUMIN SERPL-MCNC: 3.5 G/DL (ref 3.4–5)
ALP BLD-CCNC: 54 U/L (ref 40–129)
ALT SERPL-CCNC: 10 U/L (ref 10–40)
ANION GAP SERPL CALCULATED.3IONS-SCNC: 12 MMOL/L (ref 3–16)
ANION GAP SERPL CALCULATED.3IONS-SCNC: 16 MMOL/L (ref 3–16)
ANTIBODY SCREEN: NORMAL
AST SERPL-CCNC: 16 U/L (ref 15–37)
BASOPHILS ABSOLUTE: 0.1 K/UL (ref 0–0.2)
BASOPHILS RELATIVE PERCENT: 0.6 %
BILIRUB SERPL-MCNC: 0.3 MG/DL (ref 0–1)
BILIRUBIN URINE: NEGATIVE
BLOOD, URINE: ABNORMAL
BUN BLDV-MCNC: 36 MG/DL (ref 7–20)
BUN BLDV-MCNC: 45 MG/DL (ref 7–20)
CALCIUM SERPL-MCNC: 9.1 MG/DL (ref 8.3–10.6)
CALCIUM SERPL-MCNC: 9.7 MG/DL (ref 8.3–10.6)
CHLORIDE BLD-SCNC: 104 MMOL/L (ref 99–110)
CHLORIDE BLD-SCNC: 97 MMOL/L (ref 99–110)
CLARITY: CLEAR
CO2: 23 MMOL/L (ref 21–32)
CO2: 23 MMOL/L (ref 21–32)
COLOR: YELLOW
CREAT SERPL-MCNC: 1.3 MG/DL (ref 0.6–1.2)
CREAT SERPL-MCNC: 1.3 MG/DL (ref 0.6–1.2)
EKG ATRIAL RATE: 91 BPM
EKG DIAGNOSIS: NORMAL
EKG P AXIS: 54 DEGREES
EKG P-R INTERVAL: 192 MS
EKG Q-T INTERVAL: 406 MS
EKG QRS DURATION: 146 MS
EKG QTC CALCULATION (BAZETT): 499 MS
EKG R AXIS: -59 DEGREES
EKG T AXIS: 110 DEGREES
EKG VENTRICULAR RATE: 91 BPM
EOSINOPHILS ABSOLUTE: 0.4 K/UL (ref 0–0.6)
EOSINOPHILS RELATIVE PERCENT: 2.4 %
EPITHELIAL CELLS, UA: 7 /HPF (ref 0–5)
GFR AFRICAN AMERICAN: 47
GFR AFRICAN AMERICAN: 47
GFR NON-AFRICAN AMERICAN: 39
GFR NON-AFRICAN AMERICAN: 39
GLOBULIN: 3.4 G/DL
GLUCOSE BLD-MCNC: 122 MG/DL (ref 70–99)
GLUCOSE BLD-MCNC: 168 MG/DL (ref 70–99)
GLUCOSE URINE: NEGATIVE MG/DL
HCT VFR BLD CALC: 28.2 % (ref 36–48)
HCT VFR BLD CALC: 29 % (ref 36–48)
HCT VFR BLD CALC: 32 % (ref 36–48)
HEMOGLOBIN: 10.3 G/DL (ref 12–16)
HEMOGLOBIN: 8.9 G/DL (ref 12–16)
HEMOGLOBIN: 9.5 G/DL (ref 12–16)
HYALINE CASTS: 2 /LPF (ref 0–8)
INR BLD: 1.01 (ref 0.88–1.12)
KETONES, URINE: NEGATIVE MG/DL
LACTIC ACID, SEPSIS: 2.1 MMOL/L (ref 0.4–1.9)
LACTIC ACID, SEPSIS: 2.6 MMOL/L (ref 0.4–1.9)
LEUKOCYTE ESTERASE, URINE: ABNORMAL
LIPASE: 63 U/L (ref 13–60)
LYMPHOCYTES ABSOLUTE: 4 K/UL (ref 1–5.1)
LYMPHOCYTES RELATIVE PERCENT: 25 %
MAGNESIUM: 1.8 MG/DL (ref 1.8–2.4)
MCH RBC QN AUTO: 30.2 PG (ref 26–34)
MCHC RBC AUTO-ENTMCNC: 32.2 G/DL (ref 31–36)
MCV RBC AUTO: 93.8 FL (ref 80–100)
MICROSCOPIC EXAMINATION: YES
MONOCYTES ABSOLUTE: 1.4 K/UL (ref 0–1.3)
MONOCYTES RELATIVE PERCENT: 8.6 %
NEUTROPHILS ABSOLUTE: 10.3 K/UL (ref 1.7–7.7)
NEUTROPHILS RELATIVE PERCENT: 63.4 %
NITRITE, URINE: NEGATIVE
PDW BLD-RTO: 13.7 % (ref 12.4–15.4)
PH UA: 6 (ref 5–8)
PLATELET # BLD: 322 K/UL (ref 135–450)
PMV BLD AUTO: 9.9 FL (ref 5–10.5)
POTASSIUM REFLEX MAGNESIUM: 5.1 MMOL/L (ref 3.5–5.1)
POTASSIUM SERPL-SCNC: 4.5 MMOL/L (ref 3.5–5.1)
PRO-BNP: 3420 PG/ML (ref 0–449)
PROTEIN UA: NEGATIVE MG/DL
PROTHROMBIN TIME: 11.4 SEC (ref 9.9–12.7)
RBC # BLD: 3.41 M/UL (ref 4–5.2)
RBC UA: 3 /HPF (ref 0–4)
SARS-COV-2, NAAT: NOT DETECTED
SODIUM BLD-SCNC: 136 MMOL/L (ref 136–145)
SODIUM BLD-SCNC: 139 MMOL/L (ref 136–145)
SPECIFIC GRAVITY UA: 1.02 (ref 1–1.03)
TOTAL PROTEIN: 6.9 G/DL (ref 6.4–8.2)
TROPONIN: <0.01 NG/ML
URINE REFLEX TO CULTURE: ABNORMAL
URINE TYPE: ABNORMAL
UROBILINOGEN, URINE: 0.2 E.U./DL
WBC # BLD: 16.2 K/UL (ref 4–11)
WBC UA: 3 /HPF (ref 0–5)

## 2021-09-10 PROCEDURE — 2709999900 HC NON-CHARGEABLE SUPPLY: Performed by: INTERNAL MEDICINE

## 2021-09-10 PROCEDURE — 6360000002 HC RX W HCPCS: Performed by: ANESTHESIOLOGY

## 2021-09-10 PROCEDURE — 83880 ASSAY OF NATRIURETIC PEPTIDE: CPT

## 2021-09-10 PROCEDURE — 3609013000 HC EGD TRANSORAL CONTROL BLEEDING ANY METHOD: Performed by: INTERNAL MEDICINE

## 2021-09-10 PROCEDURE — 2720000010 HC SURG SUPPLY STERILE: Performed by: INTERNAL MEDICINE

## 2021-09-10 PROCEDURE — 1200000000 HC SEMI PRIVATE

## 2021-09-10 PROCEDURE — 83690 ASSAY OF LIPASE: CPT

## 2021-09-10 PROCEDURE — 7100000001 HC PACU RECOVERY - ADDTL 15 MIN: Performed by: INTERNAL MEDICINE

## 2021-09-10 PROCEDURE — 0W3P8ZZ CONTROL BLEEDING IN GASTROINTESTINAL TRACT, VIA NATURAL OR ARTIFICIAL OPENING ENDOSCOPIC: ICD-10-PCS | Performed by: INTERNAL MEDICINE

## 2021-09-10 PROCEDURE — 93005 ELECTROCARDIOGRAM TRACING: CPT | Performed by: EMERGENCY MEDICINE

## 2021-09-10 PROCEDURE — 83605 ASSAY OF LACTIC ACID: CPT

## 2021-09-10 PROCEDURE — 80053 COMPREHEN METABOLIC PANEL: CPT

## 2021-09-10 PROCEDURE — 6360000002 HC RX W HCPCS: Performed by: EMERGENCY MEDICINE

## 2021-09-10 PROCEDURE — 96365 THER/PROPH/DIAG IV INF INIT: CPT

## 2021-09-10 PROCEDURE — 3700000001 HC ADD 15 MINUTES (ANESTHESIA): Performed by: INTERNAL MEDICINE

## 2021-09-10 PROCEDURE — 6360000002 HC RX W HCPCS: Performed by: INTERNAL MEDICINE

## 2021-09-10 PROCEDURE — 85025 COMPLETE CBC W/AUTO DIFF WBC: CPT

## 2021-09-10 PROCEDURE — 6360000002 HC RX W HCPCS: Performed by: STUDENT IN AN ORGANIZED HEALTH CARE EDUCATION/TRAINING PROGRAM

## 2021-09-10 PROCEDURE — 86901 BLOOD TYPING SEROLOGIC RH(D): CPT

## 2021-09-10 PROCEDURE — 6370000000 HC RX 637 (ALT 250 FOR IP): Performed by: INTERNAL MEDICINE

## 2021-09-10 PROCEDURE — 6370000000 HC RX 637 (ALT 250 FOR IP): Performed by: EMERGENCY MEDICINE

## 2021-09-10 PROCEDURE — 2500000003 HC RX 250 WO HCPCS: Performed by: NURSE ANESTHETIST, CERTIFIED REGISTERED

## 2021-09-10 PROCEDURE — 6360000004 HC RX CONTRAST MEDICATION: Performed by: EMERGENCY MEDICINE

## 2021-09-10 PROCEDURE — 3700000000 HC ANESTHESIA ATTENDED CARE: Performed by: INTERNAL MEDICINE

## 2021-09-10 PROCEDURE — 6360000002 HC RX W HCPCS: Performed by: NURSE ANESTHETIST, CERTIFIED REGISTERED

## 2021-09-10 PROCEDURE — 86900 BLOOD TYPING SEROLOGIC ABO: CPT

## 2021-09-10 PROCEDURE — 83735 ASSAY OF MAGNESIUM: CPT

## 2021-09-10 PROCEDURE — C9113 INJ PANTOPRAZOLE SODIUM, VIA: HCPCS | Performed by: EMERGENCY MEDICINE

## 2021-09-10 PROCEDURE — 74177 CT ABD & PELVIS W/CONTRAST: CPT

## 2021-09-10 PROCEDURE — 93010 ELECTROCARDIOGRAM REPORT: CPT | Performed by: INTERNAL MEDICINE

## 2021-09-10 PROCEDURE — 81001 URINALYSIS AUTO W/SCOPE: CPT

## 2021-09-10 PROCEDURE — 84484 ASSAY OF TROPONIN QUANT: CPT

## 2021-09-10 PROCEDURE — 85014 HEMATOCRIT: CPT

## 2021-09-10 PROCEDURE — 2580000003 HC RX 258: Performed by: NURSE ANESTHETIST, CERTIFIED REGISTERED

## 2021-09-10 PROCEDURE — C9113 INJ PANTOPRAZOLE SODIUM, VIA: HCPCS | Performed by: STUDENT IN AN ORGANIZED HEALTH CARE EDUCATION/TRAINING PROGRAM

## 2021-09-10 PROCEDURE — 85610 PROTHROMBIN TIME: CPT

## 2021-09-10 PROCEDURE — 85018 HEMOGLOBIN: CPT

## 2021-09-10 PROCEDURE — 2580000003 HC RX 258: Performed by: STUDENT IN AN ORGANIZED HEALTH CARE EDUCATION/TRAINING PROGRAM

## 2021-09-10 PROCEDURE — 99284 EMERGENCY DEPT VISIT MOD MDM: CPT

## 2021-09-10 PROCEDURE — 87635 SARS-COV-2 COVID-19 AMP PRB: CPT

## 2021-09-10 PROCEDURE — 3609013800 HC EGD SUBMUCOSAL/BOTOX INJECTION: Performed by: INTERNAL MEDICINE

## 2021-09-10 PROCEDURE — 96375 TX/PRO/DX INJ NEW DRUG ADDON: CPT

## 2021-09-10 PROCEDURE — 86850 RBC ANTIBODY SCREEN: CPT

## 2021-09-10 PROCEDURE — 7100000000 HC PACU RECOVERY - FIRST 15 MIN: Performed by: INTERNAL MEDICINE

## 2021-09-10 PROCEDURE — 36415 COLL VENOUS BLD VENIPUNCTURE: CPT

## 2021-09-10 PROCEDURE — 2580000003 HC RX 258: Performed by: EMERGENCY MEDICINE

## 2021-09-10 PROCEDURE — 6370000000 HC RX 637 (ALT 250 FOR IP): Performed by: STUDENT IN AN ORGANIZED HEALTH CARE EDUCATION/TRAINING PROGRAM

## 2021-09-10 RX ORDER — AMLODIPINE BESYLATE 5 MG/1
5 TABLET ORAL DAILY
Status: ON HOLD | COMMUNITY
End: 2021-10-31 | Stop reason: HOSPADM

## 2021-09-10 RX ORDER — PANTOPRAZOLE SODIUM 40 MG/10ML
INJECTION, POWDER, LYOPHILIZED, FOR SOLUTION INTRAVENOUS
Status: COMPLETED
Start: 2021-09-10 | End: 2021-09-10

## 2021-09-10 RX ORDER — EPINEPHRINE 1 MG/ML(1)
AMPUL (ML) INJECTION PRN
Status: DISCONTINUED | OUTPATIENT
Start: 2021-09-10 | End: 2021-09-10 | Stop reason: ALTCHOICE

## 2021-09-10 RX ORDER — SODIUM CHLORIDE 0.9 % (FLUSH) 0.9 %
5-40 SYRINGE (ML) INJECTION EVERY 12 HOURS SCHEDULED
Status: DISCONTINUED | OUTPATIENT
Start: 2021-09-10 | End: 2021-09-12 | Stop reason: HOSPADM

## 2021-09-10 RX ORDER — FAMOTIDINE 20 MG/1
TABLET, FILM COATED ORAL
Status: DISPENSED
Start: 2021-09-10 | End: 2021-09-10

## 2021-09-10 RX ORDER — METRONIDAZOLE 500 MG/1
500 TABLET ORAL EVERY 8 HOURS SCHEDULED
Status: DISCONTINUED | OUTPATIENT
Start: 2021-09-10 | End: 2021-09-12 | Stop reason: HOSPADM

## 2021-09-10 RX ORDER — DONEPEZIL HYDROCHLORIDE 5 MG/1
5 TABLET, FILM COATED ORAL NIGHTLY
Status: DISCONTINUED | OUTPATIENT
Start: 2021-09-10 | End: 2021-09-12 | Stop reason: HOSPADM

## 2021-09-10 RX ORDER — ONDANSETRON 2 MG/ML
4 INJECTION INTRAMUSCULAR; INTRAVENOUS EVERY 6 HOURS PRN
Status: DISCONTINUED | OUTPATIENT
Start: 2021-09-10 | End: 2021-09-12 | Stop reason: HOSPADM

## 2021-09-10 RX ORDER — AMLODIPINE BESYLATE 5 MG/1
5 TABLET ORAL DAILY
Status: DISCONTINUED | OUTPATIENT
Start: 2021-09-10 | End: 2021-09-12 | Stop reason: HOSPADM

## 2021-09-10 RX ORDER — FLUOXETINE HYDROCHLORIDE 20 MG/1
20 CAPSULE ORAL DAILY
Status: DISCONTINUED | OUTPATIENT
Start: 2021-09-10 | End: 2021-09-12 | Stop reason: HOSPADM

## 2021-09-10 RX ORDER — SODIUM CHLORIDE 9 MG/ML
INJECTION, SOLUTION INTRAVENOUS CONTINUOUS PRN
Status: DISCONTINUED | OUTPATIENT
Start: 2021-09-10 | End: 2021-09-10 | Stop reason: SDUPTHER

## 2021-09-10 RX ORDER — 0.9 % SODIUM CHLORIDE 0.9 %
500 INTRAVENOUS SOLUTION INTRAVENOUS ONCE
Status: COMPLETED | OUTPATIENT
Start: 2021-09-10 | End: 2021-09-10

## 2021-09-10 RX ORDER — DEXTROSE MONOHYDRATE 50 MG/ML
INJECTION, SOLUTION INTRAVENOUS
Status: COMPLETED
Start: 2021-09-10 | End: 2021-09-10

## 2021-09-10 RX ORDER — SODIUM CHLORIDE 9 MG/ML
25 INJECTION, SOLUTION INTRAVENOUS PRN
Status: DISCONTINUED | OUTPATIENT
Start: 2021-09-10 | End: 2021-09-12 | Stop reason: HOSPADM

## 2021-09-10 RX ORDER — ALLOPURINOL 100 MG/1
50 TABLET ORAL EVERY OTHER DAY
Status: DISCONTINUED | OUTPATIENT
Start: 2021-09-10 | End: 2021-09-12 | Stop reason: HOSPADM

## 2021-09-10 RX ORDER — DOXYCYCLINE HYCLATE 100 MG/1
100 CAPSULE ORAL 2 TIMES DAILY
COMMUNITY
Start: 2021-09-03 | End: 2021-09-12

## 2021-09-10 RX ORDER — BENZONATATE 100 MG/1
100 CAPSULE ORAL 3 TIMES DAILY PRN
COMMUNITY
End: 2021-10-26

## 2021-09-10 RX ORDER — PROPOFOL 10 MG/ML
INJECTION, EMULSION INTRAVENOUS PRN
Status: DISCONTINUED | OUTPATIENT
Start: 2021-09-10 | End: 2021-09-10 | Stop reason: SDUPTHER

## 2021-09-10 RX ORDER — ALLOPURINOL 100 MG/1
50 TABLET ORAL EVERY OTHER DAY
COMMUNITY
End: 2021-12-13 | Stop reason: SDUPTHER

## 2021-09-10 RX ORDER — SODIUM CHLORIDE 0.9 % (FLUSH) 0.9 %
5-40 SYRINGE (ML) INJECTION PRN
Status: DISCONTINUED | OUTPATIENT
Start: 2021-09-10 | End: 2021-09-12 | Stop reason: HOSPADM

## 2021-09-10 RX ORDER — CEFTRIAXONE 1 G/1
INJECTION, POWDER, FOR SOLUTION INTRAMUSCULAR; INTRAVENOUS
Status: DISPENSED
Start: 2021-09-10 | End: 2021-09-10

## 2021-09-10 RX ORDER — ATORVASTATIN CALCIUM 20 MG/1
20 TABLET, FILM COATED ORAL DAILY
Status: DISCONTINUED | OUTPATIENT
Start: 2021-09-10 | End: 2021-09-12 | Stop reason: HOSPADM

## 2021-09-10 RX ORDER — PANTOPRAZOLE SODIUM 40 MG/1
40 TABLET, DELAYED RELEASE ORAL
Status: DISCONTINUED | OUTPATIENT
Start: 2021-09-10 | End: 2021-09-11

## 2021-09-10 RX ORDER — ONDANSETRON 2 MG/ML
4 INJECTION INTRAMUSCULAR; INTRAVENOUS ONCE
Status: COMPLETED | OUTPATIENT
Start: 2021-09-10 | End: 2021-09-10

## 2021-09-10 RX ORDER — PANTOPRAZOLE SODIUM 40 MG/10ML
INJECTION, POWDER, LYOPHILIZED, FOR SOLUTION INTRAVENOUS
Status: DISPENSED
Start: 2021-09-10 | End: 2021-09-10

## 2021-09-10 RX ORDER — LIDOCAINE HYDROCHLORIDE 20 MG/ML
INJECTION, SOLUTION EPIDURAL; INFILTRATION; INTRACAUDAL; PERINEURAL PRN
Status: DISCONTINUED | OUTPATIENT
Start: 2021-09-10 | End: 2021-09-10 | Stop reason: SDUPTHER

## 2021-09-10 RX ADMIN — FAMOTIDINE 20 MG: 20 TABLET ORAL at 00:55

## 2021-09-10 RX ADMIN — ONDANSETRON 4 MG: 2 INJECTION INTRAMUSCULAR; INTRAVENOUS at 15:51

## 2021-09-10 RX ADMIN — LIDOCAINE HYDROCHLORIDE 60 MG: 20 INJECTION, SOLUTION EPIDURAL; INFILTRATION; INTRACAUDAL; PERINEURAL at 14:56

## 2021-09-10 RX ADMIN — SODIUM CHLORIDE 500 ML: 9 INJECTION, SOLUTION INTRAVENOUS at 00:16

## 2021-09-10 RX ADMIN — SODIUM CHLORIDE 500 ML: 9 INJECTION, SOLUTION INTRAVENOUS at 02:12

## 2021-09-10 RX ADMIN — AMLODIPINE BESYLATE 5 MG: 5 TABLET ORAL at 07:53

## 2021-09-10 RX ADMIN — SODIUM CHLORIDE 8 MG/HR: 9 INJECTION, SOLUTION INTRAVENOUS at 07:06

## 2021-09-10 RX ADMIN — PANTOPRAZOLE SODIUM 40 MG: 40 INJECTION, POWDER, FOR SOLUTION INTRAVENOUS at 00:16

## 2021-09-10 RX ADMIN — PROPOFOL 120 MCG/KG/MIN: 10 INJECTION, EMULSION INTRAVENOUS at 14:58

## 2021-09-10 RX ADMIN — ALLOPURINOL 50 MG: 100 TABLET ORAL at 07:53

## 2021-09-10 RX ADMIN — PROPOFOL 40 MG: 10 INJECTION, EMULSION INTRAVENOUS at 15:02

## 2021-09-10 RX ADMIN — DONEPEZIL HYDROCHLORIDE 5 MG: 5 TABLET, FILM COATED ORAL at 20:38

## 2021-09-10 RX ADMIN — SODIUM CHLORIDE, PRESERVATIVE FREE 10 ML: 5 INJECTION INTRAVENOUS at 07:53

## 2021-09-10 RX ADMIN — IOPAMIDOL 75 ML: 755 INJECTION, SOLUTION INTRAVENOUS at 01:28

## 2021-09-10 RX ADMIN — CEFTRIAXONE 1000 MG: 1 INJECTION, POWDER, FOR SOLUTION INTRAMUSCULAR; INTRAVENOUS at 02:00

## 2021-09-10 RX ADMIN — FLUOXETINE 20 MG: 20 CAPSULE ORAL at 07:53

## 2021-09-10 RX ADMIN — ATORVASTATIN CALCIUM 20 MG: 20 TABLET, FILM COATED ORAL at 07:53

## 2021-09-10 RX ADMIN — SODIUM CHLORIDE 8 MG/HR: 9 INJECTION, SOLUTION INTRAVENOUS at 12:19

## 2021-09-10 RX ADMIN — PROPOFOL 70 MG: 10 INJECTION, EMULSION INTRAVENOUS at 14:56

## 2021-09-10 RX ADMIN — SODIUM CHLORIDE: 9 INJECTION, SOLUTION INTRAVENOUS at 14:50

## 2021-09-10 ASSESSMENT — ENCOUNTER SYMPTOMS
BLOOD IN STOOL: 1
PHOTOPHOBIA: 0
VOMITING: 1
COLOR CHANGE: 0
CHEST TIGHTNESS: 0
COUGH: 0
ABDOMINAL PAIN: 0
NAUSEA: 1
ANAL BLEEDING: 1
TROUBLE SWALLOWING: 0
SHORTNESS OF BREATH: 0

## 2021-09-10 ASSESSMENT — PULMONARY FUNCTION TESTS
PIF_VALUE: 0

## 2021-09-10 ASSESSMENT — PAIN - FUNCTIONAL ASSESSMENT: PAIN_FUNCTIONAL_ASSESSMENT: 0-10

## 2021-09-10 ASSESSMENT — PAIN SCALES - GENERAL
PAINLEVEL_OUTOF10: 0

## 2021-09-10 NOTE — PROGRESS NOTES
Pt arrived to 4252 from ED via stretcher with family at bedside. Pt is A/O but confused,. BP elevated, all other VSS, and denies any c/o pain at this time. Skin assessment completed. Skin intact. Please see 4 eyes skin note and skin documentation. Oriented to room, call light and hourly rounding. Bed locked in lowest position, bed alarm on, non-skid socks on and call light/belongings are within reach. Pt instructed to use call light to call out for assistance as needed. Will review orders and continue to monitor.      Ofelia Sy RN 9/10/2021 5:46 AM

## 2021-09-10 NOTE — ED TRIAGE NOTES
Pt to Ed via EMS from home. Pt states she went to have a bowel movement earlier today and thought she went to the bathroom but noticed a large amount of bright red blood in the toilet. Pt then said she felt sick to her stomach and threw up which also contained bright red blood. Pt is poor historian when it comes to time. A/ox3. NO acute distress.

## 2021-09-10 NOTE — ED NOTES
ED SBAR report provider to [facility]. Patient to be transported to Room 4252 via stretcher by ED tech. Patient transported with bedside cardiac monitor. IV site clean, dry, and intact. MEWS score and pain assessed and documented. Updated patient and family on plan of care.      Tip Trejo RN  09/10/21 9921

## 2021-09-10 NOTE — PROGRESS NOTES
Patient off unit for EGD. Pre-op checklist complete and consent signed. Daughter at the bedside.  Electronically signed by Ishmael Duffy RN on 9/10/2021 at 1:25 PM

## 2021-09-10 NOTE — ANESTHESIA POSTPROCEDURE EVALUATION
Department of Anesthesiology  Postprocedure Note    Patient: Paco Steele  MRN: [id number]  YOB: 1935  Date of evaluation: 9/10/2021  Time:  3:42 PM     Procedure Summary     Date: 09/10/21 Room / Location: [address] [place] [address]    Anesthesia Start: 0261 Anesthesia Stop: 8548    Procedures:       EGD CONTROL HEMORRHAGE (N/A )      EGD SUBMUCOSAL/ EPINEPHRINE INJECTION Diagnosis: (GI BLEED)    Surgeons: Francis Freedman DO Responsible Provider: Nguyen Weir MD    Anesthesia Type: MAC ASA Status: 3          Anesthesia Type: MAC    Camille Phase I: Camille Score: 8    Camille Phase II:      Last vitals: Reviewed and per EMR flowsheets.        Anesthesia Post Evaluation    Patient location during evaluation: PACU  Patient participation: complete - patient participated  Level of consciousness: awake and alert  Pain score: 0  Airway patency: patent  Nausea & Vomiting: no nausea and no vomiting  Complications: no  Cardiovascular status: blood pressure returned to baseline  Respiratory status: acceptable  Hydration status: euvolemic

## 2021-09-10 NOTE — ED NOTES
Bed: B-10  Expected date:   Expected time:   Means of arrival:   Comments:  Jeannine Muñoz RN  09/09/21 9744

## 2021-09-10 NOTE — PLAN OF CARE
Problem: Falls - Risk of:  Goal: Will remain free from falls  Description: Will remain free from falls  9/10/2021 1027 by Alvino Arzate RN  Outcome: Ongoing  9/10/2021 0609 by Axel Hough RN  Outcome: Ongoing  Goal: Absence of physical injury  Description: Absence of physical injury  9/10/2021 1027 by Alvino Arzate RN  Outcome: Ongoing  9/10/2021 0609 by Axel Hough RN  Outcome: Ongoing     Problem: Skin Integrity:  Goal: Will show no infection signs and symptoms  Description: Will show no infection signs and symptoms  9/10/2021 1027 by Alvino Arzate RN  Outcome: Ongoing  9/10/2021 0609 by Axel Hough RN  Outcome: Ongoing  Goal: Absence of new skin breakdown  Description: Absence of new skin breakdown  9/10/2021 1027 by Alvino Arzate RN  Outcome: Ongoing  9/10/2021 0609 by Axel Hough RN  Outcome: Ongoing

## 2021-09-10 NOTE — PROGRESS NOTES
4 Eyes Skin Assessment     NAME:  Eduardo Zazueta  YOB: 1935  MEDICAL RECORD NUMBER:  [id number]    The patient is being assess for  Admission    I agree that 2 RN's have performed a thorough Head to Toe Skin Assessment on the patient. ALL assessment sites listed below have been assessed. Areas assessed by both nurses:    Head, Face, Ears and Shoulders, Back, Chest        Does the Patient have a Wound?  No noted wound(s)       Edwardo Prevention initiated:  No   Wound Care Orders initiated:  No    Pressure Injury (Stage 3,4, Unstageable, DTI, NWPT, and Complex wounds) if present place consult order under [de-identified] No    New and Established Ostomies if present place consult order under : No      Nurse 1 eSignature: Electronically signed by Gabriele Olea RN on 9/10/21 at 5:47 AM EDT    **SHARE this note so that the co-signing nurse is able to place an eSignature**    Nurse 2 eSignature: Electronically signed by Deann Gunter RN on 9/10/21 at 5:55 AM EDT

## 2021-09-10 NOTE — ED PROVIDER NOTES
more for level 5)     Review of Systems   Constitutional: Negative for activity change, fatigue and fever. HENT: Negative for congestion, mouth sores and trouble swallowing. Eyes: Negative for photophobia and visual disturbance. Respiratory: Negative for cough, chest tightness and shortness of breath. Cardiovascular: Negative for chest pain and palpitations. Gastrointestinal: Positive for anal bleeding, blood in stool, nausea and vomiting. Negative for abdominal pain. Genitourinary: Negative for difficulty urinating and frequency. Musculoskeletal: Negative for gait problem and neck pain. Skin: Negative for color change and rash. Neurological: Negative for dizziness, light-headedness and headaches. Psychiatric/Behavioral: Negative for confusion. The patient is not nervous/anxious. All other systems reviewed and are negative. Except as noted above the remainder of the review of systems was reviewed and negative.        PAST MEDICAL HISTORY     Past Medical History:   Diagnosis Date    Chronic kidney disease (CKD), stage IV (severe) ([facility] [address]     Coronary artery disease involving native coronary artery of native heart without angina pectoris 1/5/2016    GERD (gastroesophageal reflux disease) acid reflux    Gout, chronic     Histoplasmosis     Hyperlipidemia     Hypertension     Irregular heart beat     Osteoarthritis 2/28/2019    Knee, leg     Osteopenia of hip 04/26/2019    Pneumonia     Unspecified cerebral artery occlusion with cerebral infarction 2014 , ~ 2012         SURGICALHISTORY       Past Surgical History:   Procedure Laterality Date    BLADDER SUSPENSION      CHOLECYSTECTOMY  Orthopedic surgery Left arm    HYSTERECTOMY      TUBAL LIGATION           CURRENT MEDICATIONS       Previous Medications    ALLOPURINOL (ZYLOPRIM) 100 MG TABLET    Take 0.5 tablets by mouth daily    ASPIRIN 81 MG CHEWABLE TABLET    Take 1 tablet by mouth daily    ATORVASTATIN (LIPITOR) 20 MG TABLET    TAKE 1 TABLET BY MOUTH ONE TIME A DAY     CARVEDILOL (COREG) 12.5 MG TABLET    Take 1 tablet by mouth 2 times daily (with meals)    DONEPEZIL (ARICEPT) 5 MG TABLET    Take 5 mg by mouth nightly    FLUOXETINE (PROZAC) 20 MG CAPSULE    TAKE 1 CAPSULE BY MOUTH ONE TIME A DAY     FUROSEMIDE (LASIX) 20 MG TABLET    Take 20 mg by mouth every other day    HANDICAP PLACARD MISC    by Does not apply route Patient is unable to walk more than 250 feet before stopping to rest.    Not to exceed 5 years.   DX: CHF    IPRATROPIUM-ALBUTEROL (DUONEB) 0.5-2.5 (3) MG/3ML SOLN NEBULIZER SOLUTION    Inhale 3 mLs into the lungs every 6 hours as needed for Shortness of Breath    LISINOPRIL (PRINIVIL;ZESTRIL) 2.5 MG TABLET    Take 2.5 mg by mouth daily    LORATADINE (CLARITIN) 10 MG CAPSULE    Take 10 mg by mouth daily as needed     MULTIPLE VITAMINS-MINERALS (THERAPEUTIC MULTIVITAMIN-MINERALS) TABLET    Take 1 tablet by mouth daily    PANTOPRAZOLE (PROTONIX) 40 MG TABLET    Take 1 tablet by mouth daily    RESPIRATORY THERAPY SUPPLIES (NEBULIZER/TUBING/MOUTHPIECE) KIT    1 kit by Does not apply route daily as needed (wheezing)    VITAMIN D3 (CHOLECALCIFEROL) 10 MCG (400 UNIT) TABS TABLET    Take 400 Units by mouth daily            Codeine, Morphine and related, and Oxycodone-acetaminophen    FAMILY HISTORY       Family History   Problem Relation Age of Onset    High Blood Pressure Mother     Heart Disease Mother     Diabetes Mother     High Blood Pressure Father     Heart Disease Father     Cancer Father         Brain    Cancer Sister         lymphoma    Cancer Brother         colon    Cancer Maternal Uncle           SOCIAL HISTORY       Social History     Socioeconomic History    Marital status:      Spouse name: None    Number of children: None    Years of education: None    Highest education level: None   Occupational History    None   Tobacco Use    Smoking status: Passive Smoke Exposure - Never Smoker  Smokeless tobacco: Never Used   Vaping Use    Vaping Use: Never used   Substance and Sexual Activity    Alcohol use: No     Comment: 1 yearly    Drug use: No    Sexual activity: Yes     Partners: Male   Other Topics Concern    None   Social History Narrative    None     Social Determinants of Health     Financial Resource Strain:     Difficulty of Paying Living Expenses:    Food Insecurity:     Worried About Running Out of Food in the Last Year:     Ran Out of Food in the Last Year:    Transportation Needs:     Lack of Transportation (Medical):  Lack of Transportation (Non-Medical):    Physical Activity:     Days of Exercise per Week:     Minutes of Exercise per Session:    Stress:     Feeling of Stress :    Social Connections:     Frequency of Communication with Friends and Family:     Frequency of Social Gatherings with Friends and Family:     Attends Mandaeism Services:     Active Member of Clubs or Organizations:     Attends Club or Organization Meetings:     Marital Status:    Intimate Partner Violence:     Fear of Current or Ex-Partner:     Emotionally Abused:     Physically Abused:     Sexually Abused:        SCREENINGS             PHYSICAL EXAM    (up to 7 for level 4, 8 or more for level 5)     ED Triage Vitals [09/09/21 2314]   BP Temp Temp Source Pulse Resp SpO2 Height Weight   120/61 97.7 °F (36.5 °C) Oral 110 16 96 % 5' 1\" (1.549 m) 146 lb 13.2 oz (66.6 kg)       Physical Exam  Vitals and nursing note reviewed. Constitutional:       Appearance: She is well-developed. HENT:      Head: Normocephalic and atraumatic. Eyes:      Extraocular Movements: Extraocular movements intact. Conjunctiva/sclera: Conjunctivae normal.      Pupils: Pupils are equal, round, and reactive to light. Neck:      Trachea: No tracheal deviation. Cardiovascular:      Rate and Rhythm: Normal rate and regular rhythm. Pulses: Normal pulses. Heart sounds: Normal heart sounds. Pulmonary:      Effort: Pulmonary effort is normal.      Breath sounds: Normal breath sounds. Abdominal:      General: There is no distension. Palpations: Abdomen is soft. Tenderness: There is no abdominal tenderness. Genitourinary:     Comments: Gross melena present with the patient was rolled. Musculoskeletal:         General: Normal range of motion. Cervical back: Normal range of motion. Skin:     General: Skin is warm and dry. Capillary Refill: Capillary refill takes less than 2 seconds. Neurological:      General: No focal deficit present. Mental Status: She is alert. Mental status is at baseline. RESULTS     EKG: All EKG's are interpreted by the Emergency Department Physician who either signs or Co-signsthis chart in the absence of a cardiologist.    EKG shows a sinus rhythm with a ventricular of 91 bpm.  SC interval and QTc interval are prolonged. Patient has left axis deviation. There is a left bundle branch pleasant. There are no significant ST elevations depressions EKG is nondiagnostic for ACS. Compared EKG from 8/24/2021 there are nonspecific ST changes to the anterior lateral leads. RADIOLOGY:   Non-plain filmimages such as CT, Ultrasound and MRI are read by the radiologist. Plain radiographic images are visualized and preliminarily interpreted by the emergency physician with the below findings:      Interpretation per the Radiologist below, if available at the time ofthis note:    XR CHEST PORTABLE   Final Result   No acute cardiopulmonary abnormality.          CT ABDOMEN PELVIS W IV CONTRAST Additional Contrast? None    (Results Pending)         ED BEDSIDE ULTRASOUND:   Performed by ED Physician - none    LABS:  Labs Reviewed   CBC WITH AUTO DIFFERENTIAL - Abnormal; Notable for the following components:       Result Value    WBC 16.2 (*)     RBC 3.41 (*)     Hemoglobin 10.3 (*)     Hematocrit 32.0 (*)     Neutrophils Absolute 10.3 (*)     Monocytes range or not returned as of this dictation. EMERGENCY DEPARTMENT COURSE and DIFFERENTIAL DIAGNOSIS/MDM:   Vitals:    Vitals:    09/09/21 2314 09/10/21 0015   BP: 120/61 127/61   Pulse: 110 98   Resp: 16 18   Temp: 97.7 °F (36.5 °C)    TempSrc: Oral    SpO2: 96% 98%   Weight: 146 lb 13.2 oz (66.6 kg)    Height: 5' 1\" (1.549 m)        Patient was given thefollowing medications:  Medications   pantoprazole (PROTONIX) injection 40 mg (40 mg IntraVENous Given 9/10/21 0016)   0.9 % sodium chloride bolus (0 mLs IntraVENous Stopped 9/10/21 0057)   famotidine (PEPCID) tablet 20 mg (20 mg Oral Given 9/10/21 0055)       ED COURSE & MEDICAL DECISION MAKING    Pertinent Labs & Imaging studies reviewed. (See chart for details)   -  Patient seen and evaluated in the emergency department. -  Triage and nursing notes reviewed and incorporated. -  Old chart records reviewed and incorporated. -  Differential diagnosis includes: Differential diagnosis: Hemorrhagic Shock, Coagulopathy, Platelet Dysfunction or Thrombocytopenia, Ischemic Bowel, Arterial Venous Malformation of the colon, unstable bleeding from Diverticulosis, Colon Cancer/other GI cancer, upper GI bleed, other.    -  Work-up included:  See above  -  ED treatment included: See above  -  Results discussed with patient. Patient presents to the ED for evaluation of melena as well as hematemesis. Patient does have gross melena presentation. Vital signs are within normal limits with the patient did have an syncopal episode prior to arrival.  Labs show stable hemoglobin. BMP and renal function elevated . Patient's lactate elevated as well . imaging studies show no emergent findings on CT GI bleed bleed protocol. Patient had no episodes of hematemesis in the emergency department. Patient feels well on evaluation is amenable to mission the hospital for the medical management. The patient is agreeable with plan of care and disposition.         REASSESSMENT CRITICAL CARE TIME   Total Critical Care time was 30 minutes, excluding separately reportable procedures. There was a high probability of clinically significant/life threatening deterioration in the patient's condition which required my urgent intervention. CONSULTS:  None    PROCEDURES:  Unless otherwise noted below, none     Procedures    FINAL IMPRESSION      1. Gastrointestinal hemorrhage, unspecified gastrointestinal hemorrhage type    2. Lactic acidosis    3. SULY (acute kidney injury) ([facility] [address]          DISPOSITION/PLAN   DISPOSITION        PATIENT REFERREDTO:  No follow-up provider specified.     DISCHARGEMEDICATIONS:  New Prescriptions    No medications on file          (Please note that portions of this note were completed with a voice recognition program.  Efforts were made to edit the dictations but occasionally words are mis-transcribed.)    Crescencio Anglin MD (electronically signed)  Attending Emergency Physician          Crescencio Anglin MD  09/10/21 57

## 2021-09-10 NOTE — PROGRESS NOTES
Hospitalist Progress Note      PCP: FERNANDO Martinez NP    Date of Admission: 9/9/2021    Subjective: to get EGD today    Medications:  Reviewed    Infusion Medications    pantoprozole (PROTONIX) infusion 8 mg/hr (09/10/21 1219)    sodium chloride       Scheduled Medications    metroNIDAZOLE  500 mg IntraVENous Q8H    [START ON 9/11/2021] cefTRIAXone (ROCEPHIN) IV  1,000 mg IntraVENous Q24H    atorvastatin  20 mg Oral Daily    donepezil  5 mg Oral Nightly    FLUoxetine  20 mg Oral Daily    allopurinol  50 mg Oral Every Other Day    sodium chloride flush  5-40 mL IntraVENous 2 times per day    amLODIPine  5 mg Oral Daily     PRN Meds: sodium chloride flush, sodium chloride, ondansetron      Intake/Output Summary (Last 24 hours) at 9/10/2021 1713  Last data filed at 9/10/2021 1512  Gross per 24 hour   Intake 400 ml   Output --   Net 400 ml       Physical Exam Performed:    /68   Pulse 85   Temp 97.4 °F (36.3 °C) (Axillary)   Resp 18   Ht 5' (1.524 m)   Wt 137 lb 12.6 oz (62.5 kg)   SpO2 94%   BMI 26.91 kg/m²        General appearance: Hard of hearing, pleasant and conversational, somewhat oriented and alert  HEENT Normal cephalic, atraumatic without obvious deformity. Pupils equal, round, and reactive to light. Extra ocular muscles intact. Dry mucous membranes, clear conjunctiva  Neck: Supple, no JVD  Lungs: Diminished breath sounds bilaterally but overall clear  Heart: Regular rate and rhythm with Normal S1/S2 without murmurs, rubs or gallops, point of maximum impulse non-displaced  Abdomen: Currently soft, nontender, nondistended and active bowel sounds  Extremities: No edema noted bilaterally to lower extremities  Skin: No rashes  Neurologic: Grossly intact neurologically  Mental status: Alert, oriented, thought content appropriate.   Capillary Refill: Acceptable  < 3 seconds  Peripheral Pulses: +3 Easily felt, not easily obliterated with pressure       Labs:   Recent Labs diarrhea given recent antibiotic use for bronchitis as well as H. pylori stool antigen  · Start patient on Rocephin and Flagyl for sepsis secondary due to GI pathology  · Initial hemoglobin is stable, transfuse threshold is less than 7 at this time  · GI consult for suspected upper GI bleed - plan EGD today  · Trend hb  · Start patient on Protonix IV infusion  · Restart patient's home medications including antihypertensives for now given patient's appropriate blood pressure       DVT Prophylaxis: SCDs  Diet: Diet NPO Exceptions are: Ice Chips, Sips of Water with Meds  Code Status: Full Code    PT/OT Eval Status: ordered    [facility]    Marium Bundy MD

## 2021-09-10 NOTE — H&P
Patient seen and examined by me prior to the procedure. The patient is stable for sedation. There have been no significant changes since my initial consultation note. Please reference this note for full details    The patient was counseled at length about the risks of lucille Covid-19 during their perioperative period and any recovery window from their procedure. The patient was made aware that lucille Covid-19  may worsen their prognosis for recovering from their procedure  and lend to a higher morbidity and/or mortality risk. All material risks, benefits, and reasonable alternatives including postponing the procedure were discussed. The patient does wish to proceed with the procedure at this time.     ASA class-3  Malampati- 2

## 2021-09-10 NOTE — H&P
Hospital Medicine History & Physical      PCP: FERNANDO Newman NP    Date of Admission: 9/9/2021    Date of Service: Pt seen/examined on 9/9/2021 and Admitted to Inpatient     Chief Complaint: Generalized weakness, diarrhea, epigastric abdominal pain, bloody bowel movements, syncope, hematemesis      History Of Present Illness: The patient is a 80 y.o. female with past medical history of chronic kidney disease, GERD, gout, hyperlipidemia, hypertension, irregular heartbeat, CAD, and CHF who presents to [facility] assisted by her daughter who noted patient for the last 3 weeks and initially diagnosed with bronchitis and was started on doxycycline for cough and congestion and not feeling overall well but then over the last 3 to 4 days patient was noted by the daughter to have multiple dark bloody bowel movements, patient had been getting weaker and daughter notes that she was also noting that patient did not seem to have a very good appetite and was having some level of epigastric abdominal pain. Apart from the cough and congestion that was initially diagnosed as bronchitis, daughter notes the patient has not had any other recent symptoms of fever or chills although potentially has had some night sweats. Daughter does note decreased appetite as well as nausea and then patient did have some bouts of vomiting. What was concerning to the daughter also was when patient was brought to the ED today, patient notably was very weak trying to get out of the car and did have initially what seemed like a brief syncopal episode. Daughter also notes that patient did have 1 bout of bloody emesis on arrival to the ED. She was hemodynamically stable however. Currently in the ED, she has not had any further bouts of bloody emesis or bloody diarrhea.     Past Medical History: Diagnosis Date    Chronic kidney disease (CKD), stage IV (severe) ([facility])     Coronary artery disease involving native coronary artery of native heart without angina pectoris 1/5/2016    GERD (gastroesophageal reflux disease) acid reflux    Gout, chronic     Histoplasmosis     Hyperlipidemia     Hypertension     Irregular heart beat     Osteoarthritis 2/28/2019    Knee, leg     Osteopenia of hip 04/26/2019    Pneumonia     Unspecified cerebral artery occlusion with cerebral infarction 2014 , ~ 2012       Past Surgical History:        Procedure Laterality Date    BLADDER SUSPENSION      CHOLECYSTECTOMY  Orthopedic surgery Left arm    HYSTERECTOMY      TUBAL LIGATION         Medications Prior to Admission:    Prior to Admission medications    Medication Sig Start Date End Date Taking?  Authorizing Provider   amLODIPine (NORVASC) 5 MG tablet Take 5 mg by mouth daily   Yes Historical Provider, MD   benzonatate (TESSALON) 100 MG capsule Take 100 mg by mouth 3 times daily as needed for Cough   Yes Historical Provider, MD   doxycycline hyclate (VIBRAMYCIN) 100 MG capsule Take 100 mg by mouth 2 times daily   Yes Historical Provider, MD   carvedilol (COREG) 12.5 MG tablet Take 1 tablet by mouth 2 times daily (with meals) 3/29/21  Yes Cecilia Barreto APRN - CNP   allopurinol (ZYLOPRIM) 100 MG tablet Take 0.5 tablets by mouth daily  Patient taking differently: Take 50 mg by mouth every other day  10/24/20  Yes Shirlene Shah MD   Multiple Vitamins-Minerals (THERAPEUTIC MULTIVITAMIN-MINERALS) tablet Take 1 tablet by mouth daily   Yes Historical Provider, MD   donepezil (ARICEPT) 5 MG tablet Take 5 mg by mouth nightly   Yes Historical Provider, MD   furosemide (LASIX) 20 MG tablet Take 40 mg by mouth daily    Yes Historical Provider, MD   lisinopril (PRINIVIL;ZESTRIL) 2.5 MG tablet Take 10 mg by mouth daily    Yes Historical Provider, MD   vitamin D3 (CHOLECALCIFEROL) 10 MCG (400 UNIT) TABS tablet Take 400 Units by mouth daily   Yes Historical Provider, MD   FLUoxetine (PROZAC) 20 MG capsule TAKE 1 CAPSULE BY MOUTH ONE TIME A DAY  1/29/19  Yes Elmer Garvin MD   atorvastatin (LIPITOR) 20 MG tablet TAKE 1 TABLET BY MOUTH ONE TIME A DAY  1/8/18  Yes Elmer Garvin MD   aspirin 81 MG chewable tablet Take 1 tablet by mouth daily 6/27/17  Yes Low Ferguson MD   Respiratory Therapy Supplies (NEBULIZER/TUBING/MOUTHPIECE) KIT 1 kit by Does not apply route daily as needed (wheezing) 8/24/21   RAMAN Pinon   ipratropium-albuterol (DUONEB) 0.5-2.5 (3) MG/3ML SOLN nebulizer solution Inhale 3 mLs into the lungs every 6 hours as needed for Shortness of Breath 8/24/21   RAMAN Pinon   loratadine (CLARITIN) 10 MG capsule Take 10 mg by mouth daily as needed     Historical Provider, MD   pantoprazole (PROTONIX) 40 MG tablet Take 1 tablet by mouth daily 4/8/19 12/22/20  Elmer Garvin MD   Handicap Placard MISC by Does not apply route Patient is unable to walk more than 250 feet before stopping to rest.    Not to exceed 5 years. DX: CHF 8/17/16   Elmer Garvin MD       Allergies:  Codeine, Morphine and related, and Oxycodone-acetaminophen    Social History:  The patient currently lives home  TOBACCO:   reports that she is a non-smoker but has been exposed to tobacco smoke. She has never used smokeless tobacco.  ETOH:   reports no history of alcohol use. Family History:  Reviewed in detail and negative for DM, Early CAD, Cancer, CVA. Positive as follows:        Problem Relation Age of Onset    High Blood Pressure Mother     Heart Disease Mother     Diabetes Mother     High Blood Pressure Father     Heart Disease Father     Cancer Father         Brain    Cancer Sister         lymphoma    Cancer Brother         colon    Cancer Maternal Uncle        REVIEW OF SYSTEMS:  as noted in the HPI. All other systems reviewed and negative.     PHYSICAL EXAM:    BP (!) 174/74   Pulse 89   Temp 98.2 °F (36.8 °C) (Oral) Resp 18   Ht 5' (1.524 m)   Wt 143 lb 11.8 oz (65.2 kg)   SpO2 99%   BMI 28.07 kg/m²     General appearance: Hard of hearing, pleasant and conversational, somewhat oriented and alert  HEENT Normal cephalic, atraumatic without obvious deformity. Pupils equal, round, and reactive to light. Extra ocular muscles intact. Dry mucous membranes, clear conjunctiva  Neck: Supple, no JVD  Lungs: Diminished breath sounds bilaterally but overall clear  Heart: Regular rate and rhythm with Normal S1/S2 without murmurs, rubs or gallops, point of maximum impulse non-displaced  Abdomen: Currently soft, nontender, nondistended and active bowel sounds  Extremities: No edema noted bilaterally to lower extremities  Skin: No rashes  Neurologic: Grossly intact neurologically  Mental status: Alert, oriented, thought content appropriate. Capillary Refill: Acceptable  < 3 seconds  Peripheral Pulses: +3 Easily felt, not easily obliterated with pressure      Chest x-ray: No acute process  CT abdomen and pelvis with IV contrast:  1.  The single phase of contrast and artifact in the upper abdomen reduces   the sensitivity for GI bleed.  No convincing evidence of extravasation in the   small bowel and colon.  The stomach and duodenum are not well evaluated.       2.  Thick-walled trabeculated urinary bladder with multiple small   diverticula.  Suggesting chronic outlet obstruction.       3.  No appendicitis, colonic diverticulitis, ascites or pneumoperitoneum.          CBC   Recent Labs     09/10/21  0000   WBC 16.2*   HGB 10.3*   HCT 32.0*         RENAL  Recent Labs     09/10/21  0000      K 5.1   CL 97*   CO2 23   BUN 45*   CREATININE 1.3*     LFT'S  Recent Labs     09/10/21  0000   AST 16   ALT 10   BILITOT 0.3   ALKPHOS 54     COAG  Recent Labs     09/10/21  0000   INR 1.01     CARDIAC ENZYMES  Recent Labs     09/10/21  0000   TROPONINI <0.01       U/A:    Lab Results   Component Value Date    COLORU YELLOW 09/10/2021 WBCUA 3 09/10/2021    RBCUA 3 09/10/2021    MUCUS Rare 08/24/2021    BACTERIA 1+ 08/24/2021    CLARITYU Clear 09/10/2021    SPECGRAV 1.025 09/10/2021    LEUKOCYTESUR SMALL 09/10/2021    BLOODU LARGE 09/10/2021    GLUCOSEU Negative 09/10/2021    GLUCOSEU Negative 06/13/2011    AMORPHOUS Present 06/13/2011       ABG    Lab Results   Component Value Date    UJS5BDE 21.3 10/20/2020    BEART -5.2 10/20/2020    X7UTLDQA 100.0 10/20/2020    PHART 7.292 10/20/2020    MUO0ZRT 44.2 10/20/2020    PO2ART 156.0 10/20/2020    GDD3IFY 22.6 10/20/2020           Active Hospital Problems    Diagnosis Date Noted    GI bleed [K92.2] 09/10/2021     Priority: High    Sepsis ([facility] [address] [A41.9] 09/10/2021     Priority: High    Upper GI bleed [K92.2] 09/10/2021     Priority: High    Diarrhea [R19.7] 09/10/2021     Priority: Medium    Chronic heart failure (HCC) [I50.9] 09/10/2021    Stage 3b chronic kidney disease ([facility] [address] [N18.32]     GERD (gastroesophageal reflux disease) [K21.9]     HTN (hypertension), benign [I10] 06/13/2011         PHYSICIANS CERTIFICATION:    I certify that Leoindas Fuentes is expected to be hospitalized for greater than 2 midnights based on the following assessment and plan:      ASSESSMENT/PLAN:  · Upper GI bleed  · Sepsis  · Diarrhea  · Chronic heart failure  · Stage III chronic kidney disease  · GERD  · Hypertension    Plan:  · Sepsis is of uncertain etiology, potentially could all be driven by the GI bleed although infectious process is still possible with the diarrhea and potentially could also be related to bacterial induced peptic ulcer disease  · Checking for C. difficile diarrhea given recent antibiotic use for bronchitis as well as H. pylori stool antigen  · Start patient on Rocephin and Flagyl for sepsis secondary due to GI pathology  · Initial hemoglobin is stable, transfuse threshold is less than 7 at this time  · GI consult for suspected upper GI bleed  · Trending hemoglobins  · Start patient on Protonix IV infusion  · Restart patient's home medications including antihypertensives for now given patient's appropriate blood pressure    DVT Prophylaxis: SCDs  Diet: Diet NPO Exceptions are: Ice Chips, Sips of Water with Meds  Code Status: Full Code  PT/OT Eval Status: Ambulatory    Dispo -pending clinical course       Samir Carmichael DO    Thank you FERNANDO Larson - TOM for the opportunity to be involved in this patient's care. If you have any questions or concerns please feel free to contact me at [phone].

## 2021-09-10 NOTE — PROGRESS NOTES
Patient is back to room following EGD. Vital signs are stable. Patient is drowsy at this time. Daughter is at the bedside.

## 2021-09-10 NOTE — ANESTHESIA PRE PROCEDURE
[facility] Department of Anesthesiology  Pre-Anesthesia Evaluation/Consultation       Name:  Rosaura Henderson  : 1935  Age:  80 y.o.                                            MRN:  [id number]  Date: 9/10/2021           Surgeon: Surgeon(s):  Ashleigh Simon DO    Procedure: Procedure(s):  EGD DIAGNOSTIC ONLY     Allergies   Allergen Reactions    Codeine Shortness Of Breath     Rash and dyspnea    Morphine And Related     Oxycodone-Acetaminophen      Patient Active Problem List   Diagnosis    HTN (hypertension), benign    Elbow pain    Knee pain, bilateral    Primary localized osteoarthrosis, lower leg    Rotator cuff (capsule) sprain    Coronary artery disease involving native coronary artery of native heart without angina pectoris    Idiopathic chronic gout of right foot    Chronic kidney disease (CKD), stage IV (severe) (HCC)    Moderate single current episode of major depressive disorder (HCC)    TIA (transient ischemic attack)    GERD (gastroesophageal reflux disease)    Osteoarthritis    Osteopenia of hip    Acute on chronic heart failure (HCC)    Acute on chronic congestive heart failure (HCC)    Essential hypertension    Stage 3b chronic kidney disease (HCC)    Paronychia of finger, right    GI bleed    Diarrhea    Chronic heart failure (HCC)    Sepsis ([address]    Upper GI bleed     Past Medical History:   Diagnosis Date    Chronic kidney disease (CKD), stage IV (severe) (HCC)     Coronary artery disease involving native coronary artery of native heart without angina pectoris 2016    GERD (gastroesophageal reflux disease) acid reflux    Gout, chronic     Histoplasmosis     Hyperlipidemia     Hypertension     Irregular heart beat     Osteoarthritis 2019    Knee, leg     Osteopenia of hip 2019    Pneumonia     Unspecified cerebral artery occlusion with cerebral infarction 2014 , ~      Past Surgical History:   Procedure Laterality Date    BLADDER SUSPENSION      CHOLECYSTECTOMY  Orthopedic surgery Left arm    HYSTERECTOMY      TUBAL LIGATION       Social History     Tobacco Use    Smoking status: Passive Smoke Exposure - Never Smoker    Smokeless tobacco: Never Used   Vaping Use    Vaping Use: Never used   Substance Use Topics    Alcohol use: No     Comment: 1 yearly    Drug use: No     Medications  No current facility-administered medications on file prior to encounter.      Current Outpatient Medications on File Prior to Encounter   Medication Sig Dispense Refill    amLODIPine (NORVASC) 5 MG tablet Take 5 mg by mouth daily      benzonatate (TESSALON) 100 MG capsule Take 100 mg by mouth 3 times daily as needed for Cough      doxycycline hyclate (VIBRAMYCIN) 100 MG capsule Take 100 mg by mouth 2 times daily      allopurinol (ZYLOPRIM) 100 MG tablet Take 50 mg by mouth every other day      carvedilol (COREG) 12.5 MG tablet Take 1 tablet by mouth 2 times daily (with meals) 60 tablet 3    Multiple Vitamins-Minerals (THERAPEUTIC MULTIVITAMIN-MINERALS) tablet Take 1 tablet by mouth daily      furosemide (LASIX) 40 MG tablet Take 40 mg by mouth daily       lisinopril (PRINIVIL;ZESTRIL) 10 MG tablet Take 10 mg by mouth daily       vitamin D3 (CHOLECALCIFEROL) 10 MCG (400 UNIT) TABS tablet Take 400 Units by mouth daily      atorvastatin (LIPITOR) 20 MG tablet TAKE 1 TABLET BY MOUTH ONE TIME A DAY  90 tablet 3    aspirin 81 MG chewable tablet Take 1 tablet by mouth daily 30 tablet 9    Respiratory Therapy Supplies (NEBULIZER/TUBING/MOUTHPIECE) KIT 1 kit by Does not apply route daily as needed (wheezing) 1 kit 0    ipratropium-albuterol (DUONEB) 0.5-2.5 (3) MG/3ML SOLN nebulizer solution Inhale 3 mLs into the lungs every 6 hours as needed for Shortness of Breath 360 mL 0    donepezil (ARICEPT) 5 MG tablet Take 5 mg by mouth nightly      loratadine (CLARITIN) 10 MG capsule Take 10 mg by mouth daily as needed       pantoprazole (PROTONIX) 40 MG tablet Take 1 tablet by mouth daily 30 tablet 3    FLUoxetine (PROZAC) 20 MG capsule TAKE 1 CAPSULE BY MOUTH ONE TIME A DAY  90 capsule 0    Handicap Placard MISC by Does not apply route Patient is unable to walk more than 250 feet before stopping to rest.    Not to exceed 5 years.   DX: CHF 1 each 0     Current Facility-Administered Medications   Medication Dose Route Frequency Provider Last Rate Last Admin    cefTRIAXone (ROCEPHIN) 1 g injection             metronidazole (FLAGYL) 500 mg in NaCl 100 mL IVPB premix  500 mg IntraVENous Q8H [facility]        [START ON 9/11/2021] cefTRIAXone (ROCEPHIN) 1000 mg IVPB in 50 mL D5W minibag  1,000 mg IntraVENous Q24H [facility]        pantoprazole (PROTONIX) 80 mg in sodium chloride 0.9 % 100 mL infusion  8 mg/hr IntraVENous Continuous [facility] 10 mL/hr at 09/10/21 1219 8 mg/hr at 09/10/21 1219    atorvastatin (LIPITOR) tablet 20 mg  20 mg Oral Daily [facility]   20 mg at 09/10/21 0753    donepezil (ARICEPT) tablet 5 mg  5 mg Oral Nightly [facility]        FLUoxetine (PROZAC) capsule 20 mg  20 mg Oral Daily [facility]   20 mg at 09/10/21 0753    allopurinol (ZYLOPRIM) tablet 50 mg  50 mg Oral Every Other Day [facility]   50 mg at 09/10/21 0753    sodium chloride flush 0.9 % injection 5-40 mL  5-40 mL IntraVENous 2 times per day [facility]   10 mL at 09/10/21 0753    sodium chloride flush 0.9 % injection 5-40 mL  5-40 mL IntraVENous PRN [facility]        0.9 % sodium chloride infusion  25 mL IntraVENous PRN [facility]        ondansetron (ZOFRAN) injection 4 mg  4 mg IntraVENous Q6H PRN [facility]        metroNIDAZOLE (FLAGYL) 5 mg/mL IVPB premix             amLODIPine (NORVASC) tablet 5 mg  5 mg Oral Daily Samir Carmichael DO   5 mg at 09/10/21 0752     Vital Signs (Current)   Vitals:    09/10/21 0430 09/10/21 0445 09/10/21 0537 09/10/21 1115 BP: (!) 141/48 (!) 143/68 (!) 174/74    Pulse: 83 86 89    Resp:     Temp:  97.9 °F (36.6 °C) 98.2 °F (36.8 °C)    TempSrc:  Oral Oral    SpO2: 98% 99% 99%    Weight:   143 lb 11.8 oz (65.2 kg) 137 lb 12.6 oz (62.5 kg)   Height:   5' (1.524 m)                                             Vital Signs Statistics (for past 48 hrs)     Temp  Av.9 °F (36.6 °C)  Min: 97.7 °F (36.5 °C)   Min taken time: 214  Max: 98.2 °F (36.8 °C)   Max taken time: 09/10/21 0537  Pulse  Av.5  Min: [de-identified]   Min taken time: 09/10/21 0100  Max: 110   Max taken time: 21 2314  Resp  Av.5  Min: 12   Min taken time: 09/10/21 0445  Max: 21   Max taken time: 09/10/21 0430  BP  Min: 101/66   Min taken time: 09/10/21 010  Max: 174/74   Max taken time: 09/10/21 0537  MAP (mmHg)  Av.1  Min: 71   Min taken time: 09/10/21 0430  Max: 107   Max taken time: 09/10/21 0537  SpO2  Av.4 %  Min: 83 %   Min taken time: 09/10/21 010  Max: 100 %   Max taken time: 09/10/21 0345  BP Readings from Last 3 Encounters:   09/10/21 (!) 174/74   21 (!) 167/78   21 (!) 163/61       BMI  Body mass index is 26.91 kg/m². Estimated body mass index is 26.91 kg/m² as calculated from the following:    Height as of this encounter: 5' (1.524 m). Weight as of this encounter: 137 lb 12.6 oz (62.5 kg).     CBC   Lab Results   Component Value Date    WBC 16.2 09/10/2021    RBC 3.41 09/10/2021    HGB 9.5 09/10/2021    HCT 29.0 09/10/2021    MCV 93.8 09/10/2021    RDW 13.7 09/10/2021     09/10/2021     CMP    Lab Results   Component Value Date     09/10/2021    K 4.5 09/10/2021    K 5.1 09/10/2021     09/10/2021    CO2 23 09/10/2021    BUN 36 09/10/2021    CREATININE 1.3 09/10/2021    GFRAA 47 09/10/2021    GFRAA 43 2013    AGRATIO 1.0 09/10/2021    LABGLOM 39 09/10/2021    GLUCOSE 122 09/10/2021    PROT 6.9 09/10/2021    PROT 6.8 2011    CALCIUM 9.1 09/10/2021    BILITOT 0.3 09/10/2021    ALKPHOS 54 09/10/2021    AST 16 09/10/2021    ALT 10 09/10/2021     BMP    Lab Results   Component Value Date     09/10/2021    K 4.5 09/10/2021    K 5.1 09/10/2021     09/10/2021    CO2 23 09/10/2021    BUN 36 09/10/2021    CREATININE 1.3 09/10/2021    CALCIUM 9.1 09/10/2021    GFRAA 47 09/10/2021    GFRAA 43 02/05/2013    LABGLOM 39 09/10/2021    GLUCOSE 122 09/10/2021     POCGlucose  Recent Labs     09/10/21  0000 09/10/21  0814   GLUCOSE 168* 122*      Coags    Lab Results   Component Value Date    PROTIME 11.4 09/10/2021    INR 1.01 09/10/2021    APTT 39.4 48/11/6554     HCG (If Applicable) No results found for: PREGTESTUR, PREGSERUM, HCG, HCGQUANT   ABGs   Lab Results   Component Value Date    PHART 7.292 10/20/2020    PO2ART 156.0 10/20/2020    WWV0MCX 44.2 10/20/2020    CFQ2TRR 21.3 10/20/2020    BEART -5.2 10/20/2020    B1ABQQVC 100.0 10/20/2020      Type & Screen (If Applicable)  No results found for: LABABO, LABRH                         BMI: Wt Readings from Last 3 Encounters:       NPO Status:   Date of last liquid consumption: 09/09/21   Time of last liquid consumption: 2200   Date of last solid food consumption: 09/09/21      Time of last solid consumption: 1625       Anesthesia Evaluation  Patient summary reviewed no history of anesthetic complications:   Airway: Mallampati: II  TM distance: >3 FB   Neck ROM: full  Mouth opening: > = 3 FB Dental:    (+) edentulous      Pulmonary: breath sounds clear to auscultation      (-) COPD and asthma                           Cardiovascular:    (+) hypertension:, CAD:, CABG/stent:, dysrhythmias:, CHF:,           Rate: normal                    Neuro/Psych:   (+) CVA:, TIA, psychiatric history:            GI/Hepatic/Renal:   (+) GERD:, PUD, renal disease: CRI,           Endo/Other:    (+) : arthritis:., .    (-) diabetes mellitus, hypothyroidism               Abdominal:             Vascular:     - DVT and PE.       Other Findings:             Anesthesia Plan      MAC     ASA 3       Induction: intravenous. Anesthetic plan and risks discussed with patient. Plan discussed with CRNA. This pre-anesthesia assessment may be used as a history and physical.    DOS STAFF ADDENDUM:    Pt seen and examined, chart reviewed (including anesthesia, drug and allergy history). No interval changes to history and physical examination. Anesthetic plan, risks, benefits, alternatives, and personnel involved discussed with patient. Patient verbalized an understanding and agrees to proceed.       Suzanna Gong MD  September 10, 2021  1:32 PM

## 2021-09-10 NOTE — CONSULTS
Family History   Problem Relation Age of Onset    High Blood Pressure Mother     Heart Disease Mother     Diabetes Mother     High Blood Pressure Father     Heart Disease Father     Cancer Father         Brain    Cancer Sister         lymphoma    Cancer Brother         colon    Cancer Maternal Uncle      Social History     Socioeconomic History    Marital status:      Spouse name: None    Number of children: None    Years of education: None    Highest education level: None   Occupational History    None   Tobacco Use    Smoking status: Passive Smoke Exposure - Never Smoker    Smokeless tobacco: Never Used   Vaping Use    Vaping Use: Never used   Substance and Sexual Activity    Alcohol use: No     Comment: 1 yearly    Drug use: No    Sexual activity: Yes     Partners: Male   Other Topics Concern    None   Social History Narrative    None     Social Determinants of Health     Financial Resource Strain:     Difficulty of Paying Living Expenses:    Food Insecurity:     Worried About Running Out of Food in the Last Year:     Ran Out of Food in the Last Year:    Transportation Needs:     Lack of Transportation (Medical):      Lack of Transportation (Non-Medical):    Physical Activity:     Days of Exercise per Week:     Minutes of Exercise per Session:    Stress:     Feeling of Stress :    Social Connections:     Frequency of Communication with Friends and Family:     Frequency of Social Gatherings with Friends and Family:     Attends [employer] Services:     Active Member of Clubs or Organizations:     Attends Club or Organization Meetings:     Marital Status:    Intimate Partner Violence:     Fear of Current or Ex-Partner:     Emotionally Abused:     Physically Abused:     Sexually Abused:        MEDICATIONS   SCHEDULED:  pantoprazole, ,   famotidine, ,   cefTRIAXone, ,   metroNIDAZOLE, 500 mg, Q8H  [START ON 9/11/2021] cefTRIAXone (ROCEPHIN) IV, 1,000 mg, Q24H  atorvastatin, 20 mg, Daily  donepezil, 5 mg, Nightly  FLUoxetine, 20 mg, Daily  allopurinol, 50 mg, Every Other Day  sodium chloride flush, 5-40 mL, 2 times per day  metroNIDAZOLE, ,   amLODIPine, 5 mg, Daily      FLUIDS/DRIPS:     pantoprozole (PROTONIX) infusion 8 mg/hr (09/10/21 0706)    sodium chloride       PRNs: sodium chloride flush, 5-40 mL, PRN  sodium chloride, 25 mL, PRN  ondansetron, 4 mg, Q6H PRN      ALLERGIES:  She   Allergies   Allergen Reactions    Codeine Shortness Of Breath     Rash and dyspnea    Morphine And Related     Oxycodone-Acetaminophen        REVIEW OF SYSTEMS   Pertinent ROS noted in HPI    PHYSICAL EXAM     Vitals:    09/10/21 0345 09/10/21 0430 09/10/21 0445 09/10/21 0537   BP: (!) 159/46 (!) 141/48 (!) 143/68 (!) 174/74   Pulse: 82 83 86 89   Resp: 21 21 16 18   Temp:   97.9 °F (36.6 °C) 98.2 °F (36.8 °C)   TempSrc:   Oral Oral   SpO2: 100% 98% 99% 99%   Weight:    143 lb 11.8 oz (65.2 kg)   Height:    5' (1.524 m)       No intake/output data recorded. Physical Exam:  General appearance: alert, cooperative, no distress, appears stated age  Eyes: Anicteric  Head: Normocephalic, without obvious abnormality  Lungs: clear to auscultation bilaterally, Normal Effort  Heart: regular rate and rhythm, normal S1 and S2, no murmurs or rubs  Abdomen: soft, non-distended, non-tender. Bowel sounds normal. No masses,  no organomegaly.    Extremities: atraumatic, no cyanosis or edema  Skin: warm and dry, no jaundice  Neuro: Grossly intact, A&OX3      LABS AND IMAGING   Laboratory   Recent Labs     09/10/21  0000 09/10/21  0814   WBC 16.2*  --    HGB 10.3* 9.5*   HCT 32.0* 29.0*   MCV 93.8  --      --      Recent Labs     09/10/21  0000 09/10/21  0814    139   K 5.1 4.5   CL 97* 104   CO2 23 23   BUN 45* 36*   CREATININE 1.3* 1.3*     Recent Labs     09/10/21  0000   AST 16   ALT 10   BILITOT 0.3   ALKPHOS 54     Recent Labs     09/10/21  0000   LIPASE 63.0*     Recent Labs     09/10/21  0000   PROTIME 11.4   INR 1.01       Imaging  CT ABDOMEN PELVIS W IV CONTRAST Additional Contrast? None   Final Result   1. The single phase of contrast and artifact in the upper abdomen reduces   the sensitivity for GI bleed. No convincing evidence of extravasation in the   small bowel and colon. The stomach and duodenum are not well evaluated. 2.  Thick-walled trabeculated urinary bladder with multiple small   diverticula. Suggesting chronic outlet obstruction. 3.  No appendicitis, colonic diverticulitis, ascites or pneumoperitoneum. XR CHEST PORTABLE   Final Result   No acute cardiopulmonary abnormality. ASSESSMENT AND RECOMMENDATIONS   80 y.o. female with a PMH of PMH of CAD, CHF, CKD, HTN, gout, OA who presented on 9/9/2021 with report of dark bloody bowel movements, bloody emesis, generalized weakness. We have been consulted regarding concern for upper GI bleeding. IMPRESSION:  1. Melena/hematochezia with 1 episode of hematemesis  -prior history of stomach ulcers per family  -pt denies heavy NSAID use  -prior colonoscopy history unknown  -on PPI gtt  2. Acute blood loss anemia  3. CKD    RECOMMENDATIONS:   EGD today  Continue PPI therapy  Further input and recommendations pending EGD results       If you have any questions or need any further information, please feel free to contact our consult team.  Thank you for allowing us to participate in the care of Katherine Barrett. The note was completed using Dragon voice recognition transcription. Every effort was made to ensure accuracy; however, inadvertent transcription errors may be present despite my best efforts to edit errors. Rory Ramirez PA-C    Attending physician addendum:      I have personally seen and examined the patient, reviewed the patient's medical record and pertinent labs and clinical imaging. I have personally staffed the case with RAMAN Martin.   I agree with her consultation note, exam findings, assessment and plans  as written above. I have made appropriate modifications and edited her assessment and plan where needed to reflect my impression and plans for this patient. 63-year-old white female with past medical history of dementia, coronary artery disease, CHF, CKD, hypertension, gout, who presented with complaints of hematemesis and maroon dark stool. She reportedly has a previous history of peptic ulcer disease. The daughter reported she had seen some hematemesis. The patient is hard of hearing and denies any abdominal pain. She has continued to pass some maroon-colored stools. Her hemoglobin dropped from 10.3-9.5. Her hemoglobin last month was 12.1. She had an elevated BUN of 45 with a creatinine of 1.3. We do not have any records of her previous endoscopies. She does take an aspirin daily. She had a CT angiogram performed that showed no active extravasation. There is no diverticulosis noted in the colon. The stomach and duodenum were not well evaluated. She had no ascites on CT. She has had no hematuria or epistaxis reported. BP (!) 174/74   Pulse 89   Temp 98.2 °F (36.8 °C) (Oral)   Resp 18   Ht 5' (1.524 m)   Wt 137 lb 12.6 oz (62.5 kg)   SpO2 99%   BMI 26.91 kg/m²      Gen- NAD, [place], A and O x 1  CV- RRR  Lungs- CTAB  Abd- soft, NT/ND  Ext- no C/C/E/E    Labs and CT reviewed. Impression:    1) Acute blood loss anemia-  Patient with reports of hematemesis and dark stools. Prior hx of PUD reported.  + ASA use. She was on Pantoprazole daily. CT angio negative. No diverticulosis on CT    Plan:   1) EGD today  2) PPI gtt  3) Will follow. Thank you for allowing me to participate in this patient's care. If there are any questions or concerns regarding this patient, or the plan we have set in place, please feel free to contact me at [phone].      Dolly Garsia,

## 2021-09-10 NOTE — CARE COORDINATION
Attempted initial assessment but the patient is drowsy after her EGD and also does not have her hearing aides in and cannot hear me. Attempted to call her  to discuss initial assessment, but the phone is ringing busy. Will try again later.   Vidya Garnica RN, BSN, Case Management  Phone: [phone]  Electronically signed by Vidya Garnica RN on 9/10/2021 at 5:13 PM

## 2021-09-10 NOTE — OP NOTE
Endoscopy Note    Patient: Candido Hernandez  : 1935  Acct#:     Procedure: Esophagogastroduodenoscopy with control of bleeding (epinephrine injection, endoclip application x 2)                          Date:  9/10/2021     Surgeon:   Jamal Sequeira DO    Referring Physician:  FERNANDO Pedroza NP    Indications: This is a 80y.o. year old female who presents today with hematemesis and acute blood loss anemia. Postoperative Diagnosis:  1) LA class B esophagitis was noted. 2) There was a 2cm sliding hiatal hernia noted. 3) There was mild atrophic appearance to the stomach. Superficial erosions were noted in the antrum. 4)  There were two ulcers noted in the duodenal sweep. One was 1 cm with clean base and in the anterior and inferior aspect of the duodenal sweep. The second was 5-6 mm in diameter with a 1mm  non-bleeding visible vessel. The ulcer was treated with a total of 3ml of 1:66362 epinephrine in 0.5ml-1ml aliquots x 4 quadrants,  Then, 2 resolution endoclips were deployed to the base of the ulcer to close the ulcer site. No active bleeding was noted before or after these were deployed. Anesthesia:  The patient was administered TIVA per anesthesiology team.  Please see their operative records for full details of medications administered. Consent:  The patient or their legal guardian has signed an informed consent, and is aware of the potential risks, benefits, alternatives, and potential complications of this procedure. These include, but are not limited to hemorrhage, bleeding, post procedural pain, perforation, phlebitis, aspiration, hypotension, hypoxia, cardiovascular events such as arryhthmia, and possibly death. Description of Procedure: The patient was then taken to the endoscopy suite, placed in the left lateral decubitus position and the above IV sedation was administrered.     The Olympus video endoscope was placed through the patient's oropharynx without difficulty to the extent of the 2nd portion of the duodenum. Both forward and retroflexed views of the stomach were obtained. Findings:    1) LA class B esophagitis was noted in the distal esophagus. 2) There was a 2cm sliding hiatal hernia noted. 3) There was mild atrophic appearance to the stomach. Superficial erosions were noted in the antrum. 4)  There were two ulcers noted in the duodenal sweep. One was 1 cm with clean base and in the anterior and inferior aspect of the duodenal sweep. The second was 5-6 mm in diameter with a 1mm  non-bleeding visible vessel. The ulcer was treated with a total of 3ml of 1:85086 epinephrine in 0.5ml-1ml aliquots x 4 quadrants,  Then, 2 resolution endoclips were deployed to the base of the ulcer to close the ulcer site. No active bleeding was noted before or after these were deployed. The scope was then withdrawn back into the stomach, it was decompressed, and the scope was completely withdrawn. The patient tolerated the procedure well and was taken to the post anesthesia care unit in good condition. Estimated blood loss: None    * No specimens in log *    Impression:   1) See post procedure diagnoses    Recommendations:   1) Clear liquid diet. Advance diet as tolerated in am if stable  2) Protonix IV infusion x 48-72 hours  3) H and H q 8 hours  4) If patient would re-bleed, would need to consider IR with intervention. 5) Will follow. 6) Check H.pylori stool antigen.          Maxwell Sevilla,   [address] [facility]

## 2021-09-10 NOTE — PROGRESS NOTES
Awake and alert. Pt denies pain at this time. Pt c/o of nausea. Zofran given. Will continue to monitor.

## 2021-09-10 NOTE — PROGRESS NOTES
Pt arrived to PACU from endo. Pt sleeping but easily awakens. No signs of pain at this time. Will continue to monitor.

## 2021-09-10 NOTE — ED NOTES
Pt granddaughter at bedside. Hospitals in [place] pt was recently sick with bronchitis and was on several antibiotics. Also hospitals pt has hx of peptic ulcers.      [address] [facility]  09/10/21 2905

## 2021-09-10 NOTE — PROGRESS NOTES
Pharmacy Medication Reconciliation Note     List of medications patient is currently taking is complete. Source of information:   1. Dispense hx  2. [facility]  3. Patient's daughter Kiana Like    Notes regarding home medications:   1. Aricept,Fluoxetine,Pantoprazole not refilled since June. Talked to daughter apparently these aren't on auto refill  2. Allopurinol is now 50mg QOD  3.  Recently started on 10 day course of Doxycycline    Denies taking any other OTC or herbal medications    Nicola Underwood [facility], [facility] 9/10/2021 10:02 AM

## 2021-09-10 NOTE — ED NOTES
Rectal exam by Nicholas Snow with tech as chaperone. Bright red blood noted on pt underwear and around rectum.       [address] [facility]  09/10/21 6224

## 2021-09-11 LAB
ANION GAP SERPL CALCULATED.3IONS-SCNC: 12 MMOL/L (ref 3–16)
BUN BLDV-MCNC: 29 MG/DL (ref 7–20)
CALCIUM SERPL-MCNC: 8.6 MG/DL (ref 8.3–10.6)
CHLORIDE BLD-SCNC: 104 MMOL/L (ref 99–110)
CO2: 21 MMOL/L (ref 21–32)
CREAT SERPL-MCNC: 1.2 MG/DL (ref 0.6–1.2)
GFR AFRICAN AMERICAN: 52
GFR NON-AFRICAN AMERICAN: 43
GLUCOSE BLD-MCNC: 91 MG/DL (ref 70–99)
HCT VFR BLD CALC: 22.6 % (ref 36–48)
HCT VFR BLD CALC: 24 % (ref 36–48)
HCT VFR BLD CALC: 26 % (ref 36–48)
HEMOGLOBIN: 7.7 G/DL (ref 12–16)
HEMOGLOBIN: 7.9 G/DL (ref 12–16)
HEMOGLOBIN: 8.4 G/DL (ref 12–16)
MAGNESIUM: 2 MG/DL (ref 1.8–2.4)
POTASSIUM SERPL-SCNC: 4.3 MMOL/L (ref 3.5–5.1)
REASON FOR REJECTION: NORMAL
REJECTED TEST: NORMAL
SODIUM BLD-SCNC: 137 MMOL/L (ref 136–145)

## 2021-09-11 PROCEDURE — 1200000000 HC SEMI PRIVATE

## 2021-09-11 PROCEDURE — 80048 BASIC METABOLIC PNL TOTAL CA: CPT

## 2021-09-11 PROCEDURE — 6370000000 HC RX 637 (ALT 250 FOR IP): Performed by: INTERNAL MEDICINE

## 2021-09-11 PROCEDURE — 6370000000 HC RX 637 (ALT 250 FOR IP): Performed by: NURSE PRACTITIONER

## 2021-09-11 PROCEDURE — 85018 HEMOGLOBIN: CPT

## 2021-09-11 PROCEDURE — 83735 ASSAY OF MAGNESIUM: CPT

## 2021-09-11 PROCEDURE — 85014 HEMATOCRIT: CPT

## 2021-09-11 PROCEDURE — 36415 COLL VENOUS BLD VENIPUNCTURE: CPT

## 2021-09-11 RX ORDER — QUETIAPINE FUMARATE 25 MG/1
12.5 TABLET, FILM COATED ORAL NIGHTLY
Status: DISCONTINUED | OUTPATIENT
Start: 2021-09-11 | End: 2021-09-12 | Stop reason: HOSPADM

## 2021-09-11 RX ORDER — FERROUS SULFATE TAB EC 324 MG (65 MG FE EQUIVALENT) 324 (65 FE) MG
324 TABLET DELAYED RESPONSE ORAL 2 TIMES DAILY WITH MEALS
Status: DISCONTINUED | OUTPATIENT
Start: 2021-09-11 | End: 2021-09-12 | Stop reason: HOSPADM

## 2021-09-11 RX ORDER — PANTOPRAZOLE SODIUM 40 MG/1
40 TABLET, DELAYED RELEASE ORAL
Status: DISCONTINUED | OUTPATIENT
Start: 2021-09-11 | End: 2021-09-12 | Stop reason: HOSPADM

## 2021-09-11 RX ADMIN — AMLODIPINE BESYLATE 5 MG: 5 TABLET ORAL at 10:16

## 2021-09-11 RX ADMIN — METRONIDAZOLE 500 MG: 500 TABLET ORAL at 05:45

## 2021-09-11 RX ADMIN — PANTOPRAZOLE SODIUM 40 MG: 40 TABLET, DELAYED RELEASE ORAL at 05:45

## 2021-09-11 RX ADMIN — QUETIAPINE FUMARATE 12.5 MG: 25 TABLET ORAL at 20:28

## 2021-09-11 RX ADMIN — METRONIDAZOLE 500 MG: 500 TABLET ORAL at 20:28

## 2021-09-11 RX ADMIN — METRONIDAZOLE 500 MG: 500 TABLET ORAL at 14:20

## 2021-09-11 RX ADMIN — ATORVASTATIN CALCIUM 20 MG: 20 TABLET, FILM COATED ORAL at 10:16

## 2021-09-11 RX ADMIN — FLUOXETINE 20 MG: 20 CAPSULE ORAL at 10:16

## 2021-09-11 RX ADMIN — DONEPEZIL HYDROCHLORIDE 5 MG: 5 TABLET, FILM COATED ORAL at 20:29

## 2021-09-11 RX ADMIN — FERROUS SULFATE TAB EC 324 MG (65 MG FE EQUIVALENT) 324 MG: 324 (65 FE) TABLET DELAYED RESPONSE at 16:35

## 2021-09-11 RX ADMIN — PANTOPRAZOLE SODIUM 40 MG: 40 TABLET, DELAYED RELEASE ORAL at 16:35

## 2021-09-11 ASSESSMENT — PAIN SCALES - GENERAL
PAINLEVEL_OUTOF10: 0

## 2021-09-11 ASSESSMENT — PAIN SCALES - WONG BAKER
WONGBAKER_NUMERICALRESPONSE: 0
WONGBAKER_NUMERICALRESPONSE: 0

## 2021-09-11 NOTE — PROGRESS NOTES
Gastroenterology Progress Note    Yousif Breaux is a 80 y.o. female patient. Hospitalization Day:1    SUBJECTIVE:  No further bleeding or hematemesis. No abd pain. Patient was confused overnight and ripped out her IV. Nursing could not replace this    ROS:  Gastrointestinal ROS: no abdominal pain, change in bowel habits, or black or bloody stools    Physical    VITALS:  BP (!) 163/84   Pulse 99   Temp 97.9 °F (36.6 °C) (Oral)   Resp 18   Ht 5' (1.524 m)   Wt 144 lb 2.9 oz (65.4 kg)   SpO2 95%   BMI 28.16 kg/m²   TEMPERATURE:  Current - Temp: 97.9 °F (36.6 °C); Max - Temp  Av.5 °F (36.4 °C)  Min: 97 °F (36.1 °C)  Max: 97.9 °F (36.6 °C)    General:  Alert and oriented,  Very [place], No apparent distress  Skin- without jaundice, pallor noted. Eyes: anicteric sclera, pallor noted. Cardiac: RRR, Nl s1s2, without murmurs  Lungs CTA Bilaterally, normal effort  Abdomen soft, ND, NT, no HSM, Bowel sounds normal  Ext: without edema  Neuro: no asterixis     Data    Data Review:    Recent Labs     09/10/21  0000 09/10/21  0814 09/10/21  1706 21  0015 21  0657   WBC 16.2*  --   --   --   --    HGB 10.3*   < > 8.9* 8.4* 7.9*   HCT 32.0*   < > 28.2* 26.0* 24.0*   MCV 93.8  --   --   --   --      --   --   --   --     < > = values in this interval not displayed. Recent Labs     09/10/21  0000 09/10/21  0814 21  0657    139 137   K 5.1 4.5 4.3   CL 97* 104 104   CO2 23 23 21   BUN 45* 36* 29*   CREATININE 1.3* 1.3* 1.2     Recent Labs     09/10/21  0000   AST 16   ALT 10   BILITOT 0.3   ALKPHOS 54     Recent Labs     09/10/21  0000   LIPASE 63.0*     Recent Labs     09/10/21  0000   PROTIME 11.4   INR 1.01       Radiology Review:    CT ABDOMEN PELVIS W IV CONTRAST Additional Contrast? None   Final Result   1. The single phase of contrast and artifact in the upper abdomen reduces   the sensitivity for GI bleed.   No convincing evidence of extravasation in the   small bowel and colon.  The stomach and duodenum are not well evaluated. 2.  Thick-walled trabeculated urinary bladder with multiple small   diverticula. Suggesting chronic outlet obstruction. 3.  No appendicitis, colonic diverticulitis, ascites or pneumoperitoneum. XR CHEST PORTABLE   Final Result   No acute cardiopulmonary abnormality. ASSESSMENT: 80 y.o. female with a PMH of PMH of CAD, CHF, CKD, HTN, gout, OA who presented on 9/9/2021 with report of dark bloody bowel movements, bloody emesis, generalized weakness. We have been consulted regarding concern for    1) Acute blood loss anemia secondary to duodenal ulcer with visible vessel. S/P EGD with endoscopic therapy (epinephrine and 2 endoclips to the ulcer). H and H stable    2) CAD/CHF- was on ASA prior to admission  3) Hearing loss  4) Delerium- suspect sundowning and ? Dementia. PLAN :  1) Convert to po pantoprazole bid due to loss of IV access  2) H and H q 12. 3) Advance diet  4) Will follow. 5) Can likely re-introduce ASA in next 3-5 days if stable. Thank you for allowing me to participate in the care of your patient. Please feel free to contact me with any concerns.   [address] Johny, DO

## 2021-09-11 NOTE — PLAN OF CARE
Problem: Falls - Risk of:  Goal: Will remain free from falls  Description: Will remain free from falls  9/10/2021 2326 by Breanna Thorne RN  Outcome: Ongoing  9/10/2021 1027 by Whitney Arias RN  Outcome: Ongoing  Goal: Absence of physical injury  Description: Absence of physical injury  9/10/2021 2326 by Breanna Thorne RN  Outcome: Ongoing  9/10/2021 1027 by Whitney Arias RN  Outcome: Ongoing     Problem: Skin Integrity:  Goal: Will show no infection signs and symptoms  Description: Will show no infection signs and symptoms  9/10/2021 2326 by Breanna Thorne RN  Outcome: Ongoing  9/10/2021 1027 by Whitney Arias RN  Outcome: Ongoing  Goal: Absence of new skin breakdown  Description: Absence of new skin breakdown  9/10/2021 2326 by Breanna Thorne RN  Outcome: Ongoing  9/10/2021 1027 by Whitney Arias RN  Outcome: Ongoing

## 2021-09-11 NOTE — PROGRESS NOTES
PT refused a new IV to be placed. Was not able to give rochepin. Pt also refused her Flagyl and Protonix.

## 2021-09-12 VITALS
RESPIRATION RATE: 16 BRPM | BODY MASS INDEX: 28.74 KG/M2 | TEMPERATURE: 98 F | SYSTOLIC BLOOD PRESSURE: 146 MMHG | OXYGEN SATURATION: 97 % | WEIGHT: 146.39 LBS | HEIGHT: 60 IN | DIASTOLIC BLOOD PRESSURE: 70 MMHG | HEART RATE: 88 BPM

## 2021-09-12 LAB
ANION GAP SERPL CALCULATED.3IONS-SCNC: 10 MMOL/L (ref 3–16)
BUN BLDV-MCNC: 15 MG/DL (ref 7–20)
CALCIUM SERPL-MCNC: 8.2 MG/DL (ref 8.3–10.6)
CHLORIDE BLD-SCNC: 106 MMOL/L (ref 99–110)
CO2: 24 MMOL/L (ref 21–32)
CREAT SERPL-MCNC: 1.3 MG/DL (ref 0.6–1.2)
GFR AFRICAN AMERICAN: 47
GFR NON-AFRICAN AMERICAN: 39
GLUCOSE BLD-MCNC: 97 MG/DL (ref 70–99)
HCT VFR BLD CALC: 22.5 % (ref 36–48)
HCT VFR BLD CALC: 24.4 % (ref 36–48)
HEMOGLOBIN: 7.5 G/DL (ref 12–16)
HEMOGLOBIN: 8 G/DL (ref 12–16)
MAGNESIUM: 1.7 MG/DL (ref 1.8–2.4)
POTASSIUM SERPL-SCNC: 4 MMOL/L (ref 3.5–5.1)
SODIUM BLD-SCNC: 140 MMOL/L (ref 136–145)

## 2021-09-12 PROCEDURE — 2580000003 HC RX 258: Performed by: INTERNAL MEDICINE

## 2021-09-12 PROCEDURE — 83735 ASSAY OF MAGNESIUM: CPT

## 2021-09-12 PROCEDURE — 85018 HEMOGLOBIN: CPT

## 2021-09-12 PROCEDURE — 87338 HPYLORI STOOL AG IA: CPT

## 2021-09-12 PROCEDURE — 6370000000 HC RX 637 (ALT 250 FOR IP): Performed by: NURSE PRACTITIONER

## 2021-09-12 PROCEDURE — 6360000002 HC RX W HCPCS: Performed by: INTERNAL MEDICINE

## 2021-09-12 PROCEDURE — 80048 BASIC METABOLIC PNL TOTAL CA: CPT

## 2021-09-12 PROCEDURE — 6370000000 HC RX 637 (ALT 250 FOR IP): Performed by: INTERNAL MEDICINE

## 2021-09-12 PROCEDURE — 94760 N-INVAS EAR/PLS OXIMETRY 1: CPT

## 2021-09-12 PROCEDURE — 85014 HEMATOCRIT: CPT

## 2021-09-12 PROCEDURE — 36415 COLL VENOUS BLD VENIPUNCTURE: CPT

## 2021-09-12 RX ORDER — FERROUS SULFATE TAB EC 324 MG (65 MG FE EQUIVALENT) 324 (65 FE) MG
324 TABLET DELAYED RESPONSE ORAL 2 TIMES DAILY WITH MEALS
Qty: 60 TABLET | Refills: 1 | Status: SHIPPED | OUTPATIENT
Start: 2021-09-12

## 2021-09-12 RX ORDER — PANTOPRAZOLE SODIUM 40 MG/1
40 TABLET, DELAYED RELEASE ORAL
Qty: 60 TABLET | Refills: 3 | Status: SHIPPED | OUTPATIENT
Start: 2021-09-12 | End: 2022-06-20

## 2021-09-12 RX ADMIN — FERROUS SULFATE TAB EC 324 MG (65 MG FE EQUIVALENT) 324 MG: 324 (65 FE) TABLET DELAYED RESPONSE at 09:27

## 2021-09-12 RX ADMIN — SODIUM CHLORIDE, PRESERVATIVE FREE 10 ML: 5 INJECTION INTRAVENOUS at 10:37

## 2021-09-12 RX ADMIN — ATORVASTATIN CALCIUM 20 MG: 20 TABLET, FILM COATED ORAL at 09:27

## 2021-09-12 RX ADMIN — METRONIDAZOLE 500 MG: 500 TABLET ORAL at 14:48

## 2021-09-12 RX ADMIN — FLUOXETINE 20 MG: 20 CAPSULE ORAL at 09:27

## 2021-09-12 RX ADMIN — AMLODIPINE BESYLATE 5 MG: 5 TABLET ORAL at 09:27

## 2021-09-12 RX ADMIN — PANTOPRAZOLE SODIUM 40 MG: 40 TABLET, DELAYED RELEASE ORAL at 05:23

## 2021-09-12 RX ADMIN — ALLOPURINOL 50 MG: 100 TABLET ORAL at 09:27

## 2021-09-12 RX ADMIN — CEFTRIAXONE 1000 MG: 1 INJECTION, POWDER, FOR SOLUTION INTRAMUSCULAR; INTRAVENOUS at 01:43

## 2021-09-12 RX ADMIN — METRONIDAZOLE 500 MG: 500 TABLET ORAL at 05:23

## 2021-09-12 ASSESSMENT — PAIN SCALES - GENERAL
PAINLEVEL_OUTOF10: 0

## 2021-09-12 ASSESSMENT — PAIN SCALES - WONG BAKER
WONGBAKER_NUMERICALRESPONSE: 0

## 2021-09-12 NOTE — PROGRESS NOTES
Reviewed dc instructions with pt and pt family members. Pt and family verbalized understanding. PIV removed. Dressing clean dry and intact. Pt dc to private residence. Wheelchair to transport pt. To vehicle. Pt dc with personal belongings.

## 2021-09-12 NOTE — PLAN OF CARE
Problem: Falls - Risk of:  Goal: Will remain free from falls  Description: Will remain free from falls  9/12/2021 0155 by Gay Gonsalves RN  Outcome: Ongoing  9/11/2021 2152 by Michaela Leigh RN  Outcome: Ongoing  Goal: Absence of physical injury  Description: Absence of physical injury  9/12/2021 0155 by Gay Gonsalves RN  Outcome: Ongoing  9/11/2021 2152 by Michaela Leigh RN  Outcome: Ongoing     Problem: Skin Integrity:  Goal: Will show no infection signs and symptoms  Description: Will show no infection signs and symptoms  9/12/2021 0155 by Gay Gonsalves RN  Outcome: Ongoing  9/11/2021 2152 by Michaela Leigh RN  Outcome: Ongoing  Goal: Absence of new skin breakdown  Description: Absence of new skin breakdown  9/12/2021 0155 by Gay Gonsalves RN  Outcome: Ongoing  9/11/2021 2152 by Michaela Leigh RN  Outcome: Ongoing

## 2021-09-12 NOTE — CARE COORDINATION
CASE MANAGEMENT DISCHARGE SUMMARY:  Met with patient & . Patient ready to discharge home with family who provides 24/7 care if needed. Daughter to transport. Declines needing home care.     DISCHARGE DATE: 9/12 pending lab work at [facility] 13: Home with family    TRANSPORTATION: Family             TIME: later this afternoon, pending lab work results    Electronically signed by Roger Hicks RN Case Management [phone] on 9/12/2021 at 12:01 PM

## 2021-09-12 NOTE — PROGRESS NOTES
Hospitalist Progress Note      PCP: Fernandez Terrell APRN - NP    Date of Admission: 9/9/2021    Subjective: pt was confused overnight, states she is [place], hb still dropping    Medications:  Reviewed    Infusion Medications    sodium chloride       Scheduled Medications    iron sucrose  200 mg IntraVENous Once    pantoprazole  40 mg Oral BID AC    ferrous sulfate  324 mg Oral BID WC    cefTRIAXone (ROCEPHIN) IV  1,000 mg IntraVENous Q24H    atorvastatin  20 mg Oral Daily    donepezil  5 mg Oral Nightly    FLUoxetine  20 mg Oral Daily    allopurinol  50 mg Oral Every Other Day    sodium chloride flush  5-40 mL IntraVENous 2 times per day    amLODIPine  5 mg Oral Daily    metroNIDAZOLE  500 mg Oral 3 times per day     PRN Meds: sodium chloride flush, sodium chloride, ondansetron      Intake/Output Summary (Last 24 hours) at 9/11/2021 2006  Last data filed at 9/11/2021 1751  Gross per 24 hour   Intake 1440 ml   Output 950 ml   Net 490 ml       Physical Exam Performed:    /64   Pulse 82   Temp 98.1 °F (36.7 °C) (Oral)   Resp 18   Ht 5' (1.524 m)   Wt 144 lb 2.9 oz (65.4 kg)   SpO2 95%   BMI 28.16 kg/m²        General appearance: Hard of hearing, pleasant and conversational, somewhat oriented and alert  HEENT Normal cephalic, atraumatic without obvious deformity.  Pupils equal, round, and reactive to light.  Extra ocular muscles intact.  Dry mucous membranes, clear conjunctiva  Neck: Supple, no JVD  Lungs: Diminished breath sounds bilaterally but overall clear  Heart: Regular rate and rhythm with Normal S1/S2 without murmurs, rubs or gallops, point of maximum impulse non-displaced  Abdomen: Currently soft, nontender, nondistended and active bowel sounds  Extremities: No edema noted bilaterally to lower extremities  Skin: No rashes  Neurologic: Grossly intact neurologically  Mental status: Alert, oriented, thought content appropriate.   Capillary Refill: Acceptable  < 3 seconds  Peripheral Pulses: [I10]          Plan:  · Sepsis is of uncertain etiology, potentially could all be driven by the GI bleed although infectious process is still possible with the diarrhea and potentially could also be related to bacterial induced peptic ulcer disease  · Checking for C. difficile diarrhea given recent antibiotic use for bronchitis as well as H. pylori stool antigen  · Start patient on Rocephin and Flagyl for sepsis secondary due to GI pathology  · Plan to transfuse if hb less than 7 at this time  · GI consult for suspected upper GI bleed - s/p EGD 9/10 with bleeding duodenal ulcer  · Trend hb dropping - at 7.9 today, check hb Q12H  · Started patient on Protonix IV infusion-->PO BID  · Restart patient's home medications including antihypertensives for now given patient's appropriate blood pressure  · confusion overnight - ?dementia with sundowning        DVT Prophylaxis: SCDs  Diet: ADULT DIET;  Full Liquid  Code Status: Full Code    PT/OT Eval Status:ordered    Eduardo Butts MD

## 2021-09-12 NOTE — PROGRESS NOTES
4 Eyes Skin Assessment     NAME:  Fatoumata Tolentino  YOB: 1935  MEDICAL RECORD NUMBER:  [id number]    The patient is being assess for  Transfer to New Unit    I agree that 2 RN's have performed a thorough Head to Toe Skin Assessment on the patient. ALL assessment sites listed below have been assessed. Areas assessed by both nurses:    Head, Face, Ears, Shoulders, Back, Chest, Arms, Elbows, Hands, Sacrum. Buttock, Coccyx, Ischium and Legs. Feet and Heels        Does the Patient have a Wound?  No noted wound(s)       Edwardo Prevention initiated:  Yes   Wound Care Orders initiated:  NA    Pressure Injury (Stage 3,4, Unstageable, DTI, NWPT, and Complex wounds) if present place consult order under [de-identified] NA    New and Established Ostomies if present place consult order under : na     Nurse 1 eSignature: Electronically signed by Ashanti Mirza RN on 9/11/21 at 10:22 PM EDT    **SHARE this note so that the co-signing nurse is able to place an eSignature**    Nurse 2 eSignature: Electronically signed by Ben Altamirano RN on 9/11/21 at 11:12 PM EDT

## 2021-09-12 NOTE — PROGRESS NOTES
INPATIENT CONSULTATION:    IDENTIFYING DATA/REASON FOR CONSULTATION   PATIENT:  Eric Calvert  MRN:  [id number]  ADMIT DATE: 9/9/2021  TIME OF EVALUATION: 9/12/2021 7:27 AM  HOSPITAL STAY:   LOS: 2 days   CONSULTING PHYSICIAN:  REASON FOR CONSULTATION:    Subjective:    -No reported bleeding overnight. No abdominal pain. MEDICATIONS   SCHEDULED:  iron sucrose, 200 mg, Once  pantoprazole, 40 mg, BID AC  ferrous sulfate, 324 mg, BID WC  QUEtiapine, 12.5 mg, Nightly  cefTRIAXone (ROCEPHIN) IV, 1,000 mg, Q24H  atorvastatin, 20 mg, Daily  donepezil, 5 mg, Nightly  FLUoxetine, 20 mg, Daily  allopurinol, 50 mg, Every Other Day  sodium chloride flush, 5-40 mL, 2 times per day  amLODIPine, 5 mg, Daily  metroNIDAZOLE, 500 mg, 3 times per day      FLUIDS/DRIPS:     sodium chloride       PRNs: sodium chloride flush, 5-40 mL, PRN  sodium chloride, 25 mL, PRN  ondansetron, 4 mg, Q6H PRN      ALLERGIES:  She [unfilled]      PHYSICAL EXAM   [unfilled]   I/O last 3 completed shifts:   In: 1440 [P.O.:1440]  Out: 950 [Urine:950]  Oxygen Therapy:  @[id number])@  @[id number])@  @[id number])@    Physical Exam:  Gen: Resting in bed, NAD   CV: RRR no MRG   Pul: CTAB   Abd: Good bowel sounds throughout, no scars, soft, NT/ND, no masses, no HSM   Ext: No edema   Neuro: No asterixis   Skin: No jaundice, spider angiomas, kay erythema     LABS AND IMAGING     Recent Results (from the past 24 hour(s))   SPECIMEN REJECTION    Collection Time: 09/11/21  8:37 PM   Result Value Ref Range    Rejected Test [facility]     Reason for Rejection see below    Hemoglobin and Hematocrit, Blood    Collection Time: 09/11/21  9:52 PM   Result Value Ref Range    Hemoglobin 7.7 (L) 12.0 - 16.0 g/dL    Hematocrit 22.6 (L) 36.0 - 48.0 %   Basic Metabolic Panel    Collection Time: 09/12/21  5:16 AM   Result Value Ref Range    Sodium 140 136 - 145 mmol/L    Potassium 4.0 3.5 - 5.1 mmol/L    Chloride 106 99 - 110 mmol/L    CO2 24 21 - 32 mmol/L    Anion Gap 10 3 - 16    Glucose 97 70 - 99 mg/dL    BUN 15 7 - 20 mg/dL    CREATININE 1.3 (H) 0.6 - 1.2 mg/dL    GFR Non-African American 39 (A) >60    GFR  47 (A) >60    Calcium 8.2 (L) 8.3 - 10.6 mg/dL   Magnesium    Collection Time: 09/12/21  5:16 AM   Result Value Ref Range    Magnesium 1.70 (L) 1.80 - 2.40 mg/dL     Other Labs    Imaging    ASSESSMENT AND RECOMMENDATIONS   Shayy Geronimo is a 81 YO female with a PMH of PMH of CAD, CHF, CKD, HTN, gout, OA who presented on 9/9/2021 with report of dark bloody bowel movements, bloody emesis, generalized weakness. We have been consulted regarding concern for GI bleed.    1) Acute blood loss anemia secondary to duodenal ulcer with visible vessel. S/P EGD with endoscopic therapy (epinephrine and 2 endoclips to the ulcer). H and H stable     2) CAD/CHF- was on ASA prior to admission  3) Hearing loss  4) Delerium- suspect sundowning and ? Dementia.       PLAN :  -No reported bleeding. H/H have been stable. Recommend Pantoprazole BID x 8 weeks. H. Pylori stool antigen not collected. Patient likely will need to remain on daily PPI to protect from recurrent PUD going forward. Will consider restarting ASA as outpatient. Will schedule OP follow-up with GI Esther Marks in 2 weeks. If H/H stable on recheck today can be DC. If you have any questions or need any further information, please feel free to contact anyone on our consult team.  Thank you for allowing us to participate in the care of Shayy Geronimo.     Nagi Junior MD  [address] [place] [address]

## 2021-09-13 ENCOUNTER — CARE COORDINATION (OUTPATIENT)
Dept: CASE MANAGEMENT | Age: 86
End: 2021-09-13

## 2021-09-13 NOTE — DISCHARGE SUMMARY
Hospital Medicine Discharge Summary    Patient ID: Cinda Martin      Patient's PCP: FERNANDO Sandoval NP    Admit Date: 9/9/2021     Discharge Date: 9/12/2021      Admitting Physician: Beryle Davidson, DO     Discharge Physician: Arlys Opitz, MD     Discharge Diagnoses: Active Hospital Problems    Diagnosis     GI bleed [K92.2]     Diarrhea [R19.7]     Chronic heart failure (HCC) [I50.9]     Sepsis ([facility] [address] [A41.9]     Upper GI bleed [K92.2]     Stage 3b chronic kidney disease ([facility] [address] [N18.32]     GERD (gastroesophageal reflux disease) [K21.9]     HTN (hypertension), benign [I10]        The patient was seen and examined on day of discharge and this discharge summary is in conjunction with any daily progress note from day of discharge. Hospital Course: The patient is a 80 y.o. female with past medical history of chronic kidney disease, GERD, gout, hyperlipidemia, hypertension, irregular heartbeat, CAD, and CHF who presents to [facility] assisted by her daughter who noted patient for the last 3 weeks and initially diagnosed with bronchitis and was started on doxycycline for cough and congestion and not feeling overall well but then over the last 3 to 4 days patient was noted by the daughter to have multiple dark bloody bowel movements, patient had been getting weaker and daughter notes that she was also noting that patient did not seem to have a very good appetite and was having some level of epigastric abdominal pain. Apart from the cough and congestion that was initially diagnosed as bronchitis, daughter notes the patient has not had any other recent symptoms of fever or chills although potentially has had some night sweats. Daughter does note decreased appetite as well as nausea and then patient did have some bouts of vomiting.   What was concerning to the daughter also was when patient was brought to the ED today, patient notably was very weak trying to get out of the car and did have initially what seemed like a brief syncopal episode. Daughter also notes that patient did have 1 bout of bloody emesis on arrival to the ED. She was hemodynamically stable however.   Currently in the ED, she has not had any further bouts of bloody emesis or bloody diarrhea.       · Sepsis is of uncertain etiology, potentially could all be driven by the GI bleed although infectious process is still possible with the diarrhea and potentially could also be related to bacterial induced peptic ulcer disease  · Checking for C. difficile diarrhea given recent antibiotic use for bronchitis as well as H. pylori stool antigen - no diarrhea since admit - cancel c diff testing  · Start patient on Rocephin and Flagyl for sepsis secondary due to GI pathology - dc abx  · GI consulted for suspected upper GI bleed - s/p EGD 9/10 with bleeding duodenal ulcer  · Trend hb dropping - at 7.9-->7.5, po iron dc  · Started patient on Protonix IV infusion-->PO BID  · Restart patient's home medications including antihypertensives for now given patient's appropriate blood pressure  · confusion overnight - ?dementia with sundowning       Physical Exam Performed:     BP (!) 146/70   Pulse 88   Temp 98 °F (36.7 °C) (Oral)   Resp 16   Ht 5' (1.524 m)   Wt 146 lb 6.2 oz (66.4 kg)   SpO2 97%   BMI 28.59 kg/m²        General appearance: Hard of hearing, pleasant and conversational, somewhat oriented and alert  HEENT Normal cephalic, atraumatic without obvious deformity.  Pupils equal, round, and reactive to light.  Extra ocular muscles intact.  Dry mucous membranes, clear conjunctiva  Neck: Supple, no JVD  Lungs: Diminished breath sounds bilaterally but overall clear  Heart: Regular rate and rhythm with Normal S1/S2 without murmurs, rubs or gallops, point of maximum impulse non-displaced  Abdomen: Currently soft, nontender, nondistended and active bowel sounds  Extremities: No edema noted bilaterally to lower extremities  Skin: No rashes  Neurologic: Grossly intact neurologically  Mental status: Alert, oriented, thought content appropriate. Capillary Refill: Acceptable  < 3 seconds  Peripheral Pulses: +3 Easily felt, not easily obliterated with pressure       Labs: For convenience and continuity at follow-up the following most recent labs are provided:      CBC:    Lab Results   Component Value Date    WBC 16.2 09/10/2021    HGB 8.0 09/12/2021    HCT 24.4 09/12/2021     09/10/2021       Renal:    Lab Results   Component Value Date     09/12/2021    K 4.0 09/12/2021    K 5.1 09/10/2021     09/12/2021    CO2 24 09/12/2021    BUN 15 09/12/2021    CREATININE 1.3 09/12/2021    CALCIUM 8.2 09/12/2021    PHOS 3.6 05/21/2014         Significant Diagnostic Studies    Radiology:   CT ABDOMEN PELVIS W IV CONTRAST Additional Contrast? None   Final Result   1. The single phase of contrast and artifact in the upper abdomen reduces   the sensitivity for GI bleed. No convincing evidence of extravasation in the   small bowel and colon. The stomach and duodenum are not well evaluated. 2.  Thick-walled trabeculated urinary bladder with multiple small   diverticula. Suggesting chronic outlet obstruction. 3.  No appendicitis, colonic diverticulitis, ascites or pneumoperitoneum. XR CHEST PORTABLE   Final Result   No acute cardiopulmonary abnormality.                 Consults:     IP CONSULT TO GI    Disposition:  home     Condition at Discharge: Stable    Discharge Instructions/Follow-up:  pcp in 1 week    Code Status:  Prior     Activity: activity as tolerated    Diet: regular diet      Discharge Medications:     Discharge Medication List as of 9/12/2021  3:51 PM           Details   ferrous sulfate 324 (65 Fe) MG EC tablet Take 1 tablet by mouth 2 times daily (with meals), Disp-60 tablet, R-1Normal              Details   pantoprazole (PROTONIX) 40 MG tablet Take 1 tablet by mouth 2 times daily (before meals), Disp-60 tablet, R-3Normal              Details   amLODIPine (NORVASC) 5 MG tablet Take 5 mg by mouth dailyHistorical Med      benzonatate (TESSALON) 100 MG capsule Take 100 mg by mouth 3 times daily as needed for CoughHistorical Med      doxycycline hyclate (VIBRAMYCIN) 100 MG capsule Take 100 mg by mouth 2 times dailyHistorical Med      allopurinol (ZYLOPRIM) 100 MG tablet Take 50 mg by mouth every other dayHistorical Med      Respiratory Therapy Supplies (NEBULIZER/TUBING/MOUTHPIECE) KIT DAILY PRN Starting Tue 8/24/2021, Disp-1 kit, R-0, Print      ipratropium-albuterol (DUONEB) 0.5-2.5 (3) MG/3ML SOLN nebulizer solution Inhale 3 mLs into the lungs every 6 hours as needed for Shortness of Breath, Disp-360 mL, R-0Print      carvedilol (COREG) 12.5 MG tablet Take 1 tablet by mouth 2 times daily (with meals), Disp-60 tablet, R-3Normal      Multiple Vitamins-Minerals (THERAPEUTIC MULTIVITAMIN-MINERALS) tablet Take 1 tablet by mouth dailyHistorical Med      donepezil (ARICEPT) 5 MG tablet Take 5 mg by mouth nightlyHistorical Med      furosemide (LASIX) 40 MG tablet Take 40 mg by mouth daily Historical Med      lisinopril (PRINIVIL;ZESTRIL) 10 MG tablet Take 10 mg by mouth daily Historical Med      loratadine (CLARITIN) 10 MG capsule Take 10 mg by mouth daily as needed Historical Med      vitamin D3 (CHOLECALCIFEROL) 10 MCG (400 UNIT) TABS tablet Take 400 Units by mouth dailyHistorical Med      FLUoxetine (PROZAC) 20 MG capsule TAKE 1 CAPSULE BY MOUTH ONE TIME A DAY , Disp-90 capsule, R-0Normal      atorvastatin (LIPITOR) 20 MG tablet TAKE 1 TABLET BY MOUTH ONE TIME A DAY , Disp-90 tablet, R-3Normal      Handicap Placard MISC Starting 8/17/2016, Until Discontinued, Disp-1 each, R-0, PrintPatient is unable to walk more than 250 feet before stopping to rest.   Not to exceed 5 years.  DX: CHF             Time Spent on discharge is more than 30 minutes in the examination, evaluation, counseling and review of medications and discharge plan. Signed:    Beny Villavicencio MD   9/12/2021      Thank you FERNANDO Ramirez NP for the opportunity to be involved in this patient's care. If you have any questions or concerns please feel free to contact me at [phone].

## 2021-09-13 NOTE — CARE COORDINATION
[facility] Transitions Initial Follow Up Call    Call within 2 business days of discharge: Yes    Patient: Cheyenne Roblero Patient : 1935   MRN: <[id number]>  Reason for Admission:GI bleed   Discharge Date: 21 RARS: Readmission Risk Score: 24      Last Discharge [address] [address]       Complaint Diagnosis Description Type Department Provider    21 Rectal Bleeding; Hematemesis Gastrointestinal hemorrhage, unspecified gastrointestinal hemorrhage type . .. ED to Hosp-Admission (Discharged) (ADMITTED) Sánchez Aquino MD; Dillon Vivar. .. Spoke with: Granddaughter Patient nor [employer] available left Writer stated will call back at another date. Care Transitions 24 Hour Call    Do you have all of your prescriptions and are they filled?: Yes  Care Transitions Interventions         Follow Up  No future appointments.     Vashti Younger LPN

## 2021-09-14 ENCOUNTER — CARE COORDINATION (OUTPATIENT)
Dept: CASE MANAGEMENT | Age: 86
End: 2021-09-14

## 2021-09-14 LAB — H PYLORI ANTIGEN STOOL: NEGATIVE

## 2021-09-14 NOTE — CARE COORDINATION
[facility] Transitions Initial Follow Up Call    Call within 2 business days of discharge: Yes    Patient: Lenny Salcedo Patient : 1935   MRN: [id number]  Reason for Admission: GI bleed, sepsis  Discharge Date: 21 RARS: Readmission Risk Score: 24      Last Discharge [facility]       Complaint Diagnosis Description Type Department Provider    21 Rectal Bleeding; Hematemesis Gastrointestinal hemorrhage, unspecified gastrointestinal hemorrhage type . .. ED to Hosp-Admission (Discharged) (ADMITTED) Elsy Nicholson MD; Dillon Vivar. .. Spoke with: spoke with daughter, Kirby Jane, HIPAA verified  Facility: [facility]    Non-face-to-face services provided:  Obtained and reviewed discharge summary and/or continuity of care documents    Transitions of Care Initial Call    Was this an external facility discharge? No Discharge Facility: NA    Challenges to be reviewed by the provider   Additional needs identified to be addressed with provider: No  none             Method of communication with provider : phone      Advance Care Planning:   Does patient have an Advance Directive: reviewed and current. Was this a readmission? No  Patient stated reason for admission: hematemesis  Patients top risk factors for readmission: medical condition-GI bleed, sepsis    Care Transition Nurse (CTN) contacted the patient by telephone to perform post hospital discharge assessment. Verified name and  with patient as identifiers. Provided introduction to self, and explanation of the CTN role. CTN reviewed discharge instructions, medical action plan and red flags with patient who verbalized understanding. Patient given an opportunity to ask questions and does not have any further questions or concerns at this time. Were discharge instructions available to patient? Yes. Reviewed appropriate site of care based on symptoms and resources available to patient including: PCP.  The patient agrees to contact the PCP office for questions related to their healthcare. Medication reconciliation was performed with patient, who verbalizes understanding of administration of home medications. Advised obtaining a 90-day supply of all daily and as-needed medications. Covid Risk Education  Completed Caden Silver 21     Educated patient about risk for severe COVID-19 due to risk factors according to [facility] guidelines. LPN CC reviewed discharge instructions, medical action plan and red flag symptoms with the patient who verbalized understanding. Discussed COVID vaccination status: Yes. Education provided on COVID-19 vaccination as appropriate. Discussed exposure protocols and quarantine with CDC Guidelines. Patient was given an opportunity to verbalize any questions and concerns and agrees to contact LPN CC or health care provider for questions related to their healthcare. Reviewed and educated patient on any new and changed medications related to discharge diagnosis. Was patient discharged with a pulse oximeter? No Discussed and confirmed pulse oximeter discharge instructions and when to notify provider or seek emergency care. Spoke with daughter, Tracey Arnett, HIPAA verified. Daughter verified patient  and was pleasant and agreeable to transition calls. States patient is well, folding laundry and getting around the house well. Denies weakness, bloody stools, N/V/D, abdominal pain. States stools are dark, but noted that patient is taking oral iron supplement. Appetite good. Denies problems with bladder. States respiratory symptoms have improved from bout of bronchitis in late August. Denies fever/chills. Speech becoming more coherent. States they have taken patient's weight, but she wasn't sure what it was. Plans to call for PCP f/u. Denies any acute needs at present time. Educated on the use of urgent care or physicians 24 hr access line if assistance is needed after hours.  LPN CC provided contact information. No further follow-up call indicated based on severity of symptoms and risk factors. Episode ended d/t non [facility] PCP. Radha Andrade  [facility]  [phone]    Care Transitions 24 Hour Call    Do you have any ongoing symptoms?: No  Do you have a copy of your discharge instructions?: Yes  Do you have all of your prescriptions and are they filled?: Yes  Have you been contacted by a [address]?: No  Have you scheduled your follow up appointment?: No  Were you discharged with any Home Care or Post Acute Services: No  Post Acute Services: [facility] [place] you feel like you have everything you need to keep you well at home?: Yes  Care Transitions Interventions         Follow Up  No future appointments.     Anurag Lucas LPN

## 2021-09-27 ENCOUNTER — TELEPHONE (OUTPATIENT)
Dept: PHARMACY | Facility: CLINIC | Age: 86
End: 2021-09-27

## 2021-09-27 NOTE — TELEPHONE ENCOUNTER
CLINICAL PHARMACY NOTE  Post-Discharge Transitions of Care (DANTE)    Per brief chart review:  · Appears that med rec already completed by CTN last week with daughter. PCP non-[facility] provider as well. Will not outreach to patient at this time; please contact me if further medication review/outreach requested by patient or care coordinator.     Cleophus Leyden, PharmD, [facility], toll free: [phone]      For Pharmacy Admin Tracking Only     Time Spent (min): 5

## 2021-10-26 ENCOUNTER — APPOINTMENT (OUTPATIENT)
Dept: GENERAL RADIOLOGY | Age: 86
DRG: 291 | End: 2021-10-26
Payer: MEDICARE

## 2021-10-26 ENCOUNTER — APPOINTMENT (OUTPATIENT)
Dept: CT IMAGING | Age: 86
DRG: 291 | End: 2021-10-26
Payer: MEDICARE

## 2021-10-26 ENCOUNTER — HOSPITAL ENCOUNTER (INPATIENT)
Age: 86
LOS: 5 days | Discharge: HOME | DRG: 291 | End: 2021-10-31
Attending: EMERGENCY MEDICINE | Admitting: INTERNAL MEDICINE
Payer: MEDICARE

## 2021-10-26 DIAGNOSIS — I50.43 ACUTE ON CHRONIC COMBINED SYSTOLIC AND DIASTOLIC CONGESTIVE HEART FAILURE (HCC): Primary | ICD-10-CM

## 2021-10-26 DIAGNOSIS — Z20.822 SUSPECTED COVID-19 VIRUS INFECTION: ICD-10-CM

## 2021-10-26 PROBLEM — J96.01 ACUTE RESPIRATORY FAILURE WITH HYPOXIA (HCC): Status: ACTIVE | Noted: 2021-10-26

## 2021-10-26 LAB
A/G RATIO: 1.2 (ref 1.1–2.2)
ALBUMIN SERPL-MCNC: 4 G/DL (ref 3.4–5)
ALP BLD-CCNC: 55 U/L (ref 40–129)
ALT SERPL-CCNC: 7 U/L (ref 10–40)
AMORPHOUS: ABNORMAL /HPF
ANION GAP SERPL CALCULATED.3IONS-SCNC: 12 MMOL/L (ref 3–16)
AST SERPL-CCNC: 15 U/L (ref 15–37)
BACTERIA: ABNORMAL /HPF
BASOPHILS ABSOLUTE: 0.1 K/UL (ref 0–0.2)
BASOPHILS RELATIVE PERCENT: 0.8 %
BILIRUB SERPL-MCNC: <0.2 MG/DL (ref 0–1)
BILIRUBIN URINE: NEGATIVE
BLOOD, URINE: ABNORMAL
BUN BLDV-MCNC: 19 MG/DL (ref 7–20)
CALCIUM SERPL-MCNC: 9.5 MG/DL (ref 8.3–10.6)
CHLORIDE BLD-SCNC: 104 MMOL/L (ref 99–110)
CLARITY: ABNORMAL
CO2: 23 MMOL/L (ref 21–32)
COLOR: ABNORMAL
CREAT SERPL-MCNC: 1 MG/DL (ref 0.6–1.2)
EOSINOPHILS ABSOLUTE: 0.3 K/UL (ref 0–0.6)
EOSINOPHILS RELATIVE PERCENT: 2.8 %
EPITHELIAL CELLS, UA: ABNORMAL /HPF (ref 0–5)
GFR AFRICAN AMERICAN: >60
GFR NON-AFRICAN AMERICAN: 53
GLOBULIN: 3.4 G/DL
GLUCOSE BLD-MCNC: 119 MG/DL (ref 70–99)
GLUCOSE URINE: NEGATIVE MG/DL
HCT VFR BLD CALC: 31.5 % (ref 36–48)
HEMATOLOGY PATH CONSULT: YES
HEMOGLOBIN: 10.3 G/DL (ref 12–16)
KETONES, URINE: NEGATIVE MG/DL
LEUKOCYTE ESTERASE, URINE: NEGATIVE
LYMPHOCYTES ABSOLUTE: 2.6 K/UL (ref 1–5.1)
LYMPHOCYTES RELATIVE PERCENT: 24.9 %
MCH RBC QN AUTO: 29.4 PG (ref 26–34)
MCHC RBC AUTO-ENTMCNC: 32.9 G/DL (ref 31–36)
MCV RBC AUTO: 89.3 FL (ref 80–100)
MICROSCOPIC EXAMINATION: YES
MONOCYTES ABSOLUTE: 1.6 K/UL (ref 0–1.3)
MONOCYTES RELATIVE PERCENT: 15.3 %
NEUTROPHILS ABSOLUTE: 5.9 K/UL (ref 1.7–7.7)
NEUTROPHILS RELATIVE PERCENT: 56.2 %
NITRITE, URINE: NEGATIVE
OCCULT BLOOD SCREENING: NORMAL
PDW BLD-RTO: 14.4 % (ref 12.4–15.4)
PH UA: 6 (ref 5–8)
PLATELET # BLD: 385 K/UL (ref 135–450)
PMV BLD AUTO: 9 FL (ref 5–10.5)
POTASSIUM REFLEX MAGNESIUM: 4.3 MMOL/L (ref 3.5–5.1)
PRO-BNP: ABNORMAL PG/ML (ref 0–449)
PROTEIN UA: 30 MG/DL
RBC # BLD: 3.52 M/UL (ref 4–5.2)
RBC UA: ABNORMAL /HPF (ref 0–4)
SARS-COV-2, NAAT: NOT DETECTED
SLIDE REVIEW: ABNORMAL
SODIUM BLD-SCNC: 139 MMOL/L (ref 136–145)
SPECIFIC GRAVITY UA: 1.01 (ref 1–1.03)
TOTAL PROTEIN: 7.4 G/DL (ref 6.4–8.2)
TROPONIN: 0.01 NG/ML
URINE REFLEX TO CULTURE: ABNORMAL
URINE TYPE: ABNORMAL
UROBILINOGEN, URINE: 0.2 E.U./DL
WBC # BLD: 10.5 K/UL (ref 4–11)
WBC UA: ABNORMAL /HPF (ref 0–5)

## 2021-10-26 PROCEDURE — 36415 COLL VENOUS BLD VENIPUNCTURE: CPT

## 2021-10-26 PROCEDURE — 83880 ASSAY OF NATRIURETIC PEPTIDE: CPT

## 2021-10-26 PROCEDURE — U0003 INFECTIOUS AGENT DETECTION BY NUCLEIC ACID (DNA OR RNA); SEVERE ACUTE RESPIRATORY SYNDROME CORONAVIRUS 2 (SARS-COV-2) (CORONAVIRUS DISEASE [COVID-19]), AMPLIFIED PROBE TECHNIQUE, MAKING USE OF HIGH THROUGHPUT TECHNOLOGIES AS DESCRIBED BY CMS-2020-01-R: HCPCS

## 2021-10-26 PROCEDURE — 71046 X-RAY EXAM CHEST 2 VIEWS: CPT

## 2021-10-26 PROCEDURE — 85025 COMPLETE CBC W/AUTO DIFF WBC: CPT

## 2021-10-26 PROCEDURE — 87324 CLOSTRIDIUM AG IA: CPT

## 2021-10-26 PROCEDURE — 80053 COMPREHEN METABOLIC PANEL: CPT

## 2021-10-26 PROCEDURE — 71260 CT THORAX DX C+: CPT

## 2021-10-26 PROCEDURE — 82270 OCCULT BLOOD FECES: CPT

## 2021-10-26 PROCEDURE — 87635 SARS-COV-2 COVID-19 AMP PRB: CPT

## 2021-10-26 PROCEDURE — 99284 EMERGENCY DEPT VISIT MOD MDM: CPT

## 2021-10-26 PROCEDURE — U0005 INFEC AGEN DETEC AMPLI PROBE: HCPCS

## 2021-10-26 PROCEDURE — 96374 THER/PROPH/DIAG INJ IV PUSH: CPT

## 2021-10-26 PROCEDURE — 81001 URINALYSIS AUTO W/SCOPE: CPT

## 2021-10-26 PROCEDURE — 6360000002 HC RX W HCPCS: Performed by: EMERGENCY MEDICINE

## 2021-10-26 PROCEDURE — 6370000000 HC RX 637 (ALT 250 FOR IP): Performed by: EMERGENCY MEDICINE

## 2021-10-26 PROCEDURE — 2060000000 HC ICU INTERMEDIATE R&B

## 2021-10-26 PROCEDURE — 84484 ASSAY OF TROPONIN QUANT: CPT

## 2021-10-26 PROCEDURE — 6360000004 HC RX CONTRAST MEDICATION: Performed by: EMERGENCY MEDICINE

## 2021-10-26 PROCEDURE — 87449 NOS EACH ORGANISM AG IA: CPT

## 2021-10-26 PROCEDURE — 93005 ELECTROCARDIOGRAM TRACING: CPT | Performed by: EMERGENCY MEDICINE

## 2021-10-26 RX ORDER — AMLODIPINE BESYLATE 5 MG/1
5 TABLET ORAL DAILY
Status: DISCONTINUED | OUTPATIENT
Start: 2021-10-27 | End: 2021-10-28

## 2021-10-26 RX ORDER — ASPIRIN 81 MG/1
81 TABLET, CHEWABLE ORAL DAILY
COMMUNITY

## 2021-10-26 RX ORDER — PANTOPRAZOLE SODIUM 40 MG/1
40 TABLET, DELAYED RELEASE ORAL
Status: DISCONTINUED | OUTPATIENT
Start: 2021-10-27 | End: 2021-10-31 | Stop reason: HOSPADM

## 2021-10-26 RX ORDER — ACETAMINOPHEN 650 MG/1
650 SUPPOSITORY RECTAL EVERY 6 HOURS PRN
Status: DISCONTINUED | OUTPATIENT
Start: 2021-10-26 | End: 2021-10-31 | Stop reason: HOSPADM

## 2021-10-26 RX ORDER — VITAMIN B COMPLEX
2000 TABLET ORAL DAILY
Status: DISCONTINUED | OUTPATIENT
Start: 2021-10-27 | End: 2021-10-31 | Stop reason: HOSPADM

## 2021-10-26 RX ORDER — POLYETHYLENE GLYCOL 3350 17 G/17G
17 POWDER, FOR SOLUTION ORAL DAILY PRN
Status: DISCONTINUED | OUTPATIENT
Start: 2021-10-26 | End: 2021-10-31 | Stop reason: HOSPADM

## 2021-10-26 RX ORDER — FLUOXETINE HYDROCHLORIDE 20 MG/1
20 CAPSULE ORAL DAILY
Status: DISCONTINUED | OUTPATIENT
Start: 2021-10-27 | End: 2021-10-31 | Stop reason: HOSPADM

## 2021-10-26 RX ORDER — FUROSEMIDE 10 MG/ML
20 INJECTION INTRAMUSCULAR; INTRAVENOUS ONCE
Status: COMPLETED | OUTPATIENT
Start: 2021-10-26 | End: 2021-10-26

## 2021-10-26 RX ORDER — ATORVASTATIN CALCIUM 20 MG/1
20 TABLET, FILM COATED ORAL DAILY
Status: DISCONTINUED | OUTPATIENT
Start: 2021-10-27 | End: 2021-10-31 | Stop reason: HOSPADM

## 2021-10-26 RX ORDER — SODIUM CHLORIDE 9 MG/ML
25 INJECTION, SOLUTION INTRAVENOUS PRN
Status: DISCONTINUED | OUTPATIENT
Start: 2021-10-26 | End: 2021-10-31 | Stop reason: HOSPADM

## 2021-10-26 RX ORDER — METOPROLOL TARTRATE 50 MG/1
50 TABLET, FILM COATED ORAL ONCE
Status: COMPLETED | OUTPATIENT
Start: 2021-10-26 | End: 2021-10-26

## 2021-10-26 RX ORDER — ONDANSETRON 2 MG/ML
4 INJECTION INTRAMUSCULAR; INTRAVENOUS EVERY 6 HOURS PRN
Status: DISCONTINUED | OUTPATIENT
Start: 2021-10-26 | End: 2021-10-31 | Stop reason: HOSPADM

## 2021-10-26 RX ORDER — ONDANSETRON 4 MG/1
4 TABLET, ORALLY DISINTEGRATING ORAL EVERY 8 HOURS PRN
Status: DISCONTINUED | OUTPATIENT
Start: 2021-10-26 | End: 2021-10-31 | Stop reason: HOSPADM

## 2021-10-26 RX ORDER — DEXAMETHASONE 6 MG/1
6 TABLET ORAL DAILY
Status: DISCONTINUED | OUTPATIENT
Start: 2021-10-26 | End: 2021-10-27

## 2021-10-26 RX ORDER — ALLOPURINOL 100 MG/1
50 TABLET ORAL EVERY OTHER DAY
Status: DISCONTINUED | OUTPATIENT
Start: 2021-10-27 | End: 2021-10-31 | Stop reason: HOSPADM

## 2021-10-26 RX ORDER — CARVEDILOL 12.5 MG/1
12.5 TABLET ORAL 2 TIMES DAILY WITH MEALS
Status: DISCONTINUED | OUTPATIENT
Start: 2021-10-27 | End: 2021-10-28

## 2021-10-26 RX ORDER — LISINOPRIL 10 MG/1
10 TABLET ORAL DAILY
Status: DISCONTINUED | OUTPATIENT
Start: 2021-10-27 | End: 2021-10-28

## 2021-10-26 RX ORDER — GUAIFENESIN/DEXTROMETHORPHAN 100-10MG/5
5 SYRUP ORAL EVERY 4 HOURS PRN
Status: DISCONTINUED | OUTPATIENT
Start: 2021-10-26 | End: 2021-10-31 | Stop reason: HOSPADM

## 2021-10-26 RX ORDER — ACETAMINOPHEN 325 MG/1
650 TABLET ORAL EVERY 6 HOURS PRN
Status: DISCONTINUED | OUTPATIENT
Start: 2021-10-26 | End: 2021-10-31 | Stop reason: HOSPADM

## 2021-10-26 RX ORDER — SODIUM CHLORIDE 0.9 % (FLUSH) 0.9 %
5-40 SYRINGE (ML) INJECTION EVERY 12 HOURS SCHEDULED
Status: DISCONTINUED | OUTPATIENT
Start: 2021-10-26 | End: 2021-10-31 | Stop reason: HOSPADM

## 2021-10-26 RX ORDER — ASPIRIN 81 MG/1
81 TABLET, CHEWABLE ORAL DAILY
Status: DISCONTINUED | OUTPATIENT
Start: 2021-10-27 | End: 2021-10-31 | Stop reason: HOSPADM

## 2021-10-26 RX ORDER — DONEPEZIL HYDROCHLORIDE 5 MG/1
5 TABLET, FILM COATED ORAL NIGHTLY
Status: DISCONTINUED | OUTPATIENT
Start: 2021-10-26 | End: 2021-10-31 | Stop reason: HOSPADM

## 2021-10-26 RX ORDER — FUROSEMIDE 10 MG/ML
40 INJECTION INTRAMUSCULAR; INTRAVENOUS 2 TIMES DAILY
Status: DISCONTINUED | OUTPATIENT
Start: 2021-10-27 | End: 2021-10-27

## 2021-10-26 RX ORDER — SODIUM CHLORIDE 0.9 % (FLUSH) 0.9 %
5-40 SYRINGE (ML) INJECTION PRN
Status: DISCONTINUED | OUTPATIENT
Start: 2021-10-26 | End: 2021-10-31 | Stop reason: HOSPADM

## 2021-10-26 RX ORDER — FERROUS SULFATE TAB EC 324 MG (65 MG FE EQUIVALENT) 324 (65 FE) MG
324 TABLET DELAYED RESPONSE ORAL 2 TIMES DAILY WITH MEALS
Status: DISCONTINUED | OUTPATIENT
Start: 2021-10-27 | End: 2021-10-31 | Stop reason: HOSPADM

## 2021-10-26 RX ADMIN — IOPAMIDOL 60 ML: 755 INJECTION, SOLUTION INTRAVENOUS at 17:13

## 2021-10-26 RX ADMIN — FUROSEMIDE 20 MG: 10 INJECTION, SOLUTION INTRAMUSCULAR; INTRAVENOUS at 18:01

## 2021-10-26 RX ADMIN — METOPROLOL TARTRATE 50 MG: 50 TABLET ORAL at 19:52

## 2021-10-26 ASSESSMENT — ENCOUNTER SYMPTOMS
VOMITING: 0
SORE THROAT: 0
SHORTNESS OF BREATH: 1
CHEST TIGHTNESS: 1
NAUSEA: 0
ABDOMINAL DISTENTION: 0
DIARRHEA: 0

## 2021-10-26 ASSESSMENT — PAIN SCALES - GENERAL
PAINLEVEL_OUTOF10: 0
PAINLEVEL_OUTOF10: 0

## 2021-10-26 NOTE — ED PROVIDER NOTES
[id number] [facility]  [facility]      Pt Name: Fabiana Jaeger  MRN: [id number]  [place] 1935  Date ofevaluation: 10/26/2021  Provider: Mitzy Hoffman MD    CHIEF COMPLAINT       Chief Complaint   Patient presents with    Shortness of Breath     States awake this morning with SOB         HISTORY OF PRESENT ILLNESS   (Location/Symptom, Timing/Onset,Context/Setting, Quality, Duration, Modifying Factors, Severity)  Note limiting factors. Fabiana Jaeger is a 80 y.o. female  who  has a past medical history of Chronic kidney disease (CKD), stage IV (severe) ([facility] [address] Coronary artery disease involving native coronary artery of native heart without angina pectoris, GERD (gastroesophageal reflux disease), Gout, chronic, Histoplasmosis, Hyperlipidemia, Hypertension, Irregular heart beat, Osteoarthritis, Osteopenia of hip, Pneumonia, Ulcer of jejunum, and Unspecified cerebral artery occlusion with cerebral infarction. 80-year-old female presents with acute chest pain and shortness of breath which began this morning at approximately 6 AM and has been present for the last 8 hours. Patient reports that this has happened before last time this happened she had pneumonia. Patient does have a history of CKD, CAD, CHF, hypertension, hyperlipidemia, irregular heartbeat but denies history of A. fib. She appears to be in A. fib on the monitor at this time. No other recent illness. Nothing seems to make her symptoms better. Worse with any exertion. Chest pain is described as tightness not necessarily specific pain it is generally across her chest.  It is nonradiating. The shortness of breath is without cough. Patient states she is been taking all of her medicines as prescribed. Patient is hard of hearing but family were at bedside confirms the story.   Denies nausea, vomiting, diarrhea, dysuria, hematuria, back pain, neck pain, headache, dizziness, numbness, weakness. NursingNotes were reviewed. REVIEW OF SYSTEMS    (2-9 systems for level 4, 10 or more for level 5)     Review of Systems   Constitutional: Negative. Negative for fever. HENT: Negative. Negative for sore throat. Respiratory: Positive for chest tightness and shortness of breath. Cardiovascular: Positive for chest pain and leg swelling. Negative for palpitations. Gastrointestinal: Negative for abdominal distention, diarrhea, nausea and vomiting. Genitourinary: Negative. Musculoskeletal: Negative. Skin: Negative. Neurological: Negative. Negative for light-headedness and headaches. Hematological: Negative. Does not bruise/bleed easily. Except as noted above the remainder of the review of systems was reviewed and negative.        PAST MEDICAL HISTORY     Past Medical History:   Diagnosis Date    Chronic kidney disease (CKD), stage IV (severe) (HCC)     Coronary artery disease involving native coronary artery of native heart without angina pectoris 1/5/2016    GERD (gastroesophageal reflux disease) acid reflux    Gout, chronic     Histoplasmosis     Hyperlipidemia     Hypertension     Irregular heart beat     Osteoarthritis 2/28/2019    Knee, leg     Osteopenia of hip 04/26/2019    Pneumonia     Ulcer of jejunum     Unspecified cerebral artery occlusion with cerebral infarction 2014 , ~ 2012         SURGICALHISTORY       Past Surgical History:   Procedure Laterality Date    BLADDER SUSPENSION      CHOLECYSTECTOMY  Orthopedic surgery Left arm    HYSTERECTOMY      TUBAL LIGATION      UPPER GASTROINTESTINAL ENDOSCOPY N/A 9/10/2021    EGD CONTROL HEMORRHAGE performed by Marleny Stoddard DO at [address] [facility]  9/10/2021    EGD SUBMUCOSAL/ EPINEPHRINE INJECTION performed by Marleny Stoddard DO at [address]        Previous Medications    ALLOPURINOL (ZYLOPRIM) 100 MG TABLET Take 50 mg by mouth every other day    AMLODIPINE (NORVASC) 5 MG TABLET    Take 5 mg by mouth daily    ASPIRIN 81 MG CHEWABLE TABLET    Take 81 mg by mouth daily    ATORVASTATIN (LIPITOR) 20 MG TABLET    TAKE 1 TABLET BY MOUTH ONE TIME A DAY     CARVEDILOL (COREG) 12.5 MG TABLET    Take 1 tablet by mouth 2 times daily (with meals)    DONEPEZIL (ARICEPT) 5 MG TABLET    Take 5 mg by mouth nightly    FERROUS SULFATE 324 (65 FE) MG EC TABLET    Take 1 tablet by mouth 2 times daily (with meals)    FLUOXETINE (PROZAC) 20 MG CAPSULE    TAKE 1 CAPSULE BY MOUTH ONE TIME A DAY     FUROSEMIDE (LASIX) 40 MG TABLET    Take 40 mg by mouth daily     HANDICAP PLACARD MISC    by Does not apply route Patient is unable to walk more than 250 feet before stopping to rest.    Not to exceed 5 years.   DX: CHF    IPRATROPIUM-ALBUTEROL (DUONEB) 0.5-2.5 (3) MG/3ML SOLN NEBULIZER SOLUTION    Inhale 3 mLs into the lungs every 6 hours as needed for Shortness of Breath    LISINOPRIL (PRINIVIL;ZESTRIL) 10 MG TABLET    Take 10 mg by mouth daily     LORATADINE (CLARITIN) 10 MG CAPSULE    Take 10 mg by mouth daily as needed     MULTIPLE VITAMINS-MINERALS (THERAPEUTIC MULTIVITAMIN-MINERALS) TABLET    Take 1 tablet by mouth daily    PANTOPRAZOLE (PROTONIX) 40 MG TABLET    Take 1 tablet by mouth 2 times daily (before meals)    RESPIRATORY THERAPY SUPPLIES (NEBULIZER/TUBING/MOUTHPIECE) KIT    1 kit by Does not apply route daily as needed (wheezing)    VITAMIN D3 (CHOLECALCIFEROL) 10 MCG (400 UNIT) TABS TABLET    Take 400 Units by mouth daily            Codeine, Morphine and related, and Oxycodone-acetaminophen    FAMILY HISTORY       Family History   Problem Relation Age of Onset    High Blood Pressure Mother     Heart Disease Mother     Diabetes Mother     High Blood Pressure Father     Heart Disease Father     Cancer Father         Brain    Cancer Sister         lymphoma    Cancer Brother         colon    Cancer Maternal Uncle           SOCIAL HISTORY       Social History     Socioeconomic History    Marital status:      Spouse name: None    Number of children: None    Years of education: None    Highest education level: None   Occupational History    None   Tobacco Use    Smoking status: Passive Smoke Exposure - Never Smoker    Smokeless tobacco: Never Used   Vaping Use    Vaping Use: Never used   Substance and Sexual Activity    Alcohol use: No     Comment: 1 yearly    Drug use: No    Sexual activity: Yes     Partners: Male   Other Topics Concern    None   Social History Narrative    None     Social Determinants of Health     Financial Resource Strain:     Difficulty of Paying Living Expenses:    Food Insecurity:     Worried About Running Out of Food in the Last Year:     Ran Out of Food in the Last Year:    Transportation Needs:     Lack of Transportation (Medical):  Lack of Transportation (Non-Medical):    Physical Activity:     Days of Exercise per Week:     Minutes of Exercise per Session:    Stress:     Feeling of Stress :    Social Connections:     Frequency of Communication with Friends and Family:     Frequency of Social Gatherings with Friends and Family:     Attends Islam Services:     Active Member of Clubs or Organizations:     Attends Club or Organization Meetings:     Marital Status:    Intimate Partner Violence:     Fear of Current or Ex-Partner:     Emotionally Abused:     Physically Abused:     Sexually Abused:        SCREENINGS             PHYSICAL EXAM    (up to 7 for level 4, 8 or more for level 5)     ED Triage Vitals [10/26/21 1615]   BP Temp Temp src Pulse Resp SpO2 Height Weight   (!) 195/109 -- -- 112 21 97 % -- --       Physical Exam  Vitals and nursing note reviewed. Constitutional:       General: She is not in acute distress. Appearance: She is not toxic-appearing or diaphoretic. Interventions: She is not intubated. HENT:      Head: Normocephalic and atraumatic. Mouth/Throat:      Mouth: Mucous membranes are moist.   Eyes:      Extraocular Movements: Extraocular movements intact. Pupils: Pupils are equal, round, and reactive to light. Cardiovascular:      Rate and Rhythm: Tachycardia present. Rhythm irregular. Heart sounds: Normal heart sounds. Pulmonary:      Effort: Pulmonary effort is normal. Tachypnea present. No accessory muscle usage or respiratory distress. She is not intubated. Breath sounds: No stridor. Examination of the right-lower field reveals rales. Examination of the left-lower field reveals rales. Rales present. No decreased breath sounds, wheezing or rhonchi. Abdominal:      General: Abdomen is flat. Bowel sounds are normal.      Palpations: Abdomen is soft. Tenderness: There is no abdominal tenderness. Musculoskeletal:      Right lower leg: Edema present. Left lower leg: Edema present. Comments: 1+ edema bilateral ankles   Skin:     General: Skin is warm and dry. Capillary Refill: Capillary refill takes less than 2 seconds. Neurological:      General: No focal deficit present. Mental Status: She is alert and oriented to person, place, and time. Psychiatric:         Mood and Affect: Mood normal.         Behavior: Behavior normal.         RESULTS     EKG: All EKG's are interpreted by the Emergency Department Physician who either signs or Co-signsthis chart in the absence of a cardiologist.    EKG Interpretation     Interpreted by emergency department physician    Rhythm: atrial fibrillation - rapid  Rate: 110-120  Axis: Left axis deviation  Ectopy: none  Conduction: Left bundle branch block  ST Segments: nonspecific changes  T Waves: non specific changes  Q Waves: none    Clinical Impression: no acute changes.   Comparison EKG from September 2019 patient is in atrial fibrillation with heart rate between 100 and 110 at this time  MD Ann Corona MD      RADIOLOGY:   Lesley Vazquez filmimages such as CT, Ultrasound and MRI are read by the radiologist.    Interpretation per the Radiologist below, if available at the time ofthis note:    CT CHEST PULMONARY EMBOLISM W CONTRAST   Final Result   1. Infectious or inflammatory airway process and interstitial edema. 2. Nonspecific regions central ground-glass opacity. Correlate with   presentation to exclude pneumonia. 3. No acute pulmonary embolism to the segmental arteries. 4. Other findings as described. XR CHEST (2 VW)   Final Result   Bilateral interstitial groundglass opacities are nonspecific and may   represent viral pneumonia, COVID-19 is not excluded.                ED BEDSIDE ULTRASOUND:   Performed by ED Physician - none    LABS:  Labs Reviewed   CBC WITH AUTO DIFFERENTIAL - Abnormal; Notable for the following components:       Result Value    RBC 3.52 (*)     Hemoglobin 10.3 (*)     Hematocrit 31.5 (*)     Monocytes Absolute 1.6 (*)     All other components within normal limits    Narrative:     Performed at:  [facility]  [address] [place], [facility]   Phone [phone]   COMPREHENSIVE METABOLIC PANEL W/ REFLEX TO MG FOR LOW K - Abnormal; Notable for the following components:    Glucose 119 (*)     GFR Non- 53 (*)     ALT 7 (*)     All other components within normal limits    Narrative:     Performed at:  [facility]  [address] [place], [facility]   Phone [phone]   BRAIN NATRIURETIC PEPTIDE - Abnormal; Notable for the following components:    Pro-BNP 10,051 (*)     All other components within normal limits    Narrative:     Performed at:  [facility]  [address] [place], [facility]   Phone [phone]   URINE RT REFLEX TO CULTURE - Abnormal; Notable for the following components:    Clarity, UA SL CLOUDY (*)     Blood, Urine MODERATE (*) Protein, UA 30 (*)     All other components within normal limits    Narrative:     Performed at:  [facility]) [facility]  [address], [facility]   Phone [phone]   MICROSCOPIC URINALYSIS - Abnormal; Notable for the following components:    Bacteria, UA 2+ (*)     All other components within normal limits    Narrative:     Performed at:  [facility]) [facility]  [address], [facility]   Phone [phone], RAPID   TROPONIN    Narrative:     Performed at:  [address] [facility]  [address], [facility]   Phone [phone]       All other labs were within normal range or not returned as of this dictation. EMERGENCY DEPARTMENT COURSE and DIFFERENTIAL DIAGNOSIS/MDM:   Vitals:    Vitals:    10/26/21 1830 10/26/21 1831 10/26/21 1832 10/26/21 1833   BP:       Pulse:  120 142 121   Resp:  (!) 40 (!) 35 (!) 33   SpO2: (S) (!) 71% (S) (!) 79% (S) 97% 94%   Weight:       Height:           Patient was given thefollowing medications:  Medications   iopamidol (ISOVUE-370) 76 % injection 100 mL (60 mLs IntraVENous Given 10/26/21 1713)   furosemide (LASIX) injection 20 mg (20 mg IntraVENous Given 10/26/21 1801)       ED COURSE & MEDICAL DECISION MAKING    Pertinent Labs & Imaging studies reviewed. (See chart for details)   -  Patient seen and evaluated in the emergency department. -  Triage and nursing notes reviewed and incorporated. -  Old chart records reviewed and incorporated. -  Differential diagnosis includes: Differential Diagnosis: Acute Coronary Syndrome, Congestive Heart Failure, Myocardial Infarction, Pulmonary Embolus, Pneumonia, Pneumothorax, other    49-year-old female presents with tachycardia and shortness of breath history of CHF. Patient has signs and symptoms concerning for fluid overload at this time. Cannot also rule out infection. Patient is afebrile saturating 97% on room air. Cannot also rule out ACS. Will order troponin. EKG shows A. fib with RVR and left bundle branch block but relatively unchanged from previous EKG at last admission which was approximately 1-1/2 months prior. Will monitor closely. Will give patient aspirin. We will also order CT PE to rule out pulmonary embolism at this time. Patient will likely require admission.    -  Work-up included:  See above  -  ED treatment included: See above  -  Results discussed with patient. REASSESSMENT     ED Course as of Oct 26 1856   Tue Oct 26, 2021   1759 Labs concerning for CHF exacerbation. Troponin 0 0.01. X-ray concerning for Covid pneumonia versus pleural effusion. I will order rapid Covid as well as give furosemide. Patient still remains saturating 97% on room air.    [SC]   1827 CT scan just shows again groundglass opacities concerning for inflammatory process concerning for Covid. Patient does have elevated BNP. Patient having significant good urine output with Lasix. [SC]      ED Course User Index  [SC] Ruba Sargent MD         CRITICAL CARE TIME   Total Critical Care time was 33 minutes, excluding separately reportable procedures. There was a high probability of clinically significant/life threatening deterioration in the patient's condition which required my urgent intervention. This includes multiple reevaluations, vital sign monitoring, pulse oximetry monitoring, telemetry monitoring, clinical response to the IV medications, reviewing the nursing notes, consultation time, dictation/documentation time, and interpretation of the labwork. (This time excludes time spent performing procedures). CONSULTS:  None    PROCEDURES:  Unless otherwise noted below, none     Procedures    FINAL IMPRESSION      1. Acute on chronic combined systolic and diastolic congestive heart failure ([facility] [address]    2.  Suspected COVID-19 virus infection

## 2021-10-26 NOTE — ED NOTES
Here from home  Lives with family that take care of her  She has dementia but is alert and can follow instructions  Very [place]  She states she lost her hearing aids at home     [facility]  10/26/21 1932

## 2021-10-26 NOTE — ED NOTES
Return from ct  Family member at bedside  She is forgetful and lost her hearing aids at home     Mildred Hicks RN  10/26/21 3575

## 2021-10-26 NOTE — ED NOTES
Up to commode  Very sob with activity  Oxygen sats falling with activity  Placed on 2l  EMd made aware     Mildred Hicks RN  10/1935

## 2021-10-26 NOTE — ED NOTES
Up to bedside commode with granddaughter assisting her  Had large formed stool     John Lutz RN  10/26/21 1933

## 2021-10-26 NOTE — H&P
Hospital Medicine History & Physical      PCP: FERNANDO Lawler NP    Date of Admission: 10/26/2021    Date of Service: Pt seen/examined on 10/26/2021 and Admitted to Inpatient with expected LOS greater than two midnights due to medical therapy. Chief Complaint:  Shortness of Breath      History Of Present Illness:      80 y.o. female with PMHx of CKD, CAD, GERD, HLD, HTN presented to [facility] in transfer from [facility] ED for evaluation of CHF and suspected Covid. Pt presented to [facility] ED with acute onset of shortness of breath at 0600 this morning. Pt's grand-daughter is at bedside and provides care for patient. She reports that patient has been in her normal state of health. She is concerned that patient may be \"cheeking\" her medications for approximately the last 4 days. Pt c/o GERD and g-daughter was concerned about dark stools. Pt may have also missed her diuretic. She wonders if that is what caused her shortness of breath this morning. Pt denies pain, nausea/vomiting, cough or congestion. No fever or chills.       Past Medical History:          Diagnosis Date    Chronic kidney disease (CKD), stage IV (severe) (HCC)     Coronary artery disease involving native coronary artery of native heart without angina pectoris 1/5/2016    GERD (gastroesophageal reflux disease) acid reflux    Gout, chronic     Histoplasmosis     Hyperlipidemia     Hypertension     Irregular heart beat     Osteoarthritis 2/28/2019    Knee, leg     Osteopenia of hip 04/26/2019    Pneumonia     Ulcer of jejunum     Unspecified cerebral artery occlusion with cerebral infarction 2014 , ~ 2012       Past Surgical History:          Procedure Laterality Date    BLADDER SUSPENSION      CHOLECYSTECTOMY  Orthopedic surgery Left arm    HYSTERECTOMY      TUBAL LIGATION      UPPER GASTROINTESTINAL ENDOSCOPY N/A 9/10/2021    EGD CONTROL HEMORRHAGE performed by Rufina Solis DO at [facility] ENDOSCOPY    UPPER GASTROINTESTINAL ENDOSCOPY  9/10/2021    EGD SUBMUCOSAL/ EPINEPHRINE INJECTION performed by Mauricio Burris., DO at [facility]       Medications Prior to Admission:      Prior to Admission medications    Medication Sig Start Date End Date Taking?  Authorizing Provider   aspirin 81 MG chewable tablet Take 81 mg by mouth daily   Yes Historical Provider, MD   FLUoxetine (PROZAC) 20 MG capsule TAKE 1 CAPSULE BY MOUTH ONE TIME A DAY  1/29/19  Yes Mehreen Glover MD   ferrous sulfate 324 (65 Fe) MG EC tablet Take 1 tablet by mouth 2 times daily (with meals) 9/12/21   Shawn James MD   pantoprazole (PROTONIX) 40 MG tablet Take 1 tablet by mouth 2 times daily (before meals) 9/12/21   Shawn James MD   amLODIPine (NORVASC) 5 MG tablet Take 5 mg by mouth daily    Historical Provider, MD   allopurinol (ZYLOPRIM) 100 MG tablet Take 50 mg by mouth every other day    Historical Provider, MD   Respiratory Therapy Supplies (NEBULIZER/TUBING/MOUTHPIECE) KIT 1 kit by Does not apply route daily as needed (wheezing) 8/24/21   RAMAN Rhoades   ipratropium-albuterol (DUONEB) 0.5-2.5 (3) MG/3ML SOLN nebulizer solution Inhale 3 mLs into the lungs every 6 hours as needed for Shortness of Breath 8/24/21   RAMAN Rhoades   carvedilol (COREG) 12.5 MG tablet Take 1 tablet by mouth 2 times daily (with meals) 3/29/21   Cecilia Goode APRN - CNP   Multiple Vitamins-Minerals (THERAPEUTIC MULTIVITAMIN-MINERALS) tablet Take 1 tablet by mouth daily    Historical Provider, MD   donepezil (ARICEPT) 5 MG tablet Take 5 mg by mouth nightly    Historical Provider, MD   furosemide (LASIX) 40 MG tablet Take 40 mg by mouth daily     Historical Provider, MD   lisinopril (PRINIVIL;ZESTRIL) 10 MG tablet Take 10 mg by mouth daily     Historical Provider, MD   loratadine (CLARITIN) 10 MG capsule Take 10 mg by mouth daily as needed     Historical Provider, MD   vitamin D3 (CHOLECALCIFEROL) 10 MCG (400 UNIT) TABS tablet Take 400 Units by mouth daily    Historical Provider, MD   atorvastatin (LIPITOR) 20 MG tablet TAKE 1 TABLET BY MOUTH ONE TIME A DAY  1/8/18   Inez Garcia MD   Handicap Placard MISC by Does not apply route Patient is unable to walk more than 250 feet before stopping to rest.    Not to exceed 5 years. DX: CHF 8/17/16   Inez Garcia MD       Allergies:  Codeine, Morphine and related, and Oxycodone-acetaminophen    Social History:      The patient currently lives with daughter and grand-daughter    TOBACCO:   reports that she is a non-smoker but has been exposed to tobacco smoke. She has never used smokeless tobacco.  ETOH:   reports no history of alcohol use. Family History:      Reviewed in detail positive as follows:        Problem Relation Age of Onset    High Blood Pressure Mother     Heart Disease Mother     Diabetes Mother     High Blood Pressure Father     Heart Disease Father     Cancer Father         Brain    Cancer Sister         lymphoma    Cancer Brother         colon    Cancer Maternal Uncle        REVIEW OF SYSTEMS:   Pertinent positives as noted in the HPI. All other systems reviewed and negative. PHYSICAL EXAM PERFORMED:    BP (!) 166/86   Pulse 99   Temp 99.1 °F (37.3 °C) (Oral)   Resp 20   Ht 5' (1.524 m)   Wt 149 lb 7.6 oz (67.8 kg)   SpO2 94%   BMI 29.19 kg/m²     General appearance:  Chronically ill appearing  female lying in hospital bed, mildly tachypneic but in no apparent distress, appears stated age and cooperative. HEENT:  Normal cephalic, atraumatic without obvious deformity. Pupils equal, round, and reactive to light. Conjunctivae/corneas clear. Neck: Supple, with full range of motion. No jugular venous distention. Trachea midline. Respiratory: Increased respiratory effort. Rales bilaterally, no accessory muscle use. Cardiovascular: Tachycardic rate with irregular rhythm, + lower extremity edema.   Abdomen: Soft, non-tender, non-distended, without rebound or guarding. Normal bowel sounds. Musculoskeletal:  Moves all extremities equally. Full range of motion without deformity. Skin: Skin warm, dry and intact. No rashes or lesions. Neurologic:  Neurovascularly intact without any focal sensory/motor deficits. Cranial nerves: II-XII intact, grossly non-focal.  Psychiatric:  Alert to person, hx dementia, at baseline per family. Capillary Refill: Brisk,< 3 seconds   Peripheral Pulses: +2 palpable, equal bilaterally       Labs:     Recent Labs     10/26/21  1430   WBC 10.5   HGB 10.3*   HCT 31.5*        Recent Labs     10/26/21  1430      K 4.3      CO2 23   BUN 19   CREATININE 1.0   CALCIUM 9.5     Recent Labs     10/26/21  1430   AST 15   ALT 7*   BILITOT <0.2   ALKPHOS 55     No results for input(s): INR in the last 72 hours. Recent Labs     10/26/21  1430   TROPONINI 0.01       Urinalysis:      Lab Results   Component Value Date    NITRU Negative 10/26/2021    WBCUA None seen 10/26/2021    BACTERIA 2+ 10/26/2021    RBCUA 0-2 10/26/2021    BLOODU MODERATE 10/26/2021    SPECGRAV 1.010 10/26/2021    GLUCOSEU Negative 10/26/2021    GLUCOSEU Negative 06/13/2011       Radiology:       CT CHEST PULMONARY EMBOLISM W CONTRAST   Final Result   1. Infectious or inflammatory airway process and interstitial edema. 2. Nonspecific regions central ground-glass opacity. Correlate with   presentation to exclude pneumonia. 3. No acute pulmonary embolism to the segmental arteries. 4. Other findings as described. XR CHEST (2 VW)   Final Result   Bilateral interstitial groundglass opacities are nonspecific and may   represent viral pneumonia, COVID-19 is not excluded.              ASSESSMENT:    Active Hospital Problems    Diagnosis Date Noted    CHF (congestive heart failure), NYHA class I, acute on chronic, combined ([facility] [I50.43] 10/26/2021    Suspected COVID-19 virus infection [Z20.822] 10/26/2021    Acute respiratory failure with hypoxia ([facility] [J96.01] 10/26/2021    Sepsis ([facility] [A41.9] 09/10/2021         PLAN:    Acute diastolic CHF   - last ECHO: 49/5852 LV Systolic Fx mildly decreased EF 45%. Grade 2 DD  - proBNP 15462  - lasix 40 mg IV BID  - cont BB, ACEi/ARB   - daily wts  - I/Os  - consult CHF RN  - consult cardiology    Suspected Covid 19 Pneumonia  - droplet plus isolation precautions  - Rapid Covid 19 swab Neg, PCR ordered  - Oxygen titrate to keep saturation > 90%  - Chest xray: Bilateral interstitial groundglass opacities nonspecific and may represent viral pneumonia COVID-19 is not excluded. - CT: Infectious or inflammatory airway process and interstitial edema. Nonspecific regions central groundglass opacification correlate with presentation to exclude pneumonia. No acute PE.  - Decadron 6mg X 10 days  - trend inflammatory markers    Acute respiratory failure Hypoxia with exertion   - due to CHF and suspected Covid Pneumonia  - with increased work of breath, tachypnea and hypoxia  - room air saturation desaturates to 71% with any exertion  - provide supplemental oxygen as necessary to keep SaO2 92% or greater. Essential (primary) hypertension   - monitor blood pressure  - continue home meds     Hyperlipidemia   - continue statin      DVT Prophylaxis: Lovenox  Diet: ADULT DIET; Regular; Low Sodium (2 gm); 1200 ml  Code Status: Full Code    Dispo - Inpatient       [address], APRN - CNP    Thank you FERNANDO Urbina - NP for the opportunity to be involved in this patient's care. If you have any questions or concerns please feel free to contact me at [phone].

## 2021-10-27 LAB
A/G RATIO: 1.2 (ref 1.1–2.2)
ALBUMIN SERPL-MCNC: 3.9 G/DL (ref 3.4–5)
ALP BLD-CCNC: 58 U/L (ref 40–129)
ALT SERPL-CCNC: 8 U/L (ref 10–40)
ANION GAP SERPL CALCULATED.3IONS-SCNC: 13 MMOL/L (ref 3–16)
ANION GAP SERPL CALCULATED.3IONS-SCNC: 18 MMOL/L (ref 3–16)
AST SERPL-CCNC: 14 U/L (ref 15–37)
BASOPHILS ABSOLUTE: 0.1 K/UL (ref 0–0.2)
BASOPHILS RELATIVE PERCENT: 0.9 %
BILIRUB SERPL-MCNC: 0.3 MG/DL (ref 0–1)
BUN BLDV-MCNC: 20 MG/DL (ref 7–20)
BUN BLDV-MCNC: 24 MG/DL (ref 7–20)
C DIFF TOXIN/ANTIGEN: NORMAL
C-REACTIVE PROTEIN: 10.6 MG/L (ref 0–5.1)
C-REACTIVE PROTEIN: 7.1 MG/L (ref 0–5.1)
CALCIUM SERPL-MCNC: 8.9 MG/DL (ref 8.3–10.6)
CALCIUM SERPL-MCNC: 9 MG/DL (ref 8.3–10.6)
CHLORIDE BLD-SCNC: 102 MMOL/L (ref 99–110)
CHLORIDE BLD-SCNC: 96 MMOL/L (ref 99–110)
CO2: 21 MMOL/L (ref 21–32)
CO2: 22 MMOL/L (ref 21–32)
CREAT SERPL-MCNC: 1.2 MG/DL (ref 0.6–1.2)
CREAT SERPL-MCNC: 1.3 MG/DL (ref 0.6–1.2)
EKG ATRIAL RATE: 125 BPM
EKG DIAGNOSIS: NORMAL
EKG P AXIS: 64 DEGREES
EKG P-R INTERVAL: 84 MS
EKG Q-T INTERVAL: 374 MS
EKG QRS DURATION: 122 MS
EKG QTC CALCULATION (BAZETT): 539 MS
EKG R AXIS: -49 DEGREES
EKG T AXIS: 98 DEGREES
EKG VENTRICULAR RATE: 125 BPM
EOSINOPHILS ABSOLUTE: 0 K/UL (ref 0–0.6)
EOSINOPHILS RELATIVE PERCENT: 0.1 %
FERRITIN: 17.1 NG/ML (ref 15–150)
GFR AFRICAN AMERICAN: 47
GFR AFRICAN AMERICAN: 52
GFR NON-AFRICAN AMERICAN: 39
GFR NON-AFRICAN AMERICAN: 43
GLOBULIN: 3.3 G/DL
GLUCOSE BLD-MCNC: 139 MG/DL (ref 70–99)
GLUCOSE BLD-MCNC: 195 MG/DL (ref 70–99)
HCT VFR BLD CALC: 30.7 % (ref 36–48)
HEMATOLOGY PATH CONSULT: NORMAL
HEMOGLOBIN: 10.1 G/DL (ref 12–16)
INR BLD: 1.01 (ref 0.88–1.12)
L. PNEUMOPHILA SEROGP 1 UR AG: NORMAL
LV EF: 38 %
LVEF MODALITY: NORMAL
LYMPHOCYTES ABSOLUTE: 1 K/UL (ref 1–5.1)
LYMPHOCYTES RELATIVE PERCENT: 10.8 %
MCH RBC QN AUTO: 28.9 PG (ref 26–34)
MCHC RBC AUTO-ENTMCNC: 32.9 G/DL (ref 31–36)
MCV RBC AUTO: 87.9 FL (ref 80–100)
MONOCYTES ABSOLUTE: 0.1 K/UL (ref 0–1.3)
MONOCYTES RELATIVE PERCENT: 1.3 %
NEUTROPHILS ABSOLUTE: 8.3 K/UL (ref 1.7–7.7)
NEUTROPHILS RELATIVE PERCENT: 86.9 %
PDW BLD-RTO: 14.6 % (ref 12.4–15.4)
PLATELET # BLD: 359 K/UL (ref 135–450)
PMV BLD AUTO: 9.4 FL (ref 5–10.5)
POTASSIUM REFLEX MAGNESIUM: 4.6 MMOL/L (ref 3.5–5.1)
POTASSIUM SERPL-SCNC: 4.3 MMOL/L (ref 3.5–5.1)
PROCALCITONIN: 0.05 NG/ML (ref 0–0.15)
PROTHROMBIN TIME: 11.4 SEC (ref 9.9–12.7)
RBC # BLD: 3.5 M/UL (ref 4–5.2)
SARS-COV-2: NOT DETECTED
SODIUM BLD-SCNC: 136 MMOL/L (ref 136–145)
SODIUM BLD-SCNC: 136 MMOL/L (ref 136–145)
STREP PNEUMONIAE ANTIGEN, URINE: NORMAL
TOTAL PROTEIN: 7.2 G/DL (ref 6.4–8.2)
TSH REFLEX: 2.29 UIU/ML (ref 0.27–4.2)
WBC # BLD: 9.5 K/UL (ref 4–11)

## 2021-10-27 PROCEDURE — 6370000000 HC RX 637 (ALT 250 FOR IP): Performed by: NURSE PRACTITIONER

## 2021-10-27 PROCEDURE — 85025 COMPLETE CBC W/AUTO DIFF WBC: CPT

## 2021-10-27 PROCEDURE — 93010 ELECTROCARDIOGRAM REPORT: CPT | Performed by: INTERNAL MEDICINE

## 2021-10-27 PROCEDURE — 84443 ASSAY THYROID STIM HORMONE: CPT

## 2021-10-27 PROCEDURE — 93306 TTE W/DOPPLER COMPLETE: CPT

## 2021-10-27 PROCEDURE — 2580000003 HC RX 258: Performed by: NURSE PRACTITIONER

## 2021-10-27 PROCEDURE — 86140 C-REACTIVE PROTEIN: CPT

## 2021-10-27 PROCEDURE — 83880 ASSAY OF NATRIURETIC PEPTIDE: CPT

## 2021-10-27 PROCEDURE — 6360000002 HC RX W HCPCS: Performed by: NURSE PRACTITIONER

## 2021-10-27 PROCEDURE — 80053 COMPREHEN METABOLIC PANEL: CPT

## 2021-10-27 PROCEDURE — 82728 ASSAY OF FERRITIN: CPT

## 2021-10-27 PROCEDURE — 84145 PROCALCITONIN (PCT): CPT

## 2021-10-27 PROCEDURE — 99223 1ST HOSP IP/OBS HIGH 75: CPT | Performed by: INTERNAL MEDICINE

## 2021-10-27 PROCEDURE — 85610 PROTHROMBIN TIME: CPT

## 2021-10-27 PROCEDURE — 36415 COLL VENOUS BLD VENIPUNCTURE: CPT

## 2021-10-27 PROCEDURE — 87449 NOS EACH ORGANISM AG IA: CPT

## 2021-10-27 PROCEDURE — 94761 N-INVAS EAR/PLS OXIMETRY MLT: CPT

## 2021-10-27 PROCEDURE — 6360000002 HC RX W HCPCS: Performed by: INTERNAL MEDICINE

## 2021-10-27 PROCEDURE — 2060000000 HC ICU INTERMEDIATE R&B

## 2021-10-27 PROCEDURE — 2700000000 HC OXYGEN THERAPY PER DAY

## 2021-10-27 RX ORDER — FUROSEMIDE 10 MG/ML
60 INJECTION INTRAMUSCULAR; INTRAVENOUS 2 TIMES DAILY
Status: DISCONTINUED | OUTPATIENT
Start: 2021-10-27 | End: 2021-10-28

## 2021-10-27 RX ADMIN — SODIUM CHLORIDE, PRESERVATIVE FREE 10 ML: 5 INJECTION INTRAVENOUS at 10:50

## 2021-10-27 RX ADMIN — ALLOPURINOL 50 MG: 100 TABLET ORAL at 09:51

## 2021-10-27 RX ADMIN — DONEPEZIL HYDROCHLORIDE 5 MG: 5 TABLET, FILM COATED ORAL at 00:43

## 2021-10-27 RX ADMIN — ENOXAPARIN SODIUM 30 MG: 100 INJECTION SUBCUTANEOUS at 00:43

## 2021-10-27 RX ADMIN — ATORVASTATIN CALCIUM 20 MG: 20 TABLET, FILM COATED ORAL at 09:52

## 2021-10-27 RX ADMIN — AMLODIPINE BESYLATE 5 MG: 5 TABLET ORAL at 09:52

## 2021-10-27 RX ADMIN — DEXAMETHASONE 6 MG: 6 TABLET ORAL at 00:42

## 2021-10-27 RX ADMIN — CARVEDILOL 12.5 MG: 12.5 TABLET, FILM COATED ORAL at 10:41

## 2021-10-27 RX ADMIN — LISINOPRIL 10 MG: 10 TABLET ORAL at 09:52

## 2021-10-27 RX ADMIN — ASPIRIN 81 MG: 81 TABLET, CHEWABLE ORAL at 09:52

## 2021-10-27 RX ADMIN — SODIUM CHLORIDE, PRESERVATIVE FREE 10 ML: 5 INJECTION INTRAVENOUS at 00:43

## 2021-10-27 RX ADMIN — FLUOXETINE 20 MG: 20 CAPSULE ORAL at 09:51

## 2021-10-27 RX ADMIN — FUROSEMIDE 60 MG: 10 INJECTION, SOLUTION INTRAMUSCULAR; INTRAVENOUS at 17:31

## 2021-10-27 RX ADMIN — FERROUS SULFATE TAB EC 324 MG (65 MG FE EQUIVALENT) 324 MG: 324 (65 FE) TABLET DELAYED RESPONSE at 10:41

## 2021-10-27 RX ADMIN — PANTOPRAZOLE SODIUM 40 MG: 40 TABLET, DELAYED RELEASE ORAL at 17:31

## 2021-10-27 RX ADMIN — FERROUS SULFATE TAB EC 324 MG (65 MG FE EQUIVALENT) 324 MG: 324 (65 FE) TABLET DELAYED RESPONSE at 17:31

## 2021-10-27 RX ADMIN — ENOXAPARIN SODIUM 30 MG: 100 INJECTION SUBCUTANEOUS at 09:52

## 2021-10-27 RX ADMIN — FUROSEMIDE 40 MG: 10 INJECTION, SOLUTION INTRAMUSCULAR; INTRAVENOUS at 10:38

## 2021-10-27 RX ADMIN — DONEPEZIL HYDROCHLORIDE 5 MG: 5 TABLET, FILM COATED ORAL at 21:26

## 2021-10-27 RX ADMIN — DEXAMETHASONE 6 MG: 6 TABLET ORAL at 09:51

## 2021-10-27 RX ADMIN — CARVEDILOL 12.5 MG: 12.5 TABLET, FILM COATED ORAL at 17:31

## 2021-10-27 RX ADMIN — Medication 2000 UNITS: at 09:52

## 2021-10-27 ASSESSMENT — PAIN SCALES - GENERAL
PAINLEVEL_OUTOF10: 0
PAINLEVEL_OUTOF10: 0

## 2021-10-27 NOTE — PROGRESS NOTES
Nutrition Note      RD triggered for CHF education. Hx dementia and pt is [place]. Oriented to self and situation only. Short term memory poor per RN note. Education provided in [employer] instructions if family would like to review. Please contact RD with any questions.     Electronically signed by Gianni Ryan RD, LD on 10/27/21 at 9:02 AM EDT    Contact: [phone]

## 2021-10-27 NOTE — PLAN OF CARE
Patient free from falls this shift. Fall precautions in place at all times. Bed in lowest position with two side rails up and wheels locked. Call light within reach. Patient able and agreeable to contact for safety appropriately. Skin assessment performed each shift per protocol. Patient turned and repositioned every two hours and prn with pillow support. Patient checked for incontence every two hours. Problem: Cardiac:  Goal: Hemodynamic stability will improve  Description: Hemodynamic stability will improve  Outcome: Ongoing  Goal: Ability to maintain an adequate cardiac output will improve  Description: Ability to maintain an adequate cardiac output will improve  Outcome: Ongoing     Problem:  Activity:  Goal: Capacity to carry out activities will improve  Description: Capacity to carry out activities will improve  Outcome: Ongoing  Goal: Will verbalize the importance of balancing activity with adequate rest periods  Description: Will verbalize the importance of balancing activity with adequate rest periods  Outcome: Ongoing     Problem: Fluid Volume:  Goal: Risk for excess fluid volume will decrease  Description: Risk for excess fluid volume will decrease  Outcome: Ongoing  Goal: Maintenance of adequate hydration will improve  Description: Maintenance of adequate hydration will improve  Outcome: Ongoing  Goal: Will show no signs and symptoms of electrolyte imbalance  Description: Will show no signs and symptoms of electrolyte imbalance  Outcome: Ongoing     Problem: Respiratory:  Goal: Ability to maintain adequate ventilation will improve  Description: Ability to maintain adequate ventilation will improve  Outcome: Ongoing  Goal: Respiratory status will improve  Description: Respiratory status will improve  Outcome: Ongoing

## 2021-10-27 NOTE — PROGRESS NOTES
4 Eyes Skin Assessment     NAME:  Pedro Chavez  YOB: 1935  MEDICAL RECORD NUMBER:  [id number]    The patient is being assess for  Admission    I agree that 2 RN's have performed a thorough Head to Toe Skin Assessment on the patient. ALL assessment sites listed below have been assessed. Areas assessed by both nurses:    Head, Face, Ears, Shoulders, Back, Chest, Arms, Elbows, Hands, Sacrum. Buttock, Coccyx, Ischium and Legs. Feet and Heels        Does the Patient have a Wound?  No noted wound(s)       Edwardo Prevention initiated:  No   Wound Care Orders initiated:  No    Pressure Injury (Stage 3,4, Unstageable, DTI, NWPT, and Complex wounds) if present place consult order under [de-identified] No    New and Established Ostomies if present place consult order under : No      Nurse 1 eSignature: Electronically signed by Bill Ontiveros RN on 10/27/21 at 3:19 AM EDT    **SHARE this note so that the co-signing nurse is able to place an eSignature**    Nurse 2 eSignature: Electronically signed by Beatrice Trevino RN on 10/27/21 at 7:11 AM EDT

## 2021-10-27 NOTE — ED NOTES
States has chronic arthritic pain but nothing bothering her now     Eliecer Turner RN  10/26/21 2008

## 2021-10-27 NOTE — CONSULTS
[address]                                  CONSULTATION    PATIENT NAME: Baylee Hoyos                     :        1935  MED REC NO:   [id number]                          ROOM:       [id number]  ACCOUNT NO:   [de-identified]                           ADMIT DATE: 10/26/2021  PROVIDER:     Krystal Kent MD    CARDIOLOGY CONSULTATION    CONSULT DATE:  10/27/2021    HISTORY OF PRESENT ILLNESS:  This is an 70-year-old female who was  extremely hard of hearing with a previous history of coronary artery  disease, CKD, aortic stenosis, hypertension, hyperlipidemia, presented  to the hospital with worsening shortness of breath. According to the  HPI. she presented to [facility] Emergency Room. She is also being  evaluated for COVID. Her symptoms have been worsening for four or five  days and her granddaughter brought her to the hospital.  There is also  concern of dark stools. REVIEW OF SYSTEMS:  Cannot be obtained because the patient is extremely  hard of hearing. PAST MEDICAL HISTORY:  1. History of chronic kidney disease. 2.  Coronary artery disease, details of which currently not available. 3.  Hypertension. 4.  Chronic kidney disease. 5.  History of aortic stenosis. 6.  Histoplasmosis. 7.  Hyperlipidemia. 8.  Peptic ulcer disease. PAST SURGICAL HISTORY:  She had tubal ligation, hysterectomy. SOCIAL HISTORY:  She lives with her granddaughter and daughter. No  history of smoking or alcohol abuse. FAMILY HISTORY:  Noncontributory. MEDICINES AND ALLERGIES:  Have been reviewed. PHYSICAL EXAMINATION:  VITAL SIGNS:  Her pulse 75 and regular, blood pressure 154/65,  respirations are 18, oxygen saturation 99%. CONSTITUTIONAL:  She is alert and oriented though very hard of hearing. HEENT EXAMINATION:  Her neck is supple. She appears to have jugular  venous distention.   No carotid bruit heard.  No thyromegaly. Eyes show  no pale conjunctiva or icterus. CARDIAC EXAMINATION:  Reveals normal S1 and S2. There is a 2/6 systolic  murmur at the base of the heart. A2 component of the second heart sound  is present. LUNGS:  Reveal bibasilar crackles. ABDOMEN:  Soft, nontender. Bowel sounds are present. EXTREMITIES:  Show no edema. NEUROLOGICAL EXAMINATION:  Alert, oriented. Cranial nerves II through  XII intact. No focal deficit. SKIN:  Shows no rashes. LABORATORY DATA:  Sodium 136, potassium 4.6, chloride 102, bicarb 21,  BUN is 20, creatinine 1.2. CRP is 10.6. BNP 10,051. TSH is normal.   Hemoglobin 10.1, hematocrit is 30.7. The COVID status is so far  negative though PCR is pending. EKG showed sinus tachycardia, prior anteroseptal myocardial infarction,  incomplete left bundle branch block, left septal deviation. Chest x-ray  is suggestive of bilateral ground-glass opacity, suggestive of viral  pneumonia, COVID cannot be excluded. CAT scan of the chest also raises  the possibility of COVID pneumonia versus interstitial edema. IMPRESSION:  1. This is an 77-year-old female who has presented with worsening  shortness of breath with hypoxia and had evidence of acute-on-chronic  hypoxic respiratory failure. This is due to combination of possible  pneumonia. COVID is being excluded and heart failure. 2.  History of aortic stenosis and worsening of aortic stenosis will  have to be excluded. 3.  Hypertension. 4.  History of chronic kidney disease, renal function relatively stable  today. 5.  Extremely hard of hearing. RECOMMENDATIONS:  1. Agree with Lasix. We will increase the dose to 60 IV b.i.d.  2.  We will obtain an echocardiogram to accurately assess her LV  function and valvular disease. 3.  We will follow renal function very closely.   4.  Await the results of the COVID PCR test and treatment for possible  infectious etiology of the lungs per Primary team.  5.  We will make further recommendations after reviewing all her data. I appreciate the opportunity to participate in the care of this pleasant  female. Complexity of medical decision making is very high.         Leny Ross MD    D: 10/27/2021 10:08:23       T: 10/27/2021 15:41:55     [email or web address]  Job#: [id number]     Doc#: [id number]    CC:

## 2021-10-27 NOTE — DISCHARGE INSTR - COC
Continuity of Care Form    Patient Name: Pedro Chavez   :  1935  MRN:  [id number]    Admit date:  10/26/2021  Discharge date:  ***    Code Status Order: Full Code   Advance Directives:      Admitting Physician:  Hannah Stacy MD  PCP: Graham Hahn, APRN - NP    Discharging Nurse: [facility] Unit/Room#: D9L-9439/9200-79  Discharging Unit Phone Number: ***    Emergency Contact:   Extended Emergency Contact Information  Primary Emergency Contact: Sorento  Address: [address]           [address] Phone: [phone]  Relation: Spouse  Secondary Emergency Contact: Karissa Martinez  Address: [address] Phone: [phone]  Relation: Child    Past Surgical History:  Past Surgical History:   Procedure Laterality Date    BLADDER SUSPENSION      CHOLECYSTECTOMY  Orthopedic surgery Left arm    HYSTERECTOMY      TUBAL LIGATION      UPPER GASTROINTESTINAL ENDOSCOPY N/A 9/10/2021    EGD CONTROL HEMORRHAGE performed by Amrit Deng DO at [address] [place] [address]  9/10/2021    EGD SUBMUCOSAL/ EPINEPHRINE INJECTION performed by Amrit Deng DO at [facility] ENDOSCOPY       Immunization History:   Immunization History   Administered Date(s) Administered    COVID-19, Pfizer, PF, 30mcg/0.3mL 2021, 2021    Influenza, Feli Roughen, Recombinant, IM PF (Flublok 18 yrs and older) 10/30/2018    Pneumococcal Conjugate 13-valent (Anuja President) 2016    Pneumococcal Polysaccharide (Pvtabdgah13) 2017       Active Problems:  Patient Active Problem List   Diagnosis Code    HTN (hypertension), benign I10    Elbow pain M25.529    Knee pain, bilateral M25.561, M25.562    Primary localized osteoarthrosis, lower leg M17.10    Rotator cuff (capsule) sprain V57.469G    Coronary artery disease involving native coronary artery of native heart without angina pectoris I25.10    Idiopathic chronic gout of right foot M1A.0710    Chronic kidney disease (CKD), stage IV (severe) ([facility]) N18.4    Moderate single current episode of major depressive disorder ([facility]) F32.1    TIA (transient ischemic attack) G45.9    GERD (gastroesophageal reflux disease) K21.9    Osteoarthritis M19.90    Osteopenia of hip M85.859    Acute on chronic heart failure (HCC) I50.9    Acute on chronic congestive heart failure ([facility]) I50.9    Primary hypertension I10    Stage 3b chronic kidney disease ([facility]) N18.32    Paronychia of finger, right L03.011    GI bleed K92.2    Diarrhea R19.7    Chronic heart failure ([facility]) I50.9    Sepsis ([facility]) A41.9    Upper GI bleed K92.2    CHF (congestive heart failure), NYHA class I, acute on chronic, combined ([facility]) I50.43    Suspected COVID-19 virus infection Z20.822    Acute respiratory failure with hypoxia ([facility]) J96.01       Isolation/Infection:   Isolation            Droplet Plus          Patient Infection Status       Infection Onset Added Last Indicated Last Indicated By Review Planned Expiration Resolved Resolved By    None active    Resolved    COVID-19 Rule Out 10/26/21 10/26/21 10/26/21 COVID-19 (Ordered)   10/27/21 Rule-Out Test Resulted    COVID-19 Rule Out 10/26/21 10/26/21 10/26/21 COVID-19, Rapid (Ordered)   10/26/21 Rule-Out Test Resulted    C-diff Rule Out 09/10/21 09/10/21 10/26/21 Clostridium difficile toxin/antigen (Ordered)   10/27/21 Rule-Out Test Resulted    COVID-19 Rule Out 08/24/21 08/24/21 08/24/21 COVID-19 (Ordered)   08/25/21 Rule-Out Test Resulted    COVID-19 Rule Out 11/28/20 11/28/20 11/28/20 COVID-19 (Ordered)   11/28/20 Rule-Out Test Resulted    C-diff Rule Out 10/20/20 10/20/20 10/20/20 Clostridium difficile toxin/antigen (Ordered)   10/20/20 Rule-Out Test Resulted    COVID-19 Rule Out 10/19/20 10/20/20 10/19/20 COVID-19 (Ordered)   10/20/20 Rule-Out Test Resulted    COVID-19 Rule Out 10/19/20 10/19/20 10/19/20 COVID-19 (Ordered)   10/19/20 Rule-Out Test Resulted            Nurse Assessment:  Last Vital Signs: /61   Pulse 73   Temp 98.5 °F (36.9 °C) (Oral)   Resp 16   Ht 5' (1.524 m)   Wt 149 lb 7.6 oz (67.8 kg)   SpO2 96%   BMI 29.19 kg/m²     Last documented pain score (0-10 scale): Pain Level: 0  Last Weight:   Wt Readings from Last 1 Encounters:   10/27/21 149 lb 7.6 oz (67.8 kg)     Mental Status:  {IP PT MENTAL STATUS:20030}    IV Access:  { MARCIAL IV ACCESS:133642952}    Nursing Mobility/ADLs:  Walking   {CHP DME LZMR:239907929}  Transfer  {CHP DME GPYP:177643796}  Bathing  {CHP DME TJGE:684524105}  Dressing  {CHP DME VJGH:963658489}  Toileting  {CHP DME PMCP:571547154}  Feeding  {P DME ZHUY:029264941}  Med Admin  {P DME DKIR:465672682}  Med Delivery   { MARCIAL MED Delivery:392821390}    Wound Care Documentation and Therapy:        Elimination:  Continence: Bowel: {YES / GE:43822}  Bladder: {YES / LV:46154}  Urinary Catheter: {Urinary Catheter:653324843}   Colostomy/Ileostomy/Ileal Conduit: {YES / TS:24872}       Date of Last BM: ***    Intake/Output Summary (Last 24 hours) at 10/27/2021 1646  Last data filed at 10/27/2021 1525  Gross per 24 hour   Intake 440 ml   Output 650 ml   Net -210 ml     I/O last 3 completed shifts:   In: 26 [P.O.:440]  Out: 350 [Urine:350]    Safety Concerns:     [address] Safety Concerns:044598989}    Impairments/Disabilities:      [address] Impairments/Disabilities:958878624}    Nutrition Therapy:  Current Nutrition Therapy:   [address] Diet List:159677426}    Routes of Feeding: {CHP DME Other Feedings:749886436}  Liquids: {Slp liquid thickness:27064}  Daily Fluid Restriction: {CHP DME Yes amt example:335158408}  Last Modified Barium Swallow with Video (Video Swallowing Test): {Done Not Done BBXL:045582205}    Treatments at the Time of Hospital Discharge:   Respiratory Treatments: ***  Oxygen Therapy:  {Therapy; copd oxygen:85867}  Ventilator:    {MH CC Vent [facility]:214441057}    Rehab Therapies: {THERAPEUTIC patient):  {CHP DME Belongings:280813197}    RN SIGNATURE:  {Esignature:337236905}    CASE MANAGEMENT/SOCIAL WORK SECTION    Inpatient Status Date: ***    Readmission Risk Assessment Score:  Readmission Risk              Risk of Unplanned Readmission:  24           Discharging to Facility/ Agency   Name:   Address:  Phone:  Fax:    Dialysis Facility (if applicable)   Name:  Address:  Dialysis Schedule:  Phone:  Fax:    / signature: {Esignature:988430463}    PHYSICIAN SECTION    Prognosis: Fair    Condition at Discharge: Stable    Rehab Potential (if transferring to Rehab): Good    Recommended Labs or Other Treatments After Discharge: N/A    Physician Certification: I certify the above information and transfer of Elza Leigh  is necessary for the continuing treatment of the diagnosis listed and that she requires Home Care for greater 30 days.      Update Admission H&P: No change in H&P    PHYSICIAN SIGNATURE:  Electronically signed by Esteban Soriano MD on 10/31/21 at 2:00 PM EDT

## 2021-10-27 NOTE — PROGRESS NOTES
Hospitalist Progress Note      PCP: FERNANDO Adan NP    Date of Admission: 10/26/2021    Hospital Course: \"80 y.o. female with PMHx of CKD, CAD, GERD, HLD, HTN presented to [facility] ED with  acute onset of shortness of breath. Patient was transferred for evaluation for CHF and suspected Covid. Pt's grand-daughter is concerned that patient may be \"cheeking\" her medications for approximately the last 4 days. Pt c/o GERD and g-daughter was concerned about dark stools. Pt may have also missed her diuretic. \"       Subjective:   She is better. She is still on oxygen. Medications:  Reviewed    Infusion Medications    sodium chloride       Scheduled Medications    furosemide  60 mg IntraVENous BID    influenza virus vaccine  0.5 mL IntraMUSCular Prior to discharge    allopurinol  50 mg Oral Every Other Day    amLODIPine  5 mg Oral Daily    aspirin  81 mg Oral Daily    atorvastatin  20 mg Oral Daily    carvedilol  12.5 mg Oral BID WC    donepezil  5 mg Oral Nightly    ferrous sulfate  324 mg Oral BID WC    FLUoxetine  20 mg Oral Daily    lisinopril  10 mg Oral Daily    pantoprazole  40 mg Oral BID AC    sodium chloride flush  5-40 mL IntraVENous 2 times per day    enoxaparin  30 mg SubCUTAneous BID    dexamethasone  6 mg Oral Daily    Vitamin D  2,000 Units Oral Daily     PRN Meds: sodium chloride flush, sodium chloride, ondansetron **OR** ondansetron, polyethylene glycol, acetaminophen **OR** acetaminophen, guaiFENesin-dextromethorphan      Intake/Output Summary (Last 24 hours) at 10/27/2021 1137  Last data filed at 10/27/2021 0907  Gross per 24 hour   Intake 440 ml   Output 350 ml   Net 90 ml       Physical Exam Performed:    BP (!) 147/74   Pulse 76   Temp 97.5 °F (36.4 °C)   Resp 18   Ht 5' (1.524 m)   Wt 149 lb 7.6 oz (67.8 kg)   SpO2 99%   BMI 29.19 kg/m²     General appearance: No apparent distress, appears stated age and cooperative. On nasal 02  HEENT: Pupils equal, round, and reactive to light. Conjunctivae/corneas clear. Neck: Supple, with full range of motion. No jugular venous distention. Trachea midline. Respiratory:  Normal respiratory effort. Clear to auscultation, bilaterally without Rales/Wheezes/Rhonchi. Cardiovascular: Regular rate and rhythm with normal S1/S2 without murmurs, rubs or gallops. Abdomen: Soft, non-tender, non-distended with normal bowel sounds. Musculoskeletal: No clubbing, cyanosis or edema bilaterally. Full range of motion without deformity. Skin: Skin color, texture, turgor normal.  No rashes or lesions. Neurologic:  Neurovascularly intact without any focal sensory/motor deficits. Cranial nerves: II-XII intact, grossly non-focal.  Psychiatric: Alert and oriented, thought content appropriate, normal insight      Labs:   Recent Labs     10/26/21  1430 10/27/21  0557   WBC 10.5 9.5   HGB 10.3* 10.1*   HCT 31.5* 30.7*    359     Recent Labs     10/26/21  1430 10/27/21  0557    136   K 4.3 4.6    102   CO2 23 21   BUN 19 20   CREATININE 1.0 1.2   CALCIUM 9.5 8.9     Recent Labs     10/26/21  1430 10/27/21  0557   AST 15 14*   ALT 7* 8*   BILITOT <0.2 0.3   ALKPHOS 55 58     Recent Labs     10/27/21  0557   INR 1.01     Recent Labs     10/26/21  1430   TROPONINI 0.01       Urinalysis:      Lab Results   Component Value Date    NITRU Negative 10/26/2021    WBCUA None seen 10/26/2021    BACTERIA 2+ 10/26/2021    RBCUA 0-2 10/26/2021    BLOODU MODERATE 10/26/2021    SPECGRAV 1.010 10/26/2021    GLUCOSEU Negative 10/26/2021    GLUCOSEU Negative 06/13/2011       Radiology:  CT CHEST PULMONARY EMBOLISM W CONTRAST   Final Result   1. Infectious or inflammatory airway process and interstitial edema. 2. Nonspecific regions central ground-glass opacity. Correlate with   presentation to exclude pneumonia. 3. No acute pulmonary embolism to the segmental arteries. 4. Other findings as described.          XR CHEST (2 VW)   Final Result Bilateral interstitial groundglass opacities are nonspecific and may   represent viral pneumonia, COVID-19 is not excluded. Assessment/Plan:    Active Hospital Problems    Diagnosis     CHF (congestive heart failure), NYHA class I, acute on chronic, combined ([facility] [I50.43]     Suspected COVID-19 virus infection [Z20.822]     Acute respiratory failure with hypoxia (HCC) [J96.01]     Sepsis ([facility] [A41.9]     Primary hypertension [I10]         Acute on chronic systolic CHF EF 66%  - Continue lasix 60 mg IV BID   -FU repeat Echo  - cont BB, ACEi/ARB   - daily wts and I/Os  - consult CHF RN  - consulted cardiology     Suspected Covid 19 Pneumonia-ground glass opacities more likely due to CHF.  - Rapid Covid 19 swab and PCR negative. D/C droplet precautions  - D/C decadron  - Procalcitonin negative. Less suspicion for pneumona.     Acute respiratory failure Hypoxia with exertion - due to CHF  No PE on CT chest.   - Currently on 2L NC. Wean as tolerated.     Essential (primary) hypertension   - BP slightly elevated,will monitor while on diuretics. Diarrhea  -Negative C.diff and stool occult    CKD stage 3  -Cr stable     Hyperglycemia-due to steriods  -Stopped steriods. Will monitor. Daughter at bedside. Questions and concerns were addressed. DVT Prophylaxis: Lovenox  Diet: ADULT DIET; Regular;  Low Sodium (2 gm); 1500 ml  Code Status: Full Code    PT/OT Eval Status: as needed    Dispo - Home in 2days    Rose Horne MD

## 2021-10-28 LAB
ANION GAP SERPL CALCULATED.3IONS-SCNC: 17 MMOL/L (ref 3–16)
BUN BLDV-MCNC: 48 MG/DL (ref 7–20)
C-REACTIVE PROTEIN: 9.3 MG/L (ref 0–5.1)
CALCIUM SERPL-MCNC: 8.1 MG/DL (ref 8.3–10.6)
CHLORIDE BLD-SCNC: 95 MMOL/L (ref 99–110)
CO2: 18 MMOL/L (ref 21–32)
CREAT SERPL-MCNC: 1.7 MG/DL (ref 0.6–1.2)
GFR AFRICAN AMERICAN: 34
GFR NON-AFRICAN AMERICAN: 28
GLUCOSE BLD-MCNC: 162 MG/DL (ref 70–99)
POTASSIUM SERPL-SCNC: 4.2 MMOL/L (ref 3.5–5.1)
PRO-BNP: ABNORMAL PG/ML (ref 0–449)
PROCALCITONIN: 0.07 NG/ML (ref 0–0.15)
SODIUM BLD-SCNC: 130 MMOL/L (ref 136–145)

## 2021-10-28 PROCEDURE — 80048 BASIC METABOLIC PNL TOTAL CA: CPT

## 2021-10-28 PROCEDURE — 2700000000 HC OXYGEN THERAPY PER DAY

## 2021-10-28 PROCEDURE — 2580000003 HC RX 258: Performed by: NURSE PRACTITIONER

## 2021-10-28 PROCEDURE — 99233 SBSQ HOSP IP/OBS HIGH 50: CPT | Performed by: INTERNAL MEDICINE

## 2021-10-28 PROCEDURE — 94761 N-INVAS EAR/PLS OXIMETRY MLT: CPT

## 2021-10-28 PROCEDURE — 6360000002 HC RX W HCPCS: Performed by: INTERNAL MEDICINE

## 2021-10-28 PROCEDURE — 6370000000 HC RX 637 (ALT 250 FOR IP): Performed by: INTERNAL MEDICINE

## 2021-10-28 PROCEDURE — 6370000000 HC RX 637 (ALT 250 FOR IP): Performed by: NURSE PRACTITIONER

## 2021-10-28 PROCEDURE — 84145 PROCALCITONIN (PCT): CPT

## 2021-10-28 PROCEDURE — 2060000000 HC ICU INTERMEDIATE R&B

## 2021-10-28 PROCEDURE — 86140 C-REACTIVE PROTEIN: CPT

## 2021-10-28 PROCEDURE — 36415 COLL VENOUS BLD VENIPUNCTURE: CPT

## 2021-10-28 RX ORDER — SPIRONOLACTONE 25 MG/1
12.5 TABLET ORAL DAILY
Status: DISCONTINUED | OUTPATIENT
Start: 2021-10-28 | End: 2021-10-31 | Stop reason: HOSPADM

## 2021-10-28 RX ORDER — LANOLIN ALCOHOL/MO/W.PET/CERES
3 CREAM (GRAM) TOPICAL NIGHTLY
Status: DISCONTINUED | OUTPATIENT
Start: 2021-10-28 | End: 2021-10-31 | Stop reason: HOSPADM

## 2021-10-28 RX ORDER — VALPROIC ACID 250 MG/5ML
125 SOLUTION ORAL EVERY 12 HOURS SCHEDULED
Status: DISCONTINUED | OUTPATIENT
Start: 2021-10-28 | End: 2021-10-31 | Stop reason: HOSPADM

## 2021-10-28 RX ORDER — CARVEDILOL 25 MG/1
25 TABLET ORAL 2 TIMES DAILY WITH MEALS
Status: DISCONTINUED | OUTPATIENT
Start: 2021-10-28 | End: 2021-10-29

## 2021-10-28 RX ORDER — ISOSORBIDE MONONITRATE 30 MG/1
30 TABLET, EXTENDED RELEASE ORAL DAILY
Status: DISCONTINUED | OUTPATIENT
Start: 2021-10-28 | End: 2021-10-31 | Stop reason: HOSPADM

## 2021-10-28 RX ORDER — METOLAZONE 5 MG/1
2.5 TABLET ORAL DAILY
Status: DISCONTINUED | OUTPATIENT
Start: 2021-10-28 | End: 2021-10-28

## 2021-10-28 RX ORDER — FUROSEMIDE 10 MG/ML
40 INJECTION INTRAMUSCULAR; INTRAVENOUS 2 TIMES DAILY
Status: DISCONTINUED | OUTPATIENT
Start: 2021-10-28 | End: 2021-10-30

## 2021-10-28 RX ADMIN — SODIUM CHLORIDE, PRESERVATIVE FREE 10 ML: 5 INJECTION INTRAVENOUS at 10:18

## 2021-10-28 RX ADMIN — VALPROIC ACID 125 MG: 250 SOLUTION ORAL at 20:59

## 2021-10-28 RX ADMIN — FERROUS SULFATE TAB EC 324 MG (65 MG FE EQUIVALENT) 324 MG: 324 (65 FE) TABLET DELAYED RESPONSE at 17:04

## 2021-10-28 RX ADMIN — FUROSEMIDE 60 MG: 10 INJECTION, SOLUTION INTRAMUSCULAR; INTRAVENOUS at 09:27

## 2021-10-28 RX ADMIN — Medication 2000 UNITS: at 09:27

## 2021-10-28 RX ADMIN — DONEPEZIL HYDROCHLORIDE 5 MG: 5 TABLET, FILM COATED ORAL at 20:59

## 2021-10-28 RX ADMIN — PANTOPRAZOLE SODIUM 40 MG: 40 TABLET, DELAYED RELEASE ORAL at 16:09

## 2021-10-28 RX ADMIN — ATORVASTATIN CALCIUM 20 MG: 20 TABLET, FILM COATED ORAL at 09:26

## 2021-10-28 RX ADMIN — CARVEDILOL 25 MG: 25 TABLET, FILM COATED ORAL at 17:04

## 2021-10-28 RX ADMIN — SPIRONOLACTONE 12.5 MG: 25 TABLET ORAL at 16:09

## 2021-10-28 RX ADMIN — SODIUM CHLORIDE, PRESERVATIVE FREE 10 ML: 5 INJECTION INTRAVENOUS at 20:59

## 2021-10-28 RX ADMIN — LISINOPRIL 10 MG: 10 TABLET ORAL at 09:26

## 2021-10-28 RX ADMIN — FERROUS SULFATE TAB EC 324 MG (65 MG FE EQUIVALENT) 324 MG: 324 (65 FE) TABLET DELAYED RESPONSE at 09:26

## 2021-10-28 RX ADMIN — ACETAMINOPHEN 650 MG: 325 TABLET ORAL at 01:08

## 2021-10-28 RX ADMIN — PANTOPRAZOLE SODIUM 40 MG: 40 TABLET, DELAYED RELEASE ORAL at 06:30

## 2021-10-28 RX ADMIN — ALLOPURINOL 50 MG: 100 TABLET ORAL at 09:25

## 2021-10-28 RX ADMIN — ENOXAPARIN SODIUM 30 MG: 100 INJECTION SUBCUTANEOUS at 09:27

## 2021-10-28 RX ADMIN — AMLODIPINE BESYLATE 5 MG: 5 TABLET ORAL at 09:26

## 2021-10-28 RX ADMIN — FLUOXETINE 20 MG: 20 CAPSULE ORAL at 09:26

## 2021-10-28 RX ADMIN — ASPIRIN 81 MG: 81 TABLET, CHEWABLE ORAL at 09:25

## 2021-10-28 RX ADMIN — FUROSEMIDE 40 MG: 40 INJECTION, SOLUTION INTRAMUSCULAR; INTRAVENOUS at 17:32

## 2021-10-28 RX ADMIN — CARVEDILOL 12.5 MG: 12.5 TABLET, FILM COATED ORAL at 09:26

## 2021-10-28 RX ADMIN — Medication 3 MG: at 20:59

## 2021-10-28 ASSESSMENT — PAIN SCALES - GENERAL
PAINLEVEL_OUTOF10: 0
PAINLEVEL_OUTOF10: 1

## 2021-10-28 NOTE — PROGRESS NOTES
[facility]   Daily Progress Note      Admit Date:  10/26/2021    CC: \" No symptoms  This is an 51-year-old female who was  extremely hard of hearing with a previous history of coronary artery  disease, CKD, aortic stenosis, hypertension, hyperlipidemia, presented  to the hospital with worsening shortness of breath. According to the  HPI. she presented to [facility] Emergency Room. She is also being  evaluated for COVID. Her symptoms have been worsening for four or five  days and her granddaughter brought her to the hospital.  There is also  concern of dark stools. Subjective:  Patient is extremely hard of hearing and only tells me that her arms are bruising. She still needs to be on at least 1 L of oxygen. . Accurate I's and O's are currently not available as patient is incontinent number of times. Her weight is up compared to yesterday. Objective:   BP (!) 145/69   Pulse 69   Temp 97.4 °F (36.3 °C) (Oral)   Resp 18   Ht 5' (1.524 m)   Wt 152 lb 5.4 oz (69.1 kg)   SpO2 97%   BMI 29.75 kg/m²     Intake/Output Summary (Last 24 hours) at 10/28/2021 1330  Last data filed at 10/28/2021 1316  Gross per 24 hour   Intake 480 ml   Output 750 ml   Net -270 ml     Wt Readings from Last 3 Encounters:   10/28/21 152 lb 5.4 oz (69.1 kg)   09/12/21 146 lb 6.2 oz (66.4 kg)   08/24/21 147 lb 14.9 oz (67.1 kg)     Telemetry: Normal sinus rhythm    Physical Exam:  General:  NAD, Awake, alert. Very hard of hearing. ? Confused  Skin:  Warm and dry  Neck:  Supple, cannot adequately assess JVP , no bruit  Chest: Diminished breath sounds  Cardiovascular:  Regular rate.  S1S2  Abdomen:  Soft, nontender, +bowel sounds  Extremities:  No LE edema    Cardiac Diagnosis:  hypertension, hyperlipidemia and coronary artery disease    Medications:    furosemide  60 mg IntraVENous BID    enoxaparin  30 mg SubCUTAneous Daily    influenza virus vaccine  0.5 mL IntraMUSCular Prior to discharge    allopurinol  50 mg Oral Every Other Day    amLODIPine  5 mg Oral Daily    aspirin  81 mg Oral Daily    atorvastatin  20 mg Oral Daily    carvedilol  12.5 mg Oral BID WC    donepezil  5 mg Oral Nightly    ferrous sulfate  324 mg Oral BID WC    FLUoxetine  20 mg Oral Daily    lisinopril  10 mg Oral Daily    pantoprazole  40 mg Oral BID AC    sodium chloride flush  5-40 mL IntraVENous 2 times per day    Vitamin D  2,000 Units Oral Daily      sodium chloride       sodium chloride flush, sodium chloride, ondansetron **OR** ondansetron, polyethylene glycol, acetaminophen **OR** acetaminophen, guaiFENesin-dextromethorphan    Lab Data:  CBC:   Recent Labs     10/26/21  1430 10/27/21  0557   WBC 10.5 9.5   HGB 10.3* 10.1*    359     BMP:    Recent Labs     10/26/21  1430 10/27/21  0557 10/27/21  1046    136 136   K 4.3 4.6 4.3   CO2 23 21 22   BUN 19 20 24*   CREATININE 1.0 1.2 1.3*     LIVR:   Recent Labs     10/26/21  1430 10/27/21  0557   AST 15 14*   ALT 7* 8*     INR:    Recent Labs     10/27/21  0557   INR 1.01     APTT: No results for input(s): APTT in the last 72 hours. BNP:  No results for input(s): BNP in the last 72 hours. Imaging:Left ventricular cavity size is normal.   Normal left ventricular wall thickness. Ejection fraction is visually estimated to be 35-40%. moderately diffuse hypokinesis   Grade II diastolic dysfunction with elevated LV filling pressures. Mild aortic stenosis with a peak velocity of 2.7m/s and a mean pressure   gradient of 17mmHg. Trivial aortic regurgitation. Normal right ventricular size and function. Right ventricular systolic function is normal    Assessment:Plan:  1) acute on chronic hypoxic respiratory failure  -Predominantly due to underlying congestive heart failure though with elevated CRP some component of infectious etiology cannot entirely be excluded  -Still stays on 1 L of oxygen  Acute on chronic systolic heart failure.   NYHA class III on admission  -Patient only had modest diuresis and her weight has gone up since yesterday  -Echocardiogram showed EF of 35 to 40%  -I will discontinue continue her Norvasc.   Will add isosorbide for preload  reduction  -Will increase carvedilol to 25 mg p.o. twice daily  -I will  Hold  lisinopril due to worsening renal insufficiency  -I will add low-dose Aldactone to her regimen  She does not appear to be an ideal  patient for SGLT2 inhibitor outpatient-Patient is not a candidate for ICD therapy at this point    SULY on CKD  -Renal function has significantly worsened in the last 24 hours  -We will hold her lisinopril  - will have to hold diuretic therapy till renal function is back tomorrow      Electronically signed by Sarahi Rowland MD on 10/28/2021 at 1:30 PM

## 2021-10-28 NOTE — CONSULTS
being feisty with taking her medications at times it can be a challenge. \" Praise given for being her caregivers and emotional support provided. Appropriate HF orders are in place. Pt is on on evidence based coreg and lisinopril. HF dc instructions are in place on AVS as well as on MARCIAL. I do think pt could benefit from home therapy to include a nurse to help support her HF management. I will ensure pt has a cardiology appt in place prior to dc. Dtr and granddaughter were appreciative of my call. No further questions at this time.

## 2021-10-28 NOTE — PROGRESS NOTES
Hospitalist Progress Note      PCP: FERNANDO Vee - NP    Date of Admission: 10/26/2021    Hospital Course: \"80 y.o. female with PMHx of CKD, CAD, GERD, HLD, HTN presented to [facility] ED with  acute onset of shortness of breath. Patient was transferred for evaluation for CHF and suspected Covid. Pt's grand-daughter is concerned that patient may be \"cheeking\" her medications for approximately the last 4 days. Pt c/o GERD and g-daughter was concerned about dark stools. Pt may have also missed her diuretic. \"       Subjective:   She is confused. She has been taking off oxygen, pulse ox meter, and telemetry. Medications:  Reviewed    Infusion Medications    sodium chloride       Scheduled Medications    furosemide  60 mg IntraVENous BID    enoxaparin  30 mg SubCUTAneous Daily    influenza virus vaccine  0.5 mL IntraMUSCular Prior to discharge    allopurinol  50 mg Oral Every Other Day    amLODIPine  5 mg Oral Daily    aspirin  81 mg Oral Daily    atorvastatin  20 mg Oral Daily    carvedilol  12.5 mg Oral BID WC    donepezil  5 mg Oral Nightly    ferrous sulfate  324 mg Oral BID WC    FLUoxetine  20 mg Oral Daily    lisinopril  10 mg Oral Daily    pantoprazole  40 mg Oral BID AC    sodium chloride flush  5-40 mL IntraVENous 2 times per day    Vitamin D  2,000 Units Oral Daily     PRN Meds: sodium chloride flush, sodium chloride, ondansetron **OR** ondansetron, polyethylene glycol, acetaminophen **OR** acetaminophen, guaiFENesin-dextromethorphan      Intake/Output Summary (Last 24 hours) at 10/28/2021 1332  Last data filed at 10/28/2021 1316  Gross per 24 hour   Intake 480 ml   Output 750 ml   Net -270 ml       Physical Exam Performed:    BP (!) 145/69   Pulse 69   Temp 97.4 °F (36.3 °C) (Oral)   Resp 18   Ht 5' (1.524 m)   Wt 152 lb 5.4 oz (69.1 kg)   SpO2 97%   BMI 29.75 kg/m²     General appearance: No apparent distress, appears stated age and cooperative. On nasal 02  HEENT: Pupils equal, round, and reactive to light. Conjunctivae/corneas clear. Neck: Supple, with full range of motion. No jugular venous distention. Trachea midline. Respiratory:  Normal respiratory effort. Clear to auscultation, bilaterally without Rales/Wheezes/Rhonchi. Cardiovascular: Regular rate and rhythm with normal S1/S2 without murmurs, rubs or gallops. Abdomen: Soft, non-tender, non-distended with normal bowel sounds. Musculoskeletal: No clubbing, cyanosis or edema bilaterally. Full range of motion without deformity. Skin: Skin color, texture, turgor normal.  No rashes or lesions. Neurologic:  Neurovascularly intact without any focal sensory/motor deficits. Cranial nerves: II-XII intact, grossly non-focal.  Psychiatric: Alert and oriented x 0      Labs:   Recent Labs     10/26/21  1430 10/27/21  0557   WBC 10.5 9.5   HGB 10.3* 10.1*   HCT 31.5* 30.7*    359     Recent Labs     10/26/21  1430 10/27/21  0557 10/27/21  1046    136 136   K 4.3 4.6 4.3    102 96*   CO2 23 21 22   BUN 19 20 24*   CREATININE 1.0 1.2 1.3*   CALCIUM 9.5 8.9 9.0     Recent Labs     10/26/21  1430 10/27/21  0557   AST 15 14*   ALT 7* 8*   BILITOT <0.2 0.3   ALKPHOS 55 58     Recent Labs     10/27/21  0557   INR 1.01     Recent Labs     10/26/21  1430   TROPONINI 0.01       Urinalysis:      Lab Results   Component Value Date    NITRU Negative 10/26/2021    WBCUA None seen 10/26/2021    BACTERIA 2+ 10/26/2021    RBCUA 0-2 10/26/2021    BLOODU MODERATE 10/26/2021    SPECGRAV 1.010 10/26/2021    GLUCOSEU Negative 10/26/2021    GLUCOSEU Negative 06/13/2011       Radiology:  CT CHEST PULMONARY EMBOLISM W CONTRAST   Final Result   1. Infectious or inflammatory airway process and interstitial edema. 2. Nonspecific regions central ground-glass opacity. Correlate with   presentation to exclude pneumonia. 3. No acute pulmonary embolism to the segmental arteries. 4. Other findings as described.          XR CHEST (2 VW) Final Result   Bilateral interstitial groundglass opacities are nonspecific and may   represent viral pneumonia, COVID-19 is not excluded. Assessment/Plan:    Active Hospital Problems    Diagnosis     CHF (congestive heart failure), NYHA class I, acute on chronic, combined ([address] [I50.43]     Suspected COVID-19 virus infection [Z20.822]     Acute respiratory failure with hypoxia ([address] [J96.01]     Sepsis ([address] [A41.9]     Primary hypertension [I10]         Acute on chronic systolic CHF EF 46-95%  - Continue lasix 60 mg IV BID   -Repeat Echo shows decrease in EF from 45% and moderately diffuse hypokinesis  - cont BB, ACEi/ARB   - daily wts and I/Os  - consulted CHF RN  - consulted cardiology. Plan as above. Await new recommendations.     Suspected Covid 19 Pneumonia-ground glass opacities more likely due to CHF.  - Rapid Covid 19 swab and PCR negative. D/C droplet precautions  - Stopped decadron  - Procalcitonin negative. Less suspicion for pneumona. Acute respiratory failure Hypoxia with exertion - due to CHF  No PE on CT chest.   - Currently on 1L NC. Wean as tolerated. Acute encephalopathy-in background of dementia  -Possibly due to sundowning. Pt has prolonged QTC thus will avoid medications prolonging QT. Start depakote. -Delirium precautions.     Essential (primary) hypertension   - BP slightly elevated,will monitor while on diuretics. Diarrhea  -Negative C.diff and stool occult    CKD stage 3  -Cr stable     Hyperglycemia-due to steriods  -Stopped steriods. Will monitor. Daughter at bedside. Questions and concerns were addressed. DVT Prophylaxis: Lovenox  Diet: ADULT DIET; Regular;  Low Sodium (2 gm); 1500 ml  Code Status: Full Code    PT/OT Eval Status: as needed    Dispo - Home in 2days    Bernice Ramos MD

## 2021-10-28 NOTE — ACP (ADVANCE CARE PLANNING)
Advance Care Planning     Advance Care Planning Activator (Inpatient)  Conversation Note      Date of ACP Conversation: 10/28/2021     Conversation Conducted with: Patient with Decision Making Capacity    ACP Activator: LANRE Link, [facility] Decision Maker:     Current Designated Health Care Decision Maker:     Primary Decision Maker: Guy Garza - [phone]    Secondary Decision Maker: Emmy Woodard - [facility] - [phone]    Today we documented Decision Maker(s) consistent with Legal Next of Kin hierarchy. Care Preferences    Ventilation: \"If you were in your present state of health and suddenly became very ill and were unable to breathe on your own, what would your preference be about the use of a ventilator (breathing machine) if it were available to you? \"      Would the patient desire the use of ventilator (breathing machine)?: yes    \"If your health worsens and it becomes clear that your chance of recovery is unlikely, what would your preference be about the use of a ventilator (breathing machine) if it were available to you? \"     Would the patient desire the use of ventilator (breathing machine)?: No      Resuscitation  \"CPR works best to restart the heart when there is a sudden event, like a heart attack, in someone who is otherwise healthy. Unfortunately, CPR does not typically restart the heart for people who have serious health conditions or who are very sick. \"    \"In the event your heart stopped as a result of an underlying serious health condition, would you want attempts to be made to restart your heart (answer \"yes\" for attempt to resuscitate) or would you prefer a natural death (answer \"no\" for do not attempt to resuscitate)? \" yes       [] Yes   [x] No   Educated Patient / Clearence Golds regarding differences between Advance Directives and portable DNR orders.     Length of ACP Conversation in minutes:  5    Conversation Outcomes:  [x] ACP discussion completed  [] Existing advance directive reviewed with patient; no changes to patient's previously recorded wishes  [] New Advance Directive completed  [] Portable Do Not Rescitate prepared for Provider review and signature  [] POLST/POST/MOLST/MOST prepared for Provider review and signature      Follow-up plan:    [] Schedule follow-up conversation to continue planning  [] Referred individual to Provider for additional questions/concerns   [] Advised patient/agent/surrogate to review completed ACP document and update if needed with changes in condition, patient preferences or care setting    [x] This note routed to one or more involved healthcare providers       Electronically signed by Marcianne Mcardle, MSW, MAXINEW on 10/28/2021 at 10:10 AM

## 2021-10-28 NOTE — CARE COORDINATION
INITIAL CASE MANAGEMENT ASSESSMENT     Reviewed chart, spoke with patient and spouse to assess possible discharge needs. Explained Case Management role/services. Living Situation: Patient lives in a house with spouse, daughter, granddaughter, and grandson. ADLs: Independent, but receives assistance as needed from family. DME: Currently on 1LO1 - none prior to admission     PT/OT Recs: not ordered     Active Services: Family support as needed      Transportation: Family     Medications: [place]    PCP: FERNANDO Silva NP      HD/PD: n/a    PLAN/COMMENTS: Home with family   ACP completed with patient. SW/CM provided contact information for patient or family to call with any questions. SW/CM will follow and assist as needed.     Dewaine Litten, MSW, [facility], Social Work  [phone]  Electronically signed by Dewaine Litten, MSW, W on 10/28/2021 at 10:10 AM

## 2021-10-29 LAB
ANION GAP SERPL CALCULATED.3IONS-SCNC: 17 MMOL/L (ref 3–16)
BUN BLDV-MCNC: 57 MG/DL (ref 7–20)
CALCIUM SERPL-MCNC: 7.9 MG/DL (ref 8.3–10.6)
CHLORIDE BLD-SCNC: 95 MMOL/L (ref 99–110)
CO2: 18 MMOL/L (ref 21–32)
CREAT SERPL-MCNC: 2 MG/DL (ref 0.6–1.2)
FERRITIN: 19.8 NG/ML (ref 15–150)
GFR AFRICAN AMERICAN: 29
GFR NON-AFRICAN AMERICAN: 24
GLUCOSE BLD-MCNC: 102 MG/DL (ref 70–99)
HCT VFR BLD CALC: 27.5 % (ref 36–48)
HEMOGLOBIN: 8.8 G/DL (ref 12–16)
IRON SATURATION: 8 % (ref 15–50)
IRON: 26 UG/DL (ref 37–145)
MCH RBC QN AUTO: 28.3 PG (ref 26–34)
MCHC RBC AUTO-ENTMCNC: 31.8 G/DL (ref 31–36)
MCV RBC AUTO: 88.9 FL (ref 80–100)
PDW BLD-RTO: 14.7 % (ref 12.4–15.4)
PLATELET # BLD: 334 K/UL (ref 135–450)
PMV BLD AUTO: 9.2 FL (ref 5–10.5)
POTASSIUM REFLEX MAGNESIUM: 4.2 MMOL/L (ref 3.5–5.1)
RBC # BLD: 3.09 M/UL (ref 4–5.2)
SODIUM BLD-SCNC: 130 MMOL/L (ref 136–145)
TOTAL IRON BINDING CAPACITY: 310 UG/DL (ref 260–445)
TRANSFERRIN: 279 MG/DL (ref 200–360)
WBC # BLD: 11.4 K/UL (ref 4–11)

## 2021-10-29 PROCEDURE — 6370000000 HC RX 637 (ALT 250 FOR IP): Performed by: INTERNAL MEDICINE

## 2021-10-29 PROCEDURE — 85027 COMPLETE CBC AUTOMATED: CPT

## 2021-10-29 PROCEDURE — 6370000000 HC RX 637 (ALT 250 FOR IP): Performed by: NURSE PRACTITIONER

## 2021-10-29 PROCEDURE — 2060000000 HC ICU INTERMEDIATE R&B

## 2021-10-29 PROCEDURE — 36415 COLL VENOUS BLD VENIPUNCTURE: CPT

## 2021-10-29 PROCEDURE — 2580000003 HC RX 258: Performed by: NURSE PRACTITIONER

## 2021-10-29 PROCEDURE — 83540 ASSAY OF IRON: CPT

## 2021-10-29 PROCEDURE — 6360000002 HC RX W HCPCS: Performed by: INTERNAL MEDICINE

## 2021-10-29 PROCEDURE — 99232 SBSQ HOSP IP/OBS MODERATE 35: CPT | Performed by: NURSE PRACTITIONER

## 2021-10-29 PROCEDURE — 84466 ASSAY OF TRANSFERRIN: CPT

## 2021-10-29 PROCEDURE — 80048 BASIC METABOLIC PNL TOTAL CA: CPT

## 2021-10-29 PROCEDURE — 82728 ASSAY OF FERRITIN: CPT

## 2021-10-29 PROCEDURE — 94761 N-INVAS EAR/PLS OXIMETRY MLT: CPT

## 2021-10-29 PROCEDURE — 2700000000 HC OXYGEN THERAPY PER DAY

## 2021-10-29 PROCEDURE — 99223 1ST HOSP IP/OBS HIGH 75: CPT | Performed by: HOSPITALIST

## 2021-10-29 RX ORDER — CARVEDILOL 6.25 MG/1
6.25 TABLET ORAL 2 TIMES DAILY WITH MEALS
Status: DISCONTINUED | OUTPATIENT
Start: 2021-10-30 | End: 2021-10-31 | Stop reason: HOSPADM

## 2021-10-29 RX ADMIN — CARVEDILOL 25 MG: 25 TABLET, FILM COATED ORAL at 08:54

## 2021-10-29 RX ADMIN — VALPROIC ACID 125 MG: 250 SOLUTION ORAL at 21:49

## 2021-10-29 RX ADMIN — ENOXAPARIN SODIUM 30 MG: 100 INJECTION SUBCUTANEOUS at 08:53

## 2021-10-29 RX ADMIN — Medication 3 MG: at 21:49

## 2021-10-29 RX ADMIN — PANTOPRAZOLE SODIUM 40 MG: 40 TABLET, DELAYED RELEASE ORAL at 05:44

## 2021-10-29 RX ADMIN — Medication 2000 UNITS: at 08:55

## 2021-10-29 RX ADMIN — FERROUS SULFATE TAB EC 324 MG (65 MG FE EQUIVALENT) 324 MG: 324 (65 FE) TABLET DELAYED RESPONSE at 08:55

## 2021-10-29 RX ADMIN — ASPIRIN 81 MG: 81 TABLET, CHEWABLE ORAL at 08:54

## 2021-10-29 RX ADMIN — PANTOPRAZOLE SODIUM 40 MG: 40 TABLET, DELAYED RELEASE ORAL at 16:47

## 2021-10-29 RX ADMIN — FERROUS SULFATE TAB EC 324 MG (65 MG FE EQUIVALENT) 324 MG: 324 (65 FE) TABLET DELAYED RESPONSE at 16:46

## 2021-10-29 RX ADMIN — ISOSORBIDE MONONITRATE 30 MG: 30 TABLET, EXTENDED RELEASE ORAL at 08:54

## 2021-10-29 RX ADMIN — ALLOPURINOL 50 MG: 100 TABLET ORAL at 08:54

## 2021-10-29 RX ADMIN — FLUOXETINE 20 MG: 20 CAPSULE ORAL at 08:55

## 2021-10-29 RX ADMIN — ATORVASTATIN CALCIUM 20 MG: 20 TABLET, FILM COATED ORAL at 08:55

## 2021-10-29 RX ADMIN — SODIUM CHLORIDE, PRESERVATIVE FREE 10 ML: 5 INJECTION INTRAVENOUS at 08:55

## 2021-10-29 RX ADMIN — VALPROIC ACID 125 MG: 250 SOLUTION ORAL at 08:53

## 2021-10-29 RX ADMIN — DONEPEZIL HYDROCHLORIDE 5 MG: 5 TABLET, FILM COATED ORAL at 21:49

## 2021-10-29 RX ADMIN — SODIUM CHLORIDE, PRESERVATIVE FREE 10 ML: 5 INJECTION INTRAVENOUS at 21:49

## 2021-10-29 RX ADMIN — FUROSEMIDE 40 MG: 40 INJECTION, SOLUTION INTRAMUSCULAR; INTRAVENOUS at 08:54

## 2021-10-29 RX ADMIN — SPIRONOLACTONE 12.5 MG: 25 TABLET ORAL at 08:55

## 2021-10-29 ASSESSMENT — PAIN SCALES - GENERAL
PAINLEVEL_OUTOF10: 0
PAINLEVEL_OUTOF10: 0

## 2021-10-29 NOTE — PLAN OF CARE
Problem: Falls - Risk of:  Goal: Will remain free from falls  Description: Will remain free from falls  Outcome: Ongoing  Goal: Absence of physical injury  Description: Absence of physical injury  Outcome: Ongoing     Problem: Skin Integrity:  Goal: Will show no infection signs and symptoms  Description: Will show no infection signs and symptoms  Outcome: Ongoing  Goal: Absence of new skin breakdown  Description: Absence of new skin breakdown  Outcome: Ongoing     Problem:  Activity:  Goal: Capacity to carry out activities will improve  Description: Capacity to carry out activities will improve  Outcome: Ongoing  Goal: Will verbalize the importance of balancing activity with adequate rest periods  Description: Will verbalize the importance of balancing activity with adequate rest periods  Outcome: Ongoing     Problem: Cardiac:  Goal: Hemodynamic stability will improve  Description: Hemodynamic stability will improve  Outcome: Ongoing  Goal: Ability to maintain an adequate cardiac output will improve  Description: Ability to maintain an adequate cardiac output will improve  Outcome: Ongoing     Problem: Coping:  Goal: Verbalizations of decreased anxiety will decrease  Description: Verbalizations of decreased anxiety will decrease  Outcome: Ongoing     Problem: Fluid Volume:  Goal: Risk for excess fluid volume will decrease  Description: Risk for excess fluid volume will decrease  Outcome: Ongoing  Goal: Maintenance of adequate hydration will improve  Description: Maintenance of adequate hydration will improve  Outcome: Ongoing  Goal: Will show no signs and symptoms of electrolyte imbalance  Description: Will show no signs and symptoms of electrolyte imbalance  Outcome: Ongoing     Problem: Health Behavior:  Goal: Ability to manage health-related needs will improve  Description: Ability to manage health-related needs will improve  Outcome: Ongoing  Goal: Ability to seek appropriate health care will improve  Description: Ability to seek appropriate health care will improve  Outcome: Ongoing     Problem: Nutritional:  Goal: Maintenance of adequate nutrition will improve  Description: Maintenance of adequate nutrition will improve  Outcome: Ongoing     Problem: Physical Regulation:  Goal: Complications related to the disease process, condition or treatment will be avoided or minimized  Description: Complications related to the disease process, condition or treatment will be avoided or minimized  Outcome: Ongoing     Problem: Respiratory:  Goal: Ability to maintain adequate ventilation will improve  Description: Ability to maintain adequate ventilation will improve  Outcome: Ongoing  Goal: Respiratory status will improve  Description: Respiratory status will improve  Outcome: Ongoing

## 2021-10-29 NOTE — PROGRESS NOTES
D: Patient sundowning and inconsolable. She states,\"My family left me here and this is the third time this has happened. I just want to go home. \"      A: This RN tried to console the patient. When this was unsuccessful, tried to call patient's grand daughter Damion Chavez to see if this would calm her down. Patient became even more upset. Secure message sent to Alla Das NP per family request to see if we could get something to calm the patient down. R: NP not comfortable with further medicating the patient. This RN updated patient's family and family requested that we try to distract her. Patient resting quietly at this time.

## 2021-10-29 NOTE — PROGRESS NOTES
distress, appears stated age and cooperative. On nasal 02  HEENT: Pupils equal, round, and reactive to light. Conjunctivae/corneas clear. Neck: Supple, with full range of motion. No jugular venous distention. Trachea midline. Respiratory:  Normal respiratory effort. Crackles at bases  Cardiovascular: Regular rate and rhythm with normal S1/S2 without murmurs, rubs or gallops. Abdomen: Soft, non-tender, non-distended with normal bowel sounds. Musculoskeletal: No clubbing, cyanosis or edema bilaterally. Full range of motion without deformity. Skin: Skin color, texture, turgor normal.  No rashes or lesions. Neurologic:  Neurovascularly intact without any focal sensory/motor deficits. Cranial nerves: II-XII intact, grossly non-focal.  Psychiatric: Alert and oriented x 2      Labs:   Recent Labs     10/26/21  1430 10/27/21  0557 10/29/21  0628   WBC 10.5 9.5 11.4*   HGB 10.3* 10.1* 8.8*   HCT 31.5* 30.7* 27.5*    359 334     Recent Labs     10/27/21  1046 10/28/21  1343 10/29/21  0628    130* 130*   K 4.3 4.2 4.2   CL 96* 95* 95*   CO2 22 18* 18*   BUN 24* 48* 57*   CREATININE 1.3* 1.7* 2.0*   CALCIUM 9.0 8.1* 7.9*     Recent Labs     10/26/21  1430 10/27/21  0557   AST 15 14*   ALT 7* 8*   BILITOT <0.2 0.3   ALKPHOS 55 58     Recent Labs     10/27/21  0557   INR 1.01     Recent Labs     10/26/21  1430   TROPONINI 0.01       Urinalysis:      Lab Results   Component Value Date    NITRU Negative 10/26/2021    WBCUA None seen 10/26/2021    BACTERIA 2+ 10/26/2021    RBCUA 0-2 10/26/2021    BLOODU MODERATE 10/26/2021    SPECGRAV 1.010 10/26/2021    GLUCOSEU Negative 10/26/2021    GLUCOSEU Negative 06/13/2011       Radiology:  CT CHEST PULMONARY EMBOLISM W CONTRAST   Final Result   1. Infectious or inflammatory airway process and interstitial edema. 2. Nonspecific regions central ground-glass opacity. Correlate with   presentation to exclude pneumonia.    3. No acute pulmonary embolism to the segmental arteries. 4. Other findings as described. XR CHEST (2 VW)   Final Result   Bilateral interstitial groundglass opacities are nonspecific and may   represent viral pneumonia, COVID-19 is not excluded. Assessment/Plan:    Active Hospital Problems    Diagnosis     SULY (acute kidney injury) ([facility] [N17.9]      Priority: High    CHF (congestive heart failure), NYHA class I, acute on chronic, combined ([facility] [facility] [I50.43]     Suspected COVID-19 virus infection [Z20.822]     Acute respiratory failure with hypoxia ([address] [J96.01]     Sepsis ([facility] [facility] [A41.9]     Primary hypertension [I10]         Acute on chronic systolic CHF EF 84-78%  - Hold lasix due to SULY   -Repeat Echo shows decrease in EF from 45% and moderately diffuse hypokinesis  - cont BB.  -ACEI held due to SULY  - daily wts and I/Os  - consulted CHF RN  - consulted cardiology. Plan as above. Medications adjusted     Suspected Covid 19 Pneumonia-ground glass opacities more likely due to CHF.  - Rapid Covid 19 swab and PCR negative. D/C droplet precautions  - Stopped decadron  - Procalcitonin negative. Less suspicion for pneumona. Acute respiratory failure Hypoxia with exertion - due to CHF  No PE on CT chest.   - Currently on 1L NC. Wean as tolerated. Acute encephalopathy-in background of dementia  -Possibly due to sundowning. Pt has prolonged QTC thus will avoid medications prolonging QT. Start depakote. -Delirium precautions.     Essential (primary) hypertension   - BP slightly elevated,will monitor while on diuretics. Diarrhea  -Negative C.diff and stool occult    SULY on CKD stage 3-due to diuresis  Cr worsening  -Held diuretics  -Continue to hold ACEI  -Follows with Dr Beka Fried outpatient. Consult Nephrology.     Hyperglycemia-due to steriods  -Stopped steriods. Will monitor. Daughter at bedside. Questions and concerns were addressed. DVT Prophylaxis: Lovenox  Diet: ADULT DIET; Regular;  Low Sodium (2 gm); 1500 ml  Code Status: Full Code    PT/OT Eval Status: as needed    Dispo - Home when renal function improves    Ann-Marie Moreno MD

## 2021-10-29 NOTE — CONSULTS
Office: [phone]       Fax: [phone]      Nephrology Initial Consult Note        Patient's Name: Phu Watkins  Admit Date: 10/26/2021  Date of Visit: 10/29/2021    Reason for Consult: SULY  Requesting Physician:  Kayleigh Coleman MD  PCP: FERNANDO Hummel NP    Chief Complaint:  shortness of breath      History of Present Illness:      Phu Watkins is a 80 y.o. female with PMHx of hypertension, CKD, coronary artery disease, gout  who was hospitalized on 10/26/2021 with complaints of shortness of breath   Diuresed for CHF  Creat trending up.  BP noted to be low  NSAID use: Denies   IV contrast: None recent   Home meds reviewed and noted to be on lisinopril, lasix    Past Medical History:   Diagnosis Date    Chronic kidney disease (CKD), stage IV (severe) (HCC)     Coronary artery disease involving native coronary artery of native heart without angina pectoris 1/5/2016    GERD (gastroesophageal reflux disease) acid reflux    Gout, chronic     Histoplasmosis     Hyperlipidemia     Hypertension     Irregular heart beat     Osteoarthritis 2/28/2019    Knee, leg     Osteopenia of hip 04/26/2019    Pneumonia     Ulcer of jejunum     Unspecified cerebral artery occlusion with cerebral infarction 2014 , ~ 2012       Past Surgical History:   Procedure Laterality Date    BLADDER SUSPENSION      CHOLECYSTECTOMY  Orthopedic surgery Left arm    HYSTERECTOMY      TUBAL LIGATION      UPPER GASTROINTESTINAL ENDOSCOPY N/A 9/10/2021    EGD CONTROL HEMORRHAGE performed by Homa Peterson DO at [address] ENDOSCOPY  9/10/2021    EGD SUBMUCOSAL/ EPINEPHRINE INJECTION performed by Homa Peterson DO at [facility] ENDOSCOPY       Family History   Problem Relation Age of Onset    High Blood Pressure Mother     Heart Disease Mother     Diabetes Mother     High Blood Pressure Father     Heart Disease Father     Cancer Father         Brain    Cancer Sister         lymphoma    Cancer Brother         colon    Cancer Maternal Uncle         reports that she is a non-smoker but has been exposed to tobacco smoke. She has never used smokeless tobacco. She reports that she does not drink alcohol and does not use drugs. Medications:    Allergies:  Codeine, Morphine and related, and Oxycodone-acetaminophen    Scheduled Meds:   carvedilol  25 mg Oral BID WC    spironolactone  12.5 mg Oral Daily    valproic acid  125 mg Oral 2 times per day    melatonin  3 mg Oral Nightly    [Held by provider] furosemide  40 mg IntraVENous BID    isosorbide mononitrate  30 mg Oral Daily    enoxaparin  30 mg SubCUTAneous Daily    influenza virus vaccine  0.5 mL IntraMUSCular Prior to discharge    allopurinol  50 mg Oral Every Other Day    aspirin  81 mg Oral Daily    atorvastatin  20 mg Oral Daily    donepezil  5 mg Oral Nightly    ferrous sulfate  324 mg Oral BID WC    FLUoxetine  20 mg Oral Daily    pantoprazole  40 mg Oral BID AC    sodium chloride flush  5-40 mL IntraVENous 2 times per day    Vitamin D  2,000 Units Oral Daily     Continuous Infusions:   sodium chloride         Labs:  CBC:   Recent Labs     10/27/21  0557 10/29/21  0628   WBC 9.5 11.4*   HGB 10.1* 8.8*    334     Ca/Mg/Phos:   Recent Labs     10/27/21  1046 10/28/21  1343 10/29/21  0628   CALCIUM 9.0 8.1* 7.9*     UA:  Recent Labs     10/26/21  1630   COLORU Straw   CLARITYU SL CLOUDY*   GLUCOSEU Negative   BILIRUBINUR Negative   KETUA Negative   SPECGRAV 1.010   BLOODU MODERATE*   PHUR 6.0   PROTEINU 30*   UROBILINOGEN 0.2   NITRU Negative   LEUKOCYTESUR Negative   LABMICR YES   URINETYPE NotGiven      Urine Microscopic:   Recent Labs     10/26/21  1630   BACTERIA 2+*   WBCUA None seen   RBCUA 0-2   EPIU 2-5     Urine Chemistry: No results for input(s): Chance Gobble, PROTEINUR, NAUR in the last 72 hours. ROS:     All systems reviewed and negative except as in HPI    Objective:     Vitals: BP (!) 90/50   Pulse 51   Temp 97.5 °F (36.4 °C) (Oral)   Resp 16   Ht 5' (1.524 m)   Wt 149 lb 7.6 oz (67.8 kg)   SpO2 97%   BMI 29.19 kg/m²    Wt Readings from Last 3 Encounters:   10/29/21 149 lb 7.6 oz (67.8 kg)   09/12/21 146 lb 6.2 oz (66.4 kg)   08/24/21 147 lb 14.9 oz (67.1 kg)      24HR INTAKE/OUTPUT:      Intake/Output Summary (Last 24 hours) at 10/29/2021 1610  Last data filed at 10/29/2021 1420  Gross per 24 hour   Intake 360 ml   Output --   Net 360 ml     Constitutional:  awake, NAD  HEENT:  MMM, No icterus  Neck: no bruits, No JVD  Cardiovascular:  reg rhythm  Respiratory: CTA, no crackles  Abdomen:  +BS, soft, NT, ND  Ext: no lower extremity edema  Psychiatric: mood and affect appropriate  Skin: dry/intact  CNS: alert, no agitation    IMAGING:  CT CHEST PULMONARY EMBOLISM W CONTRAST   Final Result   1. Infectious or inflammatory airway process and interstitial edema. 2. Nonspecific regions central ground-glass opacity. Correlate with   presentation to exclude pneumonia. 3. No acute pulmonary embolism to the segmental arteries. 4. Other findings as described. XR CHEST (2 VW)   Final Result   Bilateral interstitial groundglass opacities are nonspecific and may   represent viral pneumonia, COVID-19 is not excluded. Assessment :     1. SULY on CKD 3  -Non-Oliguric  -Baseline creat: 1-1.3 Now 1.2->1.7->2  -UA: none  -abdominal CT scan (9/2021):  The kidneys are enhancing equally with symmetric cortical thinning, no hydro  -Volume: hypoperfusion  -Electrolytes: Hyponatremia  -Acid-Base: NAGMA and AGMA    Recent Labs     10/27/21  0557 10/27/21  1046 10/28/21  1343 10/29/21  0628   BUN 20 24* 48* 57*   CREATININE 1.2 1.3* 1.7* 2.0*     Recent Labs     10/27/21  0557 10/27/21  1046 10/28/21  1343 10/29/21  0628    136 130* 130* K 4.6 4.3 4.2 4.2   CO2 21 22 18* 18*         2. HTN  -Blood pressure low    BP Readings from Last 1 Encounters:   10/29/21 (!) 90/50       3. HFrEF  -TTE (10/2020): LVEF 40-45%, G2DD, mod MR, mild AS  -TTE (10/2021): EF 35-40%, G2DD     4. CAD     5. Gout  -takes Allopurinol    Plan:     - UA  - hemodynamic support  - Hold ACEI/ARB for now  - diuresis based on vol status  - dec Coreg  - Monitor BMP    -Monitor I/O, UOP  -Maintain MAP>65  -Avoid nephrotoxin, if able. -Dose meds to current eGFR    Thank you for allowing us to participate in care of Katherine Barrett . We will continue to follow. Feel free to contact me with any questions.       Parrish Jimenez MD  10/29/2021    [facility] [address]  Office : [phone]  Fax :[phone]

## 2021-10-29 NOTE — CARE COORDINATION
Discharge Plan:   Per chart review, patient nearing discharge. Met with patient and daughter, Stay, at bedside. Daughter, Misha Casanova, on the telephone for discussion. Confirmed patient to return home with daughter, Misha Casanova, and additional family members at discharge. Family declined home care at this time due to patient having recently refused PT/OT. Misha Edilberto confirmed she and patient's granddaughter are home care providers and will continue to assist patient as needed. Provided IM. Plan: Home with family, daughter to transport home.    LANRE Soni, LUIGI, Social Work/Case Management   [phone]  Electronically signed by LANRE Soni LSW on 10/29/2021 at 12:21 PM

## 2021-10-29 NOTE — PROGRESS NOTES
[address]   Daily Progress Note      Admit Date:  10/26/2021    CC: SOB    HPI:   Elza Leigh is a 80 y.o. female with PMH CAD, DHF, AS,  histoplasmosis, HLP, dementia, SULY/CKD, HTN. Lives with family. Admitted to [facility] with progressively worsening SOB for several days. Was concerned for COVID PNA- now ruled out. No PE per Chest CT. Dr. Lilibeth Diggs [facility] consulted for HF. Diuresed. Now with SULY. Today she is laying flat in bed. She denies feeling SOB. She is on 1L NC. She is very [facility] but answers most questions appropriately. She reports getting OOB with assistance. + generalized weakness and fatigue. She denies CP, LH, dizziness, palpitations, syncope. Review of Systems:   General: Denies fever, chills  Skin: Denies skin changes, rash, itching, lesions.   HEENT: Denies headache, dizziness, vision changes, nosebleeds, sore throat, nasal drainage  RESP: Denies cough, sputum, dyspnea, wheeze, snoring  CARD: Denies palpitations,  murmur  GI:Denies nausea, vomiting, heartburn, loss of appetite, change in bowels  : Denies frequency, pain, incontinence, polyuria  VASC: Denies claudication, leg cramps, clots  MUSC/SKEL: Denies pain, stiffness, arthritis  PSYCH: Denies anxiety, depression, stress  NEURO: Denies numbness, tingling, weakness,change in mood or memory  HEME: Denies abn bruising, bleeding, anemia  ENDO: Denies intolerance to heat, cold, excessive thirst or hunger, hx thyroid disease    Objective:   BP (!) 118/55   Pulse 65   Temp 97.6 °F (36.4 °C) (Oral)   Resp 18   Ht 5' (1.524 m)   Wt 149 lb 7.6 oz (67.8 kg)   SpO2 96%   BMI 29.19 kg/m²         Intake/Output Summary (Last 24 hours) at 10/29/2021 1115  Last data filed at 10/28/2021 1804  Gross per 24 hour   Intake 360 ml   Output 400 ml   Net -40 ml     I/O since adm: -260    WEIGHT:Admit Weight: 150 lb 9.2 oz (68.3 kg)         Today  Weight: 149 lb 7.6 oz (67.8 kg)   DRY WEIGHT:  Wt Readings from Last 3 Encounters:   10/29/21 149 lb 7.6 oz (67.8 kg)   09/12/21 146 lb 6.2 oz (66.4 kg)   08/24/21 147 lb 14.9 oz (67.1 kg)       Physical Exam:  GEN: Appears frail, no acute distress  SKIN: Pink, warm, dry. Nails without clubbing. HEENT: PERRLA. Normocephalic, atraumatic. Neck supple. No adenopathy. LUNG: AP diameter normal. Fine crackles bilateral 12/ up. Respiratory effort normal.  HEART: S1S2 A/R. No JVD. No carotid bruit. + sys murmur 3/6, No rub or gallop. ABD: Soft, nontender. +BS X 4 quads. No hepatomegaly. EXT: Radial and pedal pulses 2+ and symmetric. Without varicosities. No edema. MUSCSKEL: Good ROM X4 extremities. No deformity. NEURO: A/O X3. Calm and cooperative. Telemetry: NSR/ST    Medications:    carvedilol  25 mg Oral BID WC    spironolactone  12.5 mg Oral Daily    valproic acid  125 mg Oral 2 times per day    melatonin  3 mg Oral Nightly    furosemide  40 mg IntraVENous BID    isosorbide mononitrate  30 mg Oral Daily    enoxaparin  30 mg SubCUTAneous Daily    influenza virus vaccine  0.5 mL IntraMUSCular Prior to discharge    allopurinol  50 mg Oral Every Other Day    aspirin  81 mg Oral Daily    atorvastatin  20 mg Oral Daily    donepezil  5 mg Oral Nightly    ferrous sulfate  324 mg Oral BID WC    FLUoxetine  20 mg Oral Daily    pantoprazole  40 mg Oral BID AC    sodium chloride flush  5-40 mL IntraVENous 2 times per day    Vitamin D  2,000 Units Oral Daily      sodium chloride       sodium chloride flush, sodium chloride, ondansetron **OR** ondansetron, polyethylene glycol, acetaminophen **OR** acetaminophen, guaiFENesin-dextromethorphan    Lab Data: I have reviewed all labs below today.    CBC:   Recent Labs     10/26/21  1430 10/27/21  0557 10/29/21  0628   WBC 10.5 9.5 11.4*   HGB 10.3* 10.1* 8.8*   HCT 31.5* 30.7* 27.5*   MCV 89.3 87.9 88.9    359 334     BMP:   Recent Labs     10/27/21  1046 10/28/21  1343 10/29/21  0628    130* 130*   K 4.3 4.2 4.2   CL 96* 95* 95*   CO2 22 18* 18*   BUN 24* 48* 57*   CREATININE 1.3* 1.7* 2.0*     GLUCOSE:   Recent Labs     10/27/21  1046 10/28/21  1343 10/29/21  0628   GLUCOSE 195* 162* 102*     LIVER PROFILE:   Lab Results   Component Value Date    AST 14 10/27/2021    ALT 8 10/27/2021    LIPASE 63.0 09/10/2021    LABALBU 3.9 10/27/2021    BILIDIR 0.1 05/19/2014    BILITOT 0.3 10/27/2021    ALKPHOS 58 10/27/2021     PT/INR:   Lab Results   Component Value Date    PROTIME 11.4 10/27/2021    INR 1.01 10/27/2021    INR 1.01 09/10/2021    INR 0.97 11/28/2020     APTT:   Lab Results   Component Value Date    APTT 39.4 06/12/2011     Pro-BNP:    Lab Results   Component Value Date    PROBNP 16,871 10/27/2021    PROBNP 10,051 10/26/2021    PROBNP 3,420 09/10/2021     Parameters:   > 450 pg/mL under age 48  > 900 pg/mL ages 54-65  > 1800 pg/mL over age 76    ENZYMES:  Lab Results   Component Value Date    CKTOTAL 36 03/07/2014    CKTOTAL 45 11/02/2011    CKTOTAL 36 11/02/2011    CKMB <0.2 11/03/2011    CKMB <0.2 11/02/2011    TROPONINI 0.01 10/26/2021    TROPONINI <0.01 09/10/2021    TROPONINI <0.01 08/24/2021     FASTING LIPID PANEL:  Lab Results   Component Value Date    CHOL 191 04/24/2018    HDL 47 04/24/2018    HDL 38 11/03/2011    LDLCALC 85 04/24/2018    TRIG 295 04/24/2018       Diagnostics:    EKG: 10/26/2021  ST     ECHO:  Echo 5/20/14:  Normal left ventricle size, wall thickness and systolic function with an    estimated ejection fraction of 55%. No regional wall motion abnormalities    are seen.   Omelia Sovereign is reversal of E/A inflow velocities across the mitral valve    suggesting impaired left ventricular relaxation.    Mitral annular calcification is present.    Aortic valve appears sclerotic but opens adequately.      Echo: 10/2020  Normal left ventricular size and wall thickness. Global left ventricular systolic function is mildly decreased with ejection fraction estimated at 45%.  Evidence of grade 2 diastolic dysfunction appreciated. The left atrium is mildly dilated. Moderate mitral regurgitation is present. There is moderate fibrocalcific sclerosis of the aortic valve with mean gradient across the aortic valve of 16 mmHg suggesting probable mild aortic stenosis.        ECHO 10/27/2021  Summary   Left ventricular cavity size is normal.   Normal left ventricular wall thickness. Ejection fraction is visually estimated to be 35-40%. moderately diffuse hypokinesis   Grade II diastolic dysfunction with elevated LV filling pressures. Mild aortic stenosis with a peak velocity of 2.7m/s and a mean pressure   gradient of 17mmHg. Trivial aortic regurgitation. Normal right ventricular size and function. Right ventricular systolic function is normal.    Stress Test:   6/2011   There is a moderate zone of decreased activity on stress images   inferolateral wall at apex which reverses on the resting images   consistent with ischemia. Chest CT 10/26/2021  Impression   1. Infectious or inflammatory airway process and interstitial edema. 2. Nonspecific regions central ground-glass opacity.  Correlate with   presentation to exclude pneumonia. 3. No acute pulmonary embolism to the segmental arteries. 4. Other findings as described. CXR 10/26/2021  FINDINGS:   Bilateral interstitial groundglass opacities are nonspecific and may   represent viral pneumonia, COVID-19 is not excluded.  No pleural effusion or   pneumothorax.  Cardiac silhouette is enlarged.           Impression   Bilateral interstitial groundglass opacities are nonspecific and may   represent viral pneumonia, COVID-19 is not excluded. Cardiac Angiography:     Assessment/Plan:  1.) Acute on chronic combined diastolic and systolic heart failure, EF 45%>35-40%: Minimal diuresis, holding lasix D/T SULY. Neg 260 - accurate? Wt 150>149lbs. SOB is improved. BNP 16,871 > repeat in AM.  Weight stable.    NYHA Class III  Stage C  Diuretic:  hold  Beta Blocker: coreg 25mg BID  SGLT2 Inhibitor: none- consider as OP  Imdur added for preload reduction  2gm Na diet, daily weight, 64 oz fluid restriction  Avoid NSAIDS and other nephrotoxic meds  Cardiac Rehab: Not indicated for EF>35%  ICD: Not indicated for EF>35%  [facility] Referral: NO- dementia, disoriented      2.) Hypertension:    Goal BP <130/80.   Non pharmacologic interventions include:   -weight loss  -heart healthy low sodium and low fat diet that consist of mostly fruits, vegetables and grains (Dash diet)  -limited amount of alcohol (no more than 1 drink/day for women, 2 drinks/day for men)  -regular physical activity  -no smoking  -stress reduction     3.) CAD: No evidence of angina/ischemia  Continue asa, lipitor    4.) Anemia:   Cont ferrous sulfate  Check iron studies    5,) SULY on CKD: Renal following    Electronically signed by FERNANDO Smith CNP on 10/29/2021 at 11:15 AM

## 2021-10-30 LAB
ANION GAP SERPL CALCULATED.3IONS-SCNC: 17 MMOL/L (ref 3–16)
BUN BLDV-MCNC: 65 MG/DL (ref 7–20)
CALCIUM SERPL-MCNC: 7.7 MG/DL (ref 8.3–10.6)
CHLORIDE BLD-SCNC: 94 MMOL/L (ref 99–110)
CO2: 17 MMOL/L (ref 21–32)
CREAT SERPL-MCNC: 2.4 MG/DL (ref 0.6–1.2)
CREATININE URINE: 34.8 MG/DL (ref 28–259)
GFR AFRICAN AMERICAN: 23
GFR NON-AFRICAN AMERICAN: 19
GLUCOSE BLD-MCNC: 92 MG/DL (ref 70–99)
HCT VFR BLD CALC: 27.8 % (ref 36–48)
HEMOGLOBIN: 8.8 G/DL (ref 12–16)
MCH RBC QN AUTO: 27.9 PG (ref 26–34)
MCHC RBC AUTO-ENTMCNC: 31.6 G/DL (ref 31–36)
MCV RBC AUTO: 88.3 FL (ref 80–100)
PDW BLD-RTO: 14.8 % (ref 12.4–15.4)
PLATELET # BLD: 309 K/UL (ref 135–450)
PMV BLD AUTO: 9.4 FL (ref 5–10.5)
POTASSIUM REFLEX MAGNESIUM: 3.7 MMOL/L (ref 3.5–5.1)
PRO-BNP: 5362 PG/ML (ref 0–449)
RBC # BLD: 3.15 M/UL (ref 4–5.2)
SODIUM BLD-SCNC: 128 MMOL/L (ref 136–145)
WBC # BLD: 13 K/UL (ref 4–11)

## 2021-10-30 PROCEDURE — 6370000000 HC RX 637 (ALT 250 FOR IP): Performed by: INTERNAL MEDICINE

## 2021-10-30 PROCEDURE — 2060000000 HC ICU INTERMEDIATE R&B

## 2021-10-30 PROCEDURE — 6360000002 HC RX W HCPCS: Performed by: INTERNAL MEDICINE

## 2021-10-30 PROCEDURE — 83880 ASSAY OF NATRIURETIC PEPTIDE: CPT

## 2021-10-30 PROCEDURE — 6370000000 HC RX 637 (ALT 250 FOR IP): Performed by: NURSE PRACTITIONER

## 2021-10-30 PROCEDURE — 2580000003 HC RX 258: Performed by: NURSE PRACTITIONER

## 2021-10-30 PROCEDURE — 85027 COMPLETE CBC AUTOMATED: CPT

## 2021-10-30 PROCEDURE — 94760 N-INVAS EAR/PLS OXIMETRY 1: CPT

## 2021-10-30 PROCEDURE — 99233 SBSQ HOSP IP/OBS HIGH 50: CPT | Performed by: HOSPITALIST

## 2021-10-30 PROCEDURE — 82570 ASSAY OF URINE CREATININE: CPT

## 2021-10-30 PROCEDURE — 6360000002 HC RX W HCPCS: Performed by: HOSPITALIST

## 2021-10-30 PROCEDURE — 36415 COLL VENOUS BLD VENIPUNCTURE: CPT

## 2021-10-30 PROCEDURE — 99232 SBSQ HOSP IP/OBS MODERATE 35: CPT | Performed by: NURSE PRACTITIONER

## 2021-10-30 PROCEDURE — 80048 BASIC METABOLIC PNL TOTAL CA: CPT

## 2021-10-30 PROCEDURE — 6370000000 HC RX 637 (ALT 250 FOR IP): Performed by: HOSPITALIST

## 2021-10-30 RX ORDER — HEPARIN SODIUM 5000 [USP'U]/ML
5000 INJECTION, SOLUTION INTRAVENOUS; SUBCUTANEOUS EVERY 8 HOURS SCHEDULED
Status: DISCONTINUED | OUTPATIENT
Start: 2021-10-31 | End: 2021-10-31 | Stop reason: HOSPADM

## 2021-10-30 RX ORDER — FUROSEMIDE 10 MG/ML
40 INJECTION INTRAMUSCULAR; INTRAVENOUS ONCE
Status: COMPLETED | OUTPATIENT
Start: 2021-10-30 | End: 2021-10-30

## 2021-10-30 RX ADMIN — FUROSEMIDE 40 MG: 40 INJECTION, SOLUTION INTRAMUSCULAR; INTRAVENOUS at 17:05

## 2021-10-30 RX ADMIN — SODIUM CHLORIDE, PRESERVATIVE FREE 10 ML: 5 INJECTION INTRAVENOUS at 20:58

## 2021-10-30 RX ADMIN — PANTOPRAZOLE SODIUM 40 MG: 40 TABLET, DELAYED RELEASE ORAL at 16:44

## 2021-10-30 RX ADMIN — PANTOPRAZOLE SODIUM 40 MG: 40 TABLET, DELAYED RELEASE ORAL at 06:54

## 2021-10-30 RX ADMIN — CARVEDILOL 6.25 MG: 6.25 TABLET, FILM COATED ORAL at 16:44

## 2021-10-30 RX ADMIN — FERROUS SULFATE TAB EC 324 MG (65 MG FE EQUIVALENT) 324 MG: 324 (65 FE) TABLET DELAYED RESPONSE at 08:43

## 2021-10-30 RX ADMIN — ATORVASTATIN CALCIUM 20 MG: 20 TABLET, FILM COATED ORAL at 08:42

## 2021-10-30 RX ADMIN — SPIRONOLACTONE 12.5 MG: 25 TABLET ORAL at 08:42

## 2021-10-30 RX ADMIN — DONEPEZIL HYDROCHLORIDE 5 MG: 5 TABLET, FILM COATED ORAL at 20:58

## 2021-10-30 RX ADMIN — Medication 2000 UNITS: at 08:42

## 2021-10-30 RX ADMIN — VALPROIC ACID 125 MG: 250 SOLUTION ORAL at 20:58

## 2021-10-30 RX ADMIN — FERROUS SULFATE TAB EC 324 MG (65 MG FE EQUIVALENT) 324 MG: 324 (65 FE) TABLET DELAYED RESPONSE at 16:44

## 2021-10-30 RX ADMIN — ASPIRIN 81 MG: 81 TABLET, CHEWABLE ORAL at 08:42

## 2021-10-30 RX ADMIN — FLUOXETINE 20 MG: 20 CAPSULE ORAL at 08:43

## 2021-10-30 RX ADMIN — CARVEDILOL 6.25 MG: 6.25 TABLET, FILM COATED ORAL at 08:42

## 2021-10-30 RX ADMIN — ISOSORBIDE MONONITRATE 30 MG: 30 TABLET, EXTENDED RELEASE ORAL at 08:41

## 2021-10-30 RX ADMIN — ENOXAPARIN SODIUM 30 MG: 100 INJECTION SUBCUTANEOUS at 08:41

## 2021-10-30 RX ADMIN — SODIUM CHLORIDE, PRESERVATIVE FREE 10 ML: 5 INJECTION INTRAVENOUS at 08:42

## 2021-10-30 RX ADMIN — VALPROIC ACID 125 MG: 250 SOLUTION ORAL at 08:41

## 2021-10-30 RX ADMIN — Medication 3 MG: at 20:58

## 2021-10-30 ASSESSMENT — PAIN SCALES - GENERAL
PAINLEVEL_OUTOF10: 0

## 2021-10-30 NOTE — PROGRESS NOTES
[address]   Daily Progress Note      Admit Date:  10/26/2021    CC: SOB    HPI:   Fallon Baez is a 80 y.o. female with PMH CAD, DHF, AS,  histoplasmosis, HLP, dementia, SULY/CKD, HTN. Lives with family. Admitted to [facility] with progressively worsening SOB for several days. Was concerned for COVID PNA- now ruled out. No PE per Chest CT. Dr. Dietrich [facility] consulted for HF. Diuresed. Now with SULY. Today she is laying flat in bed. She denies feeling SOB. O2 weaned off. She is very [facility] but answers most questions appropriately. She reports getting OOB with assistance. + generalized weakness and fatigue. She denies CP, LH, dizziness, palpitations, syncope. Review of Systems:   General: Denies fever, chills  Skin: Denies skin changes, rash, itching, lesions.   HEENT: Denies headache, dizziness, vision changes, nosebleeds, sore throat, nasal drainage  RESP: Denies cough, sputum, dyspnea, wheeze, snoring  CARD: Denies palpitations,  murmur  GI:Denies nausea, vomiting, heartburn, loss of appetite, change in bowels  : Denies frequency, pain, incontinence, polyuria  VASC: Denies claudication, leg cramps, clots  MUSC/SKEL: Denies pain, stiffness, arthritis  PSYCH: Denies anxiety, depression, stress  NEURO: Denies numbness, tingling, weakness,change in mood or memory  HEME: Denies abn bruising, bleeding, anemia  ENDO: Denies intolerance to heat, cold, excessive thirst or hunger, hx thyroid disease    Objective:   BP (!) 123/53   Pulse 72   Temp 98.2 °F (36.8 °C) (Oral)   Resp 16   Ht 5' (1.524 m)   Wt 149 lb 0.5 oz (67.6 kg)   SpO2 95%   BMI 29.11 kg/m²           Intake/Output Summary (Last 24 hours) at 10/30/2021 0920  Last data filed at 10/30/2021 0656  Gross per 24 hour   Intake 480 ml   Output --   Net 480 ml     I/O since adm: +220    WEIGHT:Admit Weight: 150 lb 9.2 oz (68.3 kg)         Today  Weight: 149 lb 0.5 oz (67.6 kg)   DRY WEIGHT:  Wt Readings from Last 3 Encounters: 10/30/21 149 lb 0.5 oz (67.6 kg)   09/12/21 146 lb 6.2 oz (66.4 kg)   08/24/21 147 lb 14.9 oz (67.1 kg)       Physical Exam:  GEN: Appears frail, no acute distress  SKIN: Pink, warm, dry. Nails without clubbing. HEENT: PERRLA. Normocephalic, atraumatic. Neck supple. No adenopathy. LUNG: AP diameter normal. Decreased bilateral bases, no crackles. Respiratory effort normal.  HEART: S1S2 A/R. No JVD. No carotid bruit. + sys murmur 3/6, No rub or gallop. ABD: Soft, nontender. +BS X 4 quads. No hepatomegaly. EXT: Radial and pedal pulses 2+ and symmetric. Without varicosities. No edema. MUSCSKEL: Good ROM X4 extremities. No deformity. NEURO: A/O X3. Calm and cooperative. Telemetry: NSR/ST    Medications:    carvedilol  6.25 mg Oral BID WC    spironolactone  12.5 mg Oral Daily    valproic acid  125 mg Oral 2 times per day    melatonin  3 mg Oral Nightly    [Held by provider] furosemide  40 mg IntraVENous BID    isosorbide mononitrate  30 mg Oral Daily    enoxaparin  30 mg SubCUTAneous Daily    influenza virus vaccine  0.5 mL IntraMUSCular Prior to discharge    allopurinol  50 mg Oral Every Other Day    aspirin  81 mg Oral Daily    atorvastatin  20 mg Oral Daily    donepezil  5 mg Oral Nightly    ferrous sulfate  324 mg Oral BID WC    FLUoxetine  20 mg Oral Daily    pantoprazole  40 mg Oral BID AC    sodium chloride flush  5-40 mL IntraVENous 2 times per day    Vitamin D  2,000 Units Oral Daily      sodium chloride       sodium chloride flush, sodium chloride, ondansetron **OR** ondansetron, polyethylene glycol, acetaminophen **OR** acetaminophen, guaiFENesin-dextromethorphan    Lab Data: I have reviewed all labs below today.    CBC:   Recent Labs     10/29/21  0628 10/30/21  0649   WBC 11.4* 13.0*   HGB 8.8* 8.8*   HCT 27.5* 27.8*   MCV 88.9 88.3    309     BMP:   Recent Labs     10/28/21  1343 10/29/21  0628 10/30/21  0649   * 130* 128*   K 4.2 4.2 3.7   CL 95* 95* 94*   CO2 18* 18* 17*   BUN 48* 57* 65*   CREATININE 1.7* 2.0* 2.4*     GLUCOSE:   Recent Labs     10/28/21  1343 10/29/21  0628 10/30/21  0649   GLUCOSE 162* 102* 92     LIVER PROFILE:   Lab Results   Component Value Date    AST 14 10/27/2021    ALT 8 10/27/2021    LIPASE 63.0 09/10/2021    LABALBU 3.9 10/27/2021    BILIDIR 0.1 05/19/2014    BILITOT 0.3 10/27/2021    ALKPHOS 58 10/27/2021     PT/INR:   Lab Results   Component Value Date    PROTIME 11.4 10/27/2021    INR 1.01 10/27/2021    INR 1.01 09/10/2021    INR 0.97 11/28/2020     APTT:   Lab Results   Component Value Date    APTT 39.4 06/12/2011     Pro-BNP:    Lab Results   Component Value Date    PROBNP 5,362 10/30/2021    PROBNP 16,871 10/27/2021    PROBNP 10,051 10/26/2021     Parameters:   > 450 pg/mL under age 48  > 900 pg/mL ages 54-65  > 1800 pg/mL over age 76    ENZYMES:  Lab Results   Component Value Date    CKTOTAL 36 03/07/2014    CKTOTAL 45 11/02/2011    CKTOTAL 36 11/02/2011    CKMB <0.2 11/03/2011    CKMB <0.2 11/02/2011    TROPONINI 0.01 10/26/2021    TROPONINI <0.01 09/10/2021    TROPONINI <0.01 08/24/2021     FASTING LIPID PANEL:  Lab Results   Component Value Date    CHOL 191 04/24/2018    HDL 47 04/24/2018    HDL 38 11/03/2011    LDLCALC 85 04/24/2018    TRIG 295 04/24/2018       Diagnostics:    EKG: 10/26/2021  ST     ECHO:  Echo 5/20/14:  Normal left ventricle size, wall thickness and systolic function with an    estimated ejection fraction of 55%. No regional wall motion abnormalities    are seen.   Elgie Dowdy is reversal of E/A inflow velocities across the mitral valve    suggesting impaired left ventricular relaxation.    Mitral annular calcification is present.    Aortic valve appears sclerotic but opens adequately.      Echo: 10/2020  Normal left ventricular size and wall thickness. Global left ventricular systolic function is mildly decreased with ejection fraction estimated at 45%. Evidence of grade 2 diastolic dysfunction appreciated.   The left atrium is mildly dilated. Moderate mitral regurgitation is present. There is moderate fibrocalcific sclerosis of the aortic valve with mean gradient across the aortic valve of 16 mmHg suggesting probable mild aortic stenosis.        ECHO 10/27/2021  Summary   Left ventricular cavity size is normal.   Normal left ventricular wall thickness. Ejection fraction is visually estimated to be 35-40%. moderately diffuse hypokinesis   Grade II diastolic dysfunction with elevated LV filling pressures. Mild aortic stenosis with a peak velocity of 2.7m/s and a mean pressure   gradient of 17mmHg. Trivial aortic regurgitation. Normal right ventricular size and function. Right ventricular systolic function is normal.    Stress Test:   6/2011   There is a moderate zone of decreased activity on stress images   inferolateral wall at apex which reverses on the resting images   consistent with ischemia. Chest CT 10/26/2021  Impression   1. Infectious or inflammatory airway process and interstitial edema. 2. Nonspecific regions central ground-glass opacity.  Correlate with   presentation to exclude pneumonia. 3. No acute pulmonary embolism to the segmental arteries. 4. Other findings as described. CXR 10/26/2021  FINDINGS:   Bilateral interstitial groundglass opacities are nonspecific and may   represent viral pneumonia, COVID-19 is not excluded.  No pleural effusion or   pneumothorax.  Cardiac silhouette is enlarged.           Impression   Bilateral interstitial groundglass opacities are nonspecific and may   represent viral pneumonia, COVID-19 is not excluded. Cardiac Angiography:     Assessment/Plan:  1.) Acute on chronic combined diastolic and systolic heart failure, EF 45%>35-40%: Euvolemic. Minimal diuresis, holding lasix D/T SULY. Neg 260 - accurate? Wt 150>149lbs. SOB is improved now on RA  BNP 16,871 >5362. Weight stable.    NYHA Class III  Stage C  Diuretic:  hold  Beta Blocker: coreg 25mg BID>decreased to 6.25 mg BID  SGLT2 Inhibitor: none- consider as OP  Imdur added for preload reduction  2gm Na diet, daily weight, 64 oz fluid restriction  Avoid NSAIDS and other nephrotoxic meds  Cardiac Rehab: Not indicated for EF>35%  ICD: Not indicated for EF>35%  [facility] Referral: NO- dementia, disoriented      2.) Hypertension:    Goal BP <130/80.   Non pharmacologic interventions include:   -weight loss  -heart healthy low sodium and low fat diet that consist of mostly fruits, vegetables and grains (Dash diet)  -limited amount of alcohol (no more than 1 drink/day for women, 2 drinks/day for men)  -regular physical activity  -no smoking  -stress reduction     3.) CAD: No evidence of angina/ischemia  Continue asa, lipitor    4.) Anemia:   Cont ferrous sulfate  Check iron studies    5,) SULY on CKD: Renal following- Worsening creat today. Coreg decreased per Neph for better renal perfusion.        Electronically signed by FERNANDO Gates CNP on 10/30/2021 at 9:20 AM

## 2021-10-30 NOTE — PLAN OF CARE
decrease  Description: Risk for excess fluid volume will decrease  Outcome: Ongoing  Goal: Maintenance of adequate hydration will improve  Description: Maintenance of adequate hydration will improve  Outcome: Ongoing  Goal: Will show no signs and symptoms of electrolyte imbalance  Description: Will show no signs and symptoms of electrolyte imbalance  Outcome: Ongoing     Problem: Health Behavior:  Goal: Ability to manage health-related needs will improve  Description: Ability to manage health-related needs will improve  Outcome: Ongoing  Goal: Ability to seek appropriate health care will improve  Description: Ability to seek appropriate health care will improve  Outcome: Ongoing     Problem: Nutritional:  Goal: Maintenance of adequate nutrition will improve  Description: Maintenance of adequate nutrition will improve  Outcome: Ongoing     Problem: Physical Regulation:  Goal: Complications related to the disease process, condition or treatment will be avoided or minimized  Description: Complications related to the disease process, condition or treatment will be avoided or minimized  Outcome: Ongoing     Problem: Respiratory:  Goal: Ability to maintain adequate ventilation will improve  Description: Ability to maintain adequate ventilation will improve  Outcome: Ongoing  Goal: Respiratory status will improve  Description: Respiratory status will improve  Outcome: Ongoing

## 2021-10-30 NOTE — PROGRESS NOTES
input(s): Alison Hasting, GLUCOSEU, BILIRUBINUR, Adam Aleta, BLOODU, PHUR, PROTEINU, UROBILINOGEN, NITRU, LEUKOCYTESUR, Gilbert Alosa in the last 72 hours. Urine Microscopic:   No results for input(s): LABCAST, BACTERIA, COMU, HYALCAST, WBCUA, RBCUA, EPIU in the last 72 hours. Urine Chemistry: No results for input(s): [place]ank Chalet, PROTEINUR, NAUR in the last 72 hours. Objective:     Vitals: BP (!) 123/53   Pulse 72   Temp 98.2 °F (36.8 °C) (Oral)   Resp 16   Ht 5' (1.524 m)   Wt 149 lb 0.5 oz (67.6 kg)   SpO2 95%   BMI 29.11 kg/m²    Wt Readings from Last 3 Encounters:   10/30/21 149 lb 0.5 oz (67.6 kg)   09/12/21 146 lb 6.2 oz (66.4 kg)   08/24/21 147 lb 14.9 oz (67.1 kg)      24HR INTAKE/OUTPUT:      Intake/Output Summary (Last 24 hours) at 10/30/2021 0930  Last data filed at 10/30/2021 0656  Gross per 24 hour   Intake 480 ml   Output --   Net 480 ml   UM    Constitutional:  awake, NAD  HEENT:  MMM, No icterus  Neck: no bruits, No JVD  Cardiovascular:  reg rhythm  Respiratory: CTA, no crackles  Abdomen:  +BS, soft, NT, ND  Ext: no lower extremity edema  CNS: alert, no agitation    IMAGING:  CT CHEST PULMONARY EMBOLISM W CONTRAST   Final Result   1. Infectious or inflammatory airway process and interstitial edema. 2. Nonspecific regions central ground-glass opacity. Correlate with   presentation to exclude pneumonia. 3. No acute pulmonary embolism to the segmental arteries. 4. Other findings as described. XR CHEST (2 VW)   Final Result   Bilateral interstitial groundglass opacities are nonspecific and may   represent viral pneumonia, COVID-19 is not excluded. Assessment :     1. SULY on CKD 3  -Non-Oliguric  -Baseline creat: 1-1.3 Now 1.2->1.7->2->2.4  -UA: reviewed  -abdominal CT scan (9/2021):  The kidneys are enhancing equally with symmetric cortical thinning, no hydro  -Volume: hypoperfusion  -Electrolytes: Hyponatremia  -Acid-Base: NAGMA and Sw[address] 15    Recent Labs     10/27/21  1046 10/28/21  1343 10/29/21  0628 10/30/21  0649   BUN 24* 48* 57* 65*   CREATININE 1.3* 1.7* 2.0* 2.4*     Recent Labs     10/27/21  1046 10/28/21  1343 10/29/21  0628 10/30/21  0649    130* 130* 128*   K 4.3 4.2 4.2 3.7   CO2 22 18* 18* 17*         2. HTN  -Blood pressure improving    BP Readings from Last 1 Encounters:   10/30/21 (!) 123/53       3. HFrEF  -TTE (10/2020): LVEF 40-45%, G2DD, mod MR, mild AS  -TTE (10/2021): EF 35-40%, G2DD     4. CAD     5. Gout  -takes Allopurinol    Plan:       - hemodynamic support  - Hold ACEI/ARB for now  - diuresis based on vol status  - dec Coreg  - Monitor BMP    -Monitor I/O, UOP  -Maintain MAP>65  -Avoid nephrotoxin, if able. -Dose meds to current eGFR    Thank you for allowing us to participate in care of Katherine Barrett . We will continue to follow. Feel free to contact me with any questions.       Mathias Brunner, MD  10/30/2021    [facility] [address]  Office : [phone]  Fax :[phone]

## 2021-10-30 NOTE — PROGRESS NOTES
Hospitalist Progress Note      PCP: FERNANDO Lomeli - NP    Date of Admission: 10/26/2021    Hospital Course: \"80 y.o. female with PMHx of CKD, CAD, GERD, HLD, HTN presented to [facility] ED with  acute onset of shortness of breath. Patient was transferred for evaluation for CHF and suspected Covid. Pt's grand-daughter is concerned that patient may be \"cheeking\" her medications for approximately the last 4 days. Pt c/o GERD and g-daughter was concerned about dark stools. Pt may have also missed her diuretic. \"       Subjective:   She is comfortable. Dyspnea is improved. No n/v.      Medications:  Reviewed    Infusion Medications    sodium chloride       Scheduled Medications    [START ON 10/31/2021] heparin (porcine)  5,000 Units SubCUTAneous 3 times per day    carvedilol  6.25 mg Oral BID WC    [Held by provider] spironolactone  12.5 mg Oral Daily    valproic acid  125 mg Oral 2 times per day    melatonin  3 mg Oral Nightly    [Held by provider] furosemide  40 mg IntraVENous BID    isosorbide mononitrate  30 mg Oral Daily    influenza virus vaccine  0.5 mL IntraMUSCular Prior to discharge    allopurinol  50 mg Oral Every Other Day    aspirin  81 mg Oral Daily    atorvastatin  20 mg Oral Daily    donepezil  5 mg Oral Nightly    ferrous sulfate  324 mg Oral BID WC    FLUoxetine  20 mg Oral Daily    pantoprazole  40 mg Oral BID AC    sodium chloride flush  5-40 mL IntraVENous 2 times per day    Vitamin D  2,000 Units Oral Daily     PRN Meds: sodium chloride flush, sodium chloride, ondansetron **OR** ondansetron, polyethylene glycol, acetaminophen **OR** acetaminophen, guaiFENesin-dextromethorphan      Intake/Output Summary (Last 24 hours) at 10/30/2021 1405  Last data filed at 10/30/2021 0656  Gross per 24 hour   Intake 480 ml   Output --   Net 480 ml       Physical Exam Performed:    BP (!) 92/51   Pulse 73   Temp 98 °F (36.7 °C) (Oral)   Resp 18   Ht 5' (1.524 m)   Wt 149 lb 0.5 oz (67.6 pulmonary embolism to the segmental arteries. 4. Other findings as described. XR CHEST (2 VW)   Final Result   Bilateral interstitial groundglass opacities are nonspecific and may   represent viral pneumonia, COVID-19 is not excluded. Assessment/Plan:    Active Hospital Problems    Diagnosis     SULY (acute kidney injury) ([facility] [N17.9]      Priority: High    CHF (congestive heart failure), NYHA class I, acute on chronic, combined ([facility] [address] [I50.43]     Suspected COVID-19 virus infection [Z20.822]     Acute respiratory failure with hypoxia ([address] [J96.01]     Sepsis ([facility] [address] [A41.9]     Primary hypertension [I10]         Acute on chronic systolic CHF EF 35-19%  - Continue to hold lasix due to SULY   -Repeat Echo shows decrease in EF from 45% and moderately diffuse hypokinesis  - Cont BB.  - ACEI held due to SULY  - daily wts and I/Os  - consulted CHF RN  - consulted cardiology. Plan as above. Medications adjusted     Suspected Covid 19 Pneumonia-ground glass opacities more likely due to CHF.  - Rapid Covid 19 swab and PCR negative. D/C droplet precautions  - Stopped decadron  - Procalcitonin negative. Less suspicion for pneumona. Acute respiratory failure Hypoxia with exertion - due to CHF  No PE on CT chest.   - Weaned off oxygen. Acute encephalopathy-in background of dementia  -Possibly due to sundowning. Pt has prolonged QTC thus will avoid medications prolonging QT. Continue depakote. -Delirium precautions. Daughter slept overnight to help orient patient.     Essential (primary) hypertension   - BP lower,holding parameters on BP meds    Diarrhea  -Negative C.diff and stool occult    SULY on CKD stage 3-due to diuresis  Cr worsening to 2.4  -Held diuretics  -Continue to hold ACEI and spironolactone  -Follows with Dr Ramo Garcia outpatient. Consulted Nephrology. Await further recs.     Hyperglycemia-due to steriods  -Stopped steriods. Will monitor. Daughter at bedside. Questions and concerns were addressed. DVT Prophylaxis: Lovenox  Diet: ADULT DIET; Regular;  Low Sodium (2 gm); 1500 ml  Code Status: Full Code    PT/OT Eval Status: as needed    Dispo - Home when renal function improves    Chasity Gonzalez MD

## 2021-10-31 VITALS
HEART RATE: 80 BPM | WEIGHT: 149.03 LBS | OXYGEN SATURATION: 95 % | HEIGHT: 60 IN | SYSTOLIC BLOOD PRESSURE: 103 MMHG | BODY MASS INDEX: 29.26 KG/M2 | DIASTOLIC BLOOD PRESSURE: 64 MMHG | TEMPERATURE: 98.3 F | RESPIRATION RATE: 18 BRPM

## 2021-10-31 LAB
ANION GAP SERPL CALCULATED.3IONS-SCNC: 16 MMOL/L (ref 3–16)
BUN BLDV-MCNC: 59 MG/DL (ref 7–20)
CALCIUM SERPL-MCNC: 8 MG/DL (ref 8.3–10.6)
CHLORIDE BLD-SCNC: 104 MMOL/L (ref 99–110)
CO2: 19 MMOL/L (ref 21–32)
CREAT SERPL-MCNC: 2.1 MG/DL (ref 0.6–1.2)
GFR AFRICAN AMERICAN: 27
GFR NON-AFRICAN AMERICAN: 22
GLUCOSE BLD-MCNC: 106 MG/DL (ref 70–99)
HCT VFR BLD CALC: 27.1 % (ref 36–48)
HEMOGLOBIN: 8.9 G/DL (ref 12–16)
MCH RBC QN AUTO: 28.5 PG (ref 26–34)
MCHC RBC AUTO-ENTMCNC: 32.8 G/DL (ref 31–36)
MCV RBC AUTO: 87.1 FL (ref 80–100)
PDW BLD-RTO: 14.7 % (ref 12.4–15.4)
PLATELET # BLD: 318 K/UL (ref 135–450)
PMV BLD AUTO: 9.2 FL (ref 5–10.5)
POTASSIUM REFLEX MAGNESIUM: 3.7 MMOL/L (ref 3.5–5.1)
RBC # BLD: 3.12 M/UL (ref 4–5.2)
SODIUM BLD-SCNC: 139 MMOL/L (ref 136–145)
WBC # BLD: 14.4 K/UL (ref 4–11)

## 2021-10-31 PROCEDURE — 6370000000 HC RX 637 (ALT 250 FOR IP): Performed by: INTERNAL MEDICINE

## 2021-10-31 PROCEDURE — 99233 SBSQ HOSP IP/OBS HIGH 50: CPT | Performed by: HOSPITALIST

## 2021-10-31 PROCEDURE — 99232 SBSQ HOSP IP/OBS MODERATE 35: CPT | Performed by: NURSE PRACTITIONER

## 2021-10-31 PROCEDURE — 6370000000 HC RX 637 (ALT 250 FOR IP): Performed by: NURSE PRACTITIONER

## 2021-10-31 PROCEDURE — 2580000003 HC RX 258: Performed by: NURSE PRACTITIONER

## 2021-10-31 PROCEDURE — 94760 N-INVAS EAR/PLS OXIMETRY 1: CPT

## 2021-10-31 PROCEDURE — 6360000002 HC RX W HCPCS: Performed by: HOSPITALIST

## 2021-10-31 PROCEDURE — 36415 COLL VENOUS BLD VENIPUNCTURE: CPT

## 2021-10-31 PROCEDURE — 6360000002 HC RX W HCPCS: Performed by: INTERNAL MEDICINE

## 2021-10-31 PROCEDURE — 85027 COMPLETE CBC AUTOMATED: CPT

## 2021-10-31 PROCEDURE — 6370000000 HC RX 637 (ALT 250 FOR IP): Performed by: HOSPITALIST

## 2021-10-31 PROCEDURE — 80048 BASIC METABOLIC PNL TOTAL CA: CPT

## 2021-10-31 RX ORDER — FUROSEMIDE 10 MG/ML
40 INJECTION INTRAMUSCULAR; INTRAVENOUS ONCE
Status: COMPLETED | OUTPATIENT
Start: 2021-10-31 | End: 2021-10-31

## 2021-10-31 RX ORDER — FUROSEMIDE 40 MG/1
20 TABLET ORAL EVERY OTHER DAY
Qty: 60 TABLET | Refills: 3
Start: 2021-10-31 | End: 2021-12-08 | Stop reason: DRUGHIGH

## 2021-10-31 RX ORDER — SODIUM BICARBONATE 650 MG/1
650 TABLET ORAL 2 TIMES DAILY
Status: DISCONTINUED | OUTPATIENT
Start: 2021-10-31 | End: 2021-10-31 | Stop reason: HOSPADM

## 2021-10-31 RX ORDER — ISOSORBIDE MONONITRATE 30 MG/1
30 TABLET, EXTENDED RELEASE ORAL DAILY
Qty: 30 TABLET | Refills: 0 | Status: SHIPPED | OUTPATIENT
Start: 2021-11-01 | End: 2021-11-04

## 2021-10-31 RX ORDER — CARVEDILOL 6.25 MG/1
6.25 TABLET ORAL 2 TIMES DAILY WITH MEALS
Qty: 60 TABLET | Refills: 0 | Status: SHIPPED | OUTPATIENT
Start: 2021-10-31 | End: 2021-11-04 | Stop reason: DRUGHIGH

## 2021-10-31 RX ORDER — SODIUM BICARBONATE 650 MG/1
650 TABLET ORAL 2 TIMES DAILY
Qty: 14 TABLET | Refills: 0 | Status: SHIPPED | OUTPATIENT
Start: 2021-10-31 | End: 2021-11-04 | Stop reason: ALTCHOICE

## 2021-10-31 RX ADMIN — FLUOXETINE 20 MG: 20 CAPSULE ORAL at 08:51

## 2021-10-31 RX ADMIN — HEPARIN SODIUM 5000 UNITS: 5000 INJECTION INTRAVENOUS; SUBCUTANEOUS at 13:25

## 2021-10-31 RX ADMIN — ISOSORBIDE MONONITRATE 30 MG: 30 TABLET, EXTENDED RELEASE ORAL at 08:51

## 2021-10-31 RX ADMIN — HEPARIN SODIUM 5000 UNITS: 5000 INJECTION INTRAVENOUS; SUBCUTANEOUS at 06:05

## 2021-10-31 RX ADMIN — SODIUM CHLORIDE, PRESERVATIVE FREE 10 ML: 5 INJECTION INTRAVENOUS at 08:52

## 2021-10-31 RX ADMIN — CARVEDILOL 6.25 MG: 6.25 TABLET, FILM COATED ORAL at 08:51

## 2021-10-31 RX ADMIN — PANTOPRAZOLE SODIUM 40 MG: 40 TABLET, DELAYED RELEASE ORAL at 06:06

## 2021-10-31 RX ADMIN — FERROUS SULFATE TAB EC 324 MG (65 MG FE EQUIVALENT) 324 MG: 324 (65 FE) TABLET DELAYED RESPONSE at 08:51

## 2021-10-31 RX ADMIN — SODIUM BICARBONATE 650 MG: 650 TABLET ORAL at 13:20

## 2021-10-31 RX ADMIN — ALLOPURINOL 50 MG: 100 TABLET ORAL at 08:50

## 2021-10-31 RX ADMIN — FUROSEMIDE 40 MG: 10 INJECTION, SOLUTION INTRAMUSCULAR; INTRAVENOUS at 13:20

## 2021-10-31 RX ADMIN — ATORVASTATIN CALCIUM 20 MG: 20 TABLET, FILM COATED ORAL at 08:51

## 2021-10-31 RX ADMIN — ASPIRIN 81 MG: 81 TABLET, CHEWABLE ORAL at 08:51

## 2021-10-31 RX ADMIN — Medication 2000 UNITS: at 08:50

## 2021-10-31 RX ADMIN — VALPROIC ACID 125 MG: 250 SOLUTION ORAL at 08:51

## 2021-10-31 ASSESSMENT — PAIN SCALES - GENERAL
PAINLEVEL_OUTOF10: 0
PAINLEVEL_OUTOF10: 0

## 2021-10-31 NOTE — DISCHARGE SUMMARY
Hospital Medicine Discharge Summary    Patient ID: Katie Headley      Patient's PCP: FERNANDO Vanegas - [facility]    Admit Date: 10/26/2021     Discharge Date:   10/31/21    Admitting Physician: Sandy Chacon MD     Discharge Physician: Pj Dixon MD     Discharge Diagnoses: Active Hospital Problems    Diagnosis     SULY (acute kidney injury) ([address] [N17.9]      Priority: High    CHF (congestive heart failure), NYHA class I, acute on chronic, combined ([address] [I50.43]     Suspected COVID-19 virus infection [Z20.822]     Acute respiratory failure with hypoxia ([address] [J96.01]     Sepsis ([address] [A41.9]     Primary hypertension [I10]        The patient was seen and examined on day of discharge and this discharge summary is in conjunction with any daily progress note from day of discharge. Hospital Course:   \"86 y.o. female with PMHx of CKD, CAD, GERD, HLD, HTN presented to [facility] ED with  acute onset of shortness of breath. Patient was transferred for evaluation for CHF and suspected Covid. Pt's grand-daughter is concerned that patient may be \"cheeking\" her medications for approximately the last 4 days.  Pt c/o GERD and g-daughter was concerned about dark stools.  Pt may have also missed her diuretic. \"     Acute on chronic systolic CHF EF 16-77%  -Consulted cardiology. Imdur started Initially diuresed with IV lasix. Pt had SULY. Lasix was held. Pt will resume lasix outpatient at 20mg qod starting 11/3/21. She will need to follow up with Cardiology for evaluation of volume status. -Repeat Echo shows decrease in EF from 45% and moderately diffuse hypokinesis  - Decreased coreg to 6.25mg due to low BP. ACEI held due to SULY  - consulted CHF RN  -D/C with home care services to monitor CHF.     Suspected Covid 19 Pneumonia-ground glass opacities more likely due to CHF. - Initially treated on decadron. Rapid Covid 19 swab and PCR negative. D/Cd droplet precautions and decadron.  - Procalcitonin negative. Less suspicion for pneumona.     Acute respiratory failure Hypoxia with exertion - due to CHF  No PE on CT chest.   - Weaned off oxygen.     Acute encephalopathy-in background of dementia  -Possibly due to sundowning. Pt has prolonged QTC thus will avoid medications prolonging QT. Placed on depakote inpatient. Patient was at baseline on discharge.  -Delirium precautions. Daughter slept overnight to help orient patient.     Essential (primary) hypertension   - BP fluctuating,holding parameters on BP meds. Lisinopril held and coreg dose reduced.     Diarrhea  -Negative C.diff and stool occult     USLY on CKD stage 3-due to diuresis  Cr trending down  -Held diuretics. Will D/C on low dose diuretics  -Continue to hold ACEI and spironolactone  -Follows with Dr John Concepcion outpatient. Consulted Nephrology. Supportive care.     Hyperglycemia-due to steriods  -Stopped steriods. Will monitor. Physical Exam Performed:     /64   Pulse 80   Temp 98.3 °F (36.8 °C) (Oral)   Resp 18   Ht 5' (1.524 m)   Wt 149 lb 0.5 oz (67.6 kg)   SpO2 95%   BMI 29.11 kg/m²   General appearance: No apparent distress, appears stated age and cooperative. On nasal 02  HEENT: Pupils equal, round, and reactive to light. Conjunctivae/corneas clear. Neck: Supple, with full range of motion. No jugular venous distention. Trachea midline. Respiratory:  Normal respiratory effort. Crackles at bases  Cardiovascular: Regular rate and rhythm with normal S1/S2 without murmurs, rubs or gallops. Abdomen: Soft, non-tender, non-distended with normal bowel sounds. Musculoskeletal: No clubbing, cyanosis or edema bilaterally. Full range of motion without deformity. Skin: Skin color, texture, turgor normal.  No rashes or lesions. Neurologic:  Neurovascularly intact without any focal sensory/motor deficits. Cranial nerves: II-XII intact, grossly non-focal.  Psychiatric: Alert and oriented x 2          Labs:  For convenience and continuity at follow-up the following most recent labs are provided:      CBC:    Lab Results   Component Value Date    WBC 14.4 10/31/2021    HGB 8.9 10/31/2021    HCT 27.1 10/31/2021     10/31/2021       Renal:    Lab Results   Component Value Date     10/31/2021    K 3.7 10/31/2021     10/31/2021    CO2 19 10/31/2021    BUN 59 10/31/2021    CREATININE 2.1 10/31/2021    CALCIUM 8.0 10/31/2021    PHOS 3.6 05/21/2014         Significant Diagnostic Studies    Radiology:   CT CHEST PULMONARY EMBOLISM W CONTRAST   Final Result   1. Infectious or inflammatory airway process and interstitial edema. 2. Nonspecific regions central ground-glass opacity. Correlate with   presentation to exclude pneumonia. 3. No acute pulmonary embolism to the segmental arteries. 4. Other findings as described. XR CHEST (2 VW)   Final Result   Bilateral interstitial groundglass opacities are nonspecific and may   represent viral pneumonia, COVID-19 is not excluded. Consults:     IP CONSULT TO CARDIOLOGY  IP CONSULT TO HEART FAILURE NURSE/COORDINATOR  IP CONSULT TO NEPHROLOGY    Disposition:  Home with home health    Condition at Discharge: Stable    Discharge Instructions/Follow-up:    No follow-up provider specified.      Code Status:  Full Code     Activity: activity as tolerated    Diet: cardiac diet      Discharge Medications:     Current Discharge Medication List           Details   sodium bicarbonate 650 MG tablet Take 1 tablet by mouth 2 times daily for 7 days  Qty: 14 tablet, Refills: 0      isosorbide mononitrate (IMDUR) 30 MG extended release tablet Take 1 tablet by mouth daily  Qty: 30 tablet, Refills: 0              Details   carvedilol (COREG) 6.25 MG tablet Take 1 tablet by mouth 2 times daily (with meals)  Qty: 60 tablet, Refills: 0      furosemide (LASIX) 40 MG tablet Take 0.5 tablets by mouth every other day Resume furosemide on 11/3/21  Qty: 60 tablet, Refills: 3              Details   aspirin 81 MG chewable tablet Take 81 mg by mouth daily      ferrous sulfate 324 (65 Fe) MG EC tablet Take 1 tablet by mouth 2 times daily (with meals)  Qty: 60 tablet, Refills: 1      pantoprazole (PROTONIX) 40 MG tablet Take 1 tablet by mouth 2 times daily (before meals)  Qty: 60 tablet, Refills: 3      allopurinol (ZYLOPRIM) 100 MG tablet Take 50 mg by mouth every other day      Respiratory Therapy Supplies (NEBULIZER/TUBING/MOUTHPIECE) KIT 1 kit by Does not apply route daily as needed (wheezing)  Qty: 1 kit, Refills: 0      Multiple Vitamins-Minerals (THERAPEUTIC MULTIVITAMIN-MINERALS) tablet Take 1 tablet by mouth daily      donepezil (ARICEPT) 5 MG tablet Take 5 mg by mouth nightly      loratadine (CLARITIN) 10 MG capsule Take 10 mg by mouth daily as needed       vitamin D3 (CHOLECALCIFEROL) 10 MCG (400 UNIT) TABS tablet Take 400 Units by mouth daily      FLUoxetine (PROZAC) 20 MG capsule TAKE 1 CAPSULE BY MOUTH ONE TIME A DAY   Qty: 90 capsule, Refills: 0    Associated Diagnoses: Moderate single current episode of major depressive disorder (HCC)      atorvastatin (LIPITOR) 20 MG tablet TAKE 1 TABLET BY MOUTH ONE TIME A DAY   Qty: 90 tablet, Refills: 3    Associated Diagnoses: Coronary artery disease involving native coronary artery of native heart without angina pectoris      ipratropium-albuterol (DUONEB) 0.5-2.5 (3) MG/3ML SOLN nebulizer solution Inhale 3 mLs into the lungs every 6 hours as needed for Shortness of Breath  Qty: 360 mL, Refills: 0      Handicap Placard MISC by Does not apply route Patient is unable to walk more than 250 feet before stopping to rest.    Not to exceed 5 years. DX: CHF  Qty: 1 each, Refills: 0    Associated Diagnoses: Primary localized osteoarthrosis, lower leg, unspecified laterality             Time Spent on discharge is more than 30 minutes in the examination, evaluation, counseling and review of medications and discharge plan.       Signed:    Shemar Landa MD   10/31/2021      Thank you FERNANDO Urbina NP for the opportunity to be involved in this patient's care. If you have any questions or concerns please feel free to contact me at [phone].

## 2021-10-31 NOTE — CARE COORDINATION
CASE MANAGEMENT DISCHARGE SUMMARY:     DISCHARGE DATE: 10/31/21     DISCHARGED TO: Home with family; no anticipated needs     HOME CARE AGENCY: Family declined. Confirmed daughter and granddaughter assist with patient's needs.  Family reports patient refuses PT/OT when attempted in past.      TRANSPORTATION: Family     PREFERRED [facility]              NUMBER: [phone]    Electronically signed by Jenny Ambrosio RN MSN on 10/31/2021 at 2:06 PM

## 2021-10-31 NOTE — PROGRESS NOTES
Office: [phone]       Fax: [phone]      Nephrology Progress Note        Patient's Name: Stephanie Jonas Date: 10/26/2021  Date of Visit: 10/31/2021    Reason for Consult:  SULY      History of Present Illness:      Delta Prabhakar is a 80 y.o. female with PMHx of hypertension, CKD, coronary artery disease, gout  who was hospitalized on 10/26/2021 with complaints of shortness of breath   Diuresed for CHF  Creat trending up. BP noted to be low  NSAID use: Denies   IV contrast: 10/26  Home meds reviewed and noted to be on lisinopril, lasix    INTERVAL HISTORY    Feels same  Shortness of breath: No   UOP: Fair  Creat: trending down        Medications:    Allergies:  Codeine, Morphine and related, and Oxycodone-acetaminophen    Scheduled Meds:   heparin (porcine)  5,000 Units SubCUTAneous 3 times per day    carvedilol  6.25 mg Oral BID WC    [Held by provider] spironolactone  12.5 mg Oral Daily    valproic acid  125 mg Oral 2 times per day    melatonin  3 mg Oral Nightly    isosorbide mononitrate  30 mg Oral Daily    influenza virus vaccine  0.5 mL IntraMUSCular Prior to discharge    allopurinol  50 mg Oral Every Other Day    aspirin  81 mg Oral Daily    atorvastatin  20 mg Oral Daily    donepezil  5 mg Oral Nightly    ferrous sulfate  324 mg Oral BID WC    FLUoxetine  20 mg Oral Daily    pantoprazole  40 mg Oral BID AC    sodium chloride flush  5-40 mL IntraVENous 2 times per day    Vitamin D  2,000 Units Oral Daily     Continuous Infusions:   sodium chloride         Labs:  CBC:   Recent Labs     10/29/21  0628 10/30/21  0649 10/31/21  0513   WBC 11.4* 13.0* 14.4*   HGB 8.8* 8.8* 8.9*    309 318     Ca/Mg/Phos:   Recent Labs     10/29/21  0628 10/30/21  0649 10/31/21  0513   CALCIUM 7.9* 7.7* 8.0*     UA:  No results for input(s): 10/29/21  0628 10/30/21  0649 10/31/21  0513   BUN 48* 57* 65* 59*   CREATININE 1.7* 2.0* 2.4* 2.1*     Recent Labs     10/28/21  1343 10/29/21  0628 10/30/21  0649 10/31/21  0513   * 130* 128* 139   K 4.2 4.2 3.7 3.7   CO2 18* 18* 17* 19*         2. HTN  -Blood pressure improving    BP Readings from Last 1 Encounters:   10/31/21 (!) 137/55       3. HFrEF  -TTE (10/2020): LVEF 40-45%, G2DD, mod MR, mild AS  -TTE (10/2021): EF 35-40%, G2DD     4. CAD     5. Gout  -takes Allopurinol    Plan:       - hemodynamic support  - Hold ACEI/ARB for now  - diuresis based on vol status  - dec Coreg  - sod bicarb  - Monitor BMP    -Monitor I/O, UOP  -Maintain MAP>65  -Avoid nephrotoxin, if able. -Dose meds to current eGFR    Thank you for allowing us to participate in care of Katherine Barrett . We will continue to follow. Feel free to contact me with any questions.       Meg Argueta MD  10/31/2021    [facility] [address]  Office : [phone]  Fax :[phone]

## 2021-10-31 NOTE — PLAN OF CARE
Problem: Coping:  Goal: Verbalizations of decreased anxiety will decrease  Description: Verbalizations of decreased anxiety will decrease  10/30/2021 2335 by Lv Encarnacion RN  Outcome: Ongoing     Problem: Fluid Volume:  Goal: Risk for excess fluid volume will decrease  Description: Risk for excess fluid volume will decrease  10/30/2021 2335 by Lv Encarnacion RN  Outcome: Ongoing     Problem: Fluid Volume:  Goal: Maintenance of adequate hydration will improve  Description: Maintenance of adequate hydration will improve  10/30/2021 2335 by Lv Encarnacion RN  Outcome: Ongoing     Problem: Fluid Volume:  Goal: Will show no signs and symptoms of electrolyte imbalance  Description: Will show no signs and symptoms of electrolyte imbalance  10/30/2021 2335 by Lv Encarnacion RN  Outcome: Ongoing     Problem: Health Behavior:  Goal: Ability to manage health-related needs will improve  Description: Ability to manage health-related needs will improve  10/30/2021 2335 by Lv Encarnacion RN  Outcome: Ongoing     Problem: Health Behavior:  Goal: Ability to seek appropriate health care will improve  Description: Ability to seek appropriate health care will improve  10/30/2021 2335 by Lv Encarnacion RN  Outcome: Ongoing     Problem: Nutritional:  Goal: Maintenance of adequate nutrition will improve  Description: Maintenance of adequate nutrition will improve  10/30/2021 2335 by Lv Encarnacion RN  Outcome: Ongoing     Problem: Physical Regulation:  Goal: Complications related to the disease process, condition or treatment will be avoided or minimized  Description: Complications related to the disease process, condition or treatment will be avoided or minimized  10/30/2021 2335 by Lv Encarnacion RN  Outcome: Ongoing     Problem: Respiratory:  Goal: Ability to maintain adequate ventilation will improve  Description: Ability to maintain adequate ventilation will improve  10/30/2021 2335 by Lv Encarnacion RN  Outcome: Ongoing     Problem: Respiratory:  Goal: Respiratory status will improve  Description: Respiratory status will improve  10/30/2021 2335 by Lisa Osman RN  Outcome: Ongoing

## 2021-10-31 NOTE — PROGRESS NOTES
Hospitalist Progress Note      PCP: FERNANDO Harden NP    Date of Admission: 10/26/2021    Hospital Course: \"80 y.o. female with PMHx of CKD, CAD, GERD, HLD, HTN presented to [facility] ED with  acute onset of shortness of breath. Patient was transferred for evaluation for CHF and suspected Covid. Pt's grand-daughter is concerned that patient may be \"cheeking\" her medications for approximately the last 4 days. Pt c/o GERD and g-daughter was concerned about dark stools. Pt may have also missed her diuretic. \"       Subjective:   No new complaints. She is comfortable off oxygen. Clement Mirella Dyspnea is improved. No n/v.      Medications:  Reviewed    Infusion Medications    sodium chloride       Scheduled Medications    sodium bicarbonate  650 mg Oral BID    heparin (porcine)  5,000 Units SubCUTAneous 3 times per day    carvedilol  6.25 mg Oral BID WC    [Held by provider] spironolactone  12.5 mg Oral Daily    valproic acid  125 mg Oral 2 times per day    melatonin  3 mg Oral Nightly    isosorbide mononitrate  30 mg Oral Daily    influenza virus vaccine  0.5 mL IntraMUSCular Prior to discharge    allopurinol  50 mg Oral Every Other Day    aspirin  81 mg Oral Daily    atorvastatin  20 mg Oral Daily    donepezil  5 mg Oral Nightly    ferrous sulfate  324 mg Oral BID WC    FLUoxetine  20 mg Oral Daily    pantoprazole  40 mg Oral BID AC    sodium chloride flush  5-40 mL IntraVENous 2 times per day    Vitamin D  2,000 Units Oral Daily     PRN Meds: sodium chloride flush, sodium chloride, ondansetron **OR** ondansetron, polyethylene glycol, acetaminophen **OR** acetaminophen, guaiFENesin-dextromethorphan      Intake/Output Summary (Last 24 hours) at 10/31/2021 1348  Last data filed at 10/31/2021 0614  Gross per 24 hour   Intake 60 ml   Output 700 ml   Net -640 ml       Physical Exam Performed:    /64   Pulse 80   Temp 98.3 °F (36.8 °C) (Oral)   Resp 18   Ht 5' (1.524 m)   Wt 149 lb 0.5 oz (67.6 kg) SpO2 95%   BMI 29.11 kg/m²     General appearance: No apparent distress, appears stated age and cooperative. On nasal 02  HEENT: Pupils equal, round, and reactive to light. Conjunctivae/corneas clear. Neck: Supple, with full range of motion. No jugular venous distention. Trachea midline. Respiratory:  Normal respiratory effort. Crackles at bases  Cardiovascular: Regular rate and rhythm with normal S1/S2 without murmurs, rubs or gallops. Abdomen: Soft, non-tender, non-distended with normal bowel sounds. Musculoskeletal: No clubbing, cyanosis or edema bilaterally. Full range of motion without deformity. Skin: Skin color, texture, turgor normal.  No rashes or lesions. Neurologic:  Neurovascularly intact without any focal sensory/motor deficits. Cranial nerves: II-XII intact, grossly non-focal.  Psychiatric: Alert and oriented x 2      Labs:   Recent Labs     10/29/21  0628 10/30/21  0649 10/31/21  0513   WBC 11.4* 13.0* 14.4*   HGB 8.8* 8.8* 8.9*   HCT 27.5* 27.8* 27.1*    309 318     Recent Labs     10/29/21  0628 10/30/21  0649 10/31/21  0513   * 128* 139   K 4.2 3.7 3.7   CL 95* 94* 104   CO2 18* 17* 19*   BUN 57* 65* 59*   CREATININE 2.0* 2.4* 2.1*   CALCIUM 7.9* 7.7* 8.0*     No results for input(s): AST, ALT, BILIDIR, BILITOT, ALKPHOS in the last 72 hours. No results for input(s): INR in the last 72 hours. No results for input(s): Samantha Martin in the last 72 hours. Urinalysis:      Lab Results   Component Value Date    NITRU Negative 10/26/2021    WBCUA None seen 10/26/2021    BACTERIA 2+ 10/26/2021    RBCUA 0-2 10/26/2021    BLOODU MODERATE 10/26/2021    SPECGRAV 1.010 10/26/2021    GLUCOSEU Negative 10/26/2021    GLUCOSEU Negative 06/13/2011       Radiology:  CT CHEST PULMONARY EMBOLISM W CONTRAST   Final Result   1. Infectious or inflammatory airway process and interstitial edema. 2. Nonspecific regions central ground-glass opacity.   Correlate with   presentation to exclude pneumonia. 3. No acute pulmonary embolism to the segmental arteries. 4. Other findings as described. XR CHEST (2 VW)   Final Result   Bilateral interstitial groundglass opacities are nonspecific and may   represent viral pneumonia, COVID-19 is not excluded. Assessment/Plan:    Active Hospital Problems    Diagnosis     SULY (acute kidney injury) ([facility] [N17.9]      Priority: High    CHF (congestive heart failure), NYHA class I, acute on chronic, combined ([facility] [I50.43]     Suspected COVID-19 virus infection [Z20.822]     Acute respiratory failure with hypoxia ([facility] [J96.01]     Sepsis ([facility] [A41.9]     Primary hypertension [I10]         Acute on chronic systolic CHF EF 00-18%  - Continue to hold lasix due to SULY   -Repeat Echo shows decrease in EF from 45% and moderately diffuse hypokinesis  - Cont BB.  - ACEI held due to SULY  - daily wts and I/Os  - consulted CHF RN  - consulted cardiology. Plan as above. Medications adjusted     Suspected Covid 19 Pneumonia-ground glass opacities more likely due to CHF.  - Rapid Covid 19 swab and PCR negative. D/C droplet precautions  - Stopped decadron  - Procalcitonin negative. Less suspicion for pneumona. Acute respiratory failure Hypoxia with exertion - due to CHF  No PE on CT chest.   - Weaned off oxygen. Acute encephalopathy-in background of dementia  -Possibly due to sundowning. Pt has prolonged QTC thus will avoid medications prolonging QT. Continue depakote. -Delirium precautions. Daughter slept overnight to help orient patient.     Essential (primary) hypertension   - BP fluctuating,holding parameters on BP meds    Diarrhea  -Negative C.diff and stool occult    SULY on CKD stage 3-due to diuresis  Cr trending down  -Held diuretics. Will D/C on low dose diuretics  -Continue to hold ACEI and spironolactone  -Follows with Dr Greta Ng outpatient. Consulted Nephrology.     Hyperglycemia-due to steriods  -Stopped steriods. Will monitor.     DVT Prophylaxis: Lovenox  Diet: ADULT DIET; Regular;  Low Sodium (2 gm); 1500 ml  Code Status: Full Code    PT/OT Eval Status: as needed    Dispo - Home today    Ryan Andrade MD

## 2021-10-31 NOTE — PROGRESS NOTES
Pt discharged home at 1536. Discharge instructions given and explained. All questions explained. IV is removed. Dressing applied to site. Transportation to home provided by granddaughter.     Electronically signed by Nicola Auguste RN on 10/31/2021 at 3:36 PM

## 2021-10-31 NOTE — PROGRESS NOTES
[facility]   Daily Progress Note      Admit Date:  10/26/2021    CC: SOB    HPI:   Shaunna Leblanc is a 80 y.o. female with PMH CAD, DHF, AS,  histoplasmosis, HLP, dementia, SULY/CKD, HTN. Lives with family. Admitted to [facility] with progressively worsening SOB for several days. Was concerned for COVID PNA- now ruled out. No PE per Chest CT. Dr. Vicente [facility] consulted for HF. Diuresed. Now with SULY. Today she is laying flat in bed. She denies feeling SOB. O2 weaned off. She is very Z[facility]RINA [facility] but answers most questions appropriately. She reports getting OOB with assistance. + generalized weakness and fatigue. She denies CP, LH, dizziness, palpitations, syncope. Review of Systems:   General: Denies fever, chills  Skin: Denies skin changes, rash, itching, lesions.   HEENT: Denies headache, dizziness, vision changes, nosebleeds, sore throat, nasal drainage  RESP: Denies cough, sputum, dyspnea, wheeze, snoring  CARD: Denies palpitations,  murmur  GI:Denies nausea, vomiting, heartburn, loss of appetite, change in bowels  : Denies frequency, pain, incontinence, polyuria  VASC: Denies claudication, leg cramps, clots  MUSC/SKEL: Denies pain, stiffness, arthritis  PSYCH: Denies anxiety, depression, stress  NEURO: Denies numbness, tingling, weakness,change in mood or memory  HEME: Denies abn bruising, bleeding, anemia  ENDO: Denies intolerance to heat, cold, excessive thirst or hunger, hx thyroid disease    Objective:   BP (!) 148/60   Pulse 96   Temp 98.6 °F (37 °C) (Oral)   Resp 18   Ht 5' (1.524 m)   Wt 149 lb 0.5 oz (67.6 kg)   SpO2 94%   BMI 29.11 kg/m²           Intake/Output Summary (Last 24 hours) at 10/31/2021 0820  Last data filed at 10/31/2021 3942  Gross per 24 hour   Intake 60 ml   Output 700 ml   Net -640 ml     I/O since adm: -420    WEIGHT:Admit Weight: 150 lb 9.2 oz (68.3 kg)         Today  Weight: 149 lb 0.5 oz (67.6 kg)   DRY WEIGHT:  Wt Readings from Last 3 Encounters: 10/31/21 149 lb 0.5 oz (67.6 kg)   09/12/21 146 lb 6.2 oz (66.4 kg)   08/24/21 147 lb 14.9 oz (67.1 kg)       Physical Exam:  GEN: Appears frail, no acute distress  SKIN: Pink, warm, dry. Nails without clubbing. HEENT: PERRLA. Normocephalic, atraumatic. Neck supple. No adenopathy. LUNG: AP diameter normal. Decreased bilateral bases, no crackles. Respiratory effort normal.  HEART: S1S2 A/R. No JVD. No carotid bruit. + sys murmur 3/6, No rub or gallop. ABD: Soft, nontender. +BS X 4 quads. No hepatomegaly. EXT: Radial and pedal pulses 2+ and symmetric. Without varicosities. No edema. MUSCSKEL: Good ROM X4 extremities. No deformity. NEURO: A/O X3. Calm and cooperative. Telemetry: NSR/ST    Medications:    heparin (porcine)  5,000 Units SubCUTAneous 3 times per day    carvedilol  6.25 mg Oral BID WC    [Held by provider] spironolactone  12.5 mg Oral Daily    valproic acid  125 mg Oral 2 times per day    melatonin  3 mg Oral Nightly    isosorbide mononitrate  30 mg Oral Daily    influenza virus vaccine  0.5 mL IntraMUSCular Prior to discharge    allopurinol  50 mg Oral Every Other Day    aspirin  81 mg Oral Daily    atorvastatin  20 mg Oral Daily    donepezil  5 mg Oral Nightly    ferrous sulfate  324 mg Oral BID WC    FLUoxetine  20 mg Oral Daily    pantoprazole  40 mg Oral BID AC    sodium chloride flush  5-40 mL IntraVENous 2 times per day    Vitamin D  2,000 Units Oral Daily      sodium chloride       sodium chloride flush, sodium chloride, ondansetron **OR** ondansetron, polyethylene glycol, acetaminophen **OR** acetaminophen, guaiFENesin-dextromethorphan    Lab Data: I have reviewed all labs below today.    CBC:   Recent Labs     10/29/21  0628 10/30/21  0649 10/31/21  0513   WBC 11.4* 13.0* 14.4*   HGB 8.8* 8.8* 8.9*   HCT 27.5* 27.8* 27.1*   MCV 88.9 88.3 87.1    309 318     BMP:   Recent Labs     10/29/21  0628 10/30/21  0649 10/31/21  0513   * 128* 139   K 4.2 3.7 3.7 CL 95* 94* 104   CO2 18* 17* 19*   BUN 57* 65* 59*   CREATININE 2.0* 2.4* 2.1*     GLUCOSE:   Recent Labs     10/29/21  0628 10/30/21  0649 10/31/21  0513   GLUCOSE 102* 92 106*     LIVER PROFILE:   Lab Results   Component Value Date    AST 14 10/27/2021    ALT 8 10/27/2021    LIPASE 63.0 09/10/2021    LABALBU 3.9 10/27/2021    BILIDIR 0.1 05/19/2014    BILITOT 0.3 10/27/2021    ALKPHOS 58 10/27/2021     PT/INR:   Lab Results   Component Value Date    PROTIME 11.4 10/27/2021    INR 1.01 10/27/2021    INR 1.01 09/10/2021    INR 0.97 11/28/2020     APTT:   Lab Results   Component Value Date    APTT 39.4 06/12/2011     Pro-BNP:    Lab Results   Component Value Date    PROBNP 5,362 10/30/2021    PROBNP 16,871 10/27/2021    PROBNP 10,051 10/26/2021     Parameters:   > 450 pg/mL under age 48  > 900 pg/mL ages 54-65  > 1800 pg/mL over age 76    ENZYMES:  Lab Results   Component Value Date    CKTOTAL 36 03/07/2014    CKTOTAL 45 11/02/2011    CKTOTAL 36 11/02/2011    CKMB <0.2 11/03/2011    CKMB <0.2 11/02/2011    TROPONINI 0.01 10/26/2021    TROPONINI <0.01 09/10/2021    TROPONINI <0.01 08/24/2021     FASTING LIPID PANEL:  Lab Results   Component Value Date    CHOL 191 04/24/2018    HDL 47 04/24/2018    HDL 38 11/03/2011    LDLCALC 85 04/24/2018    TRIG 295 04/24/2018       Diagnostics:    EKG: 10/26/2021  ST     ECHO:  Echo 5/20/14:  Normal left ventricle size, wall thickness and systolic function with an    estimated ejection fraction of 55%. No regional wall motion abnormalities    are seen.   Gregery Mar is reversal of E/A inflow velocities across the mitral valve    suggesting impaired left ventricular relaxation.    Mitral annular calcification is present.    Aortic valve appears sclerotic but opens adequately.      Echo: 10/2020  Normal left ventricular size and wall thickness. Global left ventricular systolic function is mildly decreased with ejection fraction estimated at 45%.  Evidence of grade 2 diastolic dysfunction appreciated. The left atrium is mildly dilated. Moderate mitral regurgitation is present. There is moderate fibrocalcific sclerosis of the aortic valve with mean gradient across the aortic valve of 16 mmHg suggesting probable mild aortic stenosis.        ECHO 10/27/2021  Summary   Left ventricular cavity size is normal.   Normal left ventricular wall thickness. Ejection fraction is visually estimated to be 35-40%. moderately diffuse hypokinesis   Grade II diastolic dysfunction with elevated LV filling pressures. Mild aortic stenosis with a peak velocity of 2.7m/s and a mean pressure   gradient of 17mmHg. Trivial aortic regurgitation. Normal right ventricular size and function. Right ventricular systolic function is normal.    Stress Test:   6/2011   There is a moderate zone of decreased activity on stress images   inferolateral wall at apex which reverses on the resting images   consistent with ischemia. Chest CT 10/26/2021  Impression   1. Infectious or inflammatory airway process and interstitial edema. 2. Nonspecific regions central ground-glass opacity.  Correlate with   presentation to exclude pneumonia. 3. No acute pulmonary embolism to the segmental arteries. 4. Other findings as described. CXR 10/26/2021  FINDINGS:   Bilateral interstitial groundglass opacities are nonspecific and may   represent viral pneumonia, COVID-19 is not excluded.  No pleural effusion or   pneumothorax.  Cardiac silhouette is enlarged.           Impression   Bilateral interstitial groundglass opacities are nonspecific and may   represent viral pneumonia, COVID-19 is not excluded. Cardiac Angiography:     Assessment/Plan:  1.) Acute on chronic combined diastolic and systolic heart failure, EF 45%>35-40%: Euvolemic. Minimal diuresis, holding lasix D/T SULY. Neg 260 - accurate? Wt 150>149lbs. SOB is improved now on RA  BNP 16,871 >5362. Weight stable.    NYHA Class III  Stage C  Diuretic:  hold  Beta Blocker: coreg 25mg BID>decreased to 6.25 mg BID  SGLT2 Inhibitor: none- consider as OP  Imdur added for preload reduction  2gm Na diet, daily weight, 64 oz fluid restriction  Avoid NSAIDS and other nephrotoxic meds  Cardiac Rehab: Not indicated for EF>35%  ICD: Not indicated for EF>35%  [facility] Referral: NO- dementia, disoriented      2.) Hypertension:    Goal BP <130/80.   Non pharmacologic interventions include:   -weight loss  -heart healthy low sodium and low fat diet that consist of mostly fruits, vegetables and grains (Dash diet)  -limited amount of alcohol (no more than 1 drink/day for women, 2 drinks/day for men)  -regular physical activity  -no smoking  -stress reduction     3.) CAD: No evidence of angina/ischemia  Continue asa, lipitor    4.) Anemia:   Cont ferrous sulfate  Check iron studies    5,) SULY on CKD: Renal following- Creat improving. Coreg decreased per Neph for better renal perfusion. Patient is stable from cardiology standpoint. Will sign off. Patient will be scheduled to [facility] with[facility] within 7 days of discharge.      Electronically signed by FERNANDO Mahan CNP on 10/31/2021 at 8:20 AM

## 2021-11-01 ENCOUNTER — CARE COORDINATION (OUTPATIENT)
Dept: CASE MANAGEMENT | Age: 86
End: 2021-11-01

## 2021-11-01 NOTE — CARE COORDINATION
[facility] Transitions Initial Follow Up Call    Call within 2 business days of discharge: Yes    Patient: Bella Handler Patient : 1935   MRN: <[id number]>  Reason for Admission: CHF  Discharge Date: 10/31/21 RARS: Readmission Risk Score: 22.8      Last Discharge [facility]       Complaint Diagnosis Description Type Department Provider    10/26/21 Shortness of Breath Acute on chronic combined systolic and diastolic congestive heart failure ([facility] [address] . .. ED to Hosp-Admission (Discharged) (ADMITTED) Manju Malloy MD; Ivonne Anderson... Spoke with: Juany Shearer (daughter HIPAA verified) who reports that patient is feeling a lot better since being home from the hospital. Patient is currently resting. Daughter reports no c/o cp, sob, cough, dizziness, headache, n/v, diarrhea, abdominal pains, fever, or chills. Patient report that appetite and fluid intake is good and denies any problems with bowel or bladder. Daughter reports some issues with hemorrhoids and patient is currently using the hemorrhoids wipes which so far is effective. Daughter states that patient is stiill on all the same medications. We did not do medication review d/t medication list not available at this time. Writer review discharge paperwork with daughter and she verbalized understanding. Daughter stated that she and her daughter are STNAs. She states that she will schedule all of patients follow up appointment and will read all paperwork. She she will start patient a weight log, understands sodium and fluid restrictions. Denies any needs at this time. Daughtert instructed to continue to monitor s/s, reporting any that may present to MD immediately for early intervention. Reminded of COVID 19 precautions. Agreeable to f/u calls. Transitions of Care Initial Call    Was this an external facility discharge?  No Discharge Facility: [facility]    Challenges to be reviewed by the provider   Additional needs identified to be addressed with provider: No  none             Method of communication with provider : none      Advance Care Planning:   Does patient have an Advance Directive: Primary Decision Maker: Jade Suggs . She believes that information is on file will check at next PCP appointment. Was this a readmission? No  Patient stated reason for admission: Heart Failure  Patients top risk factors for readmission: medical condition-CHF    Care Transition Nurse (CTN) contacted the family by telephone to perform post hospital discharge assessment. Verified name and  with family as identifiers. Provided introduction to self, and explanation of the CTN role. CTN reviewed discharge instructions, medical action plan and red flags with family who verbalized understanding. Family given an opportunity to ask questions and does not have any further questions or concerns at this time. Were discharge instructions available to patient? Yes. Reviewed appropriate site of care based on symptoms and resources available to patient including: PCP, Specialist and When to call 911. The family agrees to contact the PCP office for questions related to their healthcare. Medication reconciliation was performed with family, who verbalizes understanding of administration of home medications. Advised obtaining a 90-day supply of all daily and as-needed medications. Covid Risk Education     Educated patient about risk for severe COVID-19 due to risk factors according to CDC guidelines. LPN CC reviewed discharge instructions, medical action plan and red flag symptoms with the family who verbalized understanding. Discussed COVID vaccination status: Yes. Education provided on COVID-19 vaccination as appropriate. Discussed exposure protocols and quarantine with CDC Guidelines.  Family was given an opportunity to verbalize any questions and concerns and agrees to contact LPN CC or health care provider for questions related to their healthcare. Reviewed and educated family on any new and changed medications related to discharge diagnosis. Was patient discharged with a pulse oximeter? No Discussed and confirmed pulse oximeter discharge instructions and when to notify provider or seek emergency care. LPN CC provided contact information. Plan for follow-up call in 5-7 days based on severity of symptoms and risk factors.   Plan for next call: symptom management-,, self management-,, follow up appointment-if appointment have been schedule and medication management-medication review      Care Transitions 24 Hour Call    Do you have all of your prescriptions and are they filled?: Yes  Care Transitions Interventions         Follow Up  Future Appointments   Date Time Provider Helen Kaba   11/4/2021 11:45 AM Sherrian Epley, MD [facility], LPN

## 2021-11-04 ENCOUNTER — OFFICE VISIT (OUTPATIENT)
Dept: CARDIOLOGY CLINIC | Age: 86
End: 2021-11-04
Payer: MEDICARE

## 2021-11-04 VITALS
SYSTOLIC BLOOD PRESSURE: 112 MMHG | BODY MASS INDEX: 29.64 KG/M2 | HEART RATE: 74 BPM | DIASTOLIC BLOOD PRESSURE: 68 MMHG | HEIGHT: 60 IN | OXYGEN SATURATION: 95 % | WEIGHT: 151 LBS

## 2021-11-04 DIAGNOSIS — I50.23 ACUTE ON CHRONIC SYSTOLIC HEART FAILURE (HCC): Primary | ICD-10-CM

## 2021-11-04 DIAGNOSIS — N17.9 ACUTE KIDNEY INJURY SUPERIMPOSED ON CKD (HCC): ICD-10-CM

## 2021-11-04 DIAGNOSIS — D64.9 CHRONIC ANEMIA: ICD-10-CM

## 2021-11-04 DIAGNOSIS — N18.9 ACUTE KIDNEY INJURY SUPERIMPOSED ON CKD (HCC): ICD-10-CM

## 2021-11-04 PROCEDURE — 99214 OFFICE O/P EST MOD 30 MIN: CPT | Performed by: INTERNAL MEDICINE

## 2021-11-04 RX ORDER — CARVEDILOL 12.5 MG/1
12.5 TABLET ORAL 2 TIMES DAILY WITH MEALS
Status: ON HOLD | COMMUNITY
End: 2022-07-31

## 2021-11-04 RX ORDER — ISOSORBIDE MONONITRATE 30 MG/1
30 TABLET, EXTENDED RELEASE ORAL DAILY
Qty: 30 TABLET | Refills: 6 | Status: SHIPPED | OUTPATIENT
Start: 2021-11-04

## 2021-11-04 NOTE — PATIENT INSTRUCTIONS
Patient Education        Learning About Low-Sodium Foods  What foods are low in sodium? The foods you eat contain nutrients, such as vitamins and minerals. Sodium is a nutrient. Your body needs the right amount to stay healthy and work as it should. You can use the list below to help you make choices about which foods to eat. Fruits  · Fresh, frozen, canned, or dried fruit  Vegetables  · Fresh or frozen vegetables, with no added salt  · Canned vegetables, low-sodium or with no added salt  Grains  · Bagels without salted tops  · Cereal with no added salt  · Corn tortillas  · Crackers with no added salt  · Oatmeal, cooked without salt  · Popcorn with no salt  · Pasta and noodles, cooked without salt  · Rice, cooked without salt  · Unsalted pretzels  Dairy and dairy alternatives  · Butter, unsalted  · Cream cheese  · Ice cream  · Milk  · Soy milk  Meats and other protein foods  · Beans and peas, canned with no salt  · Eggs  · Fresh fish (not smoked)  · Fresh meats (not smoked or cured)  · Nuts and nut butter, prepared without salt  · Poultry, not packaged with sodium solution  · Tofu, unseasoned  · Tuna, canned without salt  Seasonings  · Garlic  · Herbs and spices  · Lemon juice  · Mustard  · Olive oil  · Salt-free seasoning mixes  · Vinegar  Work with your doctor to find out how much of this nutrient you need. Depending on your health, you may need more or less of it in your diet. Where can you learn more? Go to https://Survelapesooeweb.[employer]. org and sign in to your [email or web address].io account. Enter X578 in the [facility] box to learn more about \"Learning About Low-Sodium Foods. \"     If you do not have an account, please click on the \"Sign Up Now\" link. Current as of: December 17, 2020               Content Version: 13.0  © 1313-4933 Healthwise, Incorporated. Care instructions adapted under license by [facility] ([facility]).  If you have questions about a medical condition or this instruction, always ask your healthcare professional. [address] any warranty or liability for your use of this information. Patient Education        Avoiding Triggers With Heart Failure: Care Instructions  Your Care Instructions     Triggers are anything that make your heart failure flare up. A flare-up is also called \"sudden heart failure\" or \"acute heart failure. \" When you have a flare-up, fluid builds up in your lungs, and you have problems breathing. You might need to go to the hospital. By watching for changes in your condition and avoiding triggers, you can prevent heart failure flare-ups. Follow-up care is a key part of your treatment and safety. Be sure to make and go to all appointments, and call your doctor if you are having problems. It's also a good idea to know your test results and keep a list of the medicines you take. How can you care for yourself at home? Watch for changes in your weight and condition  · Weigh yourself without clothing at the same time each day. Record your weight. Call your doctor if you have sudden weight gain, such as more than 2 to 3 pounds in a day or 5 pounds in a week. (Your doctor may suggest a different range of weight gain.) A sudden weight gain may mean that your heart failure is getting worse. · Keep a daily record of your symptoms. Write down any changes in how you feel, such as new shortness of breath, cough, or problems eating. Also record if your ankles are more swollen than usual and if you feel more tired than usual. Note anything that you ate or did that could have triggered these changes. Limit sodium  Sodium causes your body to hold on to extra water. This may cause your heart failure symptoms to get worse. People get most of their sodium from processed foods. Fast food and restaurant meals also tend to be very high in sodium. · Your doctor may suggest that you limit sodium. Your doctor can tell you how much sodium is right for you.  This includes problem with your medicine. · Make a list of all the medicines you take. Include those prescribed to you by other doctors and any over-the-counter medicines, vitamins, or supplements you take. Take this list with you when you go to any doctor. · Take your medicines at the same time every day. It may help you to post a list of all the medicines you take every day and what time of day you take them. · Make taking your medicine as simple as you can. Plan times to take your medicines when you are doing other things, such as eating a meal or getting ready for bed. This will make it easier to remember to take your medicines. · Get organized. Use helpful tools, such as daily or weekly pill containers. When should you call for help? Call 911  if you have symptoms of sudden heart failure such as:    · You have severe trouble breathing.     · You cough up pink, foamy mucus.     · You have a new irregular or rapid heartbeat. Call your doctor now or seek immediate medical care if:    · You have new or increased shortness of breath.     · You are dizzy or lightheaded, or you feel like you may faint.     · You have sudden weight gain, such as more than 2 to 3 pounds in a day or 5 pounds in a week. (Your doctor may suggest a different range of weight gain.)     · You have increased swelling in your legs, ankles, or feet.     · You are suddenly so tired or weak that you cannot do your usual activities. Watch closely for changes in your health, and be sure to contact your doctor if you develop new symptoms. Where can you learn more? Go to https://StockCastrbabita.[employer]. org and sign in to your Beeline account. Enter R639 in the [employer] box to learn more about \"Avoiding Triggers With Heart Failure: Care Instructions. \"     If you do not have an account, please click on the \"Sign Up Now\" link. Current as of: April 29, 2021               Content Version: 13.0  © 4723-7763 Healthwise, Incorporated. Care instructions adapted under license by [facility] [address] If you have questions about a medical condition or this instruction, always ask your healthcare professional. [place] [address] any warranty or liability for your use of this information.

## 2021-11-04 NOTE — PROGRESS NOTES
[address]   Daily Progress Note             CC: \"HUMPHREYS and BLE edema. \"     This is an 63-year-old female who was dementia, extremely hard of hearing with a previous history of coronary artery disease, CKD, aortic stenosis, hypertension, hyperlipidemia, presented to the hospital 10/26/21 with worsening shortness of breath.  According to the HPI. she presented to [facility] Emergency Room. Nicolle Hayward is also being evaluated for Arther Ego symptoms have been worsening for four or five days and her granddaughter brought her to the hospital. [place] Ku is also concern of dark stools. Cardiac Diagnosis:  hypertension, hyperlipidemia and coronary artery disease    Today, she presents with her granddaughter for her hospital follow up. Patient is very [facility] and has dementia. She continues with HUMPHREYS and BLE edema. Use of cane for walking aid. Patient denies exertional chest pain/pressure, dyspnea at rest, changes HUMPHREYS, PND, orthopnea, palpitations, lightheadedness, weight changes, changes in LE edema, and syncope.     Past Medical History:   Diagnosis Date    Chronic kidney disease (CKD), stage IV (severe) ([facility])     Coronary artery disease involving native coronary artery of native heart without angina pectoris 1/5/2016    GERD (gastroesophageal reflux disease) acid reflux    Gout, chronic     Histoplasmosis     Hyperlipidemia     Hypertension     Irregular heart beat     Osteoarthritis 2/28/2019    Knee, leg     Osteopenia of hip 04/26/2019    Pneumonia     Ulcer of jejunum     Unspecified cerebral artery occlusion with cerebral infarction 2014 , ~ 2012     Social History     Socioeconomic History    Marital status:      Spouse name: None    Number of children: None    Years of education: None    Highest education level: None   Occupational History    None   Tobacco Use    Smoking status: Passive Smoke Exposure - Never Smoker    Smokeless tobacco: Never Used   Vaping Use    Vaping Use: Never used Substance and Sexual Activity    Alcohol use: No     Comment: 1 yearly    Drug use: No    Sexual activity: Yes     Partners: Male   Other Topics Concern    None   Social History Narrative    None     Social Determinants of Health     Financial Resource Strain:     Difficulty of Paying Living Expenses:    Food Insecurity:     Worried About Running Out of Food in the Last Year:     Ran Out of Food in the Last Year:    Transportation Needs:     Lack of Transportation (Medical):      Lack of Transportation (Non-Medical):    Physical Activity:     Days of Exercise per Week:     Minutes of Exercise per Session:    Stress:     Feeling of Stress :    Social Connections:     Frequency of Communication with Friends and Family:     Frequency of Social Gatherings with Friends and Family:     Attends Jewish Services:     Active Member of Clubs or Organizations:     Attends Club or Organization Meetings:     Marital Status:    Intimate Partner Violence:     Fear of Current or Ex-Partner:     Emotionally Abused:     Physically Abused:     Sexually Abused:      Past Surgical History:   Procedure Laterality Date    BLADDER SUSPENSION      CHOLECYSTECTOMY  Orthopedic surgery Left arm    HYSTERECTOMY      TUBAL LIGATION      UPPER GASTROINTESTINAL ENDOSCOPY N/A 9/10/2021    EGD CONTROL HEMORRHAGE performed by Caden Dill DO at [address]  9/10/2021    EGD SUBMUCOSAL/ EPINEPHRINE INJECTION performed by Caden Dill DO at [address] [facility] History     Socioeconomic History    Marital status:      Spouse name: None    Number of children: None    Years of education: None    Highest education level: None   Occupational History    None   Tobacco Use    Smoking status: Passive Smoke Exposure - Never Smoker    Smokeless tobacco: Never Used   Vaping Use    Vaping Use: Never used   Substance and Sexual Activity    Alcohol use: No     Comment: 1 yearly    Drug use: No    Sexual activity: Yes     Partners: Male   Other Topics Concern    None   Social History Narrative    None     Social Determinants of Health     Financial Resource Strain:     Difficulty of Paying Living Expenses:    Food Insecurity:     Worried About Running Out of Food in the Last Year:     Ran Out of Food in the Last Year:    Transportation Needs:     Lack of Transportation (Medical):  Lack of Transportation (Non-Medical):    Physical Activity:     Days of Exercise per Week:     Minutes of Exercise per Session:    Stress:     Feeling of Stress :    Social Connections:     Frequency of Communication with Friends and Family:     Frequency of Social Gatherings with Friends and Family:     Attends Roman Catholic Services:     Active Member of Clubs or Organizations:     Attends Club or Organization Meetings:     Marital Status:    Intimate Partner Violence:     Fear of Current or Ex-Partner:     Emotionally Abused:     Physically Abused:     Sexually Abused:          Objective:   Vitals:    11/04/21 1143   BP: 112/68   Pulse: 74   SpO2: 95%   Weight: 151 lb (68.5 kg)   Height: 5' (1.524 m)     Review of Systems:  · Constitutional: no unanticipated weight loss. There's been no change in energy level, sleep pattern, or activity level. No fevers, chills. · Eyes: No visual changes or diplopia. No scleral icterus. · ENT: No Headaches, hearing loss or vertigo. No mouth sores or sore throat. · Cardiovascular: as reviewed in HPI  · Respiratory: No cough or wheezing, no sputum production. No hematemesis. · Gastrointestinal: No abdominal pain, appetite loss, blood in stools. No change in bowel or bladder habits. · Genitourinary: No dysuria, trouble voiding, or hematuria. · Musculoskeletal:  No gait disturbance, no joint complaints. · Integumentary: No rash or pruritis.   · Neurological: No headache, diplopia, change in muscle strength, numbness or tingling. · Psychiatric: No anxiety or depression. · Endocrine: No temperature intolerance. No excessive thirst, fluid intake, or urination. No tremor. · Hematologic/Lymphatic: No abnormal bruising or bleeding, blood clots or swollen lymph nodes.   · Allergic/Immunologic: No nasal congestion or hives.        Medications:   Current Outpatient Medications   Medication Sig Dispense Refill    carvedilol (COREG) 12.5 MG tablet Take 12.5 mg by mouth 2 times daily (with meals)      furosemide (LASIX) 40 MG tablet Take 0.5 tablets by mouth every other day Resume furosemide on 11/3/21 (Patient taking differently: Take 40 mg by mouth daily Resume furosemide on 11/3/21) 60 tablet 3    aspirin 81 MG chewable tablet Take 81 mg by mouth daily      ferrous sulfate 324 (65 Fe) MG EC tablet Take 1 tablet by mouth 2 times daily (with meals) 60 tablet 1    pantoprazole (PROTONIX) 40 MG tablet Take 1 tablet by mouth 2 times daily (before meals) 60 tablet 3    allopurinol (ZYLOPRIM) 100 MG tablet Take 50 mg by mouth every other day      Respiratory Therapy Supplies (NEBULIZER/TUBING/MOUTHPIECE) KIT 1 kit by Does not apply route daily as needed (wheezing) 1 kit 0    ipratropium-albuterol (DUONEB) 0.5-2.5 (3) MG/3ML SOLN nebulizer solution Inhale 3 mLs into the lungs every 6 hours as needed for Shortness of Breath 360 mL 0    Multiple Vitamins-Minerals (THERAPEUTIC MULTIVITAMIN-MINERALS) tablet Take 1 tablet by mouth daily      donepezil (ARICEPT) 5 MG tablet Take 5 mg by mouth nightly      loratadine (CLARITIN) 10 MG capsule Take 10 mg by mouth daily as needed       vitamin D3 (CHOLECALCIFEROL) 10 MCG (400 UNIT) TABS tablet Take 400 Units by mouth daily      FLUoxetine (PROZAC) 20 MG capsule TAKE 1 CAPSULE BY MOUTH ONE TIME A DAY  90 capsule 0    atorvastatin (LIPITOR) 20 MG tablet TAKE 1 TABLET BY MOUTH ONE TIME A DAY  90 tablet 3    Handicap Placard MISC by Does not apply route Patient is unable to walk more than 250 feet before stopping to rest.    Not to exceed 5 years. DX: CHF 1 each 0     No current facility-administered medications for this visit. Physical Exam:   Constitutional: She is oriented to person, place, and time. She is elderly and pleasant. In no acute distress. HEENT: Normocephalic and atraumatic. Sclerae anicteric. No xanthelasmas. EOM's intact. Neck: Supple. No JVD present. Carotids without bruits. No thyromegaly present. No lymphadenopathy present. Cardiovascular: RRR, normal S1 and S2; no murmur/gallop or rub  Pulmonary/Chest: Effort normal.  Lungs clear to auscultation. Chest wall nontender  Abdominal: soft, nontender, nondistended. + bowel sounds; no hepatomegaly Extremities: No cyanosis, or clubbing. Pulses are 2+ radial/DP/PT bilaterally. Cap refill brisk. + varicosities to lower legs bilaterally. 1+ edema on R posterior calf at site of large varicosity and trace edema on the L lower leg  Neurological: No focal deficit. Skin: Skin is warm and dry. Psychiatric: She has a normal mood and affect.  Her speech is normal and behavior is normal.       Lab Data:  Lab Results   Component Value Date    WBC 14.4 10/31/2021       Lab Results   Component Value Date     10/31/2021        Lab Results   Component Value Date    HGB 8.9 10/31/2021    HCT 27.1 10/31/2021     Lab Results   Component Value Date     10/31/2021     Lab Results   Component Value Date    K 3.7 10/31/2021       Lab Results   Component Value Date    BUN 59 10/31/2021    CREATININE 2.1 10/31/2021     Lab Results   Component Value Date    MG 1.70 09/12/2021       Lab Results   Component Value Date    PHOS 3.6 05/21/2014     Lab Results   Component Value Date    TROPONINI 0.01 10/26/2021    TROPONINI <0.01 09/10/2021    TROPONINI <0.01 08/24/2021     No results found for: LABA1C  Lab Results   Component Value Date    TRIG 295 04/24/2018    HDL 47 04/24/2018    HDL 38 11/03/2011    LDLCALC 85 04/24/2018    LDLDIRECT 67 09/28/2016    LABVLDL 59 04/24/2018       No results found for: TSH      Imaging:    Echo: 10/27/21  Left ventricular cavity size is normal.   Normal left ventricular wall thickness.   Ejection fraction is visually estimated to be 35-40%.   moderately diffuse hypokinesis   Grade II diastolic dysfunction with elevated LV filling pressures.   Mild aortic stenosis with a peak velocity of 2.7m/s and a mean pressure   gradient of 17mmHg.   Trivial aortic regurgitation.   Normal right ventricular size and function.   Right ventricular systolic function is normal     Assessment:Plan:    Severe Dementia  Discussed with granddaughter     Acute on chronic systolic heart failure. NYHA class III on admission  Today NYHA  -Patient only had modest diuresis and her weight has gone up since yesterday  -Echocardiogram showed EF of 35 to 40%  -I will discontinue continue her Norvasc.  -carvedilol to 12.5 mg p.o. twice daily  -I will hold lisinopril due to worsening renal insufficiency  -She does not appear to be an ideal  patient for SGLT2 inhibitor nor aldactone.  -Patient is not a candidate for ICD therapy at this point  -today, she continues with HUMPHREYS and BLE edema   -She appears mildly decompensated. .   -Start Imdur 30 mg daily for preload  -BMP, BNP, CBC in 4 week then f/u     SULY on CKD with anemia   -Renal function has significantly worsened   -We will hold her lisinopril  -on iron supplement once daily, encouraged BID. - will have to hold diuretic therapy till renal function is back tomorrow   -referral to nephrology at [facility].       Acute on chronic hypoxic respiratory failure  -Predominantly due to underlying congestive heart failure though with elevated CRP some component of infectious etiology cannot entirely be excluded  -oxygen therapy discontinued at discharged   -on breathing treatments at home    We will see her back in 4-5 weeks. I have recommended palliative care for her. They will think it over.     Thank you very much for allowing me to participate in the care of your patient. Please do not hesitate to contact me if you have any questions. Sincerely,  Kahlil Foss MD      [facility], [address] [place] [address]  Ph: [phone]  Fax: [phone]    This note was scribed in the presence of Dr. Humaira Tim MD by Leda Norton RN.

## 2021-11-10 ENCOUNTER — TELEPHONE (OUTPATIENT)
Dept: CARDIOLOGY CLINIC | Age: 86
End: 2021-11-10

## 2021-11-10 NOTE — TELEPHONE ENCOUNTER
Continue Coreg 12.5 p.o. twice daily. Lasix 20 mg p.o. daily. Imdur 30 mg daily. Have patient repeat renal profile before her next visit.   Currently sinus exit BS is okay to this is

## 2021-11-10 NOTE — TELEPHONE ENCOUNTER
Dr. Meek Blake,     Please review below carefully along with all progress notes inpatient and outpatient and please advise. Thanks. 1) Granddaughter called and is confused as to what cardiac medications patient is supposed to be on with many adjustment during recent hospitalization and per f/u 11/4/21 in the office. 2) Dr. Meek Blake requested to discussed palliative care with granddaughter at [facility] 11/4/21 with patient medical issues (CHF, resp. failure, CKD) along with patient's severe dementia and will re-discuss at next f/u.     3) Low BP and worsening renal function during recent admission 10/26-10/31:    ~Imdur 30 mg daily added for preload per Dr. Meek Blake 10/28 and has been continued. ~Discontinue Norvasc per Dr. Enrico Mckeon progress note 10/28 (inpt) and again mentioned HFU progress note on 11/4. ~Increase coreg to 12.5 mg BID per Dr. Thad Rodriguez progress note 11/4 but it was adjusted in med list when MA was rooming patient (Siva Dasen decreased to help with renal perfusion 6.25 mg BID inpatient and noted per DC Summary 10/31-so not sure when increased?)    ~Continue to HOLD lisinopril and aldactone at DC 10/31. ~DC home to resume Lasix 20 mg daily on 11/3 as it had been held during admission. ~DC summary patient to f/u with Dr. Jud Borrero MD-Nephrology outpatient. 4) F/u 4-5 weeks from 11/4 puts us at 12/2-12/9, so not sure why appt was not scheduled when granddaughter checked patient out when leaving? I will address with . (Thursday 12/9/21 at 11:00 a.m. at [facility]) will be offered.

## 2021-12-06 DIAGNOSIS — D64.9 CHRONIC ANEMIA: ICD-10-CM

## 2021-12-06 DIAGNOSIS — N18.9 ACUTE KIDNEY INJURY SUPERIMPOSED ON CKD (HCC): ICD-10-CM

## 2021-12-06 DIAGNOSIS — N17.9 ACUTE KIDNEY INJURY SUPERIMPOSED ON CKD (HCC): ICD-10-CM

## 2021-12-06 DIAGNOSIS — I50.23 ACUTE ON CHRONIC SYSTOLIC HEART FAILURE (HCC): ICD-10-CM

## 2021-12-06 LAB
ANION GAP SERPL CALCULATED.3IONS-SCNC: 13 MMOL/L (ref 3–16)
BUN BLDV-MCNC: 37 MG/DL (ref 7–20)
CALCIUM SERPL-MCNC: 9 MG/DL (ref 8.3–10.6)
CHLORIDE BLD-SCNC: 103 MMOL/L (ref 99–110)
CO2: 23 MMOL/L (ref 21–32)
CREAT SERPL-MCNC: 2.1 MG/DL (ref 0.6–1.2)
GFR AFRICAN AMERICAN: 27
GFR NON-AFRICAN AMERICAN: 22
GLUCOSE BLD-MCNC: 98 MG/DL (ref 70–99)
HCT VFR BLD CALC: 33.7 % (ref 36–48)
HEMOGLOBIN: 10.8 G/DL (ref 12–16)
MCH RBC QN AUTO: 28.2 PG (ref 26–34)
MCHC RBC AUTO-ENTMCNC: 32.1 G/DL (ref 31–36)
MCV RBC AUTO: 87.9 FL (ref 80–100)
PDW BLD-RTO: 16.6 % (ref 12.4–15.4)
PLATELET # BLD: 315 K/UL (ref 135–450)
PMV BLD AUTO: 9.6 FL (ref 5–10.5)
POTASSIUM SERPL-SCNC: 4.9 MMOL/L (ref 3.5–5.1)
PRO-BNP: 1474 PG/ML (ref 0–449)
RBC # BLD: 3.84 M/UL (ref 4–5.2)
SODIUM BLD-SCNC: 139 MMOL/L (ref 136–145)
WBC # BLD: 9.3 K/UL (ref 4–11)

## 2021-12-08 ENCOUNTER — OFFICE VISIT (OUTPATIENT)
Dept: CARDIOLOGY CLINIC | Age: 86
End: 2021-12-08
Payer: MEDICARE

## 2021-12-08 VITALS
DIASTOLIC BLOOD PRESSURE: 64 MMHG | BODY MASS INDEX: 28.07 KG/M2 | WEIGHT: 143 LBS | HEART RATE: 70 BPM | OXYGEN SATURATION: 96 % | HEIGHT: 60 IN | SYSTOLIC BLOOD PRESSURE: 114 MMHG

## 2021-12-08 DIAGNOSIS — N17.9 ACUTE KIDNEY INJURY SUPERIMPOSED ON CKD (HCC): ICD-10-CM

## 2021-12-08 DIAGNOSIS — F03.C0 SEVERE DEMENTIA: Primary | ICD-10-CM

## 2021-12-08 DIAGNOSIS — I50.22 CHRONIC SYSTOLIC CONGESTIVE HEART FAILURE (HCC): ICD-10-CM

## 2021-12-08 DIAGNOSIS — J96.11 CHRONIC RESPIRATORY FAILURE WITH HYPOXIA (HCC): ICD-10-CM

## 2021-12-08 DIAGNOSIS — N18.9 ACUTE KIDNEY INJURY SUPERIMPOSED ON CKD (HCC): ICD-10-CM

## 2021-12-08 PROCEDURE — 99214 OFFICE O/P EST MOD 30 MIN: CPT | Performed by: INTERNAL MEDICINE

## 2021-12-08 RX ORDER — FUROSEMIDE 40 MG/1
20 TABLET ORAL DAILY
Qty: 30 TABLET | Refills: 11 | Status: SHIPPED
Start: 2021-12-08

## 2021-12-08 RX ORDER — LISINOPRIL 5 MG/1
5 TABLET ORAL DAILY
Qty: 30 TABLET | Refills: 11 | Status: ON HOLD
Start: 2021-12-08 | End: 2022-07-31

## 2021-12-08 RX ORDER — FUROSEMIDE 40 MG/1
20 TABLET ORAL EVERY OTHER DAY
Qty: 30 TABLET | Refills: 11 | Status: SHIPPED
Start: 2021-12-08 | End: 2021-12-08 | Stop reason: DRUGHIGH

## 2021-12-08 RX ORDER — AMLODIPINE BESYLATE 5 MG/1
5 TABLET ORAL DAILY
COMMUNITY
End: 2021-12-08 | Stop reason: SINTOL

## 2021-12-08 NOTE — PROGRESS NOTES
[facility]   Daily Progress Note             CC: \"Raciel is doing so much better. \"     This is an 60-year-old female who was dementia, extremely hard of hearing with a previous history of coronary artery disease, CKD, aortic stenosis, hypertension, hyperlipidemia, presented to the hospital 10/26/21 with worsening shortness of breath.  According to the HPI. she presented to [facility] Emergency Room. Evelina Anderson is also being evaluated for [place] symptoms have been worsening for four or five days and her granddaughter brought her to the hospital. [facility] is also concern of dark stools. Cardiac Diagnosis:  hypertension, hyperlipidemia and coronary artery disease      11/4/21, follow up,  she presented with her granddaughter for her hospital follow up. Patient is very [address] and has dementia. She continued with HUMPHREYS and BLE edema. Use of cane for walking aid. Patient denied exertional chest pain/pressure, dyspnea at rest, changes HUMPHREYS, PND, orthopnea, palpitations, lightheadedness, weight changes, changes in LE edema, and syncope. I have recommended palliative care for her. They will think it over. Today, she is again accompanied by family-granddaughter. They both reports she is feeling \"much better. \" than at our last visit. She has  Mild RLE edema, chronic and unchanged. Her weight has been consistent per home monitoring, ~147#. He denies CP, pressure, tightness, edema, SOB, heart racing, palpitations, lightheaded, dizziness, PND or orthopnea. She is compliant with medical therapy and tolerating.        Past Medical History:   Diagnosis Date    Chronic kidney disease (CKD), stage IV (severe) (HCC)     Coronary artery disease involving native coronary artery of native heart without angina pectoris 1/5/2016    GERD (gastroesophageal reflux disease) acid reflux    Gout, chronic     Histoplasmosis     Hyperlipidemia     Hypertension     Irregular heart beat     Osteoarthritis 2/28/2019    Knee, leg     Osteopenia of hip 04/26/2019    Pneumonia     Ulcer of jejunum     Unspecified cerebral artery occlusion with cerebral infarction 2014 , ~ 2012     Social History     Socioeconomic History    Marital status:      Spouse name: Not on file    Number of children: Not on file    Years of education: Not on file    Highest education level: Not on file   Occupational History    Not on file   Tobacco Use    Smoking status: Passive Smoke Exposure - Never Smoker    Smokeless tobacco: Never Used   Vaping Use    Vaping Use: Never used   Substance and Sexual Activity    Alcohol use: No     Comment: 1 yearly    Drug use: No    Sexual activity: Yes     Partners: Male   Other Topics Concern    Not on file   Social History Narrative    Not on file     Social Determinants of Health     Financial Resource Strain:     Difficulty of Paying Living Expenses: Not on file   Food Insecurity:     Worried About Running Out of Food in the Last Year: Not on file    Uday of Food in the Last Year: Not on file   Transportation Needs:     Lack of Transportation (Medical): Not on file    Lack of Transportation (Non-Medical):  Not on file   Physical Activity:     Days of Exercise per Week: Not on file    Minutes of Exercise per Session: Not on file   Stress:     Feeling of Stress : Not on file   Social Connections:     Frequency of Communication with Friends and Family: Not on file    Frequency of Social Gatherings with Friends and Family: Not on file    Attends Synagogue Services: Not on file    Active Member of Clubs or Organizations: Not on file    Attends Club or Organization Meetings: Not on file    Marital Status: Not on file   Intimate Partner Violence:     Fear of Current or Ex-Partner: Not on file    Emotionally Abused: Not on file    Physically Abused: Not on file    Sexually Abused: Not on file   Housing Stability:     Unable to Pay for Housing in the Last Year: Not on file    Number of Places Lived in the Last Year: Not on file    Unstable Housing in the Last Year: Not on file     Past Surgical History:   Procedure Laterality Date   [address] surgery Left arm    HYSTERECTOMY      TUBAL LIGATION      UPPER GASTROINTESTINAL ENDOSCOPY N/A 9/10/2021    EGD CONTROL HEMORRHAGE performed by Mauricio Mckeon DO at [place] [address]  9/10/2021    EGD SUBMUCOSAL/ EPINEPHRINE INJECTION performed by Mauricio Mckeon DO at [facility] History     Socioeconomic History    Marital status:      Spouse name: Not on file    Number of children: Not on file    Years of education: Not on file    Highest education level: Not on file   Occupational History    Not on file   Tobacco Use    Smoking status: Passive Smoke Exposure - Never Smoker    Smokeless tobacco: Never Used   Vaping Use    Vaping Use: Never used   Substance and Sexual Activity    Alcohol use: No     Comment: 1 yearly    Drug use: No    Sexual activity: Yes     Partners: Male   Other Topics Concern    Not on file   Social History Narrative    Not on file     Social Determinants of Health     Financial Resource Strain:     Difficulty of Paying Living Expenses: Not on file   Food Insecurity:     Worried About [employer] [employer] in the Last Year: Not on file    Uday of Food in the Last Year: Not on file   Transportation Needs:     Lack of Transportation (Medical): Not on file    Lack of Transportation (Non-Medical):  Not on file   Physical Activity:     Days of Exercise per Week: Not on file    Minutes of Exercise per Session: Not on file   Stress:     Feeling of Stress : Not on file   Social Connections:     Frequency of Communication with Friends and Family: Not on file    Frequency of Social Gatherings with Friends and Family: Not on file    Attends Scientologist Services: Not on file   [employer] of Clubs or Organizations: Not on file    Attends Club or Organization Meetings: Not on file    Marital Status: Not on file   Intimate Partner Violence:     Fear of Current or Ex-Partner: Not on file    Emotionally Abused: Not on file    Physically Abused: Not on file    Sexually Abused: Not on file   Housing Stability:     Unable to Pay for Housing in the Last Year: Not on file    Number of [place] in the Last Year: Not on file    Unstable Housing in the Last Year: Not on file       Objective:     Review of Systems:  · Constitutional: no unanticipated weight loss. There's been no change in energy level, sleep pattern, or activity level. No fevers, chills. · Eyes: No visual changes or diplopia. No scleral icterus. · ENT: No Headaches, hearing loss or vertigo. No mouth sores or sore throat. · Cardiovascular: as reviewed in HPI  · Respiratory: No cough or wheezing, no sputum production. No hematemesis. · Gastrointestinal: No abdominal pain, appetite loss, blood in stools. No change in bowel or bladder habits. · Genitourinary: No dysuria, trouble voiding, or hematuria. · Musculoskeletal:  No gait disturbance, no joint complaints. · Integumentary: No rash or pruritis. · Neurological: No headache, diplopia, change in muscle strength, numbness or tingling. · Psychiatric: No anxiety or depression. · Endocrine: No temperature intolerance. No excessive thirst, fluid intake, or urination. No tremor. · Hematologic/Lymphatic: No abnormal bruising or bleeding, blood clots or swollen lymph nodes.   · Allergic/Immunologic: No nasal congestion or hives.        Medications:   Current Outpatient Medications   Medication Sig Dispense Refill    Cyanocobalamin (VITAMIN B 12 PO) Take by mouth daily      amLODIPine (NORVASC) 5 MG tablet Take 5 mg by mouth daily      carvedilol (COREG) 12.5 MG tablet Take 12.5 mg by mouth 2 times daily (with meals)      isosorbide mononitrate (IMDUR) 30 MG extended release tablet Take 1 tablet by mouth daily Spacing out from other cardiac medications 30 tablet 6    furosemide (LASIX) 40 MG tablet Take 0.5 tablets by mouth every other day Resume furosemide on 11/3/21 (Patient taking differently: Take 40 mg by mouth daily Resume furosemide on 11/3/21) 60 tablet 3    aspirin 81 MG chewable tablet Take 81 mg by mouth daily      ferrous sulfate 324 (65 Fe) MG EC tablet Take 1 tablet by mouth 2 times daily (with meals) 60 tablet 1    pantoprazole (PROTONIX) 40 MG tablet Take 1 tablet by mouth 2 times daily (before meals) 60 tablet 3    allopurinol (ZYLOPRIM) 100 MG tablet Take 50 mg by mouth every other day      Respiratory Therapy Supplies (NEBULIZER/TUBING/MOUTHPIECE) KIT 1 kit by Does not apply route daily as needed (wheezing) 1 kit 0    ipratropium-albuterol (DUONEB) 0.5-2.5 (3) MG/3ML SOLN nebulizer solution Inhale 3 mLs into the lungs every 6 hours as needed for Shortness of Breath 360 mL 0    Multiple Vitamins-Minerals (THERAPEUTIC MULTIVITAMIN-MINERALS) tablet Take 1 tablet by mouth daily      donepezil (ARICEPT) 5 MG tablet Take 5 mg by mouth nightly      loratadine (CLARITIN) 10 MG capsule Take 10 mg by mouth daily as needed       FLUoxetine (PROZAC) 20 MG capsule TAKE 1 CAPSULE BY MOUTH ONE TIME A DAY  90 capsule 0    Handicap Placard MISC by Does not apply route Patient is unable to walk more than 250 feet before stopping to rest.    Not to exceed 5 years. DX: CHF 1 each 0     No current facility-administered medications for this visit. Physical Exam:     Vitals:    12/08/21 1516   BP: 114/64   Pulse: 70   SpO2: 96%   Weight: 143 lb (64.9 kg)   Height: 5' (1.524 m)       Constitutional: She is oriented to person, place, and time. She is elderly and pleasant. In no acute distress. HEENT: Normocephalic and atraumatic. Sclerae anicteric. No xanthelasmas. EOM's intact. Neck: Supple. No JVD present. Carotids without bruits. No thyromegaly present.  No lymphadenopathy present. Cardiovascular: RRR, normal S1 and S2; 2/6 systolic Murmur at the bass or the heart, no gallop or rub  Pulmonary/Chest: Effort normal.  Lungs clear to auscultation. Chest wall nontender  Abdominal: soft, nontender, nondistended. + bowel sounds; no hepatomegaly Extremities: No cyanosis, or clubbing. Pulses are 2+ radial/DP/PT bilaterally. Cap refill brisk. + varicosities to lower legs bilaterally. Trace edema on R posterior calf at site of large varicosity and trace edema on the L lower leg  Neurological: No focal deficit. Skin: Skin is warm and dry. Psychiatric: She has a normal mood and affect.  Her speech is normal and behavior is normal.       Lab Data:  Lab Results   Component Value Date    WBC 9.3 12/06/2021       Lab Results   Component Value Date     12/06/2021        Lab Results   Component Value Date    HGB 10.8 12/06/2021    HCT 33.7 12/06/2021     Lab Results   Component Value Date     12/06/2021     Lab Results   Component Value Date    K 4.9 12/06/2021    K 3.7 10/31/2021       Lab Results   Component Value Date    BUN 37 12/06/2021    CREATININE 2.1 12/06/2021     Lab Results   Component Value Date    MG 1.70 09/12/2021       Lab Results   Component Value Date    PHOS 3.6 05/21/2014     Lab Results   Component Value Date    TROPONINI 0.01 10/26/2021    TROPONINI <0.01 09/10/2021    TROPONINI <0.01 08/24/2021     No results found for: LABA1C  Lab Results   Component Value Date    TRIG 295 04/24/2018    HDL 47 04/24/2018    HDL 38 11/03/2011    LDLCALC 85 04/24/2018    LDLDIRECT 67 09/28/2016    LABVLDL 59 04/24/2018       No results found for: TSH    EKG Interpretation: 12/8/21: not completed     Imaging:    Echo: 10/27/21  Left ventricular cavity size is normal.   Normal left ventricular wall thickness.   Ejection fraction is visually estimated to be 35-40%.   moderately diffuse hypokinesis   Grade II diastolic dysfunction with elevated LV filling pressures.   Mild aortic stenosis with a peak velocity of 2.7m/s and a mean pressure   gradient of 17mmHg.   Trivial aortic regurgitation.   Normal right ventricular size and function.        Right ventricular systolic function is normal     Assessment:Plan:    Severe Dementia  Discussed with granddaughter     Acute on chronic systolic heart failure. NYHA class III on admission  Today NYHA    -Echocardiogram showed EF of 35 to 40% 10/2021  -reports improvement in SOB, edema since last visit.    -Continue Coreg 12.5 p.o. twice daily. Lasix 20 mg p.o. daily. Imdur 30 mg daily. I would like jose reduce Lisinopril to 5 mg daily it was formerly discontinued inpatient. She will remain off of Norvasc. -BMP in 1 month and may consider changes to medical management.   -She does not appear to be an ideal  patient for SGLT2 inhibitor nor aldactone.    -Patient is not a candidate for ICD therapy at this point-  -2 month f/u      SULY on CKD with anemia   -Renal function has improved 12/6/21 compared 10/31/21 at [place].   -she will remain off of aldactone, norvasc and reduce lisinopril to 5 mg daily. -on iron supplement once daily, encouraged BID. - will have to hold diuretic therapy till renal function improved.    -referral to nephrology at [facility] [address] previously provided. Acute on chronic hypoxic respiratory failure  -Predominantly due to underlying congestive heart failure though with elevated CRP some component of infectious etiology cannot entirely be excluded  -oxygen therapy discontinued at discharged   -on breathing treatments at home    We will see her back in 2 months. I  have previously recommended palliative care for her. They will think it over. Thank you very much for allowing me to participate in the care of your patient. Please do not hesitate to contact me if you have any questions.     Sincerely,  Brittany De Luna MD      [facility], [address] [place] [address]  Ph: [phone]  Fax: [phone]      This note was scribed in the presence of Dr. Luke Buitrago MD by Renée Baez RN.

## 2022-02-25 NOTE — PROGRESS NOTES
[address]   Cardiology Progress Note             CC: \"    This is an 66-year-old female who was dementia, extremely hard of hearing with a previous history of coronary artery disease, CKD, aortic stenosis, hypertension, hyperlipidemia, presented to the hospital 10/26/21 with worsening shortness of breath.  According to the HPI. she presented to [facility]. Erica Ralph is also being evaluated for Mamta Castillo symptoms have been worsening for four or five days and her granddaughter brought her to the hospital. Liz Oconnell is also concern of dark stools. Cardiac Diagnosis:  hypertension, hyperlipidemia and coronary artery disease      11/4/21, follow up,  she presented with her granddaughter for her hospital follow up. Patient is very [address] and has dementia. She continued with HUMPHREYS and BLE edema. Use of cane for walking aid. Patient denied exertional chest pain/pressure, dyspnea at rest, changes HUMPHREYS, PND, orthopnea, palpitations, lightheadedness, weight changes, changes in LE edema, and syncope. I have recommended palliative care for her. They will think it over. 12/8/21 she is again accompanied by family-granddaughter. They both reports she is feeling \"much better. \" than at our last visit. She has  Mild RLE edema, chronic and unchanged. Her weight has been consistent per home monitoring, ~147#. He denies CP, pressure, tightness, edema, SOB, heart racing, palpitations, lightheaded, dizziness, PND or orthopnea. She is compliant with medical therapy and tolerating. Today, she returns in follow up accompanied by family. She is in a wheelchair, uses cane at home. Family reports memory issues continues to be a struggle but denies any cardiac symptoms except fatigue with activity. Today she twisted her ankle walking around her home. Patient denies exertional chest pain/pressure, dyspnea at rest, HUMPHREYS, PND, orthopnea, palpitations, lightheadedness, weight changes, changes in LE edema, and syncope.  She admits to medical therapy compliance and tolerating. Past Medical History:   Diagnosis Date    Chronic kidney disease (CKD), stage IV (severe) ([facility])     Coronary artery disease involving native coronary artery of native heart without angina pectoris 1/5/2016    GERD (gastroesophageal reflux disease) acid reflux    Gout, chronic     Histoplasmosis     Hyperlipidemia     Hypertension     Irregular heart beat     Osteoarthritis 2/28/2019    Knee, leg     Osteopenia of hip 04/26/2019    Pneumonia     Ulcer of jejunum     Unspecified cerebral artery occlusion with cerebral infarction 2014 , ~ 2012     Social History     Socioeconomic History    Marital status:      Spouse name: None    Number of children: None    Years of education: None    Highest education level: None   Occupational History    None   Tobacco Use    Smoking status: Passive Smoke Exposure - Never Smoker    Smokeless tobacco: Never Used   Vaping Use    Vaping Use: Never used   Substance and Sexual Activity    Alcohol use: No     Comment: 1 yearly    Drug use: No    Sexual activity: Yes     Partners: Male   Other Topics Concern    None   Social History Narrative    None     Social Determinants of Health     Financial Resource Strain: Low Risk     Difficulty of Paying Living Expenses: Not hard at all   Food Insecurity: No Food Insecurity    Worried About Running Out of Food in the Last Year: Never true    Uday of Food in the Last Year: Never true   Transportation Needs:     Lack of Transportation (Medical): Not on file    Lack of Transportation (Non-Medical):  Not on file   Physical Activity:     Days of Exercise per Week: Not on file    Minutes of Exercise per Session: Not on file   Stress:     Feeling of Stress : Not on file   Social Connections:     Frequency of Communication with Friends and Family: Not on file    Frequency of Social Gatherings with Friends and Family: Not on file    Attends Druze Services: Not on file    Active Member of Clubs or Organizations: Not on file    Attends Club or Organization Meetings: Not on file    Marital Status: Not on file   Intimate Partner Violence:     Fear of Current or Ex-Partner: Not on file    Emotionally Abused: Not on file    Physically Abused: Not on file    Sexually Abused: Not on file   Housing Stability:     Unable to Pay for Housing in the Last Year: Not on file    Number of [place] in the Last Year: Not on file    Unstable Housing in the Last Year: Not on file     Past Surgical History:   Procedure Laterality Date   [address]  surgery Left arm    HYSTERECTOMY      TUBAL LIGATION      UPPER GASTROINTESTINAL ENDOSCOPY N/A 9/10/2021    EGD CONTROL HEMORRHAGE performed by Jatin Castrejon DO at [place] [id number]  9/10/2021    EGD SUBMUCOSAL/ EPINEPHRINE INJECTION performed by Jatin Castrejon DO at [address] [place] [address] History     Socioeconomic History    Marital status:      Spouse name: None    Number of children: None    Years of education: None    Highest education level: None   Occupational History    None   Tobacco Use    Smoking status: Passive Smoke Exposure - Never Smoker    Smokeless tobacco: Never Used   Vaping Use    Vaping Use: Never used   Substance and Sexual Activity    Alcohol use: No     Comment: 1 yearly    Drug use: No    Sexual activity: Yes     Partners: Male   Other Topics Concern    None   Social History Narrative    None     Social Determinants of Health     Financial Resource Strain: Low Risk     Difficulty of Paying Living Expenses: Not hard at all   Food Insecurity: No Food Insecurity    Worried About Running Out of Food in the Last Year: Never true    [address] in the Last Year: Never true   Transportation Needs:     Lack of Transportation (Medical):  Not on file    Lack of Transportation (Non-Medical): Not on file   Physical Activity:     Days of Exercise per Week: Not on file    Minutes of Exercise per Session: Not on file   Stress:     Feeling of Stress : Not on file   Social Connections:     Frequency of Communication with Friends and Family: Not on file    Frequency of Social Gatherings with Friends and Family: Not on file    Attends Quaker Services: Not on file    Active Member of [address] [place] [address] or Organizations: Not on file    Attends Club or Organization Meetings: Not on file    Marital Status: Not on file   Intimate Partner Violence:     Fear of Current or Ex-Partner: Not on file    Emotionally Abused: Not on file    Physically Abused: Not on file    Sexually Abused: Not on file   Housing Stability:     Unable to Pay for Housing in the Last Year: Not on file    Number of [place] in the Last Year: Not on file    Unstable Housing in the Last Year: Not on file       Objective:     Review of Systems:  · Constitutional: no unanticipated weight loss. There's been no change in energy level, sleep pattern, or activity level. No fevers, chills. · Eyes: No visual changes or diplopia. No scleral icterus. · ENT: No Headaches, hearing loss or vertigo. No mouth sores or sore throat. · Cardiovascular: as reviewed in HPI  · Respiratory: No cough or wheezing, no sputum production. No hematemesis. · Gastrointestinal: No abdominal pain, appetite loss, blood in stools. No change in bowel or bladder habits. · Genitourinary: No dysuria, trouble voiding, or hematuria. · Musculoskeletal:  No gait disturbance, no joint complaints. · Integumentary: No rash or pruritis. · Neurological: No headache, diplopia, change in muscle strength, numbness or tingling. · Psychiatric: No anxiety or depression. · Endocrine: No temperature intolerance. No excessive thirst, fluid intake, or urination. No tremor.   · Hematologic/Lymphatic: No abnormal bruising or bleeding, blood clots or swollen lymph nodes. · Allergic/Immunologic: No nasal congestion or hives.        Medications:   Current Outpatient Medications   Medication Sig Dispense Refill    donepezil (ARICEPT) 10 MG tablet Take 1 tablet by mouth nightly 90 tablet 1    albuterol sulfate HFA (VENTOLIN HFA) 108 (90 Base) MCG/ACT inhaler Inhale 2 puffs into the lungs 4 times daily as needed for Wheezing 18 g 0    FLUoxetine (PROZAC) 10 MG capsule TAKE 1 CAPSULE BY MOUTH ONE TIME A DAY 30 capsule 2    allopurinol (ZYLOPRIM) 100 MG tablet Take 0.5 tablets by mouth every other day 30 tablet 0    Cyanocobalamin (VITAMIN B 12 PO) Take by mouth daily      lisinopril (PRINIVIL;ZESTRIL) 5 MG tablet Take 1 tablet by mouth daily 30 tablet 11    furosemide (LASIX) 40 MG tablet Take 0.5 tablets by mouth daily 30 tablet 11    carvedilol (COREG) 12.5 MG tablet Take 12.5 mg by mouth 2 times daily (with meals)      isosorbide mononitrate (IMDUR) 30 MG extended release tablet Take 1 tablet by mouth daily Spacing out from other cardiac medications 30 tablet 6    aspirin 81 MG chewable tablet Take 81 mg by mouth daily      ferrous sulfate 324 (65 Fe) MG EC tablet Take 1 tablet by mouth 2 times daily (with meals) 60 tablet 1    pantoprazole (PROTONIX) 40 MG tablet Take 1 tablet by mouth 2 times daily (before meals) 60 tablet 3    Respiratory Therapy Supplies (NEBULIZER/TUBING/MOUTHPIECE) KIT 1 kit by Does not apply route daily as needed (wheezing) 1 kit 0    ipratropium-albuterol (DUONEB) 0.5-2.5 (3) MG/3ML SOLN nebulizer solution Inhale 3 mLs into the lungs every 6 hours as needed for Shortness of Breath 360 mL 0    Multiple Vitamins-Minerals (THERAPEUTIC MULTIVITAMIN-MINERALS) tablet Take 1 tablet by mouth daily      loratadine (CLARITIN) 10 MG capsule Take 10 mg by mouth daily as needed       Handicap Placard MISC by Does not apply route Patient is unable to walk more than 250 feet before stopping to rest.    Not to exceed 5 years.   DX: CHF 1 each 0     No current facility-administered medications for this visit. Physical Exam:     Vitals:    03/01/22 1605   BP: 124/64   Pulse: 64   SpO2: 98%   Weight: 153 lb (69.4 kg)   Height: 5' (1.524 m)       Constitutional: She is oriented to person, place, and time. She is elderly and pleasant. In no acute distress. HEENT: Normocephalic and atraumatic. Sclerae anicteric. No xanthelasmas. EOM's intact. Neck: Supple. No JVD present. Carotids without bruits. No thyromegaly present. No lymphadenopathy present. Cardiovascular: RRR, normal S1 and S2; 2/6 systolic Murmur at the bass or the heart, no gallop or rub  Pulmonary/Chest: Effort normal.  Lungs clear to auscultation. Chest wall nontender  Abdominal: soft, nontender, nondistended. + bowel sounds; no hepatomegaly Extremities: No cyanosis, or clubbing. Pulses are 2+ radial/DP/PT bilaterally. Cap refill brisk. + varicosities to lower legs bilaterally. Trace edema on R posterior calf at site of large varicosity and trace edema on the L lower leg  Neurological: No focal deficit. Skin: Skin is warm and dry. Psychiatric: She has a normal mood and affect.  Her speech is normal and behavior is normal.       Lab Data:  Lab Results   Component Value Date    WBC 9.3 12/06/2021       Lab Results   Component Value Date     12/06/2021        Lab Results   Component Value Date    HGB 10.8 12/06/2021    HCT 33.7 12/06/2021     Lab Results   Component Value Date     12/06/2021     Lab Results   Component Value Date    K 4.9 12/06/2021    K 3.7 10/31/2021       Lab Results   Component Value Date    BUN 37 12/06/2021    CREATININE 2.1 12/06/2021     Lab Results   Component Value Date    MG 1.70 09/12/2021       Lab Results   Component Value Date    PHOS 3.6 05/21/2014     Lab Results   Component Value Date    TROPONINI 0.01 10/26/2021    TROPONINI <0.01 09/10/2021    TROPONINI <0.01 08/24/2021     No results found for: LABA1C  Lab Results   Component Value discontinued at discharged   -on breathing treatments at home  -physical exam normal today     We will see her back in 6 months. I have again discussed recommended palliative care for her. They will think it over. Thank you very much for allowing me to participate in the care of your patient. Please do not hesitate to contact me if you have any questions. Sincerely,  Wilson Phillips MD      [facility], [address] [place] [address]  Ph: [phone]  Fax: [phone]    This note was scribed in the presence of Dr. Cindy Ross MD by Gallito Lopez RN.

## 2022-03-01 ENCOUNTER — OFFICE VISIT (OUTPATIENT)
Dept: CARDIOLOGY CLINIC | Age: 87
End: 2022-03-01
Payer: MEDICARE

## 2022-03-01 VITALS
BODY MASS INDEX: 30.04 KG/M2 | DIASTOLIC BLOOD PRESSURE: 64 MMHG | HEIGHT: 60 IN | OXYGEN SATURATION: 98 % | SYSTOLIC BLOOD PRESSURE: 124 MMHG | HEART RATE: 64 BPM | WEIGHT: 153 LBS

## 2022-03-01 DIAGNOSIS — F03.C0 SEVERE DEMENTIA: Primary | ICD-10-CM

## 2022-03-01 DIAGNOSIS — N18.9 ACUTE KIDNEY INJURY SUPERIMPOSED ON CKD (HCC): ICD-10-CM

## 2022-03-01 DIAGNOSIS — J96.11 CHRONIC RESPIRATORY FAILURE WITH HYPOXIA (HCC): ICD-10-CM

## 2022-03-01 DIAGNOSIS — N17.9 ACUTE KIDNEY INJURY SUPERIMPOSED ON CKD (HCC): ICD-10-CM

## 2022-03-01 DIAGNOSIS — I50.23 ACUTE ON CHRONIC SYSTOLIC HEART FAILURE (HCC): ICD-10-CM

## 2022-03-01 PROCEDURE — 99214 OFFICE O/P EST MOD 30 MIN: CPT | Performed by: INTERNAL MEDICINE

## 2022-07-30 ENCOUNTER — APPOINTMENT (OUTPATIENT)
Dept: CT IMAGING | Age: 87
DRG: 871 | End: 2022-07-30
Payer: MEDICARE

## 2022-07-30 ENCOUNTER — APPOINTMENT (OUTPATIENT)
Dept: GENERAL RADIOLOGY | Age: 87
DRG: 871 | End: 2022-07-30
Payer: MEDICARE

## 2022-07-30 ENCOUNTER — HOSPITAL ENCOUNTER (INPATIENT)
Age: 87
LOS: 4 days | Discharge: HOME | DRG: 871 | End: 2022-08-03
Attending: EMERGENCY MEDICINE | Admitting: INTERNAL MEDICINE
Payer: MEDICARE

## 2022-07-30 DIAGNOSIS — R62.7 ADULT FAILURE TO THRIVE: ICD-10-CM

## 2022-07-30 DIAGNOSIS — N28.9 RENAL INSUFFICIENCY: ICD-10-CM

## 2022-07-30 DIAGNOSIS — U07.1 COVID: Primary | ICD-10-CM

## 2022-07-30 DIAGNOSIS — R77.8 ELEVATED TROPONIN: ICD-10-CM

## 2022-07-30 PROBLEM — N39.0 UTI (URINARY TRACT INFECTION): Status: ACTIVE | Noted: 2022-07-30

## 2022-07-30 LAB
ALBUMIN SERPL-MCNC: 3.3 G/DL (ref 3.4–5)
ALP BLD-CCNC: 52 U/L (ref 40–129)
ALT SERPL-CCNC: 12 U/L (ref 10–40)
ANION GAP SERPL CALCULATED.3IONS-SCNC: 21 MMOL/L (ref 3–16)
AST SERPL-CCNC: 33 U/L (ref 15–37)
BACTERIA: ABNORMAL /HPF
BASOPHILS ABSOLUTE: 0 K/UL (ref 0–0.2)
BASOPHILS RELATIVE PERCENT: 0.2 %
BILIRUB SERPL-MCNC: 0.3 MG/DL (ref 0–1)
BILIRUBIN DIRECT: <0.2 MG/DL (ref 0–0.3)
BILIRUBIN URINE: NEGATIVE
BILIRUBIN, INDIRECT: ABNORMAL MG/DL (ref 0–1)
BLOOD, URINE: NEGATIVE
BUN BLDV-MCNC: 58 MG/DL (ref 7–20)
CALCIUM SERPL-MCNC: 7.1 MG/DL (ref 8.3–10.6)
CHLORIDE BLD-SCNC: 97 MMOL/L (ref 99–110)
CLARITY: ABNORMAL
CO2: 21 MMOL/L (ref 21–32)
COLOR: YELLOW
CREAT SERPL-MCNC: 2.6 MG/DL (ref 0.6–1.2)
EOSINOPHILS ABSOLUTE: 0 K/UL (ref 0–0.6)
EOSINOPHILS RELATIVE PERCENT: 0.1 %
EPITHELIAL CELLS, UA: ABNORMAL /HPF (ref 0–5)
GFR AFRICAN AMERICAN: 21
GFR NON-AFRICAN AMERICAN: 17
GLUCOSE BLD-MCNC: 138 MG/DL (ref 70–99)
GLUCOSE URINE: NEGATIVE MG/DL
HCT VFR BLD CALC: 32.7 % (ref 36–48)
HEMOGLOBIN: 11.3 G/DL (ref 12–16)
KETONES, URINE: NEGATIVE MG/DL
LACTIC ACID: 1.8 MMOL/L (ref 0.4–2)
LEUKOCYTE ESTERASE, URINE: ABNORMAL
LIPASE: 54 U/L (ref 13–60)
LYMPHOCYTES ABSOLUTE: 3.2 K/UL (ref 1–5.1)
LYMPHOCYTES RELATIVE PERCENT: 29.6 %
MCH RBC QN AUTO: 30.7 PG (ref 26–34)
MCHC RBC AUTO-ENTMCNC: 34.5 G/DL (ref 31–36)
MCV RBC AUTO: 89 FL (ref 80–100)
MICROSCOPIC EXAMINATION: YES
MONOCYTES ABSOLUTE: 1.8 K/UL (ref 0–1.3)
MONOCYTES RELATIVE PERCENT: 16.8 %
NEUTROPHILS ABSOLUTE: 5.7 K/UL (ref 1.7–7.7)
NEUTROPHILS RELATIVE PERCENT: 53.3 %
NITRITE, URINE: NEGATIVE
PDW BLD-RTO: 14.5 % (ref 12.4–15.4)
PH UA: 6 (ref 5–8)
PLATELET # BLD: 288 K/UL (ref 135–450)
PMV BLD AUTO: 9.3 FL (ref 5–10.5)
POTASSIUM SERPL-SCNC: 3.3 MMOL/L (ref 3.5–5.1)
PRO-BNP: 3188 PG/ML (ref 0–449)
PROTEIN UA: ABNORMAL MG/DL
RBC # BLD: 3.68 M/UL (ref 4–5.2)
RBC UA: ABNORMAL /HPF (ref 0–4)
SARS-COV-2, NAAT: DETECTED
SODIUM BLD-SCNC: 139 MMOL/L (ref 136–145)
SPECIFIC GRAVITY UA: 1.01 (ref 1–1.03)
TOTAL PROTEIN: 6.7 G/DL (ref 6.4–8.2)
TROPONIN: 0.06 NG/ML
TROPONIN: 0.1 NG/ML
URINE TYPE: ABNORMAL
UROBILINOGEN, URINE: 0.2 E.U./DL
WBC # BLD: 10.8 K/UL (ref 4–11)
WBC UA: >100 /HPF (ref 0–5)

## 2022-07-30 PROCEDURE — 36415 COLL VENOUS BLD VENIPUNCTURE: CPT

## 2022-07-30 PROCEDURE — 2580000003 HC RX 258: Performed by: STUDENT IN AN ORGANIZED HEALTH CARE EDUCATION/TRAINING PROGRAM

## 2022-07-30 PROCEDURE — 74176 CT ABD & PELVIS W/O CONTRAST: CPT

## 2022-07-30 PROCEDURE — 93005 ELECTROCARDIOGRAM TRACING: CPT | Performed by: EMERGENCY MEDICINE

## 2022-07-30 PROCEDURE — 81001 URINALYSIS AUTO W/SCOPE: CPT

## 2022-07-30 PROCEDURE — 71046 X-RAY EXAM CHEST 2 VIEWS: CPT

## 2022-07-30 PROCEDURE — 85025 COMPLETE CBC W/AUTO DIFF WBC: CPT

## 2022-07-30 PROCEDURE — 80076 HEPATIC FUNCTION PANEL: CPT

## 2022-07-30 PROCEDURE — 2580000003 HC RX 258: Performed by: EMERGENCY MEDICINE

## 2022-07-30 PROCEDURE — 83690 ASSAY OF LIPASE: CPT

## 2022-07-30 PROCEDURE — 80048 BASIC METABOLIC PNL TOTAL CA: CPT

## 2022-07-30 PROCEDURE — 2580000003 HC RX 258: Performed by: NURSE PRACTITIONER

## 2022-07-30 PROCEDURE — 84484 ASSAY OF TROPONIN QUANT: CPT

## 2022-07-30 PROCEDURE — 6360000002 HC RX W HCPCS: Performed by: STUDENT IN AN ORGANIZED HEALTH CARE EDUCATION/TRAINING PROGRAM

## 2022-07-30 PROCEDURE — 1200000000 HC SEMI PRIVATE

## 2022-07-30 PROCEDURE — 83605 ASSAY OF LACTIC ACID: CPT

## 2022-07-30 PROCEDURE — 83880 ASSAY OF NATRIURETIC PEPTIDE: CPT

## 2022-07-30 PROCEDURE — 6370000000 HC RX 637 (ALT 250 FOR IP): Performed by: STUDENT IN AN ORGANIZED HEALTH CARE EDUCATION/TRAINING PROGRAM

## 2022-07-30 PROCEDURE — 6360000002 HC RX W HCPCS: Performed by: NURSE PRACTITIONER

## 2022-07-30 PROCEDURE — 99285 EMERGENCY DEPT VISIT HI MDM: CPT

## 2022-07-30 PROCEDURE — 87635 SARS-COV-2 COVID-19 AMP PRB: CPT

## 2022-07-30 RX ORDER — ACETAMINOPHEN 650 MG/1
650 SUPPOSITORY RECTAL EVERY 6 HOURS PRN
Status: DISCONTINUED | OUTPATIENT
Start: 2022-07-30 | End: 2022-08-03 | Stop reason: HOSPADM

## 2022-07-30 RX ORDER — MEMANTINE HYDROCHLORIDE 5 MG/1
5 TABLET ORAL DAILY
Status: DISCONTINUED | OUTPATIENT
Start: 2022-07-31 | End: 2022-08-03 | Stop reason: HOSPADM

## 2022-07-30 RX ORDER — FLUOXETINE 10 MG/1
10 CAPSULE ORAL DAILY
Status: DISCONTINUED | OUTPATIENT
Start: 2022-07-31 | End: 2022-08-03 | Stop reason: HOSPADM

## 2022-07-30 RX ORDER — SODIUM CHLORIDE 0.9 % (FLUSH) 0.9 %
5-40 SYRINGE (ML) INJECTION EVERY 12 HOURS SCHEDULED
Status: DISCONTINUED | OUTPATIENT
Start: 2022-07-30 | End: 2022-08-03 | Stop reason: HOSPADM

## 2022-07-30 RX ORDER — LISINOPRIL 10 MG/1
10 TABLET ORAL DAILY
Status: DISCONTINUED | OUTPATIENT
Start: 2022-07-31 | End: 2022-08-03 | Stop reason: HOSPADM

## 2022-07-30 RX ORDER — CARVEDILOL 6.25 MG/1
6.25 TABLET ORAL 2 TIMES DAILY WITH MEALS
Status: DISCONTINUED | OUTPATIENT
Start: 2022-07-31 | End: 2022-08-03 | Stop reason: HOSPADM

## 2022-07-30 RX ORDER — ONDANSETRON 2 MG/ML
4 INJECTION INTRAMUSCULAR; INTRAVENOUS EVERY 6 HOURS PRN
Status: DISCONTINUED | OUTPATIENT
Start: 2022-07-30 | End: 2022-08-03 | Stop reason: HOSPADM

## 2022-07-30 RX ORDER — ATORVASTATIN CALCIUM 20 MG/1
20 TABLET, FILM COATED ORAL NIGHTLY
Status: DISCONTINUED | OUTPATIENT
Start: 2022-07-30 | End: 2022-08-03 | Stop reason: HOSPADM

## 2022-07-30 RX ORDER — POLYETHYLENE GLYCOL 3350 17 G/17G
17 POWDER, FOR SOLUTION ORAL DAILY PRN
Status: DISCONTINUED | OUTPATIENT
Start: 2022-07-30 | End: 2022-08-03 | Stop reason: HOSPADM

## 2022-07-30 RX ORDER — ALLOPURINOL 100 MG/1
50 TABLET ORAL DAILY
Status: DISCONTINUED | OUTPATIENT
Start: 2022-07-31 | End: 2022-08-03 | Stop reason: HOSPADM

## 2022-07-30 RX ORDER — 0.9 % SODIUM CHLORIDE 0.9 %
500 INTRAVENOUS SOLUTION INTRAVENOUS ONCE
Status: COMPLETED | OUTPATIENT
Start: 2022-07-30 | End: 2022-07-30

## 2022-07-30 RX ORDER — SODIUM CHLORIDE 0.9 % (FLUSH) 0.9 %
5-40 SYRINGE (ML) INJECTION PRN
Status: DISCONTINUED | OUTPATIENT
Start: 2022-07-30 | End: 2022-08-03 | Stop reason: HOSPADM

## 2022-07-30 RX ORDER — AMLODIPINE BESYLATE 5 MG/1
5 TABLET ORAL DAILY
Status: DISCONTINUED | OUTPATIENT
Start: 2022-07-31 | End: 2022-08-03 | Stop reason: HOSPADM

## 2022-07-30 RX ORDER — SODIUM CHLORIDE 9 MG/ML
INJECTION, SOLUTION INTRAVENOUS ONCE
Status: COMPLETED | OUTPATIENT
Start: 2022-07-30 | End: 2022-07-31

## 2022-07-30 RX ORDER — ASPIRIN 81 MG/1
81 TABLET, CHEWABLE ORAL DAILY
Status: DISCONTINUED | OUTPATIENT
Start: 2022-07-31 | End: 2022-08-03 | Stop reason: HOSPADM

## 2022-07-30 RX ORDER — LACTOBACILLUS RHAMNOSUS GG 10B CELL
1 CAPSULE ORAL
Status: DISCONTINUED | OUTPATIENT
Start: 2022-07-31 | End: 2022-08-03 | Stop reason: HOSPADM

## 2022-07-30 RX ORDER — ACETAMINOPHEN 325 MG/1
650 TABLET ORAL EVERY 6 HOURS PRN
Status: DISCONTINUED | OUTPATIENT
Start: 2022-07-30 | End: 2022-08-03 | Stop reason: HOSPADM

## 2022-07-30 RX ORDER — HEPARIN SODIUM 5000 [USP'U]/ML
5000 INJECTION, SOLUTION INTRAVENOUS; SUBCUTANEOUS EVERY 8 HOURS SCHEDULED
Status: DISCONTINUED | OUTPATIENT
Start: 2022-07-30 | End: 2022-08-03 | Stop reason: HOSPADM

## 2022-07-30 RX ORDER — PANTOPRAZOLE SODIUM 40 MG/1
40 TABLET, DELAYED RELEASE ORAL
Status: DISCONTINUED | OUTPATIENT
Start: 2022-07-31 | End: 2022-08-03 | Stop reason: HOSPADM

## 2022-07-30 RX ORDER — GUAIFENESIN/DEXTROMETHORPHAN 100-10MG/5
5 SYRUP ORAL EVERY 4 HOURS PRN
Status: DISCONTINUED | OUTPATIENT
Start: 2022-07-30 | End: 2022-08-03 | Stop reason: HOSPADM

## 2022-07-30 RX ORDER — SODIUM CHLORIDE 9 MG/ML
INJECTION, SOLUTION INTRAVENOUS PRN
Status: DISCONTINUED | OUTPATIENT
Start: 2022-07-30 | End: 2022-08-03 | Stop reason: HOSPADM

## 2022-07-30 RX ADMIN — SODIUM CHLORIDE, PRESERVATIVE FREE 10 ML: 5 INJECTION INTRAVENOUS at 22:22

## 2022-07-30 RX ADMIN — HEPARIN SODIUM 5000 UNITS: 5000 INJECTION INTRAVENOUS; SUBCUTANEOUS at 22:23

## 2022-07-30 RX ADMIN — ATORVASTATIN CALCIUM 20 MG: 20 TABLET, FILM COATED ORAL at 22:23

## 2022-07-30 RX ADMIN — SODIUM CHLORIDE 500 ML: 9 INJECTION, SOLUTION INTRAVENOUS at 15:23

## 2022-07-30 RX ADMIN — SODIUM CHLORIDE: 9 INJECTION, SOLUTION INTRAVENOUS at 22:27

## 2022-07-30 RX ADMIN — CEFTRIAXONE 1000 MG: 1 INJECTION, POWDER, FOR SOLUTION INTRAMUSCULAR; INTRAVENOUS at 22:28

## 2022-07-30 ASSESSMENT — ENCOUNTER SYMPTOMS
DIARRHEA: 1
SHORTNESS OF BREATH: 1
COUGH: 1
NAUSEA: 1
VOMITING: 1
ABDOMINAL PAIN: 1

## 2022-07-30 ASSESSMENT — PAIN SCALES - GENERAL: PAINLEVEL_OUTOF10: 0

## 2022-07-30 NOTE — ED PROVIDER NOTES
[address]      Pt Name: Elza Leigh  MRN: [id number]  [place] 1935  Date of evaluation: 7/30/2022  Provider: Christina uTcker MD    CHIEF COMPLAINT       Chief Complaint   Patient presents with    Fatigue     Patient's daughter at bedside states patient has had increased fatigue with body aches x2 weeks, reports patient has also hade nausea and diarrhea for around the same amoutn of time. States was exposed to Covid-19 1 week ago, tested negative with home test on Tuesday. HISTORY OF PRESENT ILLNESS   (Location/Symptom, Timing/Onset, Context/Setting, Quality, Duration, Modifying Factors, Severity)  Note limiting factors. Elza Leigh is a 80 y.o. female who presents to the emergency department creased fatigue with nausea vomiting and diarrhea for several weeks    HPI    Is a pleasant 44-year-old  female who is at her mental status baseline according to the daughter who is at the bedside who has had nausea and vomiting and diarrhea predominately diarrhea over the last few weeks. Denies any chalo abdominal pain is occasionally short of breath with a nonproductive cough. No history of fevers or chills at home. Is received 2 out of the 3 vaccinations for COVID but has not gotten a booster. Patient also complains of increased fatigue and diffuse myalgias. Nursing Notes were reviewed. REVIEW OF SYSTEMS    (2-9 systems for level 4, 10 or more for level 5)     Review of Systems   Constitutional:  Positive for chills and fatigue. Negative for fever. Respiratory:  Positive for cough and shortness of breath. Cardiovascular:  Negative for chest pain and palpitations. Gastrointestinal:  Positive for abdominal pain, diarrhea, nausea and vomiting. Genitourinary:  Negative for dysuria. Neurological:  Positive for weakness and headaches. Negative for tremors, seizures, syncope, speech difficulty and numbness. Psychiatric/Behavioral:  Negative for agitation and behavioral problems. All other systems reviewed and are negative. Except as noted above the remainder of the review of systems was reviewed and negative.        PAST MEDICAL HISTORY     Past Medical History:   Diagnosis Date    Chronic kidney disease (CKD), stage IV (severe) (HCC)     Coronary artery disease involving native coronary artery of native heart without angina pectoris 1/5/2016    GERD (gastroesophageal reflux disease) acid reflux    Gout, chronic     Histoplasmosis     Hyperlipidemia     Hypertension     Irregular heart beat     Osteoarthritis 2/28/2019    Knee, leg     Osteopenia of hip 04/26/2019    Pneumonia     Ulcer of jejunum     Unspecified cerebral artery occlusion with cerebral infarction 2014 , ~ 2012         SURGICAL HISTORY       Past Surgical History:   Procedure Laterality Date    BLADDER SUSPENSION      CHOLECYSTECTOMY  Orthopedic surgery Left arm    HYSTERECTOMY (CERVIX STATUS UNKNOWN)      TUBAL LIGATION      UPPER GASTROINTESTINAL ENDOSCOPY N/A 9/10/2021    EGD CONTROL HEMORRHAGE performed by Aurora Montalvo., DO at [facility]  9/10/2021    EGD SUBMUCOSAL/ EPINEPHRINE INJECTION performed by Aurora Montalvo., DO at [facility] [facility]       Previous Medications    ALBUTEROL SULFATE HFA (VENTOLIN HFA) 108 (90 BASE) MCG/ACT INHALER    Inhale 2 puffs into the lungs 4 times daily as needed for Wheezing    ALLOPURINOL (ZYLOPRIM) 100 MG TABLET    TAKE 1/2 TABLET BY MOUTH EVERY OTHER DAY    AMLODIPINE (NORVASC) 5 MG TABLET    TAKE 1 TABLET BY MOUTH EVERY DAY    ASPIRIN 81 MG CHEWABLE TABLET    Take 81 mg by mouth daily    ATORVASTATIN (LIPITOR) 20 MG TABLET    TAKE 1 TABLET BY MOUTH AT BEDTIME    CARVEDILOL (COREG) 12.5 MG TABLET    Take 12.5 mg by mouth 2 times daily (with meals)    CARVEDILOL (COREG) 6.25 MG TABLET    TAKE 1 TABLET BY MOUTH TWO TIMES A DAY WITH MEALS. CYANOCOBALAMIN (VITAMIN B 12 PO)    Take by mouth daily    DONEPEZIL (ARICEPT) 10 MG TABLET    TAKE 1 TABLET BY MOUTH nightly    FERROUS SULFATE 324 (65 FE) MG EC TABLET    Take 1 tablet by mouth 2 times daily (with meals)    FLUOXETINE (PROZAC) 10 MG CAPSULE    TAKE 1 CAPSULE BY MOUTH ONE TIME A DAY    FUROSEMIDE (LASIX) 40 MG TABLET    Take 0.5 tablets by mouth daily    HANDICAP PLACARD MISC    by Does not apply route Patient is unable to walk more than 250 feet before stopping to rest.    Not to exceed 5 years.   DX: CHF    IPRATROPIUM-ALBUTEROL (DUONEB) 0.5-2.5 (3) MG/3ML SOLN NEBULIZER SOLUTION    Inhale 3 mLs into the lungs every 6 hours as needed for Shortness of Breath    ISOSORBIDE MONONITRATE (IMDUR) 30 MG EXTENDED RELEASE TABLET    Take 1 tablet by mouth daily Spacing out from other cardiac medications    LISINOPRIL (PRINIVIL;ZESTRIL) 10 MG TABLET    TAKE 1 TABLET BY MOUTH EVERY DAY    LISINOPRIL (PRINIVIL;ZESTRIL) 5 MG TABLET    Take 1 tablet by mouth daily    LORATADINE (CLARITIN) 10 MG CAPSULE    Take 10 mg by mouth daily as needed     MEMANTINE (NAMENDA) 5 MG TABLET    Take 1 tablet by mouth daily    MULTIPLE VITAMINS-MINERALS (THERAPEUTIC MULTIVITAMIN-MINERALS) TABLET    Take 1 tablet by mouth daily    PANTOPRAZOLE (PROTONIX) 40 MG TABLET    TAKE 1 TABLET BY MOUTH EVERY DAY    RESPIRATORY THERAPY SUPPLIES (NEBULIZER/TUBING/MOUTHPIECE) KIT    1 kit by Does not apply route daily as needed (wheezing)       ALLERGIES     Codeine, Morphine and related, and Oxycodone-acetaminophen    FAMILY HISTORY       Family History   Problem Relation Age of Onset    High Blood Pressure Mother     Heart Disease Mother     Diabetes Mother     High Blood Pressure Father     Heart Disease Father     Cancer Father         Brain    Cancer Sister         lymphoma    Cancer Brother         colon    Cancer Maternal Uncle           SOCIAL HISTORY       Social History     Socioeconomic History    Marital status:  Spouse name: None    Number of children: None    Years of education: None    Highest education level: None   Tobacco Use    Smoking status: Passive Smoke Exposure - Never Smoker    Smokeless tobacco: Never   Vaping Use    Vaping Use: Never used   Substance and Sexual Activity    Alcohol use: No     Comment: 1 yearly    Drug use: No    Sexual activity: Yes     Partners: Male     Social Determinants of Health     Financial Resource Strain: Low Risk     Difficulty of Paying Living Expenses: Not hard at all   Food Insecurity: No Food Insecurity    Worried About [address] in the Last Year: Never true    [facility] in the Last Year: Never true       SCREENINGS         [place] Coma Scale  Eye Opening: Spontaneous  Best Verbal Response: Confused  Best Motor Response: Obeys commands  [place] Coma Scale Score: 14                     CIWA Assessment  BP: (!) 142/61  Heart Rate: 80                 PHYSICAL EXAM    (up to 7 for level 4, 8 or more for level 5)     ED Triage Vitals [07/30/22 1409]   BP Temp Temp Source Heart Rate Resp SpO2 Height Weight   (!) 115/59 98.5 °F (36.9 °C) Oral 74 15 98 % 4' 9\" (1.448 m) 127 lb 1.6 oz (57.7 kg)       Physical Exam  Constitutional:       General: She is not in acute distress. Appearance: Normal appearance. She is not ill-appearing. HENT:      Head: Normocephalic and atraumatic. Right Ear: Tympanic membrane and ear canal normal.      Left Ear: Tympanic membrane and ear canal normal.      Nose: Nose normal. No congestion. Mouth/Throat:      Mouth: Mucous membranes are dry. Pharynx: No oropharyngeal exudate or posterior oropharyngeal erythema. Eyes:      Extraocular Movements: Extraocular movements intact. Pupils: Pupils are equal, round, and reactive to light. Cardiovascular:      Rate and Rhythm: Normal rate. Pulses: Normal pulses. Heart sounds: Murmur heard. No friction rub. No gallop.    Pulmonary:      Effort: Pulmonary effort is normal.      Breath sounds: Normal breath sounds. Abdominal:      General: Bowel sounds are decreased. Palpations: Abdomen is soft. Tenderness: There is abdominal tenderness in the periumbilical area. There is no guarding or rebound. Musculoskeletal:         General: Normal range of motion. Cervical back: Normal range of motion and neck supple. Skin:     General: Skin is warm and dry. Capillary Refill: Capillary refill takes less than 2 seconds. Neurological:      Mental Status: She is alert. Mental status is at baseline. Psychiatric:         Mood and Affect: Mood normal.         Behavior: Behavior normal.       DIAGNOSTIC RESULTS     EKG: All EKG's are interpreted by the Emergency Department Physician who either signs or Co-signs this chart in the absence of a cardiologist.    EKG Interpretation    Interpreted by emergency department physician    Rhythm: normal sinus   Rate: normal  Axis: normal  Ectopy: premature ventricular contractions (unifocal)  Conduction: nonspecific interventricular conduction block  ST Segments: normal  T Waves: normal  Q Waves: nonspecific    Clinical Impression: non-specific EKG    Aylin Finn MD     RADIOLOGY:   Non-plain film images such as CT, Ultrasound and MRI are read by the radiologist. Plain radiographic images are visualized and preliminarily interpreted by the emergency physician with the below findings:        Interpretation per the Radiologist below, if available at the time of this note:    CT ABDOMEN PELVIS WO CONTRAST Additional Contrast? None   Final Result   1. Trace pericardial effusion. 2.  No acute abdominal or pelvic pathology. 3.  Colonic diverticulosis again seen. 4.  Thick-walled, trabeculated urinary bladder walls again seen, along with   multiple diverticula, when compared to the previous study performed   09/10/2021.      5.  Mild dilatation again noted involving the infrarenal abdominal aorta.       6. Other findings, as described above. XR CHEST (2 VW)   Final Result   No radiographic evidence of acute pulmonary disease. ED BEDSIDE ULTRASOUND:   Performed by ED Physician - none    LABS:  Labs Reviewed   COVID-19, RAPID - Abnormal; Notable for the following components:       Result Value    SARS-CoV-2, NAAT DETECTED (*)     All other components within normal limits    Narrative:     Job Gross Spearing [id number],  Microbiology results called to and read back by PREM Ndiaye, 07/30/2022  15:08, by DAYBE   CBC WITH AUTO DIFFERENTIAL - Abnormal; Notable for the following components:    RBC 3.68 (*)     Hemoglobin 11.3 (*)     Hematocrit 32.7 (*)     Monocytes Absolute 1.8 (*)     All other components within normal limits   BASIC METABOLIC PANEL - Abnormal; Notable for the following components:    Potassium 3.3 (*)     Chloride 97 (*)     Anion Gap 21 (*)     Glucose 138 (*)     BUN 58 (*)     Creatinine 2.6 (*)     GFR Non- 17 (*)     GFR  21 (*)     Calcium 7.1 (*)     All other components within normal limits   HEPATIC FUNCTION PANEL - Abnormal; Notable for the following components:    Albumin 3.3 (*)     All other components within normal limits   TROPONIN - Abnormal; Notable for the following components:    Troponin 0.10 (*)     All other components within normal limits   BRAIN NATRIURETIC PEPTIDE - Abnormal; Notable for the following components:    Pro-BNP 3,188 (*)     All other components within normal limits   LACTIC ACID   LIPASE   URINALYSIS WITH MICROSCOPIC       All other labs were within normal range or not returned as of this dictation.     EMERGENCY DEPARTMENT COURSE and DIFFERENTIAL DIAGNOSIS/MDM:   Vitals:    Vitals:    07/30/22 1409 07/30/22 1530 07/30/22 1630 07/30/22 1645   BP: (!) 115/59 117/69 126/64 (!) 142/61   Pulse: 74 79 73 80   Resp: 15 24 30 23   Temp: 98.5 °F (36.9 °C)      TempSrc: Oral      SpO2: 98% 98% 96%    Weight: 127 lb 1.6 oz (57.7 kg)      Height: 4' 9\" (1.448 m)          Above history and physical exam were performed. I reviewed her past medical past surgical social family history. 12 point review systems performed is negative less otherwise noted. She was placed appropriate precautions after COVID test returned to be positive. Family was fully informed during her stay as well. She her last COVID test was Tuesday which was negative at home. MDM    So the diagnosis of pneumonia versus CHF versus ACS versus acute intra-abdominal surgical process. At this point, believe her primary etiology is COVID given that she is positive and was negative a few days ago. She also has a bit of a troponin bump although her EKG is nonischemic at this time. She also has a mildly elevated BNP. Cardiac echo in October 2021 which showed an EF of 35 to 40%. Renal function is also somewhat worse than her baseline. Overall I think she merits admission with the diagnosis of COVID with CHF worsening renal insufficiency. I discussed her with the hospitalist at [facility] Dr. Clayton Del Cid accepts her in transfer. REASSESSMENT          CRITICAL CARE TIME   Total Critical Care time was 43 minutes, excluding separately reportable procedures. There was a high probability of clinically significant/life threatening deterioration in the patient's condition which required my urgent intervention. COVID versus CHF versus acute on chronic renal insufficiency versus elevated troponin. CONSULTS:  None    PROCEDURES:  Unless otherwise noted below, none     Procedures        FINAL IMPRESSION      1. COVID    2. Renal insufficiency    3. Elevated troponin    4. Adult failure to thrive          DISPOSITION/PLAN   DISPOSITION Decision To Admit 07/30/2022 03:54:25 PM      PATIENT REFERRED TO:  No follow-up provider specified.     DISCHARGE MEDICATIONS:  New Prescriptions    No medications on file     Controlled Substances Monitoring:     No flowsheet data found.    (Please note that portions of this note were completed with a voice recognition program.  Efforts were made to edit the dictations but occasionally words are mis-transcribed.)    Breezy Osorio MD (electronically signed)  Attending Emergency Physician           Breezy Osorio MD  07/30/22 Jose Mckinley MD  07/30/22 1602       Breezy Osorio MD  07/30/22 Jose Mckinley MD  07/30/22 [id number]

## 2022-07-30 NOTE — ED TRIAGE NOTES
Patient presents to ED complaining of fatigue x2 weeks. Patient is confused to year and age, other wise oriented on arrival. Patient's daughter at bedside states this is patient's baseline due to dementia. Daughter states she is patient's POA. Patient's daughter at bedside states patient has had increased fatigue with body aches x2 weeks, reports patient has also hade nausea and diarrhea for around the same amount of time. Also reports increased shortness of breath with exertion. States was exposed to Covid-19 1 week ago, tested negative with home test on Tuesday. Patient resting on bed, respirations even and easy at this time. No obvious distress.

## 2022-07-30 NOTE — ED NOTES
Patient to imaging at this time. Laboratory called at this time to report the following critical lab result:    Rapid Covid-19: POSITIVE     Provider Notified.        Pal Subramanian RN  07/30/22 [id number]

## 2022-07-30 NOTE — ED NOTES
[address] transport ETA [id number].      Evaristo Francisco RN  07/30/22 8445

## 2022-07-31 PROBLEM — R53.1 GENERALIZED WEAKNESS: Status: ACTIVE | Noted: 2022-07-31

## 2022-07-31 LAB
A/G RATIO: 0.9 (ref 1.1–2.2)
ALBUMIN SERPL-MCNC: 3.1 G/DL (ref 3.4–5)
ALP BLD-CCNC: 48 U/L (ref 40–129)
ALT SERPL-CCNC: 10 U/L (ref 10–40)
ANION GAP SERPL CALCULATED.3IONS-SCNC: 17 MMOL/L (ref 3–16)
AST SERPL-CCNC: 29 U/L (ref 15–37)
BASOPHILS ABSOLUTE: 0 K/UL (ref 0–0.2)
BASOPHILS RELATIVE PERCENT: 0.4 %
BILIRUB SERPL-MCNC: 0.3 MG/DL (ref 0–1)
BUN BLDV-MCNC: 49 MG/DL (ref 7–20)
C DIFF TOXIN/ANTIGEN: NORMAL
CALCIUM SERPL-MCNC: 7.1 MG/DL (ref 8.3–10.6)
CHLORIDE BLD-SCNC: 102 MMOL/L (ref 99–110)
CO2: 20 MMOL/L (ref 21–32)
CREAT SERPL-MCNC: 1.9 MG/DL (ref 0.6–1.2)
EKG ATRIAL RATE: 76 BPM
EKG DIAGNOSIS: NORMAL
EKG P-R INTERVAL: 194 MS
EKG Q-T INTERVAL: 388 MS
EKG QRS DURATION: 128 MS
EKG QTC CALCULATION (BAZETT): 436 MS
EKG R AXIS: -56 DEGREES
EKG T AXIS: 83 DEGREES
EKG VENTRICULAR RATE: 76 BPM
EOSINOPHILS ABSOLUTE: 0 K/UL (ref 0–0.6)
EOSINOPHILS RELATIVE PERCENT: 0.2 %
GFR AFRICAN AMERICAN: 30
GFR NON-AFRICAN AMERICAN: 25
GLUCOSE BLD-MCNC: 103 MG/DL (ref 70–99)
HCT VFR BLD CALC: 31.9 % (ref 36–48)
HEMATOLOGY PATH CONSULT: NO
HEMOGLOBIN: 10.9 G/DL (ref 12–16)
LYMPHOCYTES ABSOLUTE: 3.1 K/UL (ref 1–5.1)
LYMPHOCYTES RELATIVE PERCENT: 28.7 %
MAGNESIUM: 0.9 MG/DL (ref 1.8–2.4)
MCH RBC QN AUTO: 30.5 PG (ref 26–34)
MCHC RBC AUTO-ENTMCNC: 34.1 G/DL (ref 31–36)
MCV RBC AUTO: 89.4 FL (ref 80–100)
MONOCYTES ABSOLUTE: 1.8 K/UL (ref 0–1.3)
MONOCYTES RELATIVE PERCENT: 17.1 %
NEUTROPHILS ABSOLUTE: 5.7 K/UL (ref 1.7–7.7)
NEUTROPHILS RELATIVE PERCENT: 53.6 %
PDW BLD-RTO: 14.8 % (ref 12.4–15.4)
PLATELET # BLD: 273 K/UL (ref 135–450)
PMV BLD AUTO: 9.3 FL (ref 5–10.5)
POTASSIUM SERPL-SCNC: 3.7 MMOL/L (ref 3.5–5.1)
RBC # BLD: 3.56 M/UL (ref 4–5.2)
SODIUM BLD-SCNC: 139 MMOL/L (ref 136–145)
TOTAL PROTEIN: 6.4 G/DL (ref 6.4–8.2)
WBC # BLD: 10.7 K/UL (ref 4–11)

## 2022-07-31 PROCEDURE — 6360000002 HC RX W HCPCS: Performed by: STUDENT IN AN ORGANIZED HEALTH CARE EDUCATION/TRAINING PROGRAM

## 2022-07-31 PROCEDURE — 1200000000 HC SEMI PRIVATE

## 2022-07-31 PROCEDURE — 94760 N-INVAS EAR/PLS OXIMETRY 1: CPT

## 2022-07-31 PROCEDURE — 87449 NOS EACH ORGANISM AG IA: CPT

## 2022-07-31 PROCEDURE — 2580000003 HC RX 258: Performed by: NURSE PRACTITIONER

## 2022-07-31 PROCEDURE — 85025 COMPLETE CBC W/AUTO DIFF WBC: CPT

## 2022-07-31 PROCEDURE — 80053 COMPREHEN METABOLIC PANEL: CPT

## 2022-07-31 PROCEDURE — 87324 CLOSTRIDIUM AG IA: CPT

## 2022-07-31 PROCEDURE — 36415 COLL VENOUS BLD VENIPUNCTURE: CPT

## 2022-07-31 PROCEDURE — 6360000002 HC RX W HCPCS: Performed by: NURSE PRACTITIONER

## 2022-07-31 PROCEDURE — 6370000000 HC RX 637 (ALT 250 FOR IP): Performed by: NURSE PRACTITIONER

## 2022-07-31 PROCEDURE — 93010 ELECTROCARDIOGRAM REPORT: CPT | Performed by: INTERNAL MEDICINE

## 2022-07-31 PROCEDURE — 6370000000 HC RX 637 (ALT 250 FOR IP): Performed by: STUDENT IN AN ORGANIZED HEALTH CARE EDUCATION/TRAINING PROGRAM

## 2022-07-31 PROCEDURE — 83735 ASSAY OF MAGNESIUM: CPT

## 2022-07-31 RX ORDER — MAGNESIUM SULFATE 1 G/100ML
1000 INJECTION INTRAVENOUS PRN
Status: DISCONTINUED | OUTPATIENT
Start: 2022-07-31 | End: 2022-07-31

## 2022-07-31 RX ORDER — MAGNESIUM SULFATE IN WATER 40 MG/ML
4000 INJECTION, SOLUTION INTRAVENOUS ONCE
Status: COMPLETED | OUTPATIENT
Start: 2022-07-31 | End: 2022-07-31

## 2022-07-31 RX ADMIN — CARVEDILOL 6.25 MG: 6.25 TABLET, FILM COATED ORAL at 08:37

## 2022-07-31 RX ADMIN — ACETAMINOPHEN 650 MG: 325 TABLET ORAL at 05:57

## 2022-07-31 RX ADMIN — FLUOXETINE 10 MG: 10 CAPSULE ORAL at 08:45

## 2022-07-31 RX ADMIN — Medication 1 CAPSULE: at 08:37

## 2022-07-31 RX ADMIN — HEPARIN SODIUM 5000 UNITS: 5000 INJECTION INTRAVENOUS; SUBCUTANEOUS at 15:15

## 2022-07-31 RX ADMIN — ALLOPURINOL 50 MG: 100 TABLET ORAL at 08:37

## 2022-07-31 RX ADMIN — ATORVASTATIN CALCIUM 20 MG: 20 TABLET, FILM COATED ORAL at 21:23

## 2022-07-31 RX ADMIN — HEPARIN SODIUM 5000 UNITS: 5000 INJECTION INTRAVENOUS; SUBCUTANEOUS at 05:57

## 2022-07-31 RX ADMIN — PANTOPRAZOLE SODIUM 40 MG: 40 TABLET, DELAYED RELEASE ORAL at 05:57

## 2022-07-31 RX ADMIN — MEMANTINE 5 MG: 5 TABLET ORAL at 08:37

## 2022-07-31 RX ADMIN — ASPIRIN 81 MG: 81 TABLET, CHEWABLE ORAL at 08:37

## 2022-07-31 RX ADMIN — AMLODIPINE BESYLATE 5 MG: 5 TABLET ORAL at 08:38

## 2022-07-31 RX ADMIN — MAGNESIUM SULFATE HEPTAHYDRATE 4000 MG: 40 INJECTION, SOLUTION INTRAVENOUS at 08:37

## 2022-07-31 RX ADMIN — CEFTRIAXONE 1000 MG: 1 INJECTION, POWDER, FOR SOLUTION INTRAMUSCULAR; INTRAVENOUS at 21:25

## 2022-07-31 RX ADMIN — HEPARIN SODIUM 5000 UNITS: 5000 INJECTION INTRAVENOUS; SUBCUTANEOUS at 21:23

## 2022-07-31 ASSESSMENT — PAIN SCALES - GENERAL: PAINLEVEL_OUTOF10: 0

## 2022-07-31 NOTE — PLAN OF CARE
Problem: Discharge Planning  Goal: Discharge to home or other facility with appropriate resources  Outcome: Progressing  Flowsheets  Taken 7/31/2022 0036  Discharge to home or other facility with appropriate resources: Identify barriers to discharge with patient and caregiver  Taken 7/30/2022 2230  Discharge to home or other facility with appropriate resources: Identify barriers to discharge with patient and caregiver     Problem: ABCDS Injury Assessment  Goal: Absence of physical injury  Outcome: Progressing     Problem: Skin/Tissue Integrity  Goal: Absence of new skin breakdown  Description: 1. Monitor for areas of redness and/or skin breakdown  2. Assess vascular access sites hourly  3. Every 4-6 hours minimum:  Change oxygen saturation probe site  4. Every 4-6 hours:  If on nasal continuous positive airway pressure, respiratory therapy assess nares and determine need for appliance change or resting period.   Outcome: Progressing     Problem: Safety - Adult  Goal: Free from fall injury  Outcome: Progressing

## 2022-07-31 NOTE — H&P
Hospital Medicine History & Physical      PCP: Austin Kuo MD    Date of Admission: 7/30/2022    Date of Service: Pt seen/examined on 7/30/2022 and Admitted to Inpatient     Chief Complaint:  Gearline Dustin      History Of Present Illness: The patient is a 80 y.o. female who presents to [facility] with PMHx: CKD stage IV, CAD, GERD, gout, histoplasmosis, CVA, HLD, HTN    Patient is severely hard of hearing. She does not have hearing aids. She lives at home with family. CODE STATUS full  COVID vaccinated, no booster  No anticoagulation therapy    Patient presented to [facility] ED with reports of decreased mental status from baseline. Some reports of nausea vomiting and diarrhea. A nonproductive cough. Daughter reported to the ED providers the patient complained to her that she had generalized body aches generalized weakness and fatigue. ED workup: COVID positive. BUN 58, creatinine 2.6 GFR 17. No evidence of anemia. Lipase 54. UA positive for UTI WBC greater than 100. Troponin 0.10. Chest x-ray negative for vascular congestion consolidation. CT abdomen and pelvis indicating trace pericardial effusion otherwise no acute findings. On my exam; she is awake and alert. I had to write things down in order to communicate with her because she cannot hear. She offers no complaints other than feeling tired. Lung fields are clear abdomen is soft. No evidence of distress. No hypoxia.   No family present for my exam.      Past Medical History:        Diagnosis Date    Chronic kidney disease (CKD), stage IV (severe) ([facility] [address]     Coronary artery disease involving native coronary artery of native heart without angina pectoris 1/5/2016    GERD (gastroesophageal reflux disease) acid reflux    Gout, chronic     Histoplasmosis     Hyperlipidemia Hypertension     Irregular heart beat     Osteoarthritis 2/28/2019    Knee, leg     Osteopenia of hip 04/26/2019    Pneumonia     Ulcer of jejunum     Unspecified cerebral artery occlusion with cerebral infarction 2014 , ~ 2012       Past Surgical History:        Procedure Laterality Date    BLADDER SUSPENSION      CHOLECYSTECTOMY  Orthopedic surgery Left arm    HYSTERECTOMY (CERVIX STATUS UNKNOWN)      TUBAL LIGATION      UPPER GASTROINTESTINAL ENDOSCOPY N/A 9/10/2021    EGD CONTROL HEMORRHAGE performed by Concepción Andrew DO at [facility]  9/10/2021    EGD SUBMUCOSAL/ EPINEPHRINE INJECTION performed by Concepción Andrew DO at [facility] ENDOSCOPY       Medications Prior to Admission:    Prior to Admission medications    Medication Sig Start Date End Date Taking? Authorizing Provider   carvedilol (COREG) 6.25 MG tablet TAKE 1 TABLET BY MOUTH TWO TIMES A DAY WITH MEALS.  7/18/22   Julia Goncalves MD   lisinopril (PRINIVIL;ZESTRIL) 10 MG tablet TAKE 1 TABLET BY MOUTH EVERY DAY 7/18/22   Julia Goncalves MD   pantoprazole (PROTONIX) 40 MG tablet TAKE 1 TABLET BY MOUTH EVERY DAY 7/18/22   Julia Goncalves MD   amLODIPine (NORVASC) 5 MG tablet TAKE 1 TABLET BY MOUTH EVERY DAY 7/18/22   Julia Goncalves MD   atorvastatin (LIPITOR) 20 MG tablet TAKE 1 TABLET BY MOUTH AT BEDTIME 7/18/22   Julia Goncalves MD   memantine [facility]) 5 MG tablet Take 1 tablet by mouth daily 6/6/22   Julia Goncalves MD   allopurinol (ZYLOPRIM) 100 MG tablet TAKE 1/2 TABLET BY MOUTH EVERY OTHER DAY 5/18/22   Julia Goncalves MD   donepezil (ARICEPT) 10 MG tablet TAKE 1 TABLET BY MOUTH nightly 5/18/22   Julia Goncalves MD   albuterol sulfate HFA (VENTOLIN HFA) 108 (90 Base) MCG/ACT inhaler Inhale 2 puffs into the lungs 4 times daily as needed for Wheezing 1/14/22   Julia Goncalves MD   FLUoxetine (PROZAC) 10 MG capsule TAKE 1 CAPSULE BY MOUTH ONE TIME A DAY 12/13/21   Julia Goncalves MD   Cyanocobalamin (VITAMIN B 12 PO) Take by mouth daily    Historical Provider, MD   lisinopril (PRINIVIL;ZESTRIL) 5 MG tablet Take 1 tablet by mouth daily 12/8/21   Juanjose Sotelo MD   furosemide (LASIX) 40 MG tablet Take 0.5 tablets by mouth daily 12/8/21   Juanjose Sotelo MD   carvedilol (COREG) 12.5 MG tablet Take 12.5 mg by mouth 2 times daily (with meals)    Historical Provider, MD   isosorbide mononitrate (IMDUR) 30 MG extended release tablet Take 1 tablet by mouth daily Spacing out from other cardiac medications 11/4/21   Juanjose Sotelo MD   aspirin 81 MG chewable tablet Take 81 mg by mouth daily    Historical Provider, MD   ferrous sulfate 324 (65 Fe) MG EC tablet Take 1 tablet by mouth 2 times daily (with meals) 9/12/21   Moriah Lawson MD   Respiratory Therapy Supplies (NEBULIZER/TUBING/MOUTHPIECE) KIT 1 kit by Does not apply route daily as needed (wheezing) 8/24/21   RAMAN Kinney   ipratropium-albuterol (DUONEB) 0.5-2.5 (3) MG/3ML SOLN nebulizer solution Inhale 3 mLs into the lungs every 6 hours as needed for Shortness of Breath 8/24/21   RAMNA Kinney   Multiple Vitamins-Minerals (THERAPEUTIC MULTIVITAMIN-MINERALS) tablet Take 1 tablet by mouth daily    Historical Provider, MD   loratadine (CLARITIN) 10 MG capsule Take 10 mg by mouth daily as needed     Historical Provider, MD   Handicap Placard MISC by Does not apply route Patient is unable to walk more than 250 feet before stopping to rest.    Not to exceed 5 years. DX: CHF 8/17/16   Ladi Pate MD       Allergies:  Codeine, Morphine and related, and Oxycodone-acetaminophen    Social History:  The patient currently lives at home. TOBACCO:   reports that she is a non-smoker but has been exposed to tobacco smoke. She has never used smokeless tobacco.  ETOH:   reports no history of alcohol use. Family History:  Reviewed in detail and negative for DM, Early CAD, Cancer, CVA.  Positive as follows:        Problem Relation Age of Onset    High Blood Pressure Mother     Heart Disease Mother     Diabetes Mother     High Blood Pressure Father     Heart Disease Father     Cancer Father         Brain    Cancer Sister         lymphoma    Cancer Brother         colon    Cancer Maternal Uncle        REVIEW OF SYSTEMS:   as noted in the HPI. All other systems reviewed and negative. PHYSICAL EXAM:    BP (!) 194/61   Pulse (!) 103   Temp 99.8 °F (37.7 °C) (Oral)   Resp 18   Ht 4' 9\" (1.448 m)   Wt 127 lb 1.6 oz (57.7 kg)   SpO2 (!) 86%   BMI 27.50 kg/m²     General appearance: No apparent distress appears stated age and cooperative. VERY [place]. NO hearing aids. HEENT Normal cephalic, atraumatic without obvious deformity. Pupils equal, round, and reactive to light. Extra ocular muscles intact. Conjunctivae/corneas clear. Neck: Supple, No jugular venous distention/bruits. Trachea midline without thyromegaly or adenopathy with full range of motion. Lungs: Clear to auscultation, bilaterally without Rales/Wheezes/Rhonchi with good respiratory effort. Heart: Regular rate and rhythm with Normal S1/S2 without murmurs, rubs or gallops, point of maximum impulse non-displaced  Abdomen: Soft, non-tender or non-distended without rigidity or guarding and positive bowel sounds all four quadrants. Extremities: No clubbing, cyanosis, or edema bilaterally. Full range of motion without deformity and normal gait intact. Skin: Skin color, texture, turgor normal.  No rashes or lesions. Neurologic: Alert and oriented X 3, moves all extremities. No facial drooping. Seems to answer questions appropriately. Mental status: Alert, oriented, thought content appropriate. Capillary Refill: Acceptable  < 3 seconds  Peripheral Pulses: +3 Easily felt, not easily obliterated with pressure      CXR:  I have reviewed the CXR with the following interpretation: NAD  EKG:  I have reviewed the EKG with the following interpretation: not available for my review.      CBC   Recent Labs 07/30/22  1452   WBC 10.8   HGB 11.3*   HCT 32.7*         RENAL  Recent Labs     07/30/22  1452      K 3.3*   CL 97*   CO2 21   BUN 58*   CREATININE 2.6*     LFT'S  Recent Labs     07/30/22  1452   AST 33   ALT 12   BILIDIR <0.2   BILITOT 0.3   ALKPHOS 52     COAG  No results for input(s): INR in the last 72 hours.   CARDIAC ENZYMES  Recent Labs     07/30/22  1452 07/30/22  2140   TROPONINI 0.10* 0.06*       U/A:    Lab Results   Component Value Date/Time    COLORU Yellow 07/30/2022 06:45 PM    WBCUA >100 07/30/2022 06:45 PM    RBCUA None seen 07/30/2022 06:45 PM    MUCUS Rare 08/24/2021 08:22 PM    BACTERIA 4+ 07/30/2022 06:45 PM    CLARITYU CLOUDY 07/30/2022 06:45 PM    SPECGRAV 1.015 07/30/2022 06:45 PM    LEUKOCYTESUR LARGE 07/30/2022 06:45 PM    BLOODU Negative 07/30/2022 06:45 PM    GLUCOSEU Negative 07/30/2022 06:45 PM    GLUCOSEU Negative 06/13/2011 04:20 PM    AMORPHOUS Rare 10/26/2021 04:30 PM       ABG    Lab Results   Component Value Date/Time    DJY1CCK 21.3 10/20/2020 02:00 AM    BEART -5.2 10/20/2020 02:00 AM    D6MOXDFN 100.0 10/20/2020 02:00 AM    PHART 7.292 10/20/2020 02:00 AM    GYN6HQH 44.2 10/20/2020 02:00 AM    PO2ART 156.0 10/20/2020 02:00 AM    BUF9KAK 22.6 10/20/2020 02:00 AM           Active Hospital Problems    Diagnosis Date Noted    Generalized weakness [R53.1] 07/31/2022     Priority: Medium    COVID-19 [U07.1] 07/30/2022     Priority: Medium    UTI (urinary tract infection) [N39.0] 07/30/2022     Priority: Medium         PHYSICIANS CERTIFICATION:    I certify that Fabiana Jaeger is expected to be hospitalized for more than 2 midnights based on the following assessment and plan:    PCP: Dr. Juanita Osorio, however d/t COVID dx she was admitted to Hospitalist Service  Proper PPE was donned to care for this pt: N95, surgical Mask, Face shield, isolation gown and gloves      ASSESSMENT/PLAN:  UTI: Urine WBC greater than 100, large leuk esterase  Positive fatigue generalized weakness  IVF  Rocephin  Urine culture in process  No evidence of sepsis: Lactic acid 1.8, afebrile, no tachycardia or hypotension: WBC 10.8  Probiotic    COVID: Continue isolation precautions. No hypoxia. Occasional cough: Nonproductive. No respiratory distress  Symptomatic treatment for cough    CKD stage IV: BUN 58, creatinine 2.6, GFR 17  Avoid nephrotoxic age  IVF  Monitor closely  Hold lisinopril    Elevated troponin:0.10 -> 0.06-> we will continue to trend, likely being influenced by CKD  no evidence of EKG changes    Generalized weakness: Secondary to age, comorbid conditions and COVID with UTI  No evidence of anemia. No evidence of sepsis    CAD/HLD/HTN: Continue amlodipine aspirin statin, carvedilol per home regimen  Gout continue allopurinol per home regimen      Continue Namenda and Prozac per home regimen  PPI: Protonix oral daily  DVT Prophylaxis: Heparin SQ  Diet: ADULT DIET; Regular; Low Sodium (2 gm)  Code Status: Full Code  PT/OT Eval Status:     Dispo - admit inpt       Bud Elisabet Latif, APRN - CNP    Thank you Alee Garcai MD for the opportunity to be involved in this patient's care. If you have any questions or concerns please feel free to contact me at [phone]. This dictation was performed with a verbal recognition program (DRAGON) and it was checked for errors. It is possible that there are still dictated errors within this office note. If so, please bring any errors to my attention for an addendum. All efforts were made to ensure that this office note is accurate.

## 2022-07-31 NOTE — PROGRESS NOTES
Pt admitted alert and oriented. Pt is very hard of hearing. Oriented to room and call light. Fall precautions implemented. Needs attended. .VS taken. will assess and monitor.

## 2022-07-31 NOTE — PROGRESS NOTES
Hospitalist Progress Note      PCP: Rhina Toney MD    Date of Admission: 7/30/2022    Chief Complaint: fatigue     Hospital Course:     80 y.o. female who presents to [facility] with PMHx: CKD stage IV, CAD, GERD, gout, histoplasmosis, CVA, HLD, HTN. Patient presented to ED with fatigue. Patient was found to be COVID +, with SULY on CKD. Subjective:   Reports fatigue, daughter at bedside updated  Reports new profuse diarrhea. Medications:  Reviewed    Infusion Medications    sodium chloride       Scheduled Medications    magnesium sulfate  4,000 mg IntraVENous Once    amLODIPine  5 mg Oral Daily    allopurinol  50 mg Oral Daily    aspirin  81 mg Oral Daily    atorvastatin  20 mg Oral Nightly    carvedilol  6.25 mg Oral BID WC    FLUoxetine  10 mg Oral Daily    [Held by provider] lisinopril  10 mg Oral Daily    memantine  5 mg Oral Daily    pantoprazole  40 mg Oral QAM AC    sodium chloride flush  5-40 mL IntraVENous 2 times per day    heparin (porcine)  5,000 Units SubCUTAneous 3 times per day    cefTRIAXone (ROCEPHIN) IV  1,000 mg IntraVENous Q24H    lactobacillus  1 capsule Oral Daily with breakfast     PRN Meds: magnesium sulfate, sodium chloride flush, sodium chloride, polyethylene glycol, acetaminophen **OR** acetaminophen, ondansetron, guaiFENesin-dextromethorphan      Intake/Output Summary (Last 24 hours) at 7/31/2022 0916  Last data filed at 7/30/2022 1724  Gross per 24 hour   Intake 437.45 ml   Output --   Net 437.45 ml       Physical Exam Performed:    /60   Pulse 77   Temp (!) 100.8 °F (38.2 °C) (Oral)   Resp 16   Ht 4' 9\" (1.448 m)   Wt 127 lb 1.6 oz (57.7 kg)   SpO2 93%   BMI 27.50 kg/m²     General appearance: pleasant, denies any complaints   HEENT: Pupils equal, round, and reactive to light. Conjunctivae/corneas clear. Neck: Supple, with full range of motion. No jugular venous distention. Trachea midline. Respiratory:  Normal respiratory effort.  Clear to auscultation, bilaterally without Rales/Wheezes/Rhonchi. Cardiovascular: Regular rate and rhythm with normal S1/S2 without murmurs, rubs or gallops. Abdomen: Soft, non-tender, non-distended with normal bowel sounds. Musculoskeletal: No clubbing, cyanosis or edema bilaterally. Full range of motion without deformity. Skin: Skin color, texture, turgor normal.  No rashes or lesions. Neurologic:  Neurovascularly intact without any focal sensory/motor deficits. Cranial nerves: II-XII intact, grossly non-focal.  Psychiatric: Alert and oriented, thought content appropriate, normal insight  Capillary Refill: Brisk,3 seconds, normal   Peripheral Pulses: +2 palpable, equal bilaterally       Labs:   Recent Labs     07/30/22  1452 07/31/22  0656   WBC 10.8 10.7   HGB 11.3* 10.9*   HCT 32.7* 31.9*    273     Recent Labs     07/30/22  1452 07/31/22  0656    139   K 3.3* 3.7   CL 97* 102   CO2 21 20*   BUN 58* 49*   CREATININE 2.6* 1.9*   CALCIUM 7.1* 7.1*     Recent Labs     07/30/22  1452 07/31/22  0656   AST 33 29   ALT 12 10   BILIDIR <0.2  --    BILITOT 0.3 0.3   ALKPHOS 52 48     No results for input(s): INR in the last 72 hours. Recent Labs     07/30/22  1452 07/30/22  2140   TROPONINI 0.10* 0.06*       Urinalysis:      Lab Results   Component Value Date/Time    NITRU Negative 07/30/2022 06:45 PM    WBCUA >100 07/30/2022 06:45 PM    BACTERIA 4+ 07/30/2022 06:45 PM    RBCUA None seen 07/30/2022 06:45 PM    BLOODU Negative 07/30/2022 06:45 PM    SPECGRAV 1.015 07/30/2022 06:45 PM    GLUCOSEU Negative 07/30/2022 06:45 PM    GLUCOSEU Negative 06/13/2011 04:20 PM       Radiology:  CT ABDOMEN PELVIS WO CONTRAST Additional Contrast? None   Final Result   1. Trace pericardial effusion. 2.  No acute abdominal or pelvic pathology. 3.  Colonic diverticulosis again seen.       4.  Thick-walled, trabeculated urinary bladder walls again seen, along with   multiple diverticula, when compared to the previous study performed   09/10/2021.      5.  Mild dilatation again noted involving the infrarenal abdominal aorta. 6.  Other findings, as described above. XR CHEST (2 VW)   Final Result   No radiographic evidence of acute pulmonary disease. Assessment/Plan:    Active Hospital Problems    Diagnosis     Generalized weakness [R53.1]      Priority: Medium    COVID-19 [U07.1]      Priority: Medium    UTI (urinary tract infection) [N39.0]      Priority: Medium     UTI  Urine WBC greater than 100, large leuk esterase  Positive fatigue generalized weakness  Plan  -Follow urine culture  -Continue on ceftriaxone     COVID:   Continue isolation precautions. No hypoxia. Occasional cough: Nonproductive. No respiratory distress  Symptomatic treatment for cough    Diarrhea  Possibly related to COVID, will treat with Imodium if C. difficile is negative    SULY on CKD  Patient presented with creatinine of around 2.6, improved to 1.9  Continue holding ACE inhibitor     Elevated troponin:  Static, related to CKD     CAD/HLD/HTN:   Continue amlodipine aspirin statin, carvedilol per home regimen  Gout continue allopurinol per home regimen       DVT Prophylaxis: heparin  Diet: ADULT DIET; Regular; Low Sodium (2 gm)  Code Status: Full Code    PT/OT Eval Status: ordered.      Dispo - pending clinical improvement      Corey Caceres MD

## 2022-07-31 NOTE — PLAN OF CARE
Problem: ABCDS Injury Assessment  Goal: Absence of physical injury  7/31/2022 1143 by Sukhwinder Dumont RN  Outcome: Progressing  7/31/2022 0442 by Ivana Villalobos RN  Outcome: Progressing     Problem: Skin/Tissue Integrity  Goal: Absence of new skin breakdown  Description: 1. Monitor for areas of redness and/or skin breakdown  2. Assess vascular access sites hourly  3. Every 4-6 hours minimum:  Change oxygen saturation probe site  4. Every 4-6 hours:  If on nasal continuous positive airway pressure, respiratory therapy assess nares and determine need for appliance change or resting period.   7/31/2022 1143 by Sukhwinder Dumont RN  Outcome: Progressing  7/31/2022 0442 by Ivana Villalobos RN  Outcome: Progressing     Problem: Safety - Adult  Goal: Free from fall injury  7/31/2022 1143 by Sukhwinder Dumont RN  Outcome: Progressing  7/31/2022 0442 by Ivana Villalobos RN  Outcome: Progressing

## 2022-08-01 LAB
A/G RATIO: 0.9 (ref 1.1–2.2)
ALBUMIN SERPL-MCNC: 2.9 G/DL (ref 3.4–5)
ALP BLD-CCNC: 47 U/L (ref 40–129)
ALT SERPL-CCNC: 11 U/L (ref 10–40)
ANION GAP SERPL CALCULATED.3IONS-SCNC: 14 MMOL/L (ref 3–16)
AST SERPL-CCNC: 27 U/L (ref 15–37)
BASOPHILS ABSOLUTE: 0 K/UL (ref 0–0.2)
BASOPHILS RELATIVE PERCENT: 0.2 %
BILIRUB SERPL-MCNC: <0.2 MG/DL (ref 0–1)
BUN BLDV-MCNC: 41 MG/DL (ref 7–20)
CALCIUM SERPL-MCNC: 7.6 MG/DL (ref 8.3–10.6)
CHLORIDE BLD-SCNC: 105 MMOL/L (ref 99–110)
CO2: 20 MMOL/L (ref 21–32)
CREAT SERPL-MCNC: 1.8 MG/DL (ref 0.6–1.2)
EOSINOPHILS ABSOLUTE: 0 K/UL (ref 0–0.6)
EOSINOPHILS RELATIVE PERCENT: 0.2 %
GFR AFRICAN AMERICAN: 32
GFR NON-AFRICAN AMERICAN: 27
GLUCOSE BLD-MCNC: 143 MG/DL (ref 70–99)
HCT VFR BLD CALC: 30.1 % (ref 36–48)
HEMOGLOBIN: 10.2 G/DL (ref 12–16)
LYMPHOCYTES ABSOLUTE: 2.9 K/UL (ref 1–5.1)
LYMPHOCYTES RELATIVE PERCENT: 26.5 %
MAGNESIUM: 2.5 MG/DL (ref 1.8–2.4)
MCH RBC QN AUTO: 30.4 PG (ref 26–34)
MCHC RBC AUTO-ENTMCNC: 33.9 G/DL (ref 31–36)
MCV RBC AUTO: 89.7 FL (ref 80–100)
MONOCYTES ABSOLUTE: 1.1 K/UL (ref 0–1.3)
MONOCYTES RELATIVE PERCENT: 10.3 %
NEUTROPHILS ABSOLUTE: 6.9 K/UL (ref 1.7–7.7)
NEUTROPHILS RELATIVE PERCENT: 62.8 %
PDW BLD-RTO: 14.7 % (ref 12.4–15.4)
PLATELET # BLD: 281 K/UL (ref 135–450)
PMV BLD AUTO: 9.3 FL (ref 5–10.5)
POTASSIUM SERPL-SCNC: 3.4 MMOL/L (ref 3.5–5.1)
RBC # BLD: 3.36 M/UL (ref 4–5.2)
SODIUM BLD-SCNC: 139 MMOL/L (ref 136–145)
TOTAL PROTEIN: 6.2 G/DL (ref 6.4–8.2)
WBC # BLD: 11 K/UL (ref 4–11)

## 2022-08-01 PROCEDURE — 6370000000 HC RX 637 (ALT 250 FOR IP): Performed by: STUDENT IN AN ORGANIZED HEALTH CARE EDUCATION/TRAINING PROGRAM

## 2022-08-01 PROCEDURE — 97530 THERAPEUTIC ACTIVITIES: CPT

## 2022-08-01 PROCEDURE — 6360000002 HC RX W HCPCS: Performed by: STUDENT IN AN ORGANIZED HEALTH CARE EDUCATION/TRAINING PROGRAM

## 2022-08-01 PROCEDURE — 80053 COMPREHEN METABOLIC PANEL: CPT

## 2022-08-01 PROCEDURE — 85025 COMPLETE CBC W/AUTO DIFF WBC: CPT

## 2022-08-01 PROCEDURE — 2580000003 HC RX 258: Performed by: NURSE PRACTITIONER

## 2022-08-01 PROCEDURE — 36415 COLL VENOUS BLD VENIPUNCTURE: CPT

## 2022-08-01 PROCEDURE — 1200000000 HC SEMI PRIVATE

## 2022-08-01 PROCEDURE — 97166 OT EVAL MOD COMPLEX 45 MIN: CPT

## 2022-08-01 PROCEDURE — 2580000003 HC RX 258: Performed by: STUDENT IN AN ORGANIZED HEALTH CARE EDUCATION/TRAINING PROGRAM

## 2022-08-01 PROCEDURE — 97162 PT EVAL MOD COMPLEX 30 MIN: CPT

## 2022-08-01 PROCEDURE — 83735 ASSAY OF MAGNESIUM: CPT

## 2022-08-01 PROCEDURE — 97116 GAIT TRAINING THERAPY: CPT

## 2022-08-01 PROCEDURE — 6370000000 HC RX 637 (ALT 250 FOR IP): Performed by: NURSE PRACTITIONER

## 2022-08-01 PROCEDURE — 97535 SELF CARE MNGMENT TRAINING: CPT

## 2022-08-01 PROCEDURE — 6360000002 HC RX W HCPCS: Performed by: NURSE PRACTITIONER

## 2022-08-01 RX ORDER — SODIUM CHLORIDE, SODIUM LACTATE, POTASSIUM CHLORIDE, CALCIUM CHLORIDE 600; 310; 30; 20 MG/100ML; MG/100ML; MG/100ML; MG/100ML
INJECTION, SOLUTION INTRAVENOUS CONTINUOUS
Status: ACTIVE | OUTPATIENT
Start: 2022-08-01 | End: 2022-08-02

## 2022-08-01 RX ADMIN — FLUOXETINE 10 MG: 10 CAPSULE ORAL at 09:20

## 2022-08-01 RX ADMIN — HEPARIN SODIUM 5000 UNITS: 5000 INJECTION INTRAVENOUS; SUBCUTANEOUS at 05:01

## 2022-08-01 RX ADMIN — SODIUM CHLORIDE, POTASSIUM CHLORIDE, SODIUM LACTATE AND CALCIUM CHLORIDE: 600; 310; 30; 20 INJECTION, SOLUTION INTRAVENOUS at 14:29

## 2022-08-01 RX ADMIN — ONDANSETRON 4 MG: 2 INJECTION INTRAMUSCULAR; INTRAVENOUS at 18:16

## 2022-08-01 RX ADMIN — SODIUM CHLORIDE, PRESERVATIVE FREE 10 ML: 5 INJECTION INTRAVENOUS at 09:10

## 2022-08-01 RX ADMIN — PANTOPRAZOLE SODIUM 40 MG: 40 TABLET, DELAYED RELEASE ORAL at 05:00

## 2022-08-01 RX ADMIN — AMLODIPINE BESYLATE 5 MG: 5 TABLET ORAL at 09:07

## 2022-08-01 RX ADMIN — CARVEDILOL 6.25 MG: 6.25 TABLET, FILM COATED ORAL at 09:07

## 2022-08-01 RX ADMIN — ALLOPURINOL 50 MG: 100 TABLET ORAL at 09:06

## 2022-08-01 RX ADMIN — CEFTRIAXONE 1000 MG: 1 INJECTION, POWDER, FOR SOLUTION INTRAMUSCULAR; INTRAVENOUS at 21:20

## 2022-08-01 RX ADMIN — HEPARIN SODIUM 5000 UNITS: 5000 INJECTION INTRAVENOUS; SUBCUTANEOUS at 21:18

## 2022-08-01 RX ADMIN — HEPARIN SODIUM 5000 UNITS: 5000 INJECTION INTRAVENOUS; SUBCUTANEOUS at 14:29

## 2022-08-01 RX ADMIN — Medication 1 CAPSULE: at 09:06

## 2022-08-01 RX ADMIN — MEMANTINE 5 MG: 5 TABLET ORAL at 09:07

## 2022-08-01 RX ADMIN — ACETAMINOPHEN 650 MG: 325 TABLET ORAL at 05:00

## 2022-08-01 RX ADMIN — ATORVASTATIN CALCIUM 20 MG: 20 TABLET, FILM COATED ORAL at 21:18

## 2022-08-01 RX ADMIN — ASPIRIN 81 MG: 81 TABLET, CHEWABLE ORAL at 09:06

## 2022-08-01 ASSESSMENT — PAIN SCALES - GENERAL
PAINLEVEL_OUTOF10: 0
PAINLEVEL_OUTOF10: 0

## 2022-08-01 NOTE — PROGRESS NOTES
Occupational Therapy  Facility/Department: [facility]  Occupational Therapy Initial Assessment    Name: Inez Vazquez  : 1935  MRN: [id number]  Date of Service: 2022    Discharge Recommendations:  24 hour supervision or assist, Home with Home health OT          Patient Diagnosis(es): The primary encounter diagnosis was COVID. Diagnoses of Renal insufficiency, Elevated troponin, and Adult failure to thrive were also pertinent to this visit. Past Medical History:  has a past medical history of Chronic kidney disease (CKD), stage IV (severe) ([address] Coronary artery disease involving native coronary artery of native heart without angina pectoris, GERD (gastroesophageal reflux disease), Gout, chronic, Histoplasmosis, Hyperlipidemia, Hypertension, Irregular heart beat, Osteoarthritis, Osteopenia of hip, Pneumonia, Ulcer of jejunum, and Unspecified cerebral artery occlusion with cerebral infarction. Past Surgical History:  has a past surgical history that includes Hysterectomy; Cholecystectomy (Orthopedic surgery Left arm); Tubal ligation; bladder suspension; Upper gastrointestinal endoscopy (N/A, 9/10/2021); and Upper gastrointestinal endoscopy (9/10/2021). Assessment   Performance deficits / Impairments: Decreased functional mobility ; Decreased ADL status; Decreased endurance;Decreased cognition  Assessment: Pt is an 80 y.o. female admitted with UTI, COVID +, CKD stage IV, generalized weakness. PTA, pt living with , daughter, and grandchildren, has assist prn for ADLs and SBA/supervision for fxl mobility with cane. Pt currently functioning below baseline d/t the above deficits, today requiring CGA fxl transfers/mobility with RW, mod A toileting and LB dressing, and anticipate pt would require overall mod A for ADLs. Pt c/o fatigue and SOB, however, SpO2 on RA >90% throughout session. Will cont to see on acute to address the above limitations and maximize pt's safety and independence. Recommend 24 hour assist and home OT at d/c. Prognosis: Good  Decision Making: Medium Complexity  History: see above  REQUIRES OT FOLLOW-UP: Yes  Activity Tolerance  Activity Tolerance: Patient limited by fatigue (pt c/o fatigue and SOB with activity requiring rest breaks. SpO2 on RA >90% )    Plan   Plan  Times per Week: 3-5  Current Treatment Recommendations: Functional mobility training, Balance training, Strengthening, Endurance training, Self-Care / ADL, Safety education & training, Equipment evaluation, education, & procurement, Cognitive reorientation, ROM     Restrictions  Restrictions/Precautions  Restrictions/Precautions: Isolation, Contact Precautions, Fall Risk  Position Activity Restriction  Other position/activity restrictions: droplet precautions due to covid-19, continuous pulse ox    Subjective   General  Chart Reviewed: Yes  Additional Pertinent Hx: Per FERNANDO Phillips - CNP's H&P 7/30/2022: \"The patient is a 80 y.o. female who presents to [facility] with PMHx: CKD stage IV, CAD, GERD, gout, histoplasmosis, CVA, HLD, HTN     Patient is severely hard of hearing. She does not have hearing aids. She lives at home with family. Patient presented to [facility] ED with reports of decreased mental status from baseline. Some reports of nausea vomiting and diarrhea. A nonproductive cough. Daughter reported to the ED providers the patient complained to her that she had generalized body aches generalized weakness and fatigue. ED workup: COVID positive. BUN 58, creatinine 2.6 GFR 17. No evidence of anemia. Lipase 54. UA positive for UTI WBC greater than 100. Troponin 0.10. Chest x-ray negative for vascular congestion consolidation. CT abdomen and pelvis indicating trace pericardial effusion otherwise no acute findings. \"  Family / Caregiver Present: Yes (caregiver)  Referring Practitioner: Axel Waddell MD  Diagnosis: UTI, COVID +, CKD stage IV, generalized weakness  Subjective  Subjective: Pt met b/s for OT eval/tx. Pt in bed on arrival, agreeable to participate in therapy. Pt denies pain. Pt c/o fatigue and SOB. Social/Functional History  Social/Functional History  Lives With: Family (spouse, dgt and gr-children; the pt has support 24/7 from all family members)  Type of Home: House  Home Layout: Multi-level, Able to Live on Main level with bedroom/bathroom  Home Access: Stairs to enter with rails  Entrance Stairs - Number of Steps: 4-5 DOUG  Entrance Stairs - Rails: Right  Bathroom Shower/Tub: Tub/Shower unit, Shower chair with back  Bathroom Toilet: Standard (vanity close by)  [employer]: Shower chair, Hand-held shower  Bathroom Accessibility: Walker accessible  Home Equipment: [place], 4 wheeled, Rollator, Cane (transport w/c)  Receives Help From: Family  ADL Assistance: Needs assistance (dgt has been asissting with bathing, dressing recently but prior to covid the pt could do dressing on her own)  Homemaking Assistance: Needs assistance (the pt able to clean her room, fold laundry; family does all else)  Ambulation Assistance: Independent (uses a cane indep in the house but with SBA when out)  Transfer Assistance: Independent  Active : No  Occupation: Retired  Leisure & Hobbies: watching tv, listening to the radio, reads bools with large print       Objective     Safety Devices  Type of Devices: Call light within reach;Gait belt; Chair alarm in place; Left in chair;Nurse notified      Balance  Sitting: Intact (seated EOB and toilet with SBA/supervision)  Standing: With support (CGA at )  Gait  Overall Level of Assistance: Contact-guard assistance (pt completed fxl mobility ~10 ft, ~25 ft with RW and CGA.)    Transfers  Sit to stand: Contact guard assistance  Stand to sit: Contact guard assistance  Transfer Comments: to/from [employer] - Technique: Ambulating  Equipment Used: Grab bars  Toilet Transfer: Contact guard assistance    AROM: Within functional limits  Sensation: Intact (pt denies N/T in maribeth hands)    ADL  LE Dressing: Moderate assistance  LE Dressing Skilled Clinical Factors: to doff depends and don pull up briefs. Toileting: Moderate assistance  Toileting Skilled Clinical Factors: external purewick catheter removed for OOB. Pt voided urine at commode. Assist to [facility] depends down, pericare in seated with SBA, min A to manage briefs up. Additional Comments: Anticipate pt is independent feeding, mod A bathing and dressing based on ROM/strength,b alance, endurance. Activity Tolerance  Activity Tolerance: Patient limited by fatigue;Patient limited by endurance  Activity Tolerance Comments: SpO2 on room air > 90% thruout session (at rest and with activity)    Vision  Vision: Impaired  Vision Exceptions: Wears glasses at all times  Hearing  Hearing: Exceptions to [facility]  Hearing Exceptions: Hard of hearing/hearing concerns; No hearing aid    Cognition  Overall Cognitive Status: Exceptions  Following Commands: Follows one step commands with increased time (d/t [place])  Memory: Decreased short term memory;Decreased recall of recent events;Decreased long term memory  Insights: Decreased awareness of deficits  Initiation: Does not require cues  Sequencing: Does not require cues  Orientation  Overall Orientation Status: Impaired  Orientation Level: Disoriented to time;Oriented to person;Oriented to place; Disoriented to situation      Education Given To: Patient  Education Provided: Role of Therapy;Plan of Care;ADL Adaptive Strategies;Transfer Training  Barriers to Learning: Cognition; Hearing  Education Outcome: Continued education needed                          AM-PAC Score        AM-PAC Inpatient Daily Activity Raw Score: 16 (08/01/22 1145)  AM-PAC Inpatient ADL T-Scale Score : 35.96 (08/01/22 1145)  ADL Inpatient CMS 0-100% Score: 53.32 (08/01/22 1145)  ADL Inpatient CMS G-Code Modifier : CK (08/01/22 1145) Goals  Short Term Goals  Time Frame for Short term goals: Prior to d/c:  Short Term Goal 1: Pt will dress with min A. Short Term Goal 2: Pt will bathe with min A. Short Term Goal 3: Pt will toilet with SBA/supervision. Short Term Goal 4: Pt will complete fxl mobility and fxl transfers to/from ADL surfaces with SBA/supervision using LRAD. Short Term Goal 5: Pt tolerate standing >5 minutes for functional task with SBA/supervision to improve standing tolerance for ADL routine. Long Term Goals  Time Frame for Long term goals : STGs=LTGs  Patient Goals   Patient goals : to return home. Therapy Time   Individual Concurrent Group Co-treatment   Time In 1001         Time Out 1044         Minutes 43         Timed Code Treatment Minutes: 28 Minutes     This note to serve as OT d/c summary if pt is d/c-ed prior to next therapy session.     David Hudson, OTR/L [id number]

## 2022-08-01 NOTE — PROGRESS NOTES
Physical Therapy  Facility/Department: [address] MED SURG  Physical Therapy Initial Assessment  If patient discharges prior to next session this note will serve as a discharge summary. Please see below for the latest assessment towards goals. Name: Fallon Baez  : 1935  MRN: [id number]  Date of Service: 2022    Discharge Recommendations:  24 hour supervision or assist, Home with Home health PT, Patient would benefit from continued therapy after discharge   Fallon Baez scored a 18/24 on the AM-PAC short mobility form. Current research shows that an AM-PAC score of 18 or greater is typically associated with a discharge to the patient's home setting. Based on the patient's AM-PAC score and their current functional mobility deficits, it is recommended that the patient have 2-3 sessions per week of Physical Therapy at d/c to increase the patient's independence. At this time, this patient demonstrates the endurance and safety to discharge home with 24/7 assist and home PT(home vs OP services) and a follow up treatment frequency of 2-3x/wk. Please see assessment section for further patient specific details. PT Equipment Recommendations  Other: may need a walker at d/c; reports hers at home is broken      Patient Diagnosis(es): The primary encounter diagnosis was COVID. Diagnoses of Renal insufficiency, Elevated troponin, and Adult failure to thrive were also pertinent to this visit. Past Medical History:  has a past medical history of Chronic kidney disease (CKD), stage IV (severe) ([facility] [place] [address] Coronary artery disease involving native coronary artery of native heart without angina pectoris, GERD (gastroesophageal reflux disease), Gout, chronic, Histoplasmosis, Hyperlipidemia, Hypertension, Irregular heart beat, Osteoarthritis, Osteopenia of hip, Pneumonia, Ulcer of jejunum, and Unspecified cerebral artery occlusion with cerebral infarction.   Past Surgical History:  has a past surgical history that includes Hysterectomy; Cholecystectomy (Orthopedic surgery Left arm); Tubal ligation; bladder suspension; Upper gastrointestinal endoscopy (N/A, 9/10/2021); and Upper gastrointestinal endoscopy (9/10/2021). Assessment   Assessment: The pt is an 81 yo female who presented to the ED with fatigue and found to be covid-19 positive. The pt also found to have a UTI. The pt lives with her  and dgt and dgt's family. She was needing just slight assist for self-care and Supervision for ambulation with a cane in the home PTA. PMHx: CKD stage IV, CAD, GERD, gout, histoplasmosis, CVA, HLD, HTN      Today, the pt demonstrated that she is functioning below her baseline and is limited by her decreased tolerance for activity and generalized weakness. The pt was able to walk today in the room setting but did need the walker. Anticipate that the pt will progress and be safe for home at d/c. She will need initial 24/7 asisst and home PT. Will con't to follow.   Therapy Prognosis: Good  Decision Making: Medium Complexity  Barriers to Learning: cog, memory  Requires PT Follow-Up: Yes  Activity Tolerance  Activity Tolerance: Patient limited by fatigue;Patient limited by endurance  Activity Tolerance Comments: SpO2 on room air > 90% thruout session (at rest and with activity)     Plan   Plan  Plan: 3-5 times per week  Current Treatment Recommendations: Strengthening, Balance training, Functional mobility training, Gait training, Transfer training, Therapeutic activities, Patient/Caregiver education & training, Safety education & training  Safety Devices  Type of Devices: Call light within reach, Gait belt, Chair alarm in place, Left in chair, Nurse notified     Restrictions  Restrictions/Precautions  Restrictions/Precautions: Isolation, Contact Precautions, Fall Risk  Position Activity Restriction  Other position/activity restrictions: droplet precautions due to covid-19, continuous pulse ox     Subjective   General  Chart Reviewed: Exceptions: Hard of hearing/hearing concerns; No hearing aid    Cognition   Orientation  Overall Orientation Status: Impaired  Orientation Level: Disoriented to time;Oriented to person;Oriented to place; Disoriented to situation     Objective     Gross Assessment  AROM: Within functional limits  Strength: Within functional limits  Tone: Normal       Bed mobility  Supine to Sit: Stand by assistance  Sit to Supine: Unable to assess  Scooting: Stand by assistance  Transfers  Sit to Stand: Contact guard assistance  Stand to sit: Contact guard assistance  Ambulation  Surface: level tile  Device: Rolling Walker  Assistance: Contact guard assistance  Quality of Gait: steady but slowed pace, no LOB  Distance: 10 feet x 1, 25 feet x 1  Comments: the pt used the commode during the session; completed edmar-care on her own and needed just min A for getting new depends on  More Ambulation?: No  Stairs/Curb  Stairs?: No     Balance  Sitting - Static: Good  Sitting - Dynamic: Good  Standing - Static: Good;-  Standing - Dynamic: Good;-           AM-PAC Score  AM-PAC Inpatient Mobility Raw Score : 18 (08/01/22 1139)  AM-PAC Inpatient T-Scale Score : 43.63 (08/01/22 1139)  Mobility Inpatient CMS 0-100% Score: 46.58 (08/01/22 1139)  Mobility Inpatient CMS G-Code Modifier : CK (08/01/22 1139)          Goals  Short Term Goals  Time Frame for Short term goals: upon d/c  Short term goal 1: Bed mobility with mod I. Short term goal 2: Transfers sit <> stand with Sup/mod I. Short term goal 3: Ambulate with LRAD 50 feet with SBA/Sup.   Patient Goals   Patient goals : to go home       Education  Patient Education  Education Given To: Patient  Education Provided: Role of Therapy;Plan of Care  Education Method: Demonstration;Verbal  Barriers to Learning: Cognition  Education Outcome: Demonstrated understanding;Continued education needed      Therapy Time   Individual Concurrent Group Co-treatment   Time In 1001         Time Out 0962 [facility] Minutes 43         Timed Code Treatment Minutes: 28 Minutes  Electronically signed by Aneta Flynn, PT [id number] on 8/1/2022 at 11:49 AM

## 2022-08-01 NOTE — PROGRESS NOTES
Hospitalist Progress Note      PCP: Nelson Blount MD    Date of Admission: 7/30/2022    Chief Complaint: fatigue     Hospital Course:     80 y.o. female who presents to [facility] with PMHx: CKD stage IV, CAD, GERD, gout, histoplasmosis, CVA, HLD, HTN. Patient presented to ED with fatigue. Patient was found to be COVID +, with SULY on CKD. Subjective:   Denies any new complaints, reports resolved diarrhea. Spiked fever up to 38 last night/      Medications:  Reviewed    Infusion Medications    sodium chloride       Scheduled Medications    amLODIPine  5 mg Oral Daily    allopurinol  50 mg Oral Daily    aspirin  81 mg Oral Daily    atorvastatin  20 mg Oral Nightly    carvedilol  6.25 mg Oral BID WC    FLUoxetine  10 mg Oral Daily    [Held by provider] lisinopril  10 mg Oral Daily    memantine  5 mg Oral Daily    pantoprazole  40 mg Oral QAM AC    sodium chloride flush  5-40 mL IntraVENous 2 times per day    heparin (porcine)  5,000 Units SubCUTAneous 3 times per day    cefTRIAXone (ROCEPHIN) IV  1,000 mg IntraVENous Q24H    lactobacillus  1 capsule Oral Daily with breakfast     PRN Meds: sodium chloride flush, sodium chloride, polyethylene glycol, acetaminophen **OR** acetaminophen, ondansetron, guaiFENesin-dextromethorphan      Intake/Output Summary (Last 24 hours) at 8/1/2022 1237  Last data filed at 8/1/2022 0913  Gross per 24 hour   Intake 300 ml   Output 400 ml   Net -100 ml         Physical Exam Performed:    BP (!) 95/51   Pulse 69   Temp 98.1 °F (36.7 °C) (Oral)   Resp 18   Ht 4' 9\" (1.448 m)   Wt 148 lb 13 oz (67.5 kg)   SpO2 93%   BMI 32.20 kg/m²     General appearance: pleasant, denies any complaints   HEENT: Pupils equal, round, and reactive to light. Conjunctivae/corneas clear. Neck: Supple, with full range of motion. No jugular venous distention. Trachea midline. Respiratory:  Normal respiratory effort.  Clear to auscultation, bilaterally without Rales/Wheezes/Rhonchi. Cardiovascular: Regular rate and rhythm with normal S1/S2 without murmurs, rubs or gallops. Abdomen: Soft, non-tender, non-distended with normal bowel sounds. Musculoskeletal: No clubbing, cyanosis or edema bilaterally. Full range of motion without deformity. Skin: Skin color, texture, turgor normal.  No rashes or lesions. Neurologic:  Neurovascularly intact without any focal sensory/motor deficits. Cranial nerves: II-XII intact, grossly non-focal.  Psychiatric: Alert and oriented, thought content appropriate, normal insight  Capillary Refill: Brisk,3 seconds, normal   Peripheral Pulses: +2 palpable, equal bilaterally       Labs:   Recent Labs     07/30/22  1452 07/31/22  0656 08/01/22  0925   WBC 10.8 10.7 11.0   HGB 11.3* 10.9* 10.2*   HCT 32.7* 31.9* 30.1*    273 281       Recent Labs     07/30/22  1452 07/31/22  0656 08/01/22  0925    139 139   K 3.3* 3.7 3.4*   CL 97* 102 105   CO2 21 20* 20*   BUN 58* 49* 41*   CREATININE 2.6* 1.9* 1.8*   CALCIUM 7.1* 7.1* 7.6*       Recent Labs     07/30/22  1452 07/31/22  0656 08/01/22  0925   AST 33 29 27   ALT 12 10 11   BILIDIR <0.2  --   --    BILITOT 0.3 0.3 <0.2   ALKPHOS 52 48 47       No results for input(s): INR in the last 72 hours. Recent Labs     07/30/22  1452 07/30/22  2140   TROPONINI 0.10* 0.06*         Urinalysis:      Lab Results   Component Value Date/Time    NITRU Negative 07/30/2022 06:45 PM    WBCUA >100 07/30/2022 06:45 PM    BACTERIA 4+ 07/30/2022 06:45 PM    RBCUA None seen 07/30/2022 06:45 PM    BLOODU Negative 07/30/2022 06:45 PM    SPECGRAV 1.015 07/30/2022 06:45 PM    GLUCOSEU Negative 07/30/2022 06:45 PM    GLUCOSEU Negative 06/13/2011 04:20 PM       Radiology:  CT ABDOMEN PELVIS WO CONTRAST Additional Contrast? None   Final Result   1. Trace pericardial effusion. 2.  No acute abdominal or pelvic pathology. 3.  Colonic diverticulosis again seen.       4.  Thick-walled, trabeculated urinary bladder walls again seen, along with   multiple diverticula, when compared to the previous study performed   09/10/2021.      5.  Mild dilatation again noted involving the infrarenal abdominal aorta. 6.  Other findings, as described above. XR CHEST (2 VW)   Final Result   No radiographic evidence of acute pulmonary disease. Assessment/Plan:    Active Hospital Problems    Diagnosis     Generalized weakness [R53.1]      Priority: Medium    COVID-19 [U07.1]      Priority: Medium    UTI (urinary tract infection) [N39.0]      Priority: Medium     UTI  Urine WBC greater than 100, large leuk esterase  Positive fatigue generalized weakness  Plan  -Continue on ceftriaxone, will treat for 3 days. COVID:   Continue isolation precautions. No hypoxia. Occasional cough: Nonproductive. No respiratory distress  Symptomatic treatment for cough    Diarrhea  Improved. Cdiff negative     SULY on CKD  Patient presented with creatinine of around 2.6, improved to 1.9--> 1.8  Continue holding ACE inhibitor. Will hydrate today with LR at 100 ml/hr      Elevated troponin:  Static, related to CKD     CAD/HLD/HTN:   Continue amlodipine aspirin statin, carvedilol per home regimen  Gout continue allopurinol per home regimen       DVT Prophylaxis: heparin  Diet: ADULT DIET; Regular; Low Sodium (2 gm)  Code Status: Full Code    PT/OT Eval Status: ordered.      Dispo - pending clinical improvement      Azul Ludwig MD

## 2022-08-01 NOTE — PROGRESS NOTES
Physician Progress Note      Hernando Castelan  [facility] #:                  [id number]  :                       1935  ADMIT DATE:       2022 2:05 PM  [address] [place] DATE:  RESPONDING  PROVIDER #:        Javier Gipson          QUERY TEXT:    Pt admitted with UTI and COVID 19. Pt noted to have fever, tachycardia and   tachypnea. If possible, please document in the progress notes and discharge   summary if you are evaluating and /or treating any of the following: The medical record reflects the following:  Risk Factors: Current admission for UTI and COVID19. Clinical Indicators: VS- 100.8, 103, 30, 115/59. ...u/a- cloudy, trace protein,   large leuk, >100 wbc, 4+ bacteria. .. Ahsanne Major COVID19-  DETECTED,  Treatment: IVF,  Rocephin IV, Urine culture. Thank Brandon Mcdonald RN BSN CDS CRCR  [email or web address]  Options provided:  -- Sepsis due to UTI, present on admission  -- Sepsis due to [address], present on admission  -- Sepsis due to UTI and COVID19, present on admission  -- UTI and COVID19 without Sepsis  -- Other - I will add my own diagnosis  -- Disagree - Not applicable / Not valid  -- Disagree - Clinically unable to determine / Unknown  -- Refer to Clinical Documentation Reviewer    PROVIDER RESPONSE TEXT:    This patient has sepsis due to UTI which was present on admission.     Query created by: Jonathan Nugent on 2022 2:24 PM      Electronically signed by:  Javier Gipson 2022 2:49 PM

## 2022-08-01 NOTE — PLAN OF CARE
Problem: Discharge Planning  Goal: Discharge to home or other facility with appropriate resources  Outcome: Progressing     Problem: ABCDS Injury Assessment  Goal: Absence of physical injury  8/1/2022 0044 by Vidya Jones RN  Outcome: Progressing     Problem: Skin/Tissue Integrity  Goal: Absence of new skin breakdown  Description: 1. Monitor for areas of redness and/or skin breakdown  2. Assess vascular access sites hourly  3. Every 4-6 hours minimum:  Change oxygen saturation probe site  4. Every 4-6 hours:  If on nasal continuous positive airway pressure, respiratory therapy assess nares and determine need for appliance change or resting period.   8/1/2022 0044 by Vidya Jones RN  Outcome: Progressing     Problem: Safety - Adult  Goal: Free from fall injury  8/1/2022 0044 by Vidya Jones RN  Outcome: Progressing

## 2022-08-02 LAB
A/G RATIO: 0.7 (ref 1.1–2.2)
ALBUMIN SERPL-MCNC: 2.3 G/DL (ref 3.4–5)
ALP BLD-CCNC: 44 U/L (ref 40–129)
ALT SERPL-CCNC: 12 U/L (ref 10–40)
ANION GAP SERPL CALCULATED.3IONS-SCNC: 15 MMOL/L (ref 3–16)
AST SERPL-CCNC: 25 U/L (ref 15–37)
BASOPHILS ABSOLUTE: 0 K/UL (ref 0–0.2)
BASOPHILS RELATIVE PERCENT: 0 %
BILIRUB SERPL-MCNC: <0.2 MG/DL (ref 0–1)
BUN BLDV-MCNC: 35 MG/DL (ref 7–20)
CALCIUM SERPL-MCNC: 7.8 MG/DL (ref 8.3–10.6)
CHLORIDE BLD-SCNC: 103 MMOL/L (ref 99–110)
CO2: 20 MMOL/L (ref 21–32)
CREAT SERPL-MCNC: 1.5 MG/DL (ref 0.6–1.2)
EOSINOPHILS ABSOLUTE: 0 K/UL (ref 0–0.6)
EOSINOPHILS RELATIVE PERCENT: 0 %
GFR AFRICAN AMERICAN: 40
GFR NON-AFRICAN AMERICAN: 33
GLUCOSE BLD-MCNC: 94 MG/DL (ref 70–99)
HCT VFR BLD CALC: 28.8 % (ref 36–48)
HEMOGLOBIN: 9.7 G/DL (ref 12–16)
LYMPHOCYTES ABSOLUTE: 2.3 K/UL (ref 1–5.1)
LYMPHOCYTES RELATIVE PERCENT: 22 %
MAGNESIUM: 2.2 MG/DL (ref 1.8–2.4)
MCH RBC QN AUTO: 30.1 PG (ref 26–34)
MCHC RBC AUTO-ENTMCNC: 33.7 G/DL (ref 31–36)
MCV RBC AUTO: 89.5 FL (ref 80–100)
MONOCYTES ABSOLUTE: 1.3 K/UL (ref 0–1.3)
MONOCYTES RELATIVE PERCENT: 13 %
NEUTROPHILS ABSOLUTE: 6.7 K/UL (ref 1.7–7.7)
NEUTROPHILS RELATIVE PERCENT: 65 %
PDW BLD-RTO: 14.4 % (ref 12.4–15.4)
PLATELET # BLD: 279 K/UL (ref 135–450)
PLATELET SLIDE REVIEW: ADEQUATE
PMV BLD AUTO: 9.7 FL (ref 5–10.5)
POTASSIUM SERPL-SCNC: 3.9 MMOL/L (ref 3.5–5.1)
RBC # BLD: 3.21 M/UL (ref 4–5.2)
RBC # BLD: NORMAL 10*6/UL
SLIDE REVIEW: ABNORMAL
SODIUM BLD-SCNC: 138 MMOL/L (ref 136–145)
TOTAL PROTEIN: 5.8 G/DL (ref 6.4–8.2)
WBC # BLD: 10.3 K/UL (ref 4–11)

## 2022-08-02 PROCEDURE — 83735 ASSAY OF MAGNESIUM: CPT

## 2022-08-02 PROCEDURE — 36415 COLL VENOUS BLD VENIPUNCTURE: CPT

## 2022-08-02 PROCEDURE — 1200000000 HC SEMI PRIVATE

## 2022-08-02 PROCEDURE — 6360000002 HC RX W HCPCS: Performed by: STUDENT IN AN ORGANIZED HEALTH CARE EDUCATION/TRAINING PROGRAM

## 2022-08-02 PROCEDURE — 2580000003 HC RX 258: Performed by: STUDENT IN AN ORGANIZED HEALTH CARE EDUCATION/TRAINING PROGRAM

## 2022-08-02 PROCEDURE — 6370000000 HC RX 637 (ALT 250 FOR IP): Performed by: NURSE PRACTITIONER

## 2022-08-02 PROCEDURE — 6370000000 HC RX 637 (ALT 250 FOR IP): Performed by: STUDENT IN AN ORGANIZED HEALTH CARE EDUCATION/TRAINING PROGRAM

## 2022-08-02 PROCEDURE — 85025 COMPLETE CBC W/AUTO DIFF WBC: CPT

## 2022-08-02 PROCEDURE — 80053 COMPREHEN METABOLIC PANEL: CPT

## 2022-08-02 PROCEDURE — 94760 N-INVAS EAR/PLS OXIMETRY 1: CPT

## 2022-08-02 RX ADMIN — ONDANSETRON 4 MG: 2 INJECTION INTRAMUSCULAR; INTRAVENOUS at 01:47

## 2022-08-02 RX ADMIN — ATORVASTATIN CALCIUM 20 MG: 20 TABLET, FILM COATED ORAL at 20:38

## 2022-08-02 RX ADMIN — FLUOXETINE 10 MG: 10 CAPSULE ORAL at 09:20

## 2022-08-02 RX ADMIN — MEMANTINE 5 MG: 5 TABLET ORAL at 09:20

## 2022-08-02 RX ADMIN — GUAIFENESIN SYRUP AND DEXTROMETHORPHAN 5 ML: 100; 10 SYRUP ORAL at 09:21

## 2022-08-02 RX ADMIN — Medication 1 CAPSULE: at 09:21

## 2022-08-02 RX ADMIN — HEPARIN SODIUM 5000 UNITS: 5000 INJECTION INTRAVENOUS; SUBCUTANEOUS at 13:55

## 2022-08-02 RX ADMIN — ALLOPURINOL 50 MG: 100 TABLET ORAL at 09:21

## 2022-08-02 RX ADMIN — ACETAMINOPHEN 650 MG: 325 TABLET ORAL at 01:45

## 2022-08-02 RX ADMIN — PANTOPRAZOLE SODIUM 40 MG: 40 TABLET, DELAYED RELEASE ORAL at 06:18

## 2022-08-02 RX ADMIN — HEPARIN SODIUM 5000 UNITS: 5000 INJECTION INTRAVENOUS; SUBCUTANEOUS at 20:38

## 2022-08-02 RX ADMIN — ASPIRIN 81 MG: 81 TABLET, CHEWABLE ORAL at 09:21

## 2022-08-02 RX ADMIN — ACETAMINOPHEN 650 MG: 325 TABLET ORAL at 18:17

## 2022-08-02 RX ADMIN — SODIUM CHLORIDE, PRESERVATIVE FREE 10 ML: 5 INJECTION INTRAVENOUS at 20:38

## 2022-08-02 RX ADMIN — HEPARIN SODIUM 5000 UNITS: 5000 INJECTION INTRAVENOUS; SUBCUTANEOUS at 06:17

## 2022-08-02 ASSESSMENT — PAIN DESCRIPTION - LOCATION: LOCATION: BACK

## 2022-08-02 ASSESSMENT — PAIN DESCRIPTION - DESCRIPTORS: DESCRIPTORS: ACHING

## 2022-08-02 ASSESSMENT — PAIN SCALES - GENERAL: PAINLEVEL_OUTOF10: 0

## 2022-08-02 NOTE — PLAN OF CARE
Problem: Discharge Planning  Goal: Discharge to home or other facility with appropriate resources  Outcome: Progressing     Problem: ABCDS Injury Assessment  Goal: Absence of physical injury  Outcome: Progressing     Problem: Skin/Tissue Integrity  Goal: Absence of new skin breakdown  Description: 1. Monitor for areas of redness and/or skin breakdown  2. Assess vascular access sites hourly  3. Every 4-6 hours minimum:  Change oxygen saturation probe site  4. Every 4-6 hours:  If on nasal continuous positive airway pressure, respiratory therapy assess nares and determine need for appliance change or resting period.   Outcome: Progressing     Problem: Safety - Adult  Goal: Free from fall injury  Outcome: Progressing     Problem: Chronic Conditions and Co-morbidities  Goal: Patient's chronic conditions and co-morbidity symptoms are monitored and maintained or improved  Outcome: Progressing     Problem: Respiratory - Adult  Goal: Achieves optimal ventilation and oxygenation  Outcome: Progressing     Problem: Metabolic/Fluid and Electrolytes - Adult  Goal: Electrolytes maintained within normal limits  Outcome: Progressing     Problem: Pain  Goal: Verbalizes/displays adequate comfort level or baseline comfort level  Outcome: Progressing

## 2022-08-02 NOTE — PROGRESS NOTES
Hospitalist Progress Note      PCP: Eryn Tee MD    Date of Admission: 7/30/2022    Chief Complaint: fatigue     Hospital Course:     80 y.o. female who presents to [facility] with PMHx: CKD stage IV, CAD, GERD, gout, histoplasmosis, CVA, HLD, HTN. Patient presented to ED with fatigue. Patient was found to be COVID +, with SULY on CKD. Subjective:   No new complaints, daughter at bedside updated  Blood pressure noted to be on the softer side, will hold amlodipine for now  Further spikes of fever    Medications:  Reviewed    Infusion Medications    lactated ringers 100 mL/hr at 08/02/22 0615    sodium chloride       Scheduled Medications    [Held by provider] amLODIPine  5 mg Oral Daily    allopurinol  50 mg Oral Daily    aspirin  81 mg Oral Daily    atorvastatin  20 mg Oral Nightly    [Held by provider] carvedilol  6.25 mg Oral BID WC    FLUoxetine  10 mg Oral Daily    [Held by provider] lisinopril  10 mg Oral Daily    memantine  5 mg Oral Daily    pantoprazole  40 mg Oral QAM AC    sodium chloride flush  5-40 mL IntraVENous 2 times per day    heparin (porcine)  5,000 Units SubCUTAneous 3 times per day    cefTRIAXone (ROCEPHIN) IV  1,000 mg IntraVENous Q24H    lactobacillus  1 capsule Oral Daily with breakfast     PRN Meds: sodium chloride flush, sodium chloride, polyethylene glycol, acetaminophen **OR** acetaminophen, ondansetron, guaiFENesin-dextromethorphan      Intake/Output Summary (Last 24 hours) at 8/2/2022 1135  Last data filed at 8/2/2022 0615  Gross per 24 hour   Intake 2092.63 ml   Output 700 ml   Net 1392.63 ml         Physical Exam Performed:    BP (!) 107/53   Pulse 67   Temp 98.6 °F (37 °C) (Oral)   Resp 16   Ht 4' 9\" (1.448 m)   Wt 141 lb 1.5 oz (64 kg)   SpO2 98%   BMI 30.53 kg/m²     General appearance: pleasant, denies any complaints   HEENT: Pupils equal, round, and reactive to light. Conjunctivae/corneas clear. Neck: Supple, with full range of motion.  No pericardial effusion. 2.  No acute abdominal or pelvic pathology. 3.  Colonic diverticulosis again seen. 4.  Thick-walled, trabeculated urinary bladder walls again seen, along with   multiple diverticula, when compared to the previous study performed   09/10/2021.      5.  Mild dilatation again noted involving the infrarenal abdominal aorta. 6.  Other findings, as described above. XR CHEST (2 VW)   Final Result   No radiographic evidence of acute pulmonary disease. Assessment/Plan:    Active Hospital Problems    Diagnosis     Generalized weakness [R53.1]      Priority: Medium    COVID-19 [U07.1]      Priority: Medium    UTI (urinary tract infection) [N39.0]      Priority: Medium     UTI  Urine WBC greater than 100, large leuk esterase  Positive fatigue generalized weakness  Plan  -Continue on ceftriaxone, will treat for 3 days. COVID:   Continue isolation precautions. No hypoxia. Occasional cough: Nonproductive. No respiratory distress  Symptomatic treatment for cough    Diarrhea  Improved. Cdiff negative     SULY on CKD  Patient presented with creatinine of around 2.6, improved to 1.9--> 1.8--> 1.5  Continue holding ACE inhibitor. Will hydrate today with LR at 100 ml/hr      Elevated troponin:  Static, related to CKD     CAD/HLD/HTN:   Continue amlodipine aspirin statin, carvedilol per home regimen  Gout continue allopurinol per home regimen       DVT Prophylaxis: heparin  Diet: ADULT DIET; Regular; Low Sodium (2 gm)  ADULT ORAL NUTRITION SUPPLEMENT; Breakfast, Lunch, Dinner; Standard High Calorie/High Protein Oral Supplement  Code Status: Full Code    PT/OT Eval Status: ordered.      Dispo -possible DC tomorrow with home health (walker ordered)       Ryan Portillo MD

## 2022-08-02 NOTE — PROGRESS NOTES
Pt awake and confused, pulling tele leads off and trying to get out of bed per daughter. Pt c/o her stomach not being settled and also has a temp of 100.5. Tylenol and Zofran given per PRN order.

## 2022-08-02 NOTE — CARE COORDINATION
INITIAL CASE MANAGEMENT ASSESSMENT    Reviewed chart, met with patient to assess possible discharge needs. Explained Case Management role/services. Living Situation: Patient lives with her  and dgt and dgt's family in a house with 4-5 steps to enter. ADLs: Needs assistance (dgt has been assisting with bathing, dressing recently but prior to covid the pt could do dressing on her own)     DME: Shower chair with back, Hand-held shower, Walker, 4 wheeled, Rollator, Cane (transport w/c)    PT/OT Recs: Discharge Recommendations:  24 hour supervision or assist, Home with Home health PT, Patient would benefit from continued therapy after discharge    At this time, this patient demonstrates the endurance and safety to discharge home with 24/7 assist and home PT(home vs OP services) and a follow up treatment frequency of 2-3x/wk. PT Equipment Recommendations  Other: may need a walker at d/c; reports hers at home is broken  Today, the pt demonstrated that she is functioning below her baseline and is limited by her decreased tolerance for activity and generalized weakness. The pt was able to walk today in the room setting but did need the walker. Anticipate that the pt will progress and be safe for home at d/c. She will need initial 24/7 assist and home PT. Active Services: None     Transportation: Family transports     Medications: Brody Luna [phone]/DX barriers    PCP: Bandar Roque MD      HD/PD: N/A    PLAN/COMMENTS: Patient's goal is to return to home with her family. SW/CM provided contact information for patient or family to call with any questions. SW/CM will follow and assist as needed.    Electronically signed by Vicki Longo RN on 8/2/2022 at 10:39 AM

## 2022-08-02 NOTE — ACP (ADVANCE CARE PLANNING)
Advance Care Planning     Advance Care Planning Activator (Inpatient)  Conversation Note      Date of ACP Conversation: 8/2/2022     Conversation Conducted with: Patient with Decision Making Capacity    ACP Activator: Amaris Jimenez RN    Health Care Decision Maker:     Current Designated Health Care Decision Maker:     Primary Decision Maker: Denzel Luke - [phone]    Secondary Decision Maker: Martina Kirby - [phone]    Care Preferences    Ventilation: \"If you were in your present state of health and suddenly became very ill and were unable to breathe on your own, what would your preference be about the use of a ventilator (breathing machine) if it were available to you? \"      Would the patient desire the use of ventilator (breathing machine)?: yes    \"If your health worsens and it becomes clear that your chance of recovery is unlikely, what would your preference be about the use of a ventilator (breathing machine) if it were available to you? \"     Would the patient desire the use of ventilator (breathing machine)?: No      Resuscitation  \"CPR works best to restart the heart when there is a sudden event, like a heart attack, in someone who is otherwise healthy. Unfortunately, CPR does not typically restart the heart for people who have serious health conditions or who are very sick. \"    \"In the event your heart stopped as a result of an underlying serious health condition, would you want attempts to be made to restart your heart (answer \"yes\" for attempt to resuscitate) or would you prefer a natural death (answer \"no\" for do not attempt to resuscitate)? \" yes       [] Yes   [x] No   Educated Patient / Shannan List regarding differences between Advance Directives and portable DNR orders.     Length of ACP Conversation in minutes:  3    Conversation Outcomes:  [x] ACP discussion completed  [] Existing advance directive reviewed with patient; no changes to patient's previously recorded wishes  [] New Advance Directive completed  [] Portable Do Not Rescitate prepared for Provider review and signature  [] POLST/POST/MOLST/MOST prepared for Provider review and signature      Follow-up plan:    [] Schedule follow-up conversation to continue planning  [] Referred individual to Provider for additional questions/concerns   [] Advised patient/agent/surrogate to review completed ACP document and update if needed with changes in condition, patient preferences or care setting    [x] This note routed to one or more involved healthcare providers    Electronically signed by Maile Saravia RN on 8/2/2022 at 10:42 AM

## 2022-08-03 VITALS
HEART RATE: 85 BPM | SYSTOLIC BLOOD PRESSURE: 138 MMHG | TEMPERATURE: 98.1 F | OXYGEN SATURATION: 94 % | RESPIRATION RATE: 18 BRPM | WEIGHT: 135.8 LBS | BODY MASS INDEX: 29.3 KG/M2 | HEIGHT: 57 IN | DIASTOLIC BLOOD PRESSURE: 64 MMHG

## 2022-08-03 LAB
A/G RATIO: 0.8 (ref 1.1–2.2)
ALBUMIN SERPL-MCNC: 2.7 G/DL (ref 3.4–5)
ALP BLD-CCNC: 47 U/L (ref 40–129)
ALT SERPL-CCNC: 12 U/L (ref 10–40)
ANION GAP SERPL CALCULATED.3IONS-SCNC: 11 MMOL/L (ref 3–16)
AST SERPL-CCNC: 22 U/L (ref 15–37)
BASOPHILS ABSOLUTE: 0 K/UL (ref 0–0.2)
BASOPHILS RELATIVE PERCENT: 0.3 %
BILIRUB SERPL-MCNC: <0.2 MG/DL (ref 0–1)
BUN BLDV-MCNC: 24 MG/DL (ref 7–20)
CALCIUM SERPL-MCNC: 8.2 MG/DL (ref 8.3–10.6)
CHLORIDE BLD-SCNC: 106 MMOL/L (ref 99–110)
CO2: 24 MMOL/L (ref 21–32)
CREAT SERPL-MCNC: 1.1 MG/DL (ref 0.6–1.2)
EOSINOPHILS ABSOLUTE: 0.1 K/UL (ref 0–0.6)
EOSINOPHILS RELATIVE PERCENT: 1 %
GFR AFRICAN AMERICAN: 57
GFR NON-AFRICAN AMERICAN: 47
GLUCOSE BLD-MCNC: 96 MG/DL (ref 70–99)
HCT VFR BLD CALC: 29.9 % (ref 36–48)
HEMOGLOBIN: 10.1 G/DL (ref 12–16)
LYMPHOCYTES ABSOLUTE: 2 K/UL (ref 1–5.1)
LYMPHOCYTES RELATIVE PERCENT: 23.1 %
MAGNESIUM: 1.7 MG/DL (ref 1.8–2.4)
MCH RBC QN AUTO: 30.1 PG (ref 26–34)
MCHC RBC AUTO-ENTMCNC: 33.7 G/DL (ref 31–36)
MCV RBC AUTO: 89.2 FL (ref 80–100)
MONOCYTES ABSOLUTE: 1.5 K/UL (ref 0–1.3)
MONOCYTES RELATIVE PERCENT: 16.8 %
NEUTROPHILS ABSOLUTE: 5.1 K/UL (ref 1.7–7.7)
NEUTROPHILS RELATIVE PERCENT: 58.8 %
PDW BLD-RTO: 15.1 % (ref 12.4–15.4)
PLATELET # BLD: 315 K/UL (ref 135–450)
PMV BLD AUTO: 9 FL (ref 5–10.5)
POTASSIUM SERPL-SCNC: 4.2 MMOL/L (ref 3.5–5.1)
RBC # BLD: 3.35 M/UL (ref 4–5.2)
SODIUM BLD-SCNC: 141 MMOL/L (ref 136–145)
TOTAL PROTEIN: 6.3 G/DL (ref 6.4–8.2)
WBC # BLD: 8.7 K/UL (ref 4–11)

## 2022-08-03 PROCEDURE — 6360000002 HC RX W HCPCS: Performed by: STUDENT IN AN ORGANIZED HEALTH CARE EDUCATION/TRAINING PROGRAM

## 2022-08-03 PROCEDURE — 83735 ASSAY OF MAGNESIUM: CPT

## 2022-08-03 PROCEDURE — 85025 COMPLETE CBC W/AUTO DIFF WBC: CPT

## 2022-08-03 PROCEDURE — 6370000000 HC RX 637 (ALT 250 FOR IP): Performed by: STUDENT IN AN ORGANIZED HEALTH CARE EDUCATION/TRAINING PROGRAM

## 2022-08-03 PROCEDURE — 2580000003 HC RX 258: Performed by: STUDENT IN AN ORGANIZED HEALTH CARE EDUCATION/TRAINING PROGRAM

## 2022-08-03 PROCEDURE — 36415 COLL VENOUS BLD VENIPUNCTURE: CPT

## 2022-08-03 PROCEDURE — 80053 COMPREHEN METABOLIC PANEL: CPT

## 2022-08-03 PROCEDURE — 94760 N-INVAS EAR/PLS OXIMETRY 1: CPT

## 2022-08-03 PROCEDURE — 6370000000 HC RX 637 (ALT 250 FOR IP): Performed by: NURSE PRACTITIONER

## 2022-08-03 RX ADMIN — AMLODIPINE BESYLATE 5 MG: 5 TABLET ORAL at 09:15

## 2022-08-03 RX ADMIN — Medication 1 CAPSULE: at 09:03

## 2022-08-03 RX ADMIN — MEMANTINE 5 MG: 5 TABLET ORAL at 09:03

## 2022-08-03 RX ADMIN — ALLOPURINOL 50 MG: 100 TABLET ORAL at 09:04

## 2022-08-03 RX ADMIN — ASPIRIN 81 MG: 81 TABLET, CHEWABLE ORAL at 09:03

## 2022-08-03 RX ADMIN — SODIUM CHLORIDE, PRESERVATIVE FREE 10 ML: 5 INJECTION INTRAVENOUS at 09:08

## 2022-08-03 RX ADMIN — FLUOXETINE 10 MG: 10 CAPSULE ORAL at 09:15

## 2022-08-03 RX ADMIN — HEPARIN SODIUM 5000 UNITS: 5000 INJECTION INTRAVENOUS; SUBCUTANEOUS at 06:18

## 2022-08-03 RX ADMIN — PANTOPRAZOLE SODIUM 40 MG: 40 TABLET, DELAYED RELEASE ORAL at 06:18

## 2022-08-03 ASSESSMENT — PAIN SCALES - GENERAL
PAINLEVEL_OUTOF10: 0
PAINLEVEL_OUTOF10: 0

## 2022-08-03 NOTE — DISCHARGE SUMMARY
Hospital Medicine Discharge Summary    Patient ID: Eloisa Jefferson      Patient's PCP: Yanelis Stewart MD    Admit Date: 7/30/2022     Discharge Date:   08/03/22      Admitting Provider: Zelda Miles MD     Discharge Provider: Ryan Portillo MD     Discharge Diagnoses: Active Hospital Problems    Diagnosis     Generalized weakness [R53.1]      Priority: Medium    COVID-19 [U07.1]      Priority: Medium    UTI (urinary tract infection) [N39.0]      Priority: Medium       The patient was seen and examined on day of discharge and this discharge summary is in conjunction with any daily progress note from day of discharge. Hospital Course:     80 y.o. female who presents to [facility] with PMHx: CKD stage IV, CAD, GERD, gout, histoplasmosis, CVA, HLD, HTN. Patient presented to ED with fatigue. Patient was found to be COVID +, with SULY on CKD. UTI  Urine WBC greater than 100, large leuk esterase  Positive fatigue generalized weakness  Completed 3 days of ceftriaxone. COVID:  Continue isolation precautions. No hypoxia. Occasional cough: Nonproductive. No respiratory distress  Symptomatic treatment for cough     Diarrhea  Improved. Cdiff negative    Soft BP. Continue holding ACE. Talked to daughter about measuring BP at home     SULY on CKD  Patient presented with creatinine of around 2.6, improved to 1.9--> 1.8--> 1.5--> 1.1  Continue holding ACE inhibitor. Elevated troponin:  Static, related to CKD     CAD/HLD/HTN:  Continue amlodipine aspirin statin, carvedilol per home regimen  Gout continue allopurinol per home regimen      Physical Exam Performed:     BP (!) 143/70   Pulse 77   Temp 98.9 °F (37.2 °C) (Oral)   Resp 18   Ht 4' 9\" (1.448 m)   Wt 135 lb 12.9 oz (61.6 kg)   SpO2 95%   BMI 29.39 kg/m²       General appearance:  No apparent distress, appears stated age and cooperative. HEENT:  Normal cephalic, atraumatic without obvious deformity.  Pupils equal, round, and reactive to light. Extra ocular muscles intact. Conjunctivae/corneas clear. Neck: Supple, with full range of motion. No jugular venous distention. Trachea midline. Respiratory:  Normal respiratory effort. Clear to auscultation, bilaterally without Rales/Wheezes/Rhonchi. Cardiovascular:  Regular rate and rhythm with normal S1/S2 without murmurs, rubs or gallops. Abdomen: Soft, non-tender, non-distended with normal bowel sounds. Musculoskeletal:  No clubbing, cyanosis or edema bilaterally. Full range of motion without deformity. Skin: Skin color, texture, turgor normal.  No rashes or lesions. Neurologic:  Neurovascularly intact without any focal sensory/motor deficits. Cranial nerves: II-XII intact, grossly non-focal.  Psychiatric:  Alert and oriented, thought content appropriate, normal insight  Capillary Refill: Brisk,< 3 seconds   Peripheral Pulses: +2 palpable, equal bilaterally       Labs: For convenience and continuity at follow-up the following most recent labs are provided:      CBC:    Lab Results   Component Value Date/Time    WBC 8.7 08/03/2022 06:18 AM    HGB 10.1 08/03/2022 06:18 AM    HCT 29.9 08/03/2022 06:18 AM     08/03/2022 06:18 AM       Renal:    Lab Results   Component Value Date/Time     08/03/2022 06:18 AM    K 4.2 08/03/2022 06:18 AM    K 3.7 10/31/2021 05:13 AM     08/03/2022 06:18 AM    CO2 24 08/03/2022 06:18 AM    BUN 24 08/03/2022 06:18 AM    CREATININE 1.1 08/03/2022 06:18 AM    CALCIUM 8.2 08/03/2022 06:18 AM    PHOS 3.6 05/21/2014 11:11 AM         Significant Diagnostic Studies    Radiology:   CT ABDOMEN PELVIS WO CONTRAST Additional Contrast? None   Final Result   1. Trace pericardial effusion. 2.  No acute abdominal or pelvic pathology. 3.  Colonic diverticulosis again seen.       4.  Thick-walled, trabeculated urinary bladder walls again seen, along with   multiple diverticula, when compared to the previous study performed   09/10/2021.      5. Mild dilatation again noted involving the infrarenal abdominal aorta. 6.  Other findings, as described above. XR CHEST (2 VW)   Final Result   No radiographic evidence of acute pulmonary disease. Consults:     None    Disposition:  home with home health      Condition at Discharge: Stable    Discharge Instructions/Follow-up:    - hold lisinopril for now. - measure BP before giving amlodipine. Code Status:  Full Code     Activity: activity as tolerated    Diet: regular diet      Discharge Medications:     Current Discharge Medication List             Details   carvedilol (COREG) 6.25 MG tablet TAKE 1 TABLET BY MOUTH TWO TIMES A DAY WITH MEALS. Qty: 60 tablet, Refills: 0      pantoprazole (PROTONIX) 40 MG tablet TAKE 1 TABLET BY MOUTH EVERY DAY  Qty: 30 tablet, Refills: 0      amLODIPine (NORVASC) 5 MG tablet TAKE 1 TABLET BY MOUTH EVERY DAY  Qty: 30 tablet, Refills: 0      atorvastatin (LIPITOR) 20 MG tablet TAKE 1 TABLET BY MOUTH AT BEDTIME  Qty: 30 tablet, Refills: 0      memantine (NAMENDA) 5 MG tablet Take 1 tablet by mouth daily  Qty: 180 tablet, Refills: 1    Associated Diagnoses: Dementia without behavioral disturbance, unspecified dementia type (HCC)      allopurinol (ZYLOPRIM) 100 MG tablet TAKE 1/2 TABLET BY MOUTH EVERY OTHER DAY  Qty: 30 tablet, Refills: 0    Associated Diagnoses: Hyperuricemia      donepezil (ARICEPT) 10 MG tablet TAKE 1 TABLET BY MOUTH nightly  Qty: 90 tablet, Refills: 0    Associated Diagnoses: Dementia without behavioral disturbance, unspecified dementia type (HCC)      albuterol sulfate HFA (VENTOLIN HFA) 108 (90 Base) MCG/ACT inhaler Inhale 2 puffs into the lungs 4 times daily as needed for Wheezing  Qty: 18 g, Refills: 0      FLUoxetine (PROZAC) 10 MG capsule TAKE 1 CAPSULE BY MOUTH ONE TIME A DAY  Qty: 30 capsule, Refills: 2    Associated Diagnoses:  Moderate single current episode of major depressive disorder (HCC)      Cyanocobalamin (VITAMIN B 12 PO) Take by mouth daily      furosemide (LASIX) 40 MG tablet Take 0.5 tablets by mouth daily  Qty: 30 tablet, Refills: 11      isosorbide mononitrate (IMDUR) 30 MG extended release tablet Take 1 tablet by mouth daily Spacing out from other cardiac medications  Qty: 30 tablet, Refills: 6      aspirin 81 MG chewable tablet Take 81 mg by mouth daily      ferrous sulfate 324 (65 Fe) MG EC tablet Take 1 tablet by mouth 2 times daily (with meals)  Qty: 60 tablet, Refills: 1      Respiratory Therapy Supplies (NEBULIZER/TUBING/MOUTHPIECE) KIT 1 kit by Does not apply route daily as needed (wheezing)  Qty: 1 kit, Refills: 0      ipratropium-albuterol (DUONEB) 0.5-2.5 (3) MG/3ML SOLN nebulizer solution Inhale 3 mLs into the lungs every 6 hours as needed for Shortness of Breath  Qty: 360 mL, Refills: 0      Multiple Vitamins-Minerals (THERAPEUTIC MULTIVITAMIN-MINERALS) tablet Take 1 tablet by mouth daily      loratadine (CLARITIN) 10 MG capsule Take 10 mg by mouth daily as needed       Handicap Placard MISC by Does not apply route Patient is unable to walk more than 250 feet before stopping to rest.    Not to exceed 5 years. DX: CHF  Qty: 1 each, Refills: 0    Associated Diagnoses: Primary localized osteoarthrosis, lower leg, unspecified laterality             Time Spent on discharge is more than 30 minutes in the examination, evaluation, counseling and review of medications and discharge plan. Signed:    Tello Valadez MD   8/3/2022      Thank you Emmy Oakley MD for the opportunity to be involved in this patient's care. If you have any questions or concerns, please feel free to contact me at [phone].

## 2022-08-03 NOTE — PROGRESS NOTES
Removed pt IV from right hand. Right AC IV was not present on head to toe eval. Reviewed all discharge instruction and answered all questions. Pt daughter helped pt get dressed and gathered all belongings. Pt taken down in wheelchair for discharge.

## 2022-08-03 NOTE — DISCHARGE INSTR - COC
cuff (capsule) sprain S43.429A    Coronary artery disease involving native coronary artery of native heart without angina pectoris I25.10    Idiopathic chronic gout of right foot M1A.0710    Chronic kidney disease (CKD), stage IV (severe) ([facility]) N18.4    Moderate single current episode of major depressive disorder ([facility] F32.1    TIA (transient ischemic attack) G45.9    GERD (gastroesophageal reflux disease) K21.9    Osteoarthritis M19.90    Osteopenia of hip M85.859    Acute on chronic heart failure ([facility]) I50.9    Acute on chronic congestive heart failure ([facility]) I50.9    Primary hypertension I10    Stage 3b chronic kidney disease ([facility] N18.32    Paronychia of finger, right L03.011    GI bleed K92.2    Diarrhea R19.7    Chronic heart failure ([facility]) I50.9    Sepsis ([facility] A41.9    Upper GI bleed K92.2    CHF (congestive heart failure), NYHA class I, acute on chronic, combined ([facility] I50.43    Suspected COVID-19 virus infection Z20.822    Acute respiratory failure with hypoxia ([facility]) J96.01    COVID-19 U07.1    UTI (urinary tract infection) N39.0    Generalized weakness R53.1       Isolation/Infection:   Isolation            Droplet Plus          Patient Infection Status       Infection Onset Added Last Indicated Last Indicated By Review Planned Expiration Resolved Resolved By    COVID-19 07/30/22 07/30/22 07/30/22 COVID-19, Rapid 08/09/22 08/13/22      Resolved    C-diff Rule Out 07/31/22 07/31/22 07/31/22 Clostridium Difficile Toxin/Antigen (Ordered)   07/31/22 Rule-Out Test Resulted    COVID-19 (Rule Out) 07/30/22 07/30/22 07/30/22 COVID-19, Rapid (Ordered)   07/30/22 Rule-Out Test Resulted    COVID-19 (Rule Out) 10/26/21 10/26/21 10/26/21 COVID-19 (Ordered)   10/27/21 Rule-Out Test Resulted    COVID-19 (Rule Out) 10/26/21 10/26/21 10/26/21 COVID-19, Rapid (Ordered)   10/26/21 Rule-Out Test Resulted    C-diff Rule Out 09/10/21 09/10/21 10/26/21 Clostridium difficile toxin/antigen (Ordered)   10/27/21 Rule-Out Test Resulted    COVID-19 (Rule Out) 08/24/21 08/24/21 08/24/21 COVID-19 (Ordered)   08/25/21 Rule-Out Test Resulted    COVID-19 (Rule Out) 11/28/20 11/28/20 11/28/20 COVID-19 (Ordered)   11/28/20 Rule-Out Test Resulted    C-diff Rule Out 10/20/20 10/20/20 10/20/20 Clostridium difficile toxin/antigen (Ordered)   10/20/20 Rule-Out Test Resulted    COVID-19 (Rule Out) 10/19/20 10/20/20 10/19/20 COVID-19 (Ordered)   10/20/20 Rule-Out Test Resulted    COVID-19 (Rule Out) 10/19/20 10/19/20 10/19/20 COVID-19 (Ordered)   10/19/20 Rule-Out Test Resulted            Nurse Assessment:  Last Vital Signs: BP (!) 143/70   Pulse 77   Temp 98.9 °F (37.2 °C) (Oral)   Resp 18   Ht 4' 9\" (1.448 m)   Wt 135 lb 12.9 oz (61.6 kg)   SpO2 95%   BMI 29.39 kg/m²     Last documented pain score (0-10 scale): Pain Level: 0  Last Weight:   Wt Readings from Last 1 Encounters:   08/03/22 135 lb 12.9 oz (61.6 kg)     Mental Status:  {IP PT MENTAL STATUS:97022}    IV Access:  { MARCIAL IV ACCESS:916092330}    Nursing Mobility/ADLs:  Walking   {CHP DME EXTP:135106046}  Transfer  {CHP DME TFEU:716692581}  Bathing  {CHP DME EACY:215132469}  Dressing  {CHP DME VINS:342509962}  Toileting  {CHP DME NUNC:582883397}  Feeding  {CHP DME YIXM:999414540}  Med Admin  {CHP DME ONAY:743765130}  Med Delivery   { MARCIAL MED Delivery:837153864}    Wound Care Documentation and Therapy:        Elimination:  Continence: Bowel: {YES / ZI:29104}  Bladder: {YES / MF:59494}  Urinary Catheter: {Urinary Catheter:448667424}   Colostomy/Ileostomy/Ileal Conduit: {YES / HK:91064}       Date of Last BM: ***    Intake/Output Summary (Last 24 hours) at 8/3/2022 1020  Last data filed at 8/2/2022 2151  Gross per 24 hour   Intake 240 ml   Output 500 ml   Net -260 ml     I/O last 3 completed shifts: In: 1732.6 [P.O.:240;  I.V.:1331.7; IV Piggyback:160.9]  Out: 1200 [Urine:1200]    Safety Concerns:     [address] MARCIAL Safety Concerns:627037074}    Impairments/Disabilities:      [address] CEJA Impairments/Disabilities:080953328}    Nutrition Therapy:  Current Nutrition Therapy:   [address] MARCIAL Diet List:908319880}    Routes of Feeding: {CHP DME Other Feedings:962188497}  Liquids: {Slp liquid thickness:88940}  Daily Fluid Restriction: {CHP DME Yes amt example:686538984}  Last Modified Barium Swallow with Video (Video Swallowing Test): {Done Not Done ZASY:419760007}    Treatments at the Time of Hospital Discharge:   Respiratory Treatments: ***  Oxygen Therapy:  {Therapy; copd oxygen:83134}  Ventilator:    { CC Vent HVZO:479848872}    Rehab Therapies: {THERAPEUTIC INTERVENTION:7157848711}  Weight Bearing Status/Restrictions: [address] CC Weight Bearin}  Other Medical Equipment (for information only, NOT a DME order):  {EQUIPMENT:116358230}  Other Treatments: ***    Patient's personal belongings (please select all that are sent with patient):  {[facility] DME Belongings:838608433}    RN SIGNATURE:  {Esignature:305337755}    CASE MANAGEMENT/SOCIAL WORK SECTION    Inpatient Status Date: ***    Readmission Risk Assessment Score:  Readmission Risk              Risk of Unplanned Readmission:  [id number]           Discharging to Facility/ Agency   Name:   Address:  Phone:  Fax:    Dialysis Facility (if applicable)   Name:  Address:  Dialysis Schedule:  Phone:  Fax:    / signature: {Esignature:875823608}    PHYSICIAN SECTION    Prognosis: Fair    Condition at Discharge: Stable    Rehab Potential (if transferring to Rehab): Good    Recommended Labs or Other Treatments After Discharge:   - home pt and ot. - measure BP     Physician Certification: I certify the above information and transfer of Inez Vazquez  is necessary for the continuing treatment of the diagnosis listed and that she requires Home Care for greater 30 days.      Update Admission H&P: No change in H&P    PHYSICIAN SIGNATURE:  Electronically signed by Ezekiel Wilson MD on 8/3/22 at 10:21 AM EDT

## 2022-08-03 NOTE — CARE COORDINATION
DISCHARGE SUMMARY     DATE OF DISCHARGE: 8/3/22    DISCHARGE DESTINATION: Home    HOME CARE: No (Family refuses [facility])    HEMODIALYSIS: No    TRANSPORTATION: Private Car    NEW DME ORDERED: yes    COMMENTS: DME order sent to [facility]. Daughter is at bedside and will transport patient to home.   Electronically signed by Elyssa Myers RN on 8/3/2022 at 10:37 AM

## 2022-08-04 ENCOUNTER — CARE COORDINATION (OUTPATIENT)
Dept: CASE MANAGEMENT | Age: 87
End: 2022-08-04

## 2022-08-04 DIAGNOSIS — I10 HTN (HYPERTENSION), BENIGN: Primary | ICD-10-CM

## 2022-08-04 PROCEDURE — 1111F DSCHRG MED/CURRENT MED MERGE: CPT | Performed by: STUDENT IN AN ORGANIZED HEALTH CARE EDUCATION/TRAINING PROGRAM

## 2022-08-04 NOTE — CARE COORDINATION
[facility] Transitions Initial Follow Up Call    Call within 2 business days of discharge: Yes    Patient: Thuy Warren Patient : 1935   MRN: [id number]  Reason for Admission: [place]  Discharge Date: 8/3/22 RARS: Readmission Risk Score: 15.3      Last Discharge [facility]       Date Complaint Diagnosis Description Type Department Provider    [address] . Isabela Jimenez . ED to Hosp-Admission (Discharged) (ADMIT) Wilber Smith MD; Curt Harman. .. Spoke with: daughterLuc verified    [facility]ility: [facility]    Non-face-to-face services provided:  Obtained and reviewed discharge summary and/or continuity of care documents  Education of patient/family/caregiver/guardian to support self-management-vitals monitoring, ADLs, f/u  Assessment and support for treatment adherence and medication management-full medication reconciliation and 1111f completed    Care Transitions 24 Hour Call    Do you have a copy of your discharge instructions?: Yes  Do you have all of your prescriptions and are they filled?: Yes  Have you been contacted by a [address]?: No  Have you scheduled your follow up appointment?: No  Post Acute Services: [facility] [address] you have support at home?: Partner/Spouse/SO, Child, Grandchild  Do you feel like you have everything you need to keep you well at home?: Yes  Are you an active caregiver in your home?: No  Care Transitions Interventions         Transitions of Care Initial Call    Was this an external facility discharge? No Discharge Facility: NA    Challenges to be reviewed by the provider   Additional needs identified to be addressed with provider: No  none             Method of communication with provider : none    Advance Care Planning:   Does patient have an Advance Directive: reviewed and current. LPN Care Coordinator contacted the family by telephone to perform post hospital discharge assessment.  Verified name and  with family as identifiers. Provided introduction to self, and explanation of the LPN CC role. LPN CC reviewed discharge instructions, medical action plan and red flags with family who verbalized understanding. Family given an opportunity to ask questions and does not have any further questions or concerns at this time. Were discharge instructions available to patient? Yes. Reviewed appropriate site of care based on symptoms and resources available to patient including: PCP. The family agrees to contact the PCP office for questions related to their healthcare. Medication reconciliation was performed with family, who verbalizes understanding of administration of home medications. Advised obtaining a 90-day supply of all daily and as-needed medications. Was patient discharged with a pulse oximeter? no    Daughter verified patient  and was pleasant and agreeable to transition calls. States patient is ok, moving slow. Appetite diminished, family supplementing with shakes. Reports patient has some \"throat congestion\" & sputum production. Requests if they can give patient Mucinex. LPN CC offered to write note to PCP to ensure it is safe for patient to give Mucinex or Robitussin DM. States they are monitoring BP, O2, & temperature. No values given. State they normally weigh patient daily, but have not had a chance to do so today as they are orienting back to home. Denies problems with bowels or bladder. Denies fever/chills, N/V/D, SOB. Patient was sent home with walker, but insurance declined [facility] request. Family plans to get [facility] from [facility]. Full medication reconciliation and 1111f completed. Plans to call PCP today to request f/u. LPN CC provided contact information. Plan for follow-up call in 5-7 days based on severity of symptoms and risk factors. Plan for next call: symptom management-SOB, cough, fever/chills, N/V/D  self management-ADLs, vitals-BP, O2, T, weight  follow up appointment-PCP?   medication management-new or changed? Cough syrup? Mucinex? Follow Up  No future appointments.     Dianna Kumar LPN

## 2022-08-08 ENCOUNTER — APPOINTMENT (OUTPATIENT)
Dept: GENERAL RADIOLOGY | Age: 87
End: 2022-08-08
Payer: MEDICARE

## 2022-08-08 ENCOUNTER — HOSPITAL ENCOUNTER (EMERGENCY)
Age: 87
Discharge: HOME | End: 2022-08-08
Attending: STUDENT IN AN ORGANIZED HEALTH CARE EDUCATION/TRAINING PROGRAM
Payer: MEDICARE

## 2022-08-08 VITALS
DIASTOLIC BLOOD PRESSURE: 91 MMHG | RESPIRATION RATE: 21 BRPM | HEART RATE: 75 BPM | OXYGEN SATURATION: 97 % | TEMPERATURE: 98 F | SYSTOLIC BLOOD PRESSURE: 153 MMHG

## 2022-08-08 DIAGNOSIS — R06.02 SHORTNESS OF BREATH: Primary | ICD-10-CM

## 2022-08-08 LAB
A/G RATIO: 1 (ref 1.1–2.2)
ALBUMIN SERPL-MCNC: 3.2 G/DL (ref 3.4–5)
ALP BLD-CCNC: 83 U/L (ref 40–129)
ALT SERPL-CCNC: 18 U/L (ref 10–40)
ANION GAP SERPL CALCULATED.3IONS-SCNC: 15 MMOL/L (ref 3–16)
AST SERPL-CCNC: 27 U/L (ref 15–37)
BASOPHILS ABSOLUTE: 0.1 K/UL (ref 0–0.2)
BASOPHILS RELATIVE PERCENT: 0.5 %
BILIRUB SERPL-MCNC: <0.2 MG/DL (ref 0–1)
BUN BLDV-MCNC: 23 MG/DL (ref 7–20)
CALCIUM SERPL-MCNC: 8.6 MG/DL (ref 8.3–10.6)
CHLORIDE BLD-SCNC: 101 MMOL/L (ref 99–110)
CO2: 23 MMOL/L (ref 21–32)
CREAT SERPL-MCNC: 1.1 MG/DL (ref 0.6–1.2)
EOSINOPHILS ABSOLUTE: 0.2 K/UL (ref 0–0.6)
EOSINOPHILS RELATIVE PERCENT: 1.5 %
GFR AFRICAN AMERICAN: 57
GFR NON-AFRICAN AMERICAN: 47
GLUCOSE BLD-MCNC: 104 MG/DL (ref 70–99)
HCT VFR BLD CALC: 32.7 % (ref 36–48)
HEMOGLOBIN: 10.9 G/DL (ref 12–16)
LYMPHOCYTES ABSOLUTE: 2.9 K/UL (ref 1–5.1)
LYMPHOCYTES RELATIVE PERCENT: 27.7 %
MCH RBC QN AUTO: 29.9 PG (ref 26–34)
MCHC RBC AUTO-ENTMCNC: 33.4 G/DL (ref 31–36)
MCV RBC AUTO: 89.5 FL (ref 80–100)
MONOCYTES ABSOLUTE: 1.3 K/UL (ref 0–1.3)
MONOCYTES RELATIVE PERCENT: 12.6 %
NEUTROPHILS ABSOLUTE: 6 K/UL (ref 1.7–7.7)
NEUTROPHILS RELATIVE PERCENT: 57.7 %
PDW BLD-RTO: 14.9 % (ref 12.4–15.4)
PLATELET # BLD: 560 K/UL (ref 135–450)
PMV BLD AUTO: 8.3 FL (ref 5–10.5)
POTASSIUM SERPL-SCNC: 4.2 MMOL/L (ref 3.5–5.1)
PRO-BNP: 1885 PG/ML (ref 0–449)
RBC # BLD: 3.65 M/UL (ref 4–5.2)
SARS-COV-2, NAAT: NOT DETECTED
SODIUM BLD-SCNC: 139 MMOL/L (ref 136–145)
TOTAL PROTEIN: 6.5 G/DL (ref 6.4–8.2)
TROPONIN: <0.01 NG/ML
WBC # BLD: 10.4 K/UL (ref 4–11)

## 2022-08-08 PROCEDURE — 71045 X-RAY EXAM CHEST 1 VIEW: CPT

## 2022-08-08 PROCEDURE — 99284 EMERGENCY DEPT VISIT MOD MDM: CPT

## 2022-08-08 PROCEDURE — 83880 ASSAY OF NATRIURETIC PEPTIDE: CPT

## 2022-08-08 PROCEDURE — 93005 ELECTROCARDIOGRAM TRACING: CPT | Performed by: STUDENT IN AN ORGANIZED HEALTH CARE EDUCATION/TRAINING PROGRAM

## 2022-08-08 PROCEDURE — 36415 COLL VENOUS BLD VENIPUNCTURE: CPT

## 2022-08-08 PROCEDURE — 85025 COMPLETE CBC W/AUTO DIFF WBC: CPT

## 2022-08-08 PROCEDURE — 84484 ASSAY OF TROPONIN QUANT: CPT

## 2022-08-08 PROCEDURE — 80053 COMPREHEN METABOLIC PANEL: CPT

## 2022-08-08 PROCEDURE — 87635 SARS-COV-2 COVID-19 AMP PRB: CPT

## 2022-08-09 ENCOUNTER — CARE COORDINATION (OUTPATIENT)
Dept: CASE MANAGEMENT | Age: 87
End: 2022-08-09

## 2022-08-09 LAB
EKG ATRIAL RATE: 81 BPM
EKG DIAGNOSIS: NORMAL
EKG P AXIS: -25 DEGREES
EKG P-R INTERVAL: 228 MS
EKG Q-T INTERVAL: 386 MS
EKG QRS DURATION: 126 MS
EKG QTC CALCULATION (BAZETT): 448 MS
EKG R AXIS: -52 DEGREES
EKG T AXIS: 101 DEGREES
EKG VENTRICULAR RATE: 81 BPM

## 2022-08-09 PROCEDURE — 93010 ELECTROCARDIOGRAM REPORT: CPT | Performed by: INTERNAL MEDICINE

## 2022-08-09 ASSESSMENT — ENCOUNTER SYMPTOMS
SORE THROAT: 0
SHORTNESS OF BREATH: 1
BACK PAIN: 0
NAUSEA: 0
ABDOMINAL PAIN: 0
VOMITING: 0
COUGH: 0
EYE PAIN: 0
DIARRHEA: 0

## 2022-08-09 NOTE — ED NOTES
Pt ambulated around ED without difficulty, no shortness of breath. MD made aware.      Dima Fry RN  08/08/22 2246

## 2022-08-09 NOTE — ED PROVIDER NOTES
[address] ENCOUNTER      Pt Name: Lluvia Lainez  MRN: [id number]  [place] 1935  Date of evaluation: 8/8/2022  Provider: Erickson Parker MD    CHIEF COMPLAINT       Chief Complaint   Patient presents with    Shortness of Breath     \"For a while\" pt recently admitted with COVID       Shortness of breath    HISTORY OF PRESENT ILLNESS   (Location/Symptom, Timing/Onset,Context/Setting, Quality, Duration, Modifying Factors, Severity)  Note limiting factors. Lluvia Lainez is a 80 y.o. female who presents to the ED with a chief complaint of shortness of breath on and off for the past few days. History is limited 2/2 hearing impairment and history is obtained mainly from her  who is the one who brought her to the ED, he states she briefly looked winded earlier but is now looking back to her baseline, she states she gets short of breath sometimes but currently feels fine. Denies chest pain, cough, nausea, vomiting, abdominal pain, fevers. Recently admitted to the hospital with COVID and SULY. Symptoms not otherwise alleviated or exacerbated by other factors. NursingNotes were reviewed. REVIEW OF SYSTEMS    (2-9 systems for level 4, 10 or more for level 5)     Review of Systems   Constitutional:  Negative for chills and fever. HENT:  Positive for hearing loss. Negative for congestion and sore throat. Eyes:  Negative for pain and visual disturbance. Respiratory:  Positive for shortness of breath. Negative for cough. Cardiovascular:  Negative for chest pain and palpitations. Gastrointestinal:  Negative for abdominal pain, diarrhea, nausea and vomiting. Genitourinary:  Negative for dysuria and frequency. Musculoskeletal:  Negative for back pain and neck pain. Skin:  Negative for rash and wound. Neurological:  Negative for dizziness, weakness and light-headedness.       PAST MEDICAL HISTORY     Past Medical History:   Diagnosis Date Chronic kidney disease (CKD), stage IV (severe) (HCC)     Coronary artery disease involving native coronary artery of native heart without angina pectoris 1/5/2016    GERD (gastroesophageal reflux disease) acid reflux    Gout, chronic     Histoplasmosis     Hyperlipidemia     Hypertension     Irregular heart beat     Osteoarthritis 2/28/2019    Knee, leg     Osteopenia of hip 04/26/2019    Pneumonia     Ulcer of jejunum     Unspecified cerebral artery occlusion with cerebral infarction 2014 , ~ 2012         SURGICALHISTORY       Past Surgical History:   Procedure Laterality Date    BLADDER SUSPENSION      CHOLECYSTECTOMY  Orthopedic surgery Left arm    HYSTERECTOMY (CERVIX STATUS UNKNOWN)      TUBAL LIGATION      UPPER GASTROINTESTINAL ENDOSCOPY N/A 9/10/2021    EGD CONTROL HEMORRHAGE performed by Crescencio Corey DO at [address]  9/10/2021    EGD SUBMUCOSAL/ EPINEPHRINE INJECTION performed by Crescencio Corey DO at [facility] Medication List as of 8/8/2022 10:59 PM        CONTINUE these medications which have NOT CHANGED    Details   carvedilol (COREG) 6.25 MG tablet TAKE 1 TABLET BY MOUTH TWO TIMES A DAY WITH MEALS., Disp-60 tablet, R-0Normal      pantoprazole (PROTONIX) 40 MG tablet TAKE 1 TABLET BY MOUTH EVERY DAY, Disp-30 tablet, R-0Normal      amLODIPine (NORVASC) 5 MG tablet TAKE 1 TABLET BY MOUTH EVERY DAY, Disp-30 tablet, R-0Normal      atorvastatin (LIPITOR) 20 MG tablet TAKE 1 TABLET BY MOUTH AT BEDTIME, Disp-30 tablet, R-0Normal      memantine (NAMENDA) 5 MG tablet Take 1 tablet by mouth daily, Disp-180 tablet, R-1Normal      allopurinol (ZYLOPRIM) 100 MG tablet TAKE 1/2 TABLET BY MOUTH EVERY OTHER DAY, Disp-30 tablet, R-0Normal      donepezil (ARICEPT) 10 MG tablet TAKE 1 TABLET BY MOUTH nightly, Disp-90 tablet, R-0Normal      albuterol sulfate HFA (VENTOLIN HFA) 108 (90 Base) MCG/ACT inhaler Inhale 2 puffs into the lungs 4 times daily as needed for Wheezing, Disp-18 g, R-0Normal      FLUoxetine (PROZAC) 10 MG capsule TAKE 1 CAPSULE BY MOUTH ONE TIME A DAY, Disp-30 capsule, R-2Normal      Cyanocobalamin (VITAMIN B 12 PO) Take by mouth dailyHistorical Med      furosemide (LASIX) 40 MG tablet Take 0.5 tablets by mouth daily, Disp-30 tablet, R-11NO PRINT      isosorbide mononitrate (IMDUR) 30 MG extended release tablet Take 1 tablet by mouth daily Spacing out from other cardiac medications, Disp-30 tablet, R-6Normal      aspirin 81 MG chewable tablet Take 81 mg by mouth dailyHistorical Med      ferrous sulfate 324 (65 Fe) MG EC tablet Take 1 tablet by mouth 2 times daily (with meals), Disp-60 tablet, R-1Normal      Respiratory Therapy Supplies (NEBULIZER/TUBING/MOUTHPIECE) KIT DAILY PRN Starting Tue 8/24/2021, Disp-1 kit, R-0, Print      ipratropium-albuterol (DUONEB) 0.5-2.5 (3) MG/3ML SOLN nebulizer solution Inhale 3 mLs into the lungs every 6 hours as needed for Shortness of Breath, Disp-360 mL, R-0Print      Multiple Vitamins-Minerals (THERAPEUTIC MULTIVITAMIN-MINERALS) tablet Take 1 tablet by mouth dailyHistorical Med      loratadine (CLARITIN) 10 MG capsule Take 10 mg by mouth daily as needed Historical Med      Handicap Placard [facility] Starting 8/17/2016, Until Discontinued, Disp-1 each, R-0, PrintPatient is unable to walk more than 250 feet before stopping to rest.   Not to exceed 5 years.  DX: CHF             ALLERGIES     Codeine, Morphine and related, and Oxycodone-acetaminophen    FAMILY HISTORY       Family History   Problem Relation Age of Onset    High Blood Pressure Mother     Heart Disease Mother     Diabetes Mother     High Blood Pressure Father     Heart Disease Father     Cancer Father         Brain    Cancer Sister         lymphoma    Cancer Brother         colon    Cancer Maternal Uncle           SOCIAL HISTORY       Social History     Socioeconomic History    Marital status:      Spouse name: None    Number of children: None    Years of education: None    Highest education level: None   Tobacco Use    Smoking status: Passive Smoke Exposure - Never Smoker    Smokeless tobacco: Never   Vaping Use    Vaping Use: Never used   Substance and Sexual Activity    Alcohol use: No     Comment: 1 yearly    Drug use: No    Sexual activity: Yes     Partners: Male     Social Determinants of Health     Financial Resource Strain: Low Risk     Difficulty of Paying Living Expenses: Not hard at all   Food Insecurity: No Food Insecurity    Worried About [address] in the Last Year: Never true    Ran Out of Food in the Last Year: Never true       SCREENINGS    [place] Coma Scale  Eye Opening: Spontaneous  Best Verbal Response: Oriented  Best Motor Response: Obeys commands  [place] Coma Scale Score: 15        PHYSICAL EXAM    (up to 7 for level 4, 8 or more for level 5)     ED Triage Vitals [08/08/22 2149]   BP Temp Temp Source Heart Rate Resp SpO2 Height Weight   (!) 147/75 98 °F (36.7 °C) Oral 87 18 97 % -- --       General: Alert and oriented appropriately for age, No acute distress. Eye: Normal conjunctiva. Sclera anicteric. HENT: Oral mucosa is moist.  Respiratory: Respirations even and non-labored. Clear to auscultation bilaterally. Cardiovascular: Normal rate, Regular rhythm. Intact peripheral pulses. No edema. Gastrointestinal: Soft, Non-tender, Non-distended. : deferred. Musculoskeletal: No swelling. Integumentary: Warm, Dry. Neurologic: Alert and appropriate for age. Significantly hearing impaired. No focal deficits. Psychiatric: Cooperative. DIAGNOSTIC RESULTS     EKG: All EKG's are interpreted by the Emergency Department Physician who either signs or Co-signsthis chart in the absence of a cardiologist.    The Ekg interpreted by me shows  Rhythm normal sinus rhythm with first-degree AV block, incomplete left bundle branch block.   Rate of 81 bpm  Axis is left axis deviation  Intervals and durations QRS widening  ST Segments: QS complexes in inferior leads suggest remote inferior infarct, repolarization abnormality associated with LVH  Compared to prior EKG dated 2022, there are no longer PVCs. RADIOLOGY:   Non-plain filmimages such as CT, Ultrasound and MRI are read by the radiologist. Plain radiographic images are visualized and preliminarily interpreted by the emergency physician with the below findings:      Interpretation per the Radiologist below, if available at the time ofthis note:    XR CHEST PORTABLE   Final Result   No acute cardiopulmonary process. LABS:  Labs Reviewed   CBC WITH AUTO DIFFERENTIAL - Abnormal; Notable for the following components:       Result Value    RBC 3.65 (*)     Hemoglobin 10.9 (*)     Hematocrit 32.7 (*)     Platelets 499 (*)     All other components within normal limits   COMPREHENSIVE METABOLIC PANEL - Abnormal; Notable for the following components:    Glucose 104 (*)     BUN 23 (*)     GFR Non- 47 (*)     GFR  57 (*)     Albumin 3.2 (*)     Albumin/Globulin Ratio 1.0 (*)     All other components within normal limits   BRAIN NATRIURETIC PEPTIDE - Abnormal; Notable for the following components:    Pro-BNP 1,885 (*)     All other components within normal limits   COVID-19, RAPID   TROPONIN       All other labs were within normal range or not returned as of this dictation. EMERGENCY DEPARTMENT COURSE and DIFFERENTIAL DIAGNOSIS/MDM:   Vitals:    Vitals:    22 2149 22 2216 22 2230 22 2246   BP: (!) 147/75 135/71 124/75 (!) 153/91   Pulse: 87 76 75    Resp: 18 13 21    Temp: 98 °F (36.7 °C)      TempSrc: Oral      SpO2: 97% 98% 96% 97%         Medical decision makin-year-old female who presents with complaint of shortness of breath that is described as transient, no longer feeling short of breath,  states appears to be currently at her baseline. Recent COVID infection, lungs clear to auscultation bilaterally, normoxic on room air. Patient is able to ambulate in the emergency department without difficulty. Chest x-ray is obtained and demonstrates no acute process. BNP better than priors. Labs otherwise consistent with baseline values. Patient is feeling better and wishes to go home. EKG is also obtained and is stable compared to the EKG dated 7/30/2022, only difference is absence now of PVCs. Patient has close outpatient follow-up with her primary care provider and is now COVID-negative on the rapid test.  Urged close follow-up with Dr. Rita Trujillo, patient and spouse voiced understanding. Discharged home, departed the ED ambulatory in stable condition. FINAL IMPRESSION      1.  Shortness of breath          DISPOSITION/PLAN   DISPOSITION Decision To Discharge 08/08/2022 10:56:05 PM      PATIENT REFERRED TO:  Austin Kuo MD   [address] [facility] [address]  [phone]    In 1 day      [place] [address]  [phone]    If symptoms worsen        (Please note that portions of this note were completed with a voice recognition program.Efforts were made to edit the dictations but occasionally words are mis-transcribed.)    Belkys Caballero MD (electronically signed)  Attending Emergency Physician          Belkys Caballero MD  08/10/22 0185

## 2022-08-09 NOTE — CARE COORDINATION
[facility] Transitions Follow Up Call    2022    Patient: Beena Mckinney  Patient : 1935   MRN: [id number]  Reason for Admission: COVID & SOB with recent ED visit  Discharge Date: 22 RARS: Readmission Risk Score: 15.3         Care Transitions Follow Up Call    Needs to be reviewed by the provider   Additional needs identified to be addressed with provider: No               Method of communication with provider : none      Care Transition Nurse contacted the patient by telephone to follow up after admission on 2022 & recent ED visit. Verified name and  with family as identifiers. Advance Care Planning:   Advance Care Planning   Healthcare Decision Maker:    Primary Decision Maker: Gee Ramachandran - [phone]    Secondary Decision Maker: Nanda Lucero [phone]    Tone Licona states her mother has the documents for Living Will and Healthcare POA. They are not completed at this time. CTN provided contact information for future needs. Plan for next call:  SOB - follow up on walker - w/c - ramp       Care Transitions Subsequent and Final Call    Subsequent and Final Calls  Do you have any ongoing symptoms?: No  Have your medications changed?: No  Do you have any questions related to your medications?: No  Do you currently have any active services?: No  Are you currently active with any services?: Home Health  Do you have any needs or concerns that I can assist you with?: No  Identified Barriers: None  Care Transitions Interventions  Other Interventions:           Spoke with Karissa - daughter - Kelley Donaldson verified. She states her mother is \"tired\" and \"resting\". Tone Licona confirmed her mother's appt with the PCP on 2022. Tone Licona states she will provide her mother's transportation. Tone Licona denies her mother being SOB at this time. She states they normally monitor her mother's B/P, O2 sat, and temp - but have not done so yet today.  Tone Licona states they just got a new scale and will begin monitoring her mother's weight. She states her mother is currently using a walker for ambulation. She denies any new or worsening symptoms of COVID. Lissette Sierra states she is going to discuss trying to get a w/c when her mother sees the PCP. She is also interested in how to get a ramp placed. Discussed reaching out to [employer] and [employer]. She verbalized her understanding. She declined taking contact info. She denies any needs for her mother at this time.     Follow Up  Future Appointments   Date Time Provider Helen Kaba   8/12/2022  2:00 PM Anita Lott MD [facility] [place] [address] [place] [address] RN BSN  Care Transition Nurse  [phone]

## 2022-08-16 ENCOUNTER — CARE COORDINATION (OUTPATIENT)
Dept: CASE MANAGEMENT | Age: 87
End: 2022-08-16

## 2022-08-16 NOTE — CARE COORDINATION
Unable to reach patient for COVID 19 follow up phone call. Left message. Contact info provided. Requested return call to CTN.     Shelia Fabry RN BSN  Care Transition Nurse  [phone]

## 2022-08-19 ENCOUNTER — CARE COORDINATION (OUTPATIENT)
Dept: CASE MANAGEMENT | Age: 87
End: 2022-08-19

## 2022-08-19 NOTE — CARE COORDINATION
[facility] Transitions Follow Up Call    2022    Patient: Amina Desai  Patient : 1935   MRN: [id number]  Reason for Admission: COVID  Discharge Date: 22 RARS: Readmission Risk Score: 15.3         Care Transitions Follow Up Call    Needs to be reviewed by the provider   Additional needs identified to be addressed with provider: No               Method of communication with provider : none      Care Transition Nurse contacted the patient by telephone to follow up after admission on 2022. Verified name and  with family as identifiers. CTN provided contact information for future needs. Plan for next call:  gout - follow up on PCP appt - hoarseness       Care Transitions Subsequent and Final Call    Subsequent and Final Calls  Are you currently active with any services?: Home Health  Care Transitions Interventions  Other Interventions:           Rossy Michel states her mother is resting. She states Daisy has gout in her toe. Rossy Michel states her mother had to cancel the PCP follow up because currently she can not walk. She states Irineo Tom continues to have hoarseness and is now experiencing sinus drainage. She denies any other worsening COVID symptoms for her mother. Rossy Michel denies any needs for Daisy at this time. Follow Up  No future appointments.     Wilfredo Wooten RN BSN  Care Transition Nurse  [phone]

## 2022-08-26 ENCOUNTER — CARE COORDINATION (OUTPATIENT)
Dept: CASE MANAGEMENT | Age: 87
End: 2022-08-26

## 2022-08-26 NOTE — CARE COORDINATION
[facility] Transitions Follow Up Call    2022    Patient: Yoandy Hamilton  Patient : 1935   MRN: [id number]  Reason for Admission: COVID  Discharge Date: 22 RARS: Readmission Risk Score: 15.3         Care Transitions Follow Up Call    Needs to be reviewed by the provider   Additional needs identified to be addressed with provider: No               Method of communication with provider : none      Care Transition Nurse contacted the patient by telephone to follow up after admission on 2022. Verified name and  with family as identifiers. CTN provided contact information for future needs. Plan for next call:  SOB - use of hand held nebulizer - B/P checks       Care Transitions Subsequent and Final Call    Subsequent and Final Calls  Do you have any ongoing symptoms?: No  Have your medications changed?: Yes  Do you have any questions related to your medications?: No  Do you currently have any active services?: No  Are you currently active with any services?: Home Health  Do you have any needs or concerns that I can assist you with?: No  Identified Barriers: None  Care Transitions Interventions  Other Interventions:           Spoke with Karissa. She states her mother is doing \"fine\". Driss Hampton states her mother saw the PCP. He increased her Allopurinol to 1/2 tablet daily and started her on a short term steroid as reported by Driss Hampton. She denies her mother being SOB at this time. She states she checks her mother's O2 sat if her mother c/o SOB and then they do a breathing treatment. Driss Berta states none of that has happened today. Driss Berta states they do not currently monitor her mother's B/P unless there is a problem. Encouraged her to check it daily and journal the results - then share them with the PCP next month. Driss Hampton states that sounds like a good plan. She denies her mother having a fever. Driss Berta states they weigh her mother @ every other day and she has been consistent @ 123.0 lbs. She states her mother uses a walker for ambulation. Bib Houston reports her mother has an \"allergy\" cough. She denies her mother having any N/V - she states her mother does have diarrhea daily and the PCP is aware - her mother tested NEGATIVE for C-diff. Bib Houston states her mother is urinating without difficulty. She denies any needs for her mother at this time. Follow Up  No future appointments.     Micah Gilbert RN BSN  Care Transition Nurse  [phone]

## 2022-08-29 PROBLEM — N39.0 UTI (URINARY TRACT INFECTION): Status: RESOLVED | Noted: 2022-07-30 | Resolved: 2022-08-29

## 2022-09-02 ENCOUNTER — CARE COORDINATION (OUTPATIENT)
Dept: CASE MANAGEMENT | Age: 87
End: 2022-09-02

## 2022-09-02 NOTE — CARE COORDINATION
[facility] Transitions Follow Up Call    2022    Patient: Darcel Burkitt  Patient : 1935   MRN: [id number]  Reason for Admission: COVID  Discharge Date: 22 RARS: Readmission Risk Score: 15.3         Care Transitions Follow Up Call    Needs to be reviewed by the provider   Additional needs identified to be addressed with provider: No               Method of communication with provider : none      Care Transition Nurse contacted the patient by telephone to follow up after admission on 2022. Verified name and  with family as identifiers. CTN provided contact information for future needs. Care Transitions Subsequent and Final Call    Subsequent and Final Calls  Are you currently [facility] with any services?:  [facility]  Other Interventions:           Final attempt at CT follow up phone call. Nadeem Almonte states her mother is doing fine. [facility] are [facility] at [facility] to  Karsisa's father who is being discharged today. Episode resolved. Follow Up  No future appointments.     Rosalba Tom RN BSN  Care Transition Nurse  [phone]

## 2022-09-16 ENCOUNTER — TELEPHONE (OUTPATIENT)
Dept: PHARMACY | Facility: CLINIC | Age: 87
End: 2022-09-16

## 2022-09-16 NOTE — TELEPHONE ENCOUNTER
[facility] CLINICAL PHARMACY: ADHERENCE REVIEW  Identified care gap per [place]: fills at [place]: ACE/ARB and Statin adherence    Last Visit: 8/25/22    Patient not found in Outcomes MTM    [address] Records claims through 8/22/22; PUJA [place] =  86%; Potential Fail Date: 10/15/22 ):   LISINOPRIL 10 MG TABLET due to refill 9/3/22 for 30 day supply. Per [address] [facility] [address]   LISINOPRIL 10 MG TABLET last picked up on 8/4/22 for 30 day supply. 0 refills remaining. Billed through [place]     BP Readings from Last 3 Encounters:   08/25/22 137/70   08/08/22 (!) 153/91   08/03/22 138/64     Estimated Creatinine Clearance: 28 mL/min (based on SCr of 1.1 mg/dL). [address] Records claims through 8/22/22; PUJA [place] =  73%; Potential Fail Date: 9/20/22 ):   ATORVASTATIN TAB 20MG due to refill 9/3/22 for 30 day supply. Per [address] [facility] [facility] [address]   ATORVASTATIN TAB 20MG last picked up on 8/27/22 for 90 day supply. 1 refills remaining. Billed through [facility]     Lab Results   Component Value Date    CHOL 139 06/06/2022    TRIG 171 (H) 06/06/2022    HDL 48 06/06/2022    LDLCALC 57 06/06/2022    LDLDIRECT 67 09/28/2016     ALT   Date Value Ref Range Status   08/08/2022 18 10 - 40 U/L Final     AST   Date Value Ref Range Status   08/08/2022 27 15 - 37 U/L Final     The ASCVD Risk score (Morteza Garcia et al., 2013) failed to calculate for the following reasons: The 2013 ASCVD risk score is only valid for ages 36 to 78     PLAN    Lisinopril appears to be discontinued by provider on 8/22 after appointment with provider. Provider refused refill request from pharmacy on 8/29. No future appointments.     Rosio Milligan, [address] [place] [address]   Direct: [phone]  Phone: toll free [phone]     For Pharmacy [address] [place] [address] in place:  No  Time Spent (min): 15

## 2022-10-18 ENCOUNTER — APPOINTMENT (OUTPATIENT)
Dept: GENERAL RADIOLOGY | Age: 87
End: 2022-10-18
Payer: MEDICARE

## 2022-10-18 ENCOUNTER — HOSPITAL ENCOUNTER (EMERGENCY)
Age: 87
Discharge: HOME | End: 2022-10-18
Attending: EMERGENCY MEDICINE
Payer: MEDICARE

## 2022-10-18 VITALS
OXYGEN SATURATION: 98 % | DIASTOLIC BLOOD PRESSURE: 88 MMHG | SYSTOLIC BLOOD PRESSURE: 101 MMHG | WEIGHT: 127.87 LBS | HEIGHT: 60 IN | HEART RATE: 85 BPM | RESPIRATION RATE: 16 BRPM | BODY MASS INDEX: 25.1 KG/M2 | TEMPERATURE: 98.4 F

## 2022-10-18 DIAGNOSIS — M25.521 RIGHT ELBOW PAIN: Primary | ICD-10-CM

## 2022-10-18 PROCEDURE — 73080 X-RAY EXAM OF ELBOW: CPT

## 2022-10-18 PROCEDURE — 99283 EMERGENCY DEPT VISIT LOW MDM: CPT

## 2022-10-18 PROCEDURE — 6370000000 HC RX 637 (ALT 250 FOR IP): Performed by: EMERGENCY MEDICINE

## 2022-10-18 RX ORDER — DOXYCYCLINE HYCLATE 100 MG
100 TABLET ORAL ONCE
Status: COMPLETED | OUTPATIENT
Start: 2022-10-18 | End: 2022-10-18

## 2022-10-18 RX ORDER — AMOXICILLIN 500 MG/1
500 CAPSULE ORAL 3 TIMES DAILY
Qty: 21 CAPSULE | Refills: 0 | Status: SHIPPED | OUTPATIENT
Start: 2022-10-18 | End: 2022-10-25

## 2022-10-18 RX ORDER — AMOXICILLIN 250 MG/1
500 CAPSULE ORAL ONCE
Status: COMPLETED | OUTPATIENT
Start: 2022-10-18 | End: 2022-10-18

## 2022-10-18 RX ORDER — DOXYCYCLINE HYCLATE 100 MG
100 TABLET ORAL 2 TIMES DAILY
Qty: 14 TABLET | Refills: 0 | Status: SHIPPED | OUTPATIENT
Start: 2022-10-18 | End: 2022-10-25

## 2022-10-18 RX ORDER — TRAMADOL HYDROCHLORIDE 50 MG/1
50 TABLET ORAL ONCE
Status: COMPLETED | OUTPATIENT
Start: 2022-10-18 | End: 2022-10-18

## 2022-10-18 RX ADMIN — AMOXICILLIN 500 MG: 250 CAPSULE ORAL at 21:57

## 2022-10-18 RX ADMIN — TRAMADOL HYDROCHLORIDE 50 MG: 50 TABLET, COATED ORAL at 21:57

## 2022-10-18 RX ADMIN — DOXYCYCLINE HYCLATE 100 MG: 100 TABLET, COATED ORAL at 21:57

## 2022-10-18 ASSESSMENT — PAIN DESCRIPTION - ORIENTATION: ORIENTATION: RIGHT

## 2022-10-18 ASSESSMENT — PAIN DESCRIPTION - PAIN TYPE: TYPE: ACUTE PAIN

## 2022-10-18 ASSESSMENT — PAIN DESCRIPTION - DESCRIPTORS: DESCRIPTORS: SORE

## 2022-10-18 ASSESSMENT — PAIN SCALES - GENERAL
PAINLEVEL_OUTOF10: 4
PAINLEVEL_OUTOF10: 4

## 2022-10-18 ASSESSMENT — PAIN DESCRIPTION - LOCATION: LOCATION: ELBOW

## 2022-10-19 ASSESSMENT — ENCOUNTER SYMPTOMS: COLOR CHANGE: 1

## 2022-10-19 NOTE — ED PROVIDER NOTES
[id number] [facility]  [facility]      Pt Name: Baron Rubio  MRN: [id number]  [place] 1935  Date ofevaluation: 10/18/2022  Provider: Conrad Roman MD    CHIEF COMPLAINT       Chief Complaint   Patient presents with    Elbow Pain     R elbow pain x2wks, unknown if injured         HISTORY OF PRESENT ILLNESS   (Location/Symptom, Timing/Onset,Context/Setting, Quality, Duration, Modifying Factors, Severity)  Note limiting factors. Baron Rubio is a 80 y.o. female  who  has a past medical history of Chronic kidney disease (CKD), stage IV (severe) ([facility] [address] Coronary artery disease involving native coronary artery of native heart without angina pectoris, GERD (gastroesophageal reflux disease), Gout, chronic, Histoplasmosis, Hyperlipidemia, Hypertension, Irregular heart beat, Osteoarthritis, Osteopenia of hip, Pneumonia, Ulcer of jejunum, and Unspecified cerebral artery occlusion with cerebral infarction. who presents to the emergency department for evaluation of right elbow pain. Patient reports waking up with some discomfort in her right elbow approximately weeks previously. Reports that pain is progressively worsened and she is developed erythema and some swelling over the olecranon. Reports no known history of injury or trauma. Denies fevers numbness or weakness. Reports that pain is localized to the olecranon area and there is no pain with passive flexion extension. Denies a history of previous. HPI    NursingNotes were reviewed. REVIEW OF SYSTEMS    (2-9 systems for level 4, 10 or more for level 5)     Review of Systems   Constitutional:  Negative for fever. Musculoskeletal:  Positive for arthralgias. Skin:  Positive for color change. Negative for wound. Neurological:  Negative for weakness and numbness. Except as noted above the remainder of the review of systems was reviewed and negative.        PAST MEDICAL HISTORY     Past Medical History: Diagnosis Date    Chronic kidney disease (CKD), stage IV (severe) (HCC)     Coronary artery disease involving native coronary artery of native heart without angina pectoris 1/5/2016    GERD (gastroesophageal reflux disease) acid reflux    Gout, chronic     Histoplasmosis     Hyperlipidemia     Hypertension     Irregular heart beat     Osteoarthritis 2/28/2019    Knee, leg     Osteopenia of hip 04/26/2019    Pneumonia     Ulcer of jejunum     Unspecified cerebral artery occlusion with cerebral infarction 2014 , ~ 2012         SURGICALHISTORY       Past Surgical History:   Procedure Laterality Date    BLADDER SUSPENSION      CHOLECYSTECTOMY  Orthopedic surgery Left arm    HYSTERECTOMY (CERVIX STATUS UNKNOWN)      TUBAL LIGATION      UPPER GASTROINTESTINAL ENDOSCOPY N/A 9/10/2021    EGD CONTROL HEMORRHAGE performed by Cristel Mcgill DO at [address]  9/10/2021    EGD SUBMUCOSAL/ EPINEPHRINE INJECTION performed by Cristel Mcgill DO at [address] [facility]       Discharge Medication List as of 10/18/2022 10:27 PM        CONTINUE these medications which have NOT CHANGED    Details   pantoprazole (PROTONIX) 40 MG tablet TAKE 1 TABLET BY MOUTH EVERY DAY, Disp-30 tablet, R-0Normal      magnesium oxide (MAG-OX) 140 MG CAPS Take 14 mg by mouth daily, Disp-90 capsule, R-2Normal      allopurinol (ZYLOPRIM) 100 MG tablet TAKE 1/2 TABLET BY MOUTH EVERY DAY, Disp-90 tablet, R-2Normal      amLODIPine (NORVASC) 5 MG tablet TAKE 1 TABLET BY MOUTH EVERY DAY, Disp-90 tablet, R-1Normal      atorvastatin (LIPITOR) 20 MG tablet TAKE 1 TABLET BY MOUTH AT BEDTIME, Disp-90 tablet, R-1Normal      carvedilol (COREG) 6.25 MG tablet TAKE 1 TABLET BY MOUTH TWO TIMES A DAY WITH MEALS, Disp-180 tablet, R-2Normal      memantine (NAMENDA) 5 MG tablet Take 1 tablet by mouth daily, Disp-180 tablet, R-1Normal      donepezil (ARICEPT) 10 MG tablet TAKE 1 TABLET BY MOUTH nightly, Disp-90 tablet, R-0Normal      albuterol sulfate HFA (VENTOLIN HFA) 108 (90 Base) MCG/ACT inhaler Inhale 2 puffs into the lungs 4 times daily as needed for Wheezing, Disp-18 g, R-0Normal      FLUoxetine (PROZAC) 10 MG capsule TAKE 1 CAPSULE BY MOUTH ONE TIME A DAY, Disp-30 capsule, R-2Normal      Cyanocobalamin (VITAMIN B 12 PO) Take by mouth dailyHistorical Med      furosemide (LASIX) 40 MG tablet Take 0.5 tablets by mouth daily, Disp-30 tablet, R-11NO PRINT      isosorbide mononitrate (IMDUR) 30 MG extended release tablet Take 1 tablet by mouth daily Spacing out from other cardiac medications, Disp-30 tablet, R-6Normal      aspirin 81 MG chewable tablet Take 81 mg by mouth dailyHistorical Med      ferrous sulfate 324 (65 Fe) MG EC tablet Take 1 tablet by mouth 2 times daily (with meals), Disp-60 tablet, R-1Normal      Respiratory Therapy Supplies (NEBULIZER/TUBING/MOUTHPIECE) KIT DAILY PRN Starting Tue 8/24/2021, Disp-1 kit, R-0, Print      ipratropium-albuterol (DUONEB) 0.5-2.5 (3) MG/3ML SOLN nebulizer solution Inhale 3 mLs into the lungs every 6 hours as needed for Shortness of Breath, Disp-360 mL, R-0Print      Multiple Vitamins-Minerals (THERAPEUTIC MULTIVITAMIN-MINERALS) tablet Take 1 tablet by mouth dailyHistorical Med      loratadine (CLARITIN) 10 MG capsule Take 10 mg by mouth daily as needed Historical Med      Handicap Placard MISC Starting 8/17/2016, Until Discontinued, Disp-1 each, R-0, PrintPatient is unable to walk more than 250 feet before stopping to rest.   Not to exceed 5 years.  DX: CHF                  Codeine, Ibuprofen, Morphine and related, and Oxycodone-acetaminophen    FAMILY HISTORY       Family History   Problem Relation Age of Onset    High Blood Pressure Mother     Heart Disease Mother     Diabetes Mother     High Blood Pressure Father     Heart Disease Father     Cancer Father         Brain    Cancer Sister         lymphoma    Cancer Brother         colon    Cancer Maternal Uncle           SOCIAL HISTORY       Social History     Socioeconomic History    Marital status:      Spouse name: None    Number of children: None    Years of education: None    Highest education level: None   Tobacco Use    Smoking status: Never     Passive exposure: Yes    Smokeless tobacco: Never   Vaping Use    Vaping Use: Never used   Substance and Sexual Activity    Alcohol use: No     Comment: 1 yearly    Drug use: No    Sexual activity: Yes     Partners: Male     Social Determinants of Health     Financial Resource Strain: Low Risk     Difficulty of Paying Living Expenses: Not hard at all   Food Insecurity: No Food Insecurity    Worried About [address] in the Last Year: Never true    [address] in the Last Year: Never true       SCREENINGS             PHYSICAL EXAM    (up to 7 for level 4, 8 or more for level 5)     ED Triage Vitals [10/18/22 2052]   BP Temp Temp Source Heart Rate Resp SpO2 Height Weight   101/88 98.4 °F (36.9 °C) Oral 85 16 98 % 4' 11.5\" (1.511 m) 127 lb 13.9 oz (58 kg)       Physical Exam  Vitals reviewed. Constitutional:       Appearance: She is well-developed. HENT:      Head: Normocephalic and atraumatic. Mouth/Throat:      Mouth: Mucous membranes are moist.   Eyes:      Extraocular Movements: Extraocular movements intact. Conjunctiva/sclera: Conjunctivae normal.      Pupils: Pupils are equal, round, and reactive to light. Neck:      Trachea: No tracheal deviation. Cardiovascular:      Rate and Rhythm: Normal rate and regular rhythm. Heart sounds: Normal heart sounds. Pulmonary:      Effort: Pulmonary effort is normal.      Breath sounds: Normal breath sounds. Abdominal:      General: There is no distension. Palpations: Abdomen is soft. Tenderness: There is no abdominal tenderness. Musculoskeletal:         General: Swelling and tenderness present. No deformity or signs of injury. Normal range of motion.       Cervical back: Normal range of motion. Skin:     General: Skin is warm and dry. Findings: Erythema present. Neurological:      Mental Status: She is alert. RESULTS     EKG: All EKG's are interpreted by the Emergency Department Physician who either signs or Co-signsthis chart in the absence of a cardiologist.      RADIOLOGY:   Loetta Nolen such as CT, Ultrasound and MRI are read by the radiologist. Plain radiographic images are visualized and preliminarily interpreted by the emergency physician with the below findings:      Interpretation per the Radiologist below, if available at the time ofthis note:    XR ELBOW RIGHT (MIN 3 VIEWS)   Final Result   1. No convincing acute osseous abnormality. 2. Suspected small joint effusion. ED BEDSIDE ULTRASOUND:   Performed by ED Physician - none    LABS:  Labs Reviewed - No data to display    All other labs were within normal range or not returned as of this dictation. EMERGENCY DEPARTMENT COURSE and DIFFERENTIAL DIAGNOSIS/MDM:   Vitals:    Vitals:    10/18/22 2052   BP: 101/88   Pulse: 85   Resp: 16   Temp: 98.4 °F (36.9 °C)   TempSrc: Oral   SpO2: 98%   Weight: 127 lb 13.9 oz (58 kg)   Height: 4' 11.5\" (1.511 m)       Patient was given thefollowing medications:  Medications   traMADol (ULTRAM) tablet 50 mg (50 mg Oral Given 10/18/22 2157)   doxycycline hyclate (VIBRA-TABS) tablet 100 mg (100 mg Oral Given 10/18/22 2157)   amoxicillin (AMOXIL) capsule 500 mg (500 mg Oral Given 10/18/22 2157)       ED COURSE & MEDICAL DECISION MAKING    Pertinent Labs & Imaging studies reviewed. (See chart for details)   -  Patient seen and evaluated in the emergency department. -  Triage and nursing notes reviewed and incorporated. -  Old chart records reviewed and incorporated.   -  Differential diagnosis includes: Differential diagnosis: Necrotizing fasciitis, cellulitis, erysipelas, suppurative thrombophlebitis, aseptic superficial thrombophlebitis, DVT, gout, compartment syndrome, erythema migrans (lyme disease), contact dermatitis, lymphedema, other    -  Work-up included:  See above  -  ED treatment included: See above  -  Results discussed with patient. Patient presents to the evaluation of pain swelling and redness to the right elbow. Denies known injury or trauma. On exam there is some erythema and warmth to the area. There is localized to the olecranon. Motor function and sensation intact in the radial ulnar median nerves distally. Patient has no pain or discomfort with passive flexion extension of the low suspicion for septic arthritis . imaging studies show no osseous abnormalities. Discussed patient suspect she has an olecranon bursitis and she will be started on empiric antibiotics. .  Patient feels improved on reevaluation. Symptomatic treatment with expectant management discussed with the patient and they and/or family members present are amenable to treatment plan and outpatient follow-up. Strict return precautions were discussed with the patient and those present. They demonstrated understanding of when to return to the emergency department for new or worsening symptoms. .  The patient is agreeable with plan of care and disposition. Is this patient to be included in the SEP-1 Core Measure due to severe sepsis or septic shock? No   Exclusion criteria - the patient is NOT to be included for SEP-1 Core Measure due to:  2+ SIRS criteria are not met      REASSESSMENT          CRITICAL CARE TIME   Total Critical Care time was 0 minutes, excluding separately reportable procedures. There was a high probability of clinically significant/life threatening deterioration in the patient's condition which required my urgent intervention. CONSULTS:  None    PROCEDURES:  Unless otherwise noted below, none     Procedures    FINAL IMPRESSION      1.  Right elbow pain          DISPOSITION/PLAN   DISPOSITION Decision To Discharge 10/18/2022 10:02:47 PM      PATIENT REFERREDTO:  Fernandez Smiley MD  [address] [facility]  [phone]    Schedule an appointment as soon as possible for a visit   As needed      DISCHARGEMEDICATIONS:  Discharge Medication List as of 10/18/2022 10:27 PM        START taking these medications    Details   doxycycline hyclate (VIBRA-TABS) 100 MG tablet Take 1 tablet by mouth 2 times daily for 7 days, Disp-14 tablet, R-0Normal      amoxicillin (AMOXIL) 500 MG capsule Take 1 capsule by mouth 3 times daily for 7 days, Disp-21 capsule, R-0Normal                (Please note that portions of this note were completed with a voice recognition program.  Efforts were made to edit the dictations but occasionally words are mis-transcribed.)    Wolfgang Beltran MD (electronically signed)  Attending Emergency Physician          Wolfgang Beltran MD  10/19/22 0425

## 2022-11-05 ENCOUNTER — APPOINTMENT (OUTPATIENT)
Dept: GENERAL RADIOLOGY | Age: 87
DRG: 291 | End: 2022-11-05
Payer: MEDICARE

## 2022-11-05 ENCOUNTER — HOSPITAL ENCOUNTER (INPATIENT)
Age: 87
LOS: 2 days | Discharge: HOME | DRG: 291 | End: 2022-11-07
Attending: EMERGENCY MEDICINE | Admitting: STUDENT IN AN ORGANIZED HEALTH CARE EDUCATION/TRAINING PROGRAM
Payer: MEDICARE

## 2022-11-05 DIAGNOSIS — I50.9 ACUTE ON CHRONIC CONGESTIVE HEART FAILURE, UNSPECIFIED HEART FAILURE TYPE (HCC): ICD-10-CM

## 2022-11-05 DIAGNOSIS — R77.8 ELEVATED TROPONIN: Primary | ICD-10-CM

## 2022-11-05 PROBLEM — I50.20 HFREF (HEART FAILURE WITH REDUCED EJECTION FRACTION) (HCC): Status: ACTIVE | Noted: 2022-11-05

## 2022-11-05 PROBLEM — I50.20 SYSTOLIC HEART FAILURE, UNSPECIFIED HF CHRONICITY (HCC): Status: ACTIVE | Noted: 2022-11-05

## 2022-11-05 LAB
A/G RATIO: 1.1 (ref 1.1–2.2)
ALBUMIN SERPL-MCNC: 3.8 G/DL (ref 3.4–5)
ALP BLD-CCNC: 71 U/L (ref 40–129)
ALT SERPL-CCNC: 6 U/L (ref 10–40)
ANION GAP SERPL CALCULATED.3IONS-SCNC: 14 MMOL/L (ref 3–16)
AST SERPL-CCNC: 15 U/L (ref 15–37)
BACTERIA: ABNORMAL /HPF
BASOPHILS ABSOLUTE: 0.1 K/UL (ref 0–0.2)
BASOPHILS RELATIVE PERCENT: 0.5 %
BILIRUB SERPL-MCNC: 0.3 MG/DL (ref 0–1)
BILIRUBIN URINE: NEGATIVE
BLOOD, URINE: NEGATIVE
BUN BLDV-MCNC: 24 MG/DL (ref 7–20)
CALCIUM SERPL-MCNC: 9.7 MG/DL (ref 8.3–10.6)
CHLORIDE BLD-SCNC: 102 MMOL/L (ref 99–110)
CLARITY: CLEAR
CO2: 25 MMOL/L (ref 21–32)
COLOR: YELLOW
CREAT SERPL-MCNC: 1.3 MG/DL (ref 0.6–1.2)
EKG ATRIAL RATE: 86 BPM
EKG DIAGNOSIS: NORMAL
EKG P AXIS: 48 DEGREES
EKG P-R INTERVAL: 216 MS
EKG Q-T INTERVAL: 420 MS
EKG QRS DURATION: 154 MS
EKG QTC CALCULATION (BAZETT): 502 MS
EKG R AXIS: -58 DEGREES
EKG T AXIS: 108 DEGREES
EKG VENTRICULAR RATE: 86 BPM
EOSINOPHILS ABSOLUTE: 0.2 K/UL (ref 0–0.6)
EOSINOPHILS RELATIVE PERCENT: 1.4 %
EPITHELIAL CELLS, UA: 2 /HPF (ref 0–5)
GFR SERPL CREATININE-BSD FRML MDRD: 40 ML/MIN/{1.73_M2}
GLUCOSE BLD-MCNC: 116 MG/DL (ref 70–99)
GLUCOSE URINE: NEGATIVE MG/DL
HCT VFR BLD CALC: 35.1 % (ref 36–48)
HEMOGLOBIN: 11.8 G/DL (ref 12–16)
HYALINE CASTS: 1 /HPF (ref 0–5)
KETONES, URINE: NEGATIVE MG/DL
LEUKOCYTE ESTERASE, URINE: ABNORMAL
LYMPHOCYTES ABSOLUTE: 3.1 K/UL (ref 1–5.1)
LYMPHOCYTES RELATIVE PERCENT: 28.6 %
MCH RBC QN AUTO: 30.2 PG (ref 26–34)
MCHC RBC AUTO-ENTMCNC: 33.6 G/DL (ref 31–36)
MCV RBC AUTO: 89.8 FL (ref 80–100)
MICROSCOPIC EXAMINATION: YES
MONOCYTES ABSOLUTE: 1.7 K/UL (ref 0–1.3)
MONOCYTES RELATIVE PERCENT: 16 %
NEUTROPHILS ABSOLUTE: 5.8 K/UL (ref 1.7–7.7)
NEUTROPHILS RELATIVE PERCENT: 53.5 %
NITRITE, URINE: NEGATIVE
PDW BLD-RTO: 14.8 % (ref 12.4–15.4)
PH UA: 5.5 (ref 5–8)
PLATELET # BLD: 352 K/UL (ref 135–450)
PMV BLD AUTO: 9.5 FL (ref 5–10.5)
POTASSIUM SERPL-SCNC: 4 MMOL/L (ref 3.5–5.1)
PRO-BNP: ABNORMAL PG/ML (ref 0–449)
PROTEIN UA: NEGATIVE MG/DL
RAPID INFLUENZA  B AGN: NEGATIVE
RAPID INFLUENZA A AGN: NEGATIVE
RBC # BLD: 3.91 M/UL (ref 4–5.2)
RBC UA: 1 /HPF (ref 0–5)
RBC UA: ABNORMAL /HPF (ref 0–4)
SARS-COV-2, NAAT: NOT DETECTED
SODIUM BLD-SCNC: 141 MMOL/L (ref 136–145)
SPECIFIC GRAVITY UA: 1.01 (ref 1–1.03)
TOTAL PROTEIN: 7.2 G/DL (ref 6.4–8.2)
TROPONIN: 0.05 NG/ML
URINE REFLEX TO CULTURE: ABNORMAL
URINE TYPE: ABNORMAL
UROBILINOGEN, URINE: 0.2 E.U./DL
WBC # BLD: 10.8 K/UL (ref 4–11)
WBC UA: 2 /HPF (ref 0–5)
WBC UA: ABNORMAL /HPF (ref 0–5)

## 2022-11-05 PROCEDURE — 80053 COMPREHEN METABOLIC PANEL: CPT

## 2022-11-05 PROCEDURE — 6360000002 HC RX W HCPCS: Performed by: EMERGENCY MEDICINE

## 2022-11-05 PROCEDURE — 36415 COLL VENOUS BLD VENIPUNCTURE: CPT

## 2022-11-05 PROCEDURE — 93010 ELECTROCARDIOGRAM REPORT: CPT | Performed by: INTERNAL MEDICINE

## 2022-11-05 PROCEDURE — 97530 THERAPEUTIC ACTIVITIES: CPT

## 2022-11-05 PROCEDURE — 6360000002 HC RX W HCPCS: Performed by: STUDENT IN AN ORGANIZED HEALTH CARE EDUCATION/TRAINING PROGRAM

## 2022-11-05 PROCEDURE — 97161 PT EVAL LOW COMPLEX 20 MIN: CPT

## 2022-11-05 PROCEDURE — 71045 X-RAY EXAM CHEST 1 VIEW: CPT

## 2022-11-05 PROCEDURE — 6370000000 HC RX 637 (ALT 250 FOR IP): Performed by: STUDENT IN AN ORGANIZED HEALTH CARE EDUCATION/TRAINING PROGRAM

## 2022-11-05 PROCEDURE — 83880 ASSAY OF NATRIURETIC PEPTIDE: CPT

## 2022-11-05 PROCEDURE — 99285 EMERGENCY DEPT VISIT HI MDM: CPT

## 2022-11-05 PROCEDURE — 6370000000 HC RX 637 (ALT 250 FOR IP): Performed by: EMERGENCY MEDICINE

## 2022-11-05 PROCEDURE — 87635 SARS-COV-2 COVID-19 AMP PRB: CPT

## 2022-11-05 PROCEDURE — 2580000003 HC RX 258: Performed by: STUDENT IN AN ORGANIZED HEALTH CARE EDUCATION/TRAINING PROGRAM

## 2022-11-05 PROCEDURE — 85025 COMPLETE CBC W/AUTO DIFF WBC: CPT

## 2022-11-05 PROCEDURE — 93005 ELECTROCARDIOGRAM TRACING: CPT | Performed by: EMERGENCY MEDICINE

## 2022-11-05 PROCEDURE — 84484 ASSAY OF TROPONIN QUANT: CPT

## 2022-11-05 PROCEDURE — 99222 1ST HOSP IP/OBS MODERATE 55: CPT | Performed by: STUDENT IN AN ORGANIZED HEALTH CARE EDUCATION/TRAINING PROGRAM

## 2022-11-05 PROCEDURE — 87804 INFLUENZA ASSAY W/OPTIC: CPT

## 2022-11-05 PROCEDURE — 96374 THER/PROPH/DIAG INJ IV PUSH: CPT

## 2022-11-05 PROCEDURE — 1200000000 HC SEMI PRIVATE

## 2022-11-05 PROCEDURE — 81001 URINALYSIS AUTO W/SCOPE: CPT

## 2022-11-05 RX ORDER — POLYETHYLENE GLYCOL 3350 17 G/17G
17 POWDER, FOR SOLUTION ORAL DAILY PRN
Status: DISCONTINUED | OUTPATIENT
Start: 2022-11-05 | End: 2022-11-07 | Stop reason: HOSPADM

## 2022-11-05 RX ORDER — PANTOPRAZOLE SODIUM 40 MG/1
40 TABLET, DELAYED RELEASE ORAL NIGHTLY
Status: DISCONTINUED | OUTPATIENT
Start: 2022-11-05 | End: 2022-11-07 | Stop reason: HOSPADM

## 2022-11-05 RX ORDER — DICLOXACILLIN SODIUM 250 MG/1
250 CAPSULE ORAL 4 TIMES DAILY
Status: DISCONTINUED | OUTPATIENT
Start: 2022-11-05 | End: 2022-11-07 | Stop reason: HOSPADM

## 2022-11-05 RX ORDER — AMLODIPINE BESYLATE 5 MG/1
5 TABLET ORAL DAILY
Status: DISCONTINUED | OUTPATIENT
Start: 2022-11-05 | End: 2022-11-05

## 2022-11-05 RX ORDER — ALLOPURINOL 100 MG/1
50 TABLET ORAL NIGHTLY
Status: DISCONTINUED | OUTPATIENT
Start: 2022-11-05 | End: 2022-11-07 | Stop reason: HOSPADM

## 2022-11-05 RX ORDER — SODIUM CHLORIDE 0.9 % (FLUSH) 0.9 %
5-40 SYRINGE (ML) INJECTION PRN
Status: DISCONTINUED | OUTPATIENT
Start: 2022-11-05 | End: 2022-11-07 | Stop reason: HOSPADM

## 2022-11-05 RX ORDER — DONEPEZIL HYDROCHLORIDE 10 MG/1
10 TABLET, FILM COATED ORAL NIGHTLY
Status: DISCONTINUED | OUTPATIENT
Start: 2022-11-05 | End: 2022-11-07 | Stop reason: HOSPADM

## 2022-11-05 RX ORDER — ASPIRIN 81 MG/1
324 TABLET, CHEWABLE ORAL ONCE
Status: COMPLETED | OUTPATIENT
Start: 2022-11-05 | End: 2022-11-05

## 2022-11-05 RX ORDER — CETIRIZINE HYDROCHLORIDE 10 MG/1
5 TABLET ORAL NIGHTLY
Status: DISCONTINUED | OUTPATIENT
Start: 2022-11-05 | End: 2022-11-07 | Stop reason: HOSPADM

## 2022-11-05 RX ORDER — ALLOPURINOL 100 MG/1
50 TABLET ORAL DAILY
Status: DISCONTINUED | OUTPATIENT
Start: 2022-11-05 | End: 2022-11-05

## 2022-11-05 RX ORDER — PANTOPRAZOLE SODIUM 40 MG/1
40 TABLET, DELAYED RELEASE ORAL DAILY
Status: DISCONTINUED | OUTPATIENT
Start: 2022-11-05 | End: 2022-11-05

## 2022-11-05 RX ORDER — FLUOXETINE 10 MG/1
10 CAPSULE ORAL NIGHTLY
Status: DISCONTINUED | OUTPATIENT
Start: 2022-11-05 | End: 2022-11-07 | Stop reason: HOSPADM

## 2022-11-05 RX ORDER — ENOXAPARIN SODIUM 100 MG/ML
30 INJECTION SUBCUTANEOUS DAILY
Status: DISCONTINUED | OUTPATIENT
Start: 2022-11-05 | End: 2022-11-07 | Stop reason: HOSPADM

## 2022-11-05 RX ORDER — ISOSORBIDE MONONITRATE 30 MG/1
30 TABLET, EXTENDED RELEASE ORAL NIGHTLY
Status: DISCONTINUED | OUTPATIENT
Start: 2022-11-05 | End: 2022-11-07 | Stop reason: HOSPADM

## 2022-11-05 RX ORDER — SODIUM CHLORIDE 0.9 % (FLUSH) 0.9 %
5-40 SYRINGE (ML) INJECTION EVERY 12 HOURS SCHEDULED
Status: DISCONTINUED | OUTPATIENT
Start: 2022-11-05 | End: 2022-11-07 | Stop reason: HOSPADM

## 2022-11-05 RX ORDER — ENOXAPARIN SODIUM 100 MG/ML
40 INJECTION SUBCUTANEOUS DAILY
Status: DISCONTINUED | OUTPATIENT
Start: 2022-11-05 | End: 2022-11-05 | Stop reason: DRUGHIGH

## 2022-11-05 RX ORDER — SODIUM CHLORIDE 9 MG/ML
INJECTION, SOLUTION INTRAVENOUS PRN
Status: DISCONTINUED | OUTPATIENT
Start: 2022-11-05 | End: 2022-11-07 | Stop reason: HOSPADM

## 2022-11-05 RX ORDER — FUROSEMIDE 10 MG/ML
40 INJECTION INTRAMUSCULAR; INTRAVENOUS ONCE
Status: COMPLETED | OUTPATIENT
Start: 2022-11-05 | End: 2022-11-05

## 2022-11-05 RX ORDER — ONDANSETRON 4 MG/1
4 TABLET, ORALLY DISINTEGRATING ORAL EVERY 8 HOURS PRN
Status: DISCONTINUED | OUTPATIENT
Start: 2022-11-05 | End: 2022-11-07 | Stop reason: HOSPADM

## 2022-11-05 RX ORDER — ASPIRIN 81 MG/1
81 TABLET, CHEWABLE ORAL DAILY
Status: DISCONTINUED | OUTPATIENT
Start: 2022-11-05 | End: 2022-11-05

## 2022-11-05 RX ORDER — MEMANTINE HYDROCHLORIDE 5 MG/1
5 TABLET ORAL NIGHTLY
Status: DISCONTINUED | OUTPATIENT
Start: 2022-11-05 | End: 2022-11-07 | Stop reason: HOSPADM

## 2022-11-05 RX ORDER — MEMANTINE HYDROCHLORIDE 5 MG/1
5 TABLET ORAL DAILY
Status: DISCONTINUED | OUTPATIENT
Start: 2022-11-05 | End: 2022-11-05

## 2022-11-05 RX ORDER — ASPIRIN 81 MG/1
81 TABLET, CHEWABLE ORAL NIGHTLY
Status: DISCONTINUED | OUTPATIENT
Start: 2022-11-05 | End: 2022-11-07 | Stop reason: HOSPADM

## 2022-11-05 RX ORDER — LISINOPRIL 10 MG/1
10 TABLET ORAL NIGHTLY
Status: DISCONTINUED | OUTPATIENT
Start: 2022-11-05 | End: 2022-11-07 | Stop reason: HOSPADM

## 2022-11-05 RX ORDER — ONDANSETRON 2 MG/ML
4 INJECTION INTRAMUSCULAR; INTRAVENOUS EVERY 6 HOURS PRN
Status: DISCONTINUED | OUTPATIENT
Start: 2022-11-05 | End: 2022-11-07 | Stop reason: HOSPADM

## 2022-11-05 RX ORDER — FUROSEMIDE 10 MG/ML
40 INJECTION INTRAMUSCULAR; INTRAVENOUS 2 TIMES DAILY
Status: DISCONTINUED | OUTPATIENT
Start: 2022-11-06 | End: 2022-11-07 | Stop reason: HOSPADM

## 2022-11-05 RX ORDER — LISINOPRIL 10 MG/1
10 TABLET ORAL NIGHTLY
COMMUNITY
Start: 2022-08-04

## 2022-11-05 RX ORDER — ATORVASTATIN CALCIUM 10 MG/1
10 TABLET, FILM COATED ORAL NIGHTLY
Status: DISCONTINUED | OUTPATIENT
Start: 2022-11-05 | End: 2022-11-07 | Stop reason: HOSPADM

## 2022-11-05 RX ORDER — ALBUTEROL SULFATE 90 UG/1
2 AEROSOL, METERED RESPIRATORY (INHALATION) 4 TIMES DAILY PRN
Status: DISCONTINUED | OUTPATIENT
Start: 2022-11-05 | End: 2022-11-07 | Stop reason: HOSPADM

## 2022-11-05 RX ORDER — CARVEDILOL 6.25 MG/1
6.25 TABLET ORAL 2 TIMES DAILY WITH MEALS
Status: DISCONTINUED | OUTPATIENT
Start: 2022-11-05 | End: 2022-11-07 | Stop reason: HOSPADM

## 2022-11-05 RX ORDER — AMLODIPINE BESYLATE 5 MG/1
5 TABLET ORAL NIGHTLY
Status: DISCONTINUED | OUTPATIENT
Start: 2022-11-05 | End: 2022-11-07 | Stop reason: HOSPADM

## 2022-11-05 RX ADMIN — AMLODIPINE BESYLATE 5 MG: 5 TABLET ORAL at 21:11

## 2022-11-05 RX ADMIN — FLUOXETINE 10 MG: 10 CAPSULE ORAL at 21:12

## 2022-11-05 RX ADMIN — PANTOPRAZOLE SODIUM 40 MG: 40 TABLET, DELAYED RELEASE ORAL at 21:11

## 2022-11-05 RX ADMIN — CETIRIZINE HYDROCHLORIDE 5 MG: 10 TABLET, FILM COATED ORAL at 21:12

## 2022-11-05 RX ADMIN — FUROSEMIDE 40 MG: 10 INJECTION, SOLUTION INTRAMUSCULAR; INTRAVENOUS at 06:57

## 2022-11-05 RX ADMIN — ATORVASTATIN CALCIUM 10 MG: 10 TABLET, FILM COATED ORAL at 21:12

## 2022-11-05 RX ADMIN — MEMANTINE 5 MG: 5 TABLET ORAL at 21:11

## 2022-11-05 RX ADMIN — ALLOPURINOL 50 MG: 100 TABLET ORAL at 21:11

## 2022-11-05 RX ADMIN — ISOSORBIDE MONONITRATE 30 MG: 30 TABLET, EXTENDED RELEASE ORAL at 21:11

## 2022-11-05 RX ADMIN — LISINOPRIL 10 MG: 10 TABLET ORAL at 21:11

## 2022-11-05 RX ADMIN — SODIUM CHLORIDE, PRESERVATIVE FREE 10 ML: 5 INJECTION INTRAVENOUS at 11:45

## 2022-11-05 RX ADMIN — FUROSEMIDE 40 MG: 10 INJECTION, SOLUTION INTRAMUSCULAR; INTRAVENOUS at 18:51

## 2022-11-05 RX ADMIN — ENOXAPARIN SODIUM 30 MG: 100 INJECTION SUBCUTANEOUS at 11:44

## 2022-11-05 RX ADMIN — ASPIRIN 81 MG 81 MG: 81 TABLET ORAL at 21:11

## 2022-11-05 RX ADMIN — ASPIRIN 81 MG 324 MG: 81 TABLET ORAL at 06:56

## 2022-11-05 RX ADMIN — CARVEDILOL 6.25 MG: 6.25 TABLET, FILM COATED ORAL at 11:44

## 2022-11-05 RX ADMIN — SODIUM CHLORIDE, PRESERVATIVE FREE 10 ML: 5 INJECTION INTRAVENOUS at 21:18

## 2022-11-05 RX ADMIN — DONEPEZIL HYDROCHLORIDE 10 MG: 10 TABLET, FILM COATED ORAL at 21:11

## 2022-11-05 RX ADMIN — CARVEDILOL 6.25 MG: 6.25 TABLET, FILM COATED ORAL at 17:06

## 2022-11-05 ASSESSMENT — ENCOUNTER SYMPTOMS
TROUBLE SWALLOWING: 0
NAUSEA: 0
FACIAL SWELLING: 0
STRIDOR: 0
ABDOMINAL PAIN: 0
SHORTNESS OF BREATH: 1
VOMITING: 0
VOICE CHANGE: 0
COLOR CHANGE: 0
WHEEZING: 0

## 2022-11-05 ASSESSMENT — PAIN DESCRIPTION - ORIENTATION: ORIENTATION: MID

## 2022-11-05 ASSESSMENT — PAIN DESCRIPTION - PAIN TYPE: TYPE: ACUTE PAIN

## 2022-11-05 ASSESSMENT — PAIN SCALES - GENERAL
PAINLEVEL_OUTOF10: 0
PAINLEVEL_OUTOF10: 5
PAINLEVEL_OUTOF10: 0

## 2022-11-05 ASSESSMENT — PAIN DESCRIPTION - DESCRIPTORS: DESCRIPTORS: ACHING

## 2022-11-05 ASSESSMENT — PAIN DESCRIPTION - LOCATION: LOCATION: CHEST

## 2022-11-05 ASSESSMENT — PAIN DESCRIPTION - ONSET: ONSET: AWAKENED FROM SLEEP

## 2022-11-05 ASSESSMENT — PAIN - FUNCTIONAL ASSESSMENT: PAIN_FUNCTIONAL_ASSESSMENT: ACTIVITIES ARE NOT PREVENTED

## 2022-11-05 NOTE — ED NOTES
Transfer Center made aware bed assigned for pt. And need for transport.        Deven Jacobs RN  11/05/22 3588

## 2022-11-05 NOTE — PROGRESS NOTES
Patient arrived on the floor, alert and oriented x3, disoriented only to time, VSS. Patient denies n/v, diarrhea, pain, states she is a little SOB, but denies chest pain at this time. Patient oriented to room and call light, advised to call for assistance to get out of bed due to fall risk. Patient ambulated to the bathroom well with CGA,  Patient is [place], states dog ate her hearing aids. Patient denies needs at this time. Bed in lowest and locked position with bed alarm in place. Non-slip socks on, call light in reach.

## 2022-11-05 NOTE — PROGRESS NOTES
Clinical Pharmacy Note  Subcutaneous Anticoagulant Adjustment     Enoxaparin has been adjusted to 30 mg daily based on [facility] policy. Recent Labs     11/05/22 0530   CREATININE 1.3*     Recent Labs     11/05/22 0530   HGB 11.8*   HCT 35.1*        Estimated Creatinine Clearance: 24 mL/min (A) (based on SCr of 1.3 mg/dL (H)). Pharmacist Review of Appropriate Use and Automatic Dose Adjustment of Subcutaneous Anticoagulants (Adult)    The guidance below is to provide initial recommendations for dosing. If recommended dose does not align well with patient's current clinical picture, communications with the care team will occur to determine most appropriate medication and dose. TABLE 1. ENOXAPARIN ROUTINE PROPHYLAXIS DOSING (Medically ill, routine surgery)   Patient Weight (kg)     50.9 and below 51 - 100.9 101 - 150.9 151 - 174.9 175 or greater         Estimated CrCl  (ml/min) 30 or greater   30 mg SUBQ daily   40 mg SUBQ daily 30 mg SUBQ BID  40 mg SUBQ BID 60mg SUBQ BID      15-29 U[facility] 5000 units SUBQ BID   30 mg SUBQ daily 30 mg SUBQ daily 40 mg SUBQ daily   60 mg SUBQ daily      Less than 15 or Dialysis UFH 5000 units SUBQ BID   UFH 5000 units SUBQ TID UFH 7500 units SUBQ TID       TABLE 2. ENOXAPARIN TREATMENT DOSING   (Based on 1mg/kg BID for DVT/PE/AFib)   Patient Weight (kg)     50.9 and below .9 151-189.9 190 or greater         Estimated CrCl  (ml/min) 30 or greater Recommend Venecia[facility] standardized [facility] infusion, apixaban or rivaroxaban 1mg/kg SUBQ BID 1mg/kg SUBQ BID if anti-Xa levels are feasible per institution. Alternatively,  recommend switch to [address] [facility] standardized [facility] infusion     Recommend switch to [address] standardized U[facility] infusion. 15-29 Recommend [address] [facility] standardized [facility] infusion or apixaban 1mg/kg SUBQ daily Recommend switch to [address] [facility] standardized [facility] infusion     Less than 15 or Dialysis Recommend switch to [address] [facility] standardized [facility] infusion.      Jane Jiménez, [address] [facility] 11/5/2022 10:19 AM

## 2022-11-05 NOTE — H&P
[facility] Internal Medicine  History and Physical    Patient's PCP: Fernandez Smiley MD  Code Status: Full Code  History Obtained From:  patient, electronic medical record    CC: shortness of breath    HPI:       Brittni Mcnulty is an 75-year-old lady with a past medical history significant for systolic heart failure, dementia, aortic stenosis, CKD who presented for sudden shortness of breath last evening. The patient says she feels fine currently, but through chart review and discussion with emergency medicine patient was short of breath in the middle of the night. She had been progressively short of breath over the past few weeks. She has been reportedly adherent with her home medication regimen. She may have had some dietary indiscretion. There is no reports of any fever or chills. No report of nausea, vomiting or diarrhea. She has not had any recent sick contacts. She was seen a few weeks ago for right elbow pain that has persisted.       Past Medical / Surgical History:    Past Medical History:   Diagnosis Date    Chronic kidney disease (CKD), stage IV (severe) (HCC)     Coronary artery disease involving native coronary artery of native heart without angina pectoris 1/5/2016    GERD (gastroesophageal reflux disease) acid reflux    Gout, chronic     Histoplasmosis     Hyperlipidemia     Hypertension     Irregular heart beat     Osteoarthritis 2/28/2019    Knee, leg     Osteopenia of hip 04/26/2019    Pneumonia     Ulcer of jejunum     Unspecified cerebral artery occlusion with cerebral infarction 2014 , ~ 2012     Past Surgical History:   Procedure Laterality Date    BLADDER SUSPENSION      CHOLECYSTECTOMY  Orthopedic surgery Left arm    HYSTERECTOMY (CERVIX STATUS UNKNOWN)      TUBAL LIGATION      UPPER GASTROINTESTINAL ENDOSCOPY N/A 9/10/2021    EGD CONTROL HEMORRHAGE performed by Ailyn Campbell DO at [place] [address]  9/10/2021    EGD SUBMUCOSAL/ EPINEPHRINE INJECTION performed by Martin Urrutia DO at [facility] ENDOSCOPY       Medications Prior to Admission:    No current facility-administered medications on file prior to encounter.      Current Outpatient Medications on File Prior to Encounter   Medication Sig Dispense Refill    lisinopril (PRINIVIL;ZESTRIL) 10 MG tablet Take 10 mg by mouth at bedtime      pantoprazole (PROTONIX) 40 MG tablet TAKE 1 TABLET BY MOUTH EVERY DAY (Patient taking differently: Take 40 mg by mouth at bedtime) 30 tablet 0    magnesium oxide (MAG-OX) 140 MG CAPS Take 14 mg by mouth daily 90 capsule 2    allopurinol (ZYLOPRIM) 100 MG tablet TAKE 1/2 TABLET BY MOUTH EVERY DAY (Patient taking differently: Take 50 mg by mouth at bedtime TAKE 1/2 TABLET BY MOUTH EVERY DAY) 90 tablet 2    amLODIPine (NORVASC) 5 MG tablet TAKE 1 TABLET BY MOUTH EVERY DAY (Patient taking differently: Take 5 mg by mouth at bedtime) 90 tablet 1    atorvastatin (LIPITOR) 20 MG tablet TAKE 1 TABLET BY MOUTH AT BEDTIME 90 tablet 1    carvedilol (COREG) 6.25 MG tablet TAKE 1 TABLET BY MOUTH TWO TIMES A DAY WITH MEALS 180 tablet 2    memantine (NAMENDA) 5 MG tablet Take 1 tablet by mouth daily (Patient taking differently: Take 5 mg by mouth at bedtime) 180 tablet 1    donepezil (ARICEPT) 10 MG tablet TAKE 1 TABLET BY MOUTH nightly 90 tablet 0    albuterol sulfate HFA (VENTOLIN HFA) 108 (90 Base) MCG/ACT inhaler Inhale 2 puffs into the lungs 4 times daily as needed for Wheezing 18 g 0    FLUoxetine (PROZAC) 10 MG capsule TAKE 1 CAPSULE BY MOUTH ONE TIME A DAY (Patient taking differently: Take 10 mg by mouth at bedtime TAKE 1 CAPSULE BY MOUTH ONE TIME A DAY) 30 capsule 2    Cyanocobalamin (VITAMIN B 12 PO) Take by mouth daily      furosemide (LASIX) 40 MG tablet Take 0.5 tablets by mouth daily (Patient taking differently: Take 20 mg by mouth at bedtime) 30 tablet 11    isosorbide mononitrate (IMDUR) 30 MG extended release tablet Take 1 tablet by mouth daily Spacing out from other cardiac medications (Patient taking differently: Take 30 mg by mouth at bedtime Spacing out from other cardiac medications) 30 tablet 6    aspirin 81 MG chewable tablet Take 81 mg by mouth daily      ferrous sulfate 324 (65 Fe) MG EC tablet Take 1 tablet by mouth 2 times daily (with meals) 60 tablet 1    Respiratory Therapy Supplies (NEBULIZER/TUBING/MOUTHPIECE) KIT 1 kit by Does not apply route daily as needed (wheezing) 1 kit 0    ipratropium-albuterol (DUONEB) 0.5-2.5 (3) MG/3ML SOLN nebulizer solution Inhale 3 mLs into the lungs every 6 hours as needed for Shortness of Breath 360 mL 0    Multiple Vitamins-Minerals (THERAPEUTIC MULTIVITAMIN-MINERALS) tablet Take 1 tablet by mouth daily      loratadine (CLARITIN) 10 MG capsule Take 10 mg by mouth daily as needed       Handicap Placard MISC by Does not apply route Patient is unable to walk more than 250 feet before stopping to rest.    Not to exceed 5 years. DX: CHF 1 each 0       Allergies:  Codeine, Ibuprofen, Morphine and related, and Oxycodone-acetaminophen    Social History:   TOBACCO:   reports that she has never smoked. She has been exposed to tobacco smoke. She has never used smokeless tobacco.     ETOH:   reports no history of alcohol use. Family History:       Problem Relation Age of Onset    High Blood Pressure Mother     Heart Disease Mother     Diabetes Mother     High Blood Pressure Father     Heart Disease Father     Cancer Father         Brain    Cancer Sister         lymphoma    Cancer Brother         colon    Cancer Maternal Uncle        ROS:   All other systems reviewed and are negative.     PHYSICAL EXAM:  /64   Pulse 82   Temp 98.6 °F (37 °C) (Oral)   Resp 18   Ht 5' (1.524 m)   Wt 122 lb 2.2 oz (55.4 kg)   SpO2 93%   BMI 23.85 kg/m²     General: No acute distress  HEENT: PERRL, EOMI, moist oral mucosa  Cardiac: Regular rate and rhythm, systolic murmur, no appreciable peripheral edema in legs  Lungs: Diminished bibasilarly  Abdomen: Soft nontender, stented  Musculoskeletal: Right elbow olecranon bursitis  Neuro: Nonfocal, oriented to self and place    LABS:  Recent Labs     11/05/22  0530   WBC 10.8   HGB 11.8*   HCT 35.1*                                                                     Recent Labs     11/05/22  0530      K 4.0      CO2 25   BUN 24*   CREATININE 1.3*   GLUCOSE 116*     Recent Labs     11/05/22  0530   AST 15   ALT 6*   BILITOT 0.3   ALKPHOS 71     Recent Labs     11/05/22  0530   TROPONINI 0.05*     No results for input(s): BNP in the last 72 hours. Lab Results   Component Value Date/Time    PHART 7.292 10/20/2020 02:00 AM    TTM4VEE 44.2 10/20/2020 02:00 AM    PO2ART 156.0 10/20/2020 02:00 AM     No results for input(s): INR in the last 72 hours. Recent Labs     11/05/22  1320   COLORU Yellow   PHUR 5.5   WBCUA 2   RBCUA 1   BACTERIA None Seen*   CLARITYU Clear   SPECGRAV 1.008   LEUKOCYTESUR TRACE*   UROBILINOGEN 0.2   BILIRUBINUR Negative   BLOODU Negative   GLUCOSEU Negative         EKG:  Clinical Impression: Sinus rhythm: AV block  CXR: Chest X-Ray is obtained; result -    There is central vascular congestion and increased interstitial markings   compared to the prior exam indicating congestive changes. The cardiac   silhouette is not enlarged and unsure if cardiogenic or from volume overload. 2.  No sign of focal pneumonia or pneumothorax. Assessment & Plan:    Zachariah Joiner is a 80 y.o. female who was admitted with HFrEF (heart failure with reduced ejection fraction) ([facility] [address] Patient admitted with acute exacerbation of systolic heart failure. The patient's BNP is elevated to 22,000 from a recent baseline of 1800. She has demand ischemia but no chest pain. Continue her home regimen including amlodipine, lisinopril, Imdur and carvedilol. Started Lasix 40 mg twice per day.   Daily weights, low-sodium diet and 1500 mL fluid restriction    For dementia, continue donepezil and memantine. For GERD continue Protonix. CKD is lower than recent baseline, monitor renal function with diuresis    For hyperlipidemia and coronary artery disease, she is on statin. Continue aspirin. For olecranon bursitis of right arm, will start dicloxacillin      For allergic rhinitis continue cetirizine. F/E/N: Cardiac diet  PPx: Lovenox PPI  Dispo: Pending improvement in heart failure    Jennie Tim MD   11/5/2022 6:47 PM    Documentation was done using voice recognition dragon software. Every effort was made to ensure accuracy; however, inadvertent unintentional computerized transcription errors may be present.

## 2022-11-05 NOTE — PROGRESS NOTES
Comprehensive Nutrition Assessment    Type and Reason for Visit:  Initial, Positive Nutrition Screen, Consult (decreased appetite, weight loss, CHF diet education)    Nutrition Recommendations/Plan:   Continue low sodium diet regimen as tolerated  Offer Ensure bid and monitor tolerance  CHF info left at bed side, patient resting quietly. Will monitor need for questions  Will monitor nutritional adequacy, nutrition-related labs, weights, BMs, and clinical progress      Malnutrition Assessment:  Malnutrition Status: At risk for malnutrition (Comment) (11/05/22 1640)      Nutrition Assessment:    Patient admitted with CHF. Nutrition risk triggered due to weight loss and decreased appetite prior to admission. Weight has been trending down for the past 3 months especially, 13.4% per EMR history. Currently on a 2 gm Na diet eating 50-75% meals so far. Patient sleeping soundly at this time. RD left CHF nutrition info for the undernourished at bed side for patient/family. Will add nutrition supplements bid due to recent weight loss. Nutrition Related Findings:    Last BM on 11/5/22; missing teeth; [place] Wound Type:  (scattered skin tags)       Current Nutrition Intake & Therapies:    Average Meal Intake: 51-75%  Average Supplements Intake: Unable to assess  ADULT DIET; Regular; Low Sodium (2 gm)  ADULT ORAL NUTRITION SUPPLEMENT; Breakfast, Dinner; Standard High Calorie/High Protein Oral Supplement    Anthropometric Measures:  Height: 5' (152.4 cm)  Ideal Body Weight (IBW): 100 lbs (45 kg)       Current Body Weight: 122 lb 2 oz (55.4 kg),   IBW. Weight Source: Standing Scale  Current BMI (kg/m2): 23.9                          BMI Categories: Normal Weight (BMI 18.5-24. 9)    Estimated Daily Nutrient Needs:  Energy Requirements Based On: Kcal/kg  Weight Used for Energy Requirements: Current  Energy (kcal/day): 3926-6376 (30-35 kcal/55.4 kg)  Weight Used for Protein Requirements: Current  Protein (g/day): 72-83 (1.3-1.5 g/55.4 kg)  Method Used for Fluid Requirements: 1 ml/kcal  Fluid (ml/day):      Nutrition Diagnosis:   Increased nutrient needs related to increase demand for energy/nutrients as evidenced by weight loss, poor intake prior to admission    Nutrition Interventions:   Food and/or Nutrient Delivery: Continue Current Diet, Start Oral Nutrition Supplement  Nutrition Education/Counseling: Education initiated  Coordination of Nutrition Care: Continue to monitor while inpatient       Goals:     Goals: PO intake 75% or greater       Nutrition Monitoring and Evaluation:      Food/Nutrient Intake Outcomes: Food and Nutrient Intake, Supplement Intake  Physical Signs/Symptoms Outcomes: Nutrition Focused Physical Findings, Biochemical Data    Discharge Planning:     Too soon to determine     [facility], [address] [facility], [address] [place] [address],   Contact: Tha Vallecillo

## 2022-11-05 NOTE — ED NOTES
Up to bsc had soft formed stool  Small amount of urine as well  Able to stand with ease  Bed alarm in place     Rios Loya RN  11/05/22 9025

## 2022-11-05 NOTE — ED NOTES
To [place] per [facility]  Awake alert  Confused to time but aware she is going to the hospital  Walked to [employer] well  Skin warm and dry  resp unlabored per activity in the room denies any chest pain  SR on the monitor     Bettina Krabbe, RN  11/05/22 1260

## 2022-11-05 NOTE — ED NOTES
Attempted to give report spoke with Vianney Villarreal who states nurse not aware of admission and to call back in 15 mins.      Irene Madden RN  11/05/22 3049

## 2022-11-05 NOTE — ED PROVIDER NOTES
[id number] [facility] [place]  eMERGENCY dEPARTMENT eNCOUnter      Pt Name: Jasper Burrell  MRN: [id number]  Cuauhtemocgfjuan 1935  Date of evaluation: 11/5/2022  Provider: Ildefonso Swartz MD    CHIEF COMPLAINT       Chief Complaint   Patient presents with    Shortness of Breath     Pt states she was awakened suddenly by shortness of breath. Pt appears to have difficulty catching breath but is talking in full sentences. Pt asked if she has chest pain. Pt states yes and points to low sternum. \"Just a little. \"         HISTORY OF PRESENT ILLNESS   (Location/Symptom, Timing/Onset, Context/Setting, Quality, Duration, Modifying Factors, Severity)  Note limiting factors. Jasper Burrell is a 80 y.o. female with hx of CHF and CKD who presents due to being woken up suddenly due to shortness of breath while lying flat. Patient's granddaughter brought to emergency department. Patient reports mild chest pain but denies any diaphoresis, radiation of pain to jaw or arm. History is somewhat limited due to patient being hard of hearing. Patient denies any fever. Patient reports symptoms are constant, moderate, but have substantially improved since he came to the emergency department. HPI    Nursing Notes were reviewed. REVIEW OFSYSTEMS    (2-9 systems for level 4, 10 or more for level 5)     Review of Systems   Constitutional:  Negative for appetite change, fever and unexpected weight change. HENT:  Negative for facial swelling, trouble swallowing and voice change. Eyes:  Negative for visual disturbance. Respiratory:  Positive for shortness of breath. Negative for wheezing and stridor. Cardiovascular:  Positive for chest pain. Negative for palpitations. Gastrointestinal:  Negative for abdominal pain, nausea and vomiting. Genitourinary:  Negative for dysuria and vaginal bleeding. Musculoskeletal:  Negative for neck pain and neck stiffness. Skin:  Negative for color change and wound. Neurological:  Negative for seizures and syncope. Psychiatric/Behavioral:  Negative for self-injury and suicidal ideas. Except as noted above the remainder of the review of systems was reviewed and negative.        PAST MEDICAL HISTORY     Past Medical History:   Diagnosis Date    Chronic kidney disease (CKD), stage IV (severe) (HCC)     Coronary artery disease involving native coronary artery of native heart without angina pectoris 1/5/2016    GERD (gastroesophageal reflux disease) acid reflux    Gout, chronic     Histoplasmosis     Hyperlipidemia     Hypertension     Irregular heart beat     Osteoarthritis 2/28/2019    Knee, leg     Osteopenia of hip 04/26/2019    Pneumonia     Ulcer of jejunum     Unspecified cerebral artery occlusion with cerebral infarction 2014 , ~ 2012         SURGICAL HISTORY       Past Surgical History:   Procedure Laterality Date    BLADDER SUSPENSION      CHOLECYSTECTOMY  Orthopedic surgery Left arm    HYSTERECTOMY (CERVIX STATUS UNKNOWN)      TUBAL LIGATION      UPPER GASTROINTESTINAL ENDOSCOPY N/A 9/10/2021    EGD CONTROL HEMORRHAGE performed by Zoe Saez DO at [facility]  9/10/2021    EGD SUBMUCOSAL/ EPINEPHRINE INJECTION performed by [facility] Saez DO at [facility]       Previous Medications    ALBUTEROL SULFATE HFA (VENTOLIN HFA) 108 (90 BASE) MCG/ACT INHALER    Inhale 2 puffs into the lungs 4 times daily as needed for Wheezing    ALLOPURINOL (ZYLOPRIM) 100 MG TABLET    TAKE 1/2 TABLET BY MOUTH EVERY DAY    AMLODIPINE (NORVASC) 5 MG TABLET    TAKE 1 TABLET BY MOUTH EVERY DAY    ASPIRIN 81 MG CHEWABLE TABLET    Take 81 mg by mouth daily    ATORVASTATIN (LIPITOR) 20 MG TABLET    TAKE 1 TABLET BY MOUTH AT BEDTIME    CARVEDILOL (COREG) 6.25 MG TABLET    TAKE 1 TABLET BY MOUTH TWO TIMES A DAY WITH MEALS    CYANOCOBALAMIN (VITAMIN B 12 PO)    Take by mouth daily    DONEPEZIL (ARICEPT) 10 MG TABLET    TAKE 1 TABLET BY MOUTH nightly    FERROUS SULFATE 324 (65 FE) MG EC TABLET    Take 1 tablet by mouth 2 times daily (with meals)    FLUOXETINE (PROZAC) 10 MG CAPSULE    TAKE 1 CAPSULE BY MOUTH ONE TIME A DAY    FUROSEMIDE (LASIX) 40 MG TABLET    Take 0.5 tablets by mouth daily    HANDICAP PLACARD MISC    by Does not apply route Patient is unable to walk more than 250 feet before stopping to rest.    Not to exceed 5 years. DX: CHF    IPRATROPIUM-ALBUTEROL (DUONEB) 0.5-2.5 (3) MG/3ML SOLN NEBULIZER SOLUTION    Inhale 3 mLs into the lungs every 6 hours as needed for Shortness of Breath    ISOSORBIDE MONONITRATE (IMDUR) 30 MG EXTENDED RELEASE TABLET    Take 1 tablet by mouth daily Spacing out from other cardiac medications    LISINOPRIL (PRINIVIL;ZESTRIL) 10 MG TABLET    TAKE 1 TABLET BY MOUTH EVERY DAY    LORATADINE (CLARITIN) 10 MG CAPSULE    Take 10 mg by mouth daily as needed     MAGNESIUM OXIDE (MAG-OX) 140 MG CAPS    Take 14 mg by mouth daily    MEMANTINE (NAMENDA) 5 MG TABLET    Take 1 tablet by mouth daily    MULTIPLE VITAMINS-MINERALS (THERAPEUTIC MULTIVITAMIN-MINERALS) TABLET    Take 1 tablet by mouth daily    PANTOPRAZOLE (PROTONIX) 40 MG TABLET    TAKE 1 TABLET BY MOUTH EVERY DAY    RESPIRATORY THERAPY SUPPLIES (NEBULIZER/TUBING/MOUTHPIECE) KIT    1 kit by Does not apply route daily as needed (wheezing)       ALLERGIES     Codeine, Ibuprofen, Morphine and related, and Oxycodone-acetaminophen    FAMILY HISTORY       Family History   Problem Relation Age of Onset    High Blood Pressure Mother     Heart Disease Mother     Diabetes Mother     High Blood Pressure Father     Heart Disease Father     Cancer Father         Brain    Cancer Sister         lymphoma    Cancer Brother         colon    Cancer Maternal Uncle           SOCIAL HISTORY       Social History     Socioeconomic History    Marital status:       Spouse name: None    Number of children: None    Years of education: None    Highest education level: None   Tobacco Use    Smoking status: Never     Passive exposure: Yes    Smokeless tobacco: Never   Vaping Use    Vaping Use: Never used   Substance and Sexual Activity    Alcohol use: No     Comment: 1 yearly    Drug use: No    Sexual activity: Yes     Partners: Male     Social Determinants of Health     Financial Resource Strain: Low Risk     Difficulty of Paying Living Expenses: Not hard at all   Food Insecurity: No Food Insecurity    Worried About [address] in the Last Year: Never true    Ran Out of Food in the Last Year: Never true         PHYSICAL EXAM    (up to 7 for level 4, 8 or more for level 5)     ED Triage Vitals [11/05/22 0518]   BP Temp Temp Source Heart Rate Resp SpO2 Height Weight   139/82 98.4 °F (36.9 °C) Oral 95 16 95 % 5' (1.524 m) 124 lb 12.5 oz (56.6 kg)       Physical Exam  Vitals and nursing note reviewed. Constitutional:       Appearance: She is well-developed. She is not diaphoretic. HENT:      Head: Normocephalic and atraumatic. Right Ear: External ear normal.      Left Ear: External ear normal.   Eyes:      Conjunctiva/sclera: Conjunctivae normal.   Neck:      Vascular: No JVD. Trachea: No tracheal deviation. Cardiovascular:      Rate and Rhythm: Normal rate. Pulmonary:      Effort: Pulmonary effort is normal. No respiratory distress. Breath sounds: Normal breath sounds. No wheezing. Abdominal:      General: There is no distension. Palpations: Abdomen is soft. Tenderness: There is no abdominal tenderness. There is no guarding or rebound. Musculoskeletal:         General: No tenderness or deformity. Normal range of motion. Cervical back: Neck supple. Right lower leg: Edema present. Left lower leg: Edema present. Skin:     General: Skin is warm and dry. Neurological:      Mental Status: She is alert. Cranial Nerves: No cranial nerve deficit.        DIAGNOSTIC RESULTS     EKG:All EKG's are interpreted by the Emergency Department Physician who either signs or Co-signs this chart in the absence of a cardiologist.    The Ekg interpreted by me shows  Sinus rhythm with first-degree AV block, rate of 86  Axis is   Left axis deviation  QTc is   502ms  Intervals and Durations are unremarkable. ST Segments: Nonspecific changes, scarbosa negative. Widening of the QRS and prolongation of the QTC with nonspecific changes but scar Bosa negative compared to previous EKG on 8/8/2022      RADIOLOGY:     Interpretation per the Radiologist below, if available at the time of this note:    XR CHEST PORTABLE   Final Result   1. There is central vascular congestion and increased interstitial markings   compared to the prior exam indicating congestive changes. The cardiac   silhouette is not enlarged and unsure if cardiogenic or from volume overload. 2.  No sign of focal pneumonia or pneumothorax. LABS:  Labs Reviewed   CBC WITH AUTO DIFFERENTIAL - Abnormal; Notable for the following components:       Result Value    RBC 3.91 (*)     Hemoglobin 11.8 (*)     Hematocrit 35.1 (*)     Monocytes Absolute 1.7 (*)     All other components within normal limits   COMPREHENSIVE METABOLIC PANEL - Abnormal; Notable for the following components:    Glucose 116 (*)     BUN 24 (*)     Creatinine 1.3 (*)     Est, Glom Filt Rate 40 (*)     ALT 6 (*)     All other components within normal limits   TROPONIN - Abnormal; Notable for the following components:    Troponin 0.05 (*)     All other components within normal limits   BRAIN NATRIURETIC PEPTIDE - Abnormal; Notable for the following components:    Pro-BNP 22,039 (*)     All other components within normal limits   RAPID INFLUENZA A/B ANTIGENS   COVID-19, RAPID   URINALYSIS WITH REFLEX TO CULTURE       All otherlabs were within normal range or not returned as of this dictation.     EMERGENCY DEPARTMENT COURSE and DIFFERENTIAL DIAGNOSIS/MDM:   Vitals:    Vitals:    11/05/22 0518 11/05/22 0537 11/05/22 0545   BP: 139/82 104/65 127/69   Pulse: 95 84 83   Resp: 16 14 17   Temp: 98.4 °F (36.9 °C)     TempSrc: Oral     SpO2: 95% 95% 97%   Weight: 124 lb 12.5 oz (56.6 kg)     Height: 5' (1.524 m)           Is this patient to be included in the SEP-1 Core Measure due to severe sepsis or septic shock? No   Exclusion criteria - the patient is NOT to be included for SEP-1 Core Measure due to:  2+ SIRS criteria are not met      MDM  History, physical exam, and work-up are consistent with CHF exacerbation. Labs show significantly elevated BNP and chest x-ray does show pulmonary vascular congestion consistent with volume overload. Patient's troponin is elevated at 0.05. Primarily suspect demand ischemia from CHF exacerbation at this time however will cover patient with aspirin as ACS cannot be definitively ruled out at this time. Do not suspect STEMI based on EKG being Scarbosa negative. Patient also given IV Lasix and admitted for further evaluation and care. Patient and family expressed understanding and agreement with plan. Patient is reassessed numerous times throughout her stay in the emergency department no acute worsening in clinical status.      Critical Care  Performed by: Jerome Wall MD  Authorized by: Jerome Wall MD     Critical care provider statement:     Critical care time (minutes):  32    Critical care time was exclusive of:  Separately billable procedures and treating other patients and teaching time    Critical care was necessary to treat or prevent imminent or life-threatening deterioration of the following conditions:  Cardiac failure, circulatory failure and shock    Critical care was time spent personally by me on the following activities:  Ordering and performing treatments and interventions, development of treatment plan with patient or surrogate, ordering and review of laboratory studies, ordering and review of radiographic studies, discussions with primary provider, pulse oximetry, evaluation of patient's response to treatment, re-evaluation of patient's condition, examination of patient, review of old charts and obtaining history from patient or surrogate    FINAL IMPRESSION      1. Elevated troponin    2. Acute on chronic congestive heart failure, unspecified heart failure type [facility])          DISPOSITION/PLAN   DISPOSITION Admitted 11/05/2022 06:58:51 AM          (Please note that portions of this note were completed with a voice recognition program.  Efforts were made to edit the dictations but occasionally words aremis-transcribed. )    Ritesh Neely MD (electronically signed)  Attending Emergency Physician           Ritesh Neely MD  11/05/22 3106

## 2022-11-05 NOTE — PROGRESS NOTES
4 Eyes Skin Assessment     NAME:  Suki Trejo  YOB: 1935  MEDICAL RECORD NUMBER:  [id number]    The patient is being assess for  Admission    I agree that 2 RN's have performed a thorough Head to Toe Skin Assessment on the patient. ALL assessment sites listed below have been assessed. Areas assessed by both nurses:    Head, Face, Ears, Shoulders, Back, Chest, Arms, Elbows, Hands, Sacrum. Buttock, Coccyx, Ischium, and Legs. Feet and Heels        Does the Patient have a Wound?  No noted wound(s)       Edwardo Prevention initiated:  No   Wound Care Orders initiated:  No    Pressure Injury (Stage 3,4, Unstageable, DTI, NWPT, and Complex wounds) if present place referral/consult order under [de-identified] NA    New and Established Ostomies if present place consult order under : NA      Nurse 1 eSignature: Electronically signed by Leroy Norman RN on 11/5/22 at 10:26 AM EDT    **SHARE this note so that the co-signing nurse is able to place an eSignature**    Nurse 2 eSignature: Electronically signed by Radha Simons RN on 11/5/22 at 10:26 AM EDT

## 2022-11-05 NOTE — ED NOTES
Had small formed stool  Stood with ease 100 cc of light yellow urine     Anny Martinez, PREM  11/05/22 5471

## 2022-11-05 NOTE — PROGRESS NOTES
Physical Therapy  Facility/Department: [facility] SURG  Physical Therapy Initial Assessment    Name: Faraz Hale  : 1935  MRN: [id number]  Date of Service: 2022    Discharge Recommendations:  Patient would benefit from continued therapy after discharge, Home with Home health PT, Home with assist PRN   PT Equipment Recommendations  Equipment Needed: No  Other: Has walker      Patient Diagnosis(es): The primary encounter diagnosis was Elevated troponin. A diagnosis of Acute on chronic congestive heart failure, unspecified heart failure type [facility]) was also pertinent to this visit. Past Medical History:  has a past medical history of Chronic kidney disease (CKD), stage IV (severe) ([facility] [address] Coronary artery disease involving native coronary artery of native heart without angina pectoris, GERD (gastroesophageal reflux disease), Gout, chronic, Histoplasmosis, Hyperlipidemia, Hypertension, Irregular heart beat, Osteoarthritis, Osteopenia of hip, Pneumonia, Ulcer of jejunum, and Unspecified cerebral artery occlusion with cerebral infarction. Past Surgical History:  has a past surgical history that includes Hysterectomy; Cholecystectomy (Orthopedic surgery Left arm); Tubal ligation; bladder suspension; Upper gastrointestinal endoscopy (N/A, 9/10/2021); and Upper gastrointestinal endoscopy (9/10/2021). Assessment   Body Structures, Functions, Activity Limitations Requiring Skilled Therapeutic Intervention: Decreased functional mobility ; Decreased balance  Assessment: Pt is an 80 y.o. F. admitted  for CHF, elevated troponin. She presents pleasant and agreeable to evaluation, demonstrating functional LE strength, but was mildly unsteady ambulating without device, experiencing intermittent staggering and trunk sway. With walker, pt demonstrated improved stability. She would benefit from continued therapy to maximize her balance.   Anticipate safe return home with assist from family, initial use of walker, and home PT level 1. Sofia Ashby scored a 18/24 on the AM-PAC short mobility form. Current research shows that an AM-PAC score of 18 or greater is typically associated with a discharge to the patient's home setting. Based on the patient's AM-PAC score and their current functional mobility deficits, it is recommended that the patient have 2-3 sessions per week of Physical Therapy at d/c to increase the patient's independence. At this time, this patient demonstrates the endurance and safety to discharge home with home PT and a follow up treatment frequency of 2-3x/wk. Please see assessment section for further patient specific details. If patient discharges prior to next session this note will serve as a discharge summary. Please see below for the latest assessment towards goals. Specific Instructions for Next Treatment: Improve balance  Therapy Prognosis: Good  Decision Making: Low Complexity  History: See below  Exam: Strength; ROM; Balance; Ambulation  Clinical Presentation: Stable  Barriers to Learning: Fatigue  Requires PT Follow-Up: Yes  Activity Tolerance  Activity Tolerance: Patient tolerated evaluation without incident     Plan   Physcial Therapy Plan  General Plan: 2-3 times per week  Specific Instructions for Next Treatment: Improve balance  Current Treatment Recommendations: Gait training, Balance training, Functional mobility training, Stair training, Neuromuscular re-education, Transfer training, Equipment evaluation, education, & procurement, Safety education & training, Patient/Caregiver education & training  Safety Devices  Type of Devices: All fall risk precautions in place, Call light within reach, Left in bed, Bed alarm in place  Restraints  Restraints Initially in Place: No     Restrictions  Restrictions/Precautions  Restrictions/Precautions: Fall Risk     Subjective   General  Chart Reviewed:  Yes  Additional Pertinent Hx: Sofia Ashby is a 80 y.o. female with hx of CHF and CKD who presents due to being woken up suddenly due to shortness of breath while lying flat. Patient's granddaughter brought to emergency department. Patient reports mild chest pain but denies any diaphoresis, radiation of pain to jaw or arm. History is somewhat limited due to patient being hard of hearing. Patient denies any fever. Patient reports symptoms are constant, moderate, but have substantially improved since he came to the emergency department. HPI  Response To Previous Treatment: Not applicable  Referring Practitioner: Dr. Bryce Mauro  Referral Date : 11/05/22  Diagnosis: Elevated troponin; CHF  Follows Commands: Within Functional Limits  Subjective  Subjective: Pt is pleasant and agreeable to evaluation.          Social/Functional History  Social/Functional History  Lives With: Daughter  Type of Home: House  Home Access: Stairs to enter with rails  Entrance Stairs - Number of Steps: 4  Bathroom Shower/Tub: Tub/Shower unit  Bathroom Toilet: Standard  Home Equipment: amina Urr[place]ia  Has the patient had two or more falls in the past year or any fall with injury in the past year?: No  ADL Assistance: Independent  Ambulation Assistance: Independent  Transfer Assistance: Independent  Additional Comments: Pt reports house has a lot of furniture so it is difficult to maneuver with walker    Vision/Hearing  Vision  Vision: Within Functional Limits  Hearing  Hearing: Within functional limits      Objective     AROM RLE (degrees)  RLE AROM: WFL  AROM LLE (degrees)  LLE AROM : WFL    Strength RLE  Strength RLE: WFL  Strength LLE  Strength LLE: WFL    Bed mobility  Supine to Sit: Supervision  Sit to Supine: Supervision    Transfers  Sit to Stand: Contact guard assistance;Stand by assistance  Stand to Sit: Contact guard assistance;Stand by assistance    Ambulation  Surface: Level tile  Device: No Device  Assistance: Contact guard assistance  Quality of Gait: Moderate pace, increased trunk sway and staggering noted, able to self-correct LOB without physical assist.  Pt frequently reaching for nearby objects for support. Distance: 200'    More Ambulation?: Yes  Ambulation 2  Surface - 2: level tile  Device 2: Rolling Walker  Assistance 2: Supervision  Quality of Gait 2: Faster pace, no difficulty managing walker, no LOB. Distance: 25' x 2    AM-PAC Score  AM-PAC Inpatient Mobility Raw Score : 18 (11/05/22 1456)  AM-PAC Inpatient T-Scale Score : 43.63 (11/05/22 1456)  Mobility Inpatient CMS 0-100% Score: 46.58 (11/05/22 1456)  Mobility Inpatient CMS G-Code Modifier : CK (11/05/22 1456)    Goals  Short Term Goals  Time Frame for Short Term Goals: 2-3 days  Short Term Goal 1: Bed mobility (I)  Short Term Goal 2: Transfers (I)  Short Term Goal 3: Ambulation 100' with walker, (I)  Short Term Goal 4: Ascend/Descend 4 steps (I)  Patient Goals   Patient Goals : To return home       Education  Patient Education  Education Given To: Patient  Education Provided: Role of Therapy;Plan of Care; Fall Prevention Strategies;Transfer Training  Education Method: Demonstration;Verbal  Education Outcome: Continued education needed      Therapy Time   Individual Concurrent Group Co-treatment   Time In 9532         Time Out 9241         Minutes 25         Timed Code Treatment Minutes: 10 Minutes   Low Complexity Evaluation    Phyllis Desai PT   Electronically signed by Phyllis Desai PT [id number] on 11/5/2022 at 3:03 PM

## 2022-11-05 NOTE — ED NOTES
Dr. Jeimy Romero calling back to [facility], PRE[facility]  11/05/22 1308

## 2022-11-05 NOTE — ED NOTES
Pt's Granddaughter is at bedside. Updated as to room number and process. She can be reached @ [phone].      Nichole Gowers, RN  11/05/22 0859

## 2022-11-05 NOTE — ED NOTES
Dr. Hermelinda Pickard calling back to [facility], PRE[facility]  11/05/22 2676

## 2022-11-05 NOTE — ED NOTES
Report given to Shekhar on [id number] [facility].  Unknown ETA at present     Monica Saravia RN  11/05/22 3373

## 2022-11-05 NOTE — ED NOTES
Fall risk screening completed. Fall risk bracelet applied to patient. Non-skid socks provided and placed on patient. The fall risk is indicated using  fall sign . Based on score, a bed alarm was indicated and applied. The call light is within the patient's reach, and instructions for use were provided. The bed is in the lowest position with wheels locked. The patient has been advised to notify staff, using the call light, if there is a need to get up or use restroom. The patient verbalized understanding of safety precautions and how to contact staff for assistance.  Alarm in place     [facility]  11/05/22 5952

## 2022-11-05 NOTE — PROGRESS NOTES
Pharmacy Medication Reconciliation Note     List of medications patient is currently taking is complete. Source of information:   1. Conversation with pt's daughter to review meds. 2. Epic med list. Cheryle Davies PCP    Allergies   Allergen Reactions    Codeine Shortness Of Breath     Rash and dyspnea    Ibuprofen      Told not to take    Morphine And Related     Oxycodone-Acetaminophen        Notes regarding home medications:   1.  Pt's daughter states there have been no changes in meds since August discharge. 2. Family gives all meds at HS except for Coreg which is twice a day. They would like timing to be at HS here.     Ravi Dc, [facility]   11/5/2022  11:24 AM

## 2022-11-05 NOTE — ED NOTES
Appears asleep  resp easy 50 cc of urine in bedside commode     Sunday Mckenzie, PREM  11/05/22 0435

## 2022-11-05 NOTE — PROGRESS NOTES
Spoke with patients daughter, Margy Abraham. She states the patient recently had bursitis in her right elbow that is still bothering her. She also states that someone will be to stay with the patient this evening d/t sundowning.

## 2022-11-05 NOTE — ED NOTES
Fall risk screening completed. Fall risk bracelet applied to patient. Non-skid socks provided and placed on patient. The fall risk is indicated using  Fall sign . Based on score, a bed alarm was indicated and applied. The call light is within the patient's reach, and instructions for use were provided. The bed is in the lowest position with wheels locked. The patient has been advised to notify staff, using the call light, if there is a need to get up or use restroom. The patient verbalized understanding of safety precautions and how to contact staff for assistance.       Kulwinder Bocanegra RN  11/05/22 4891

## 2022-11-05 NOTE — CARE COORDINATION
INITIAL CASE MANAGEMENT ASSESSMENT    Reviewed chart, called patient's daughterTiffany assess possible discharge needs. Explained Case Management role/services. Living Situation: Patient lives with her  and dgt and dgt's family in a house with 4-5 steps to enter. ADLs: Needs assistance (dgt has been assisting with bathing, dressing recently but prior to covid the pt could do dressing on her own)     DME: Shower chair with back, Hand-held shower, Walker, 4 wheeled, Rollator, Cane (transport w/c)    PT/OT Recs: Ordered, but not seen yet     Active Services: None. Patient's daughter and granddaughter are nurse's aide and both live in the home to provide assistance. Refuses need for HHC. Transportation: Family provides transportation     Medications: Erik in [place]    PCP: Lashon Lamar MD      HD/PD: n/a    PLAN/COMMENTS: Patient's goal is to return to home with her family. SW/CM provided contact information for patient or family to call with any questions. SW/CM will follow and assist as needed.      Electronically signed by Alina [place] RN MSN on 11/5/2022 at 10:54 AM

## 2022-11-06 LAB
ANION GAP SERPL CALCULATED.3IONS-SCNC: 14 MMOL/L (ref 3–16)
BUN BLDV-MCNC: 26 MG/DL (ref 7–20)
CALCIUM SERPL-MCNC: 8.6 MG/DL (ref 8.3–10.6)
CHLORIDE BLD-SCNC: 101 MMOL/L (ref 99–110)
CO2: 22 MMOL/L (ref 21–32)
CREAT SERPL-MCNC: 1.6 MG/DL (ref 0.6–1.2)
GFR SERPL CREATININE-BSD FRML MDRD: 31 ML/MIN/{1.73_M2}
GLUCOSE BLD-MCNC: 135 MG/DL (ref 70–99)
MAGNESIUM: 1.4 MG/DL (ref 1.8–2.4)
POTASSIUM SERPL-SCNC: 3.7 MMOL/L (ref 3.5–5.1)
SODIUM BLD-SCNC: 137 MMOL/L (ref 136–145)

## 2022-11-06 PROCEDURE — 99233 SBSQ HOSP IP/OBS HIGH 50: CPT | Performed by: INTERNAL MEDICINE

## 2022-11-06 PROCEDURE — 1200000000 HC SEMI PRIVATE

## 2022-11-06 PROCEDURE — 6360000002 HC RX W HCPCS: Performed by: INTERNAL MEDICINE

## 2022-11-06 PROCEDURE — 6370000000 HC RX 637 (ALT 250 FOR IP): Performed by: STUDENT IN AN ORGANIZED HEALTH CARE EDUCATION/TRAINING PROGRAM

## 2022-11-06 PROCEDURE — 6360000002 HC RX W HCPCS: Performed by: STUDENT IN AN ORGANIZED HEALTH CARE EDUCATION/TRAINING PROGRAM

## 2022-11-06 PROCEDURE — 80048 BASIC METABOLIC PNL TOTAL CA: CPT

## 2022-11-06 PROCEDURE — 2580000003 HC RX 258: Performed by: STUDENT IN AN ORGANIZED HEALTH CARE EDUCATION/TRAINING PROGRAM

## 2022-11-06 PROCEDURE — 83735 ASSAY OF MAGNESIUM: CPT

## 2022-11-06 PROCEDURE — 36415 COLL VENOUS BLD VENIPUNCTURE: CPT

## 2022-11-06 RX ORDER — MAGNESIUM SULFATE IN WATER 40 MG/ML
2000 INJECTION, SOLUTION INTRAVENOUS ONCE
Status: COMPLETED | OUTPATIENT
Start: 2022-11-06 | End: 2022-11-06

## 2022-11-06 RX ADMIN — CARVEDILOL 6.25 MG: 6.25 TABLET, FILM COATED ORAL at 17:47

## 2022-11-06 RX ADMIN — ASPIRIN 81 MG 81 MG: 81 TABLET ORAL at 21:06

## 2022-11-06 RX ADMIN — ISOSORBIDE MONONITRATE 30 MG: 30 TABLET, EXTENDED RELEASE ORAL at 21:06

## 2022-11-06 RX ADMIN — FLUOXETINE 10 MG: 10 CAPSULE ORAL at 21:06

## 2022-11-06 RX ADMIN — DONEPEZIL HYDROCHLORIDE 10 MG: 10 TABLET, FILM COATED ORAL at 21:06

## 2022-11-06 RX ADMIN — ENOXAPARIN SODIUM 30 MG: 100 INJECTION SUBCUTANEOUS at 08:02

## 2022-11-06 RX ADMIN — CETIRIZINE HYDROCHLORIDE 5 MG: 10 TABLET, FILM COATED ORAL at 21:06

## 2022-11-06 RX ADMIN — MEMANTINE 5 MG: 5 TABLET ORAL at 21:06

## 2022-11-06 RX ADMIN — ATORVASTATIN CALCIUM 10 MG: 10 TABLET, FILM COATED ORAL at 21:06

## 2022-11-06 RX ADMIN — DICLOXACILLIN SODIUM 250 MG: 250 CAPSULE ORAL at 12:06

## 2022-11-06 RX ADMIN — AMLODIPINE BESYLATE 5 MG: 5 TABLET ORAL at 21:06

## 2022-11-06 RX ADMIN — DICLOXACILLIN SODIUM 250 MG: 250 CAPSULE ORAL at 05:38

## 2022-11-06 RX ADMIN — SODIUM CHLORIDE: 9 INJECTION, SOLUTION INTRAVENOUS at 11:08

## 2022-11-06 RX ADMIN — DICLOXACILLIN SODIUM 250 MG: 250 CAPSULE ORAL at 17:47

## 2022-11-06 RX ADMIN — CARVEDILOL 6.25 MG: 6.25 TABLET, FILM COATED ORAL at 08:02

## 2022-11-06 RX ADMIN — MAGNESIUM SULFATE HEPTAHYDRATE 2000 MG: 40 INJECTION, SOLUTION INTRAVENOUS at 11:10

## 2022-11-06 RX ADMIN — PANTOPRAZOLE SODIUM 40 MG: 40 TABLET, DELAYED RELEASE ORAL at 21:06

## 2022-11-06 RX ADMIN — LISINOPRIL 10 MG: 10 TABLET ORAL at 21:06

## 2022-11-06 RX ADMIN — ALLOPURINOL 50 MG: 100 TABLET ORAL at 21:06

## 2022-11-06 RX ADMIN — SODIUM CHLORIDE, PRESERVATIVE FREE 10 ML: 5 INJECTION INTRAVENOUS at 21:08

## 2022-11-06 RX ADMIN — FUROSEMIDE 40 MG: 10 INJECTION, SOLUTION INTRAMUSCULAR; INTRAVENOUS at 08:02

## 2022-11-06 RX ADMIN — SODIUM CHLORIDE, PRESERVATIVE FREE 10 ML: 5 INJECTION INTRAVENOUS at 08:03

## 2022-11-06 RX ADMIN — FUROSEMIDE 40 MG: 10 INJECTION, SOLUTION INTRAMUSCULAR; INTRAVENOUS at 17:47

## 2022-11-06 ASSESSMENT — PAIN DESCRIPTION - PAIN TYPE: TYPE: ACUTE PAIN

## 2022-11-06 ASSESSMENT — PAIN SCALES - GENERAL: PAINLEVEL_OUTOF10: 5

## 2022-11-06 ASSESSMENT — PAIN DESCRIPTION - FREQUENCY: FREQUENCY: CONTINUOUS

## 2022-11-06 ASSESSMENT — PAIN DESCRIPTION - ONSET: ONSET: ON-GOING

## 2022-11-06 ASSESSMENT — PAIN DESCRIPTION - DESCRIPTORS: DESCRIPTORS: NAGGING

## 2022-11-06 ASSESSMENT — PAIN DESCRIPTION - ORIENTATION: ORIENTATION: RIGHT

## 2022-11-06 ASSESSMENT — PAIN DESCRIPTION - LOCATION: LOCATION: ELBOW

## 2022-11-06 ASSESSMENT — PAIN - FUNCTIONAL ASSESSMENT: PAIN_FUNCTIONAL_ASSESSMENT: ACTIVITIES ARE NOT PREVENTED

## 2022-11-06 NOTE — PROGRESS NOTES
Department of Internal Medicine  General Internal Medicine  Attending Progress Note    Chief Complaint:sob      HPI:   SUBJECTIVE:  81 yo female admitted with Hfref, dementia and aortic stenosis and ckd. She reports feeling a little better. Slept well. Breathing is improved. Past Medical History:   Diagnosis Date    Chronic kidney disease (CKD), stage IV (severe) (HCC)     Coronary artery disease involving native coronary artery of native heart without angina pectoris 1/5/2016    GERD (gastroesophageal reflux disease) acid reflux    Gout, chronic     Histoplasmosis     Hyperlipidemia     Hypertension     Irregular heart beat     Osteoarthritis 2/28/2019    Knee, leg     Osteopenia of hip 04/26/2019    Pneumonia     Ulcer of jejunum     Unspecified cerebral artery occlusion with cerebral infarction 2014 , ~ 2012     Past Surgical History:   Procedure Laterality Date    BLADDER SUSPENSION      CHOLECYSTECTOMY  Orthopedic surgery Left arm    HYSTERECTOMY (CERVIX STATUS UNKNOWN)      TUBAL LIGATION      UPPER GASTROINTESTINAL ENDOSCOPY N/A 9/10/2021    EGD CONTROL HEMORRHAGE performed by Zachary Shaw DO at [address] [place] [address]  9/10/2021    EGD SUBMUCOSAL/ EPINEPHRINE INJECTION performed by Zachary Shaw DO at [facility] ENDOSCOPY      Family History   Problem Relation Age of Onset    High Blood Pressure Mother     Heart Disease Mother     Diabetes Mother     High Blood Pressure Father     Heart Disease Father     Cancer Father         Brain    Cancer Sister         lymphoma    Cancer Brother         colon    Cancer Maternal Uncle      Social History     Tobacco Use    Smoking status: Never     Passive exposure: Yes    Smokeless tobacco: Never   Vaping Use    Vaping Use: Never used   Substance Use Topics    Alcohol use: No     Comment: 1 yearly    Drug use: No       Prior to Admission medications    Medication Sig Start Date End Date Taking?  Authorizing Provider lisinopril (PRINIVIL;ZESTRIL) 10 MG tablet Take 10 mg by mouth at bedtime 8/4/22   Historical Provider, MD   pantoprazole (PROTONIX) 40 MG tablet TAKE 1 TABLET BY MOUTH EVERY DAY  Patient taking differently: Take 40 mg by mouth at bedtime 8/29/22   Aaron Toledo MD   magnesium oxide (MAG-OX) 140 MG CAPS Take 14 mg by mouth daily 8/25/22   Aaron Toledo MD   allopurinol (ZYLOPRIM) 100 MG tablet TAKE 1/2 TABLET BY MOUTH EVERY DAY  Patient taking differently: Take 50 mg by mouth at bedtime TAKE 1/2 TABLET BY MOUTH EVERY DAY 8/25/22   Aaron Toledo MD   amLODIPine (NORVASC) 5 MG tablet TAKE 1 TABLET BY MOUTH EVERY DAY  Patient taking differently: Take 5 mg by mouth at bedtime 8/22/22   Aaron Toledo MD   atorvastatin (LIPITOR) 20 MG tablet TAKE 1 TABLET BY MOUTH AT BEDTIME 8/22/22   Aaron Toledo MD   carvedilol (COREG) 6.25 MG tablet TAKE 1 TABLET BY MOUTH TWO TIMES A DAY WITH MEALS 8/22/22   Aaron Toledo MD   memantine (NAMENDA) 5 MG tablet Take 1 tablet by mouth daily  Patient taking differently: Take 5 mg by mouth at bedtime 6/6/22   Araon Toledo MD   donepezil (ARICEPT) 10 MG tablet TAKE 1 TABLET BY MOUTH nightly 5/18/22   Aaron Toledo MD   albuterol sulfate HFA (VENTOLIN HFA) 108 (90 Base) MCG/ACT inhaler Inhale 2 puffs into the lungs 4 times daily as needed for Wheezing 1/14/22   Aaron Toledo MD   FLUoxetine (PROZAC) 10 MG capsule TAKE 1 CAPSULE BY MOUTH ONE TIME A DAY  Patient taking differently: Take 10 mg by mouth at bedtime TAKE 1 CAPSULE BY MOUTH ONE TIME A DAY 12/13/21   Aaron Toledo MD   Cyanocobalamin (VITAMIN B 12 PO) Take by mouth daily    Historical Provider, MD   furosemide (LASIX) 40 MG tablet Take 0.5 tablets by mouth daily  Patient taking differently: Take 20 mg by mouth at bedtime 12/8/21   Harika Ledesma MD   isosorbide mononitrate (IMDUR) 30 MG extended release tablet Take 1 tablet by mouth daily Spacing out from other cardiac medications  Patient taking differently: Take 30 mg by mouth at bedtime Spacing out from other cardiac medications 11/4/21   Cristofer De La Vega MD   aspirin 81 MG chewable tablet Take 81 mg by mouth daily    Historical Provider, MD   ferrous sulfate 324 (65 Fe) MG EC tablet Take 1 tablet by mouth 2 times daily (with meals) 9/12/21   Shen Pearec MD   Respiratory Therapy Supplies (NEBULIZER/TUBING/MOUTHPIECE) KIT 1 kit by Does not apply route daily as needed (wheezing) 8/24/21   RAMAN Caldwell   ipratropium-albuterol (DUONEB) 0.5-2.5 (3) MG/3ML SOLN nebulizer solution Inhale 3 mLs into the lungs every 6 hours as needed for Shortness of Breath 8/24/21   RAMAN Caldwell   Multiple Vitamins-Minerals (THERAPEUTIC MULTIVITAMIN-MINERALS) tablet Take 1 tablet by mouth daily    Historical Provider, MD   loratadine (CLARITIN) 10 MG capsule Take 10 mg by mouth daily as needed     Historical Provider, MD   Handicap Placard MISC by Does not apply route Patient is unable to walk more than 250 feet before stopping to rest.    Not to exceed 5 years. DX: CHF 8/17/16   Con Billings MD         ROS:  A comprehensive review of systems was negative except for: \sob, knee pains    OBJECTIVE:      PHYSICAL:  Intake/Output:   Patient Vitals for the past 8 hrs:   BP Temp Temp src Pulse Resp SpO2 Weight   11/06/22 0805 116/63 97.8 °F (36.6 °C) Oral 78 18 94 % --   11/06/22 0537 (!) 102/57 98.3 °F (36.8 °C) Oral 73 18 93 % --   11/06/22 0536 -- -- -- -- -- -- 124 lb 1.9 oz (56.3 kg)     I/O last 3 completed shifts:   In: 12 [P.O.:960]  Out: 875 [Urine:875]  I/O this shift:  In: 240 [P.O.:240]  Out: -   VITALS:    /63   Pulse 78   Temp 97.8 °F (36.6 °C) (Oral)   Resp 18   Ht 5' (1.524 m)   Wt 124 lb 1.9 oz (56.3 kg)   SpO2 94%   BMI 24.24 kg/m²     General Appearance: alert and pleasant well-developed and well-nourished, in no acute distress  Skin: warm and dry, no rash or erythema  Head: normocephalic and atraumatic  Eyes: conjunctivae normal and sclera anicteric  ENT: hearing grossly normal bilaterally and sinuses non-tender  Neck: neck supple and non tender without mass, no thyromegaly or thyroid nodules, no cervical lymphadenopathy   Pulmonary/Chest: clear to auscultation bilaterally- no wheezes, rales or rhonchi, normal air movement, no respiratory distress  Cardiovascular: normal rate, normal S1 and S2,muffled aost murmur audible S2 no gallops and no carotid bruits  Abdomen: soft, non-tender, non-distended, normal bowel sounds, no masses or organomegaly  Extremities: no cyanosis, clubbing or edema  Musculoskeletal: no swollen joints and no tender joints,  Neurologic: coordination normal and speech normal, moves all 4 extremities    DATA:   Labs:  CBC:   Recent Labs     11/05/22 0530   WBC 10.8   HGB 11.8*        BMP:    Recent Labs     11/05/22 0530 11/06/22  0843    137   K 4.0 3.7    101   CO2 25 22   BUN 24* 26*   CREATININE 1.3* 1.6*   GLUCOSE 116* 135*     POC GLUCOSE:  No results for input(s): POCGLU in the last 72 hours. Ca/Mg/Phos:   Recent Labs     11/05/22 0530 11/06/22  0843   CALCIUM 9.7 8.6   MG  --  1.40*     Hepatic:   Recent Labs     11/05/22 0530   AST 15   ALT 6*   BILITOT 0.3   ALKPHOS 71     Troponin:   Recent Labs     11/05/22 0530   TROPONINI 0.05*     ProBNP:   Recent Labs     11/05/22 0530   PROBNP 22,039*     DIAGNOSTICS:  XR CHEST PORTABLE    Result Date: 11/5/2022  1. There is central vascular congestion and increased interstitial markings compared to the prior exam indicating congestive changes. The cardiac silhouette is not enlarged and unsure if cardiogenic or from volume overload. 2.  No sign of focal pneumonia or pneumothorax. ASSESSMENT AND PLAN    Principal Problem:    HFrEF (heart failure with reduced ejection fraction) ([address]  Plan: last echo done one year ago, will repeat echo to see if stable or worsening.   Continue current meds, follow labs I/O and [facility]  Active Problems:       HTN

## 2022-11-06 NOTE — PROGRESS NOTES
Patient alert and oriented x4 with periods of confusion/forgetfulness, daughter at bedside. Patient c/o SOB and pain in her right elbow. Patient denies n/v, diarrhea. Patient with a slight, occasional dry cough. Patient denies needs at this time. Bed in lowest and locked position with bed alarm in place. Non-slip socks on, call light in reach.

## 2022-11-06 NOTE — PLAN OF CARE
Problem: Discharge Planning  Goal: Discharge to home or other facility with appropriate resources  Outcome: Progressing  Flowsheets (Taken 11/5/2022 2105)  Discharge to home or other facility with appropriate resources:   Identify barriers to discharge with patient and caregiver   Arrange for needed discharge resources and transportation as appropriate   Identify discharge learning needs (meds, wound care, etc)   Refer to discharge planning if patient needs post-hospital services based on physician order or complex needs related to functional status, cognitive ability or social support system     Problem: ABCDS Injury Assessment  Goal: Absence of physical injury  Outcome: Progressing  Flowsheets (Taken 11/6/2022 0211)  Absence of Physical Injury: Implement safety measures based on patient assessment     Problem: Nutrition Deficit:  Goal: Optimize nutritional status  Outcome: Progressing     Problem: Safety - Adult  Goal: Free from fall injury  Outcome: Progressing

## 2022-11-06 NOTE — PLAN OF CARE
Problem: Discharge Planning  Goal: Discharge to home or other facility with appropriate resources  Outcome: Progressing     Problem: ABCDS Injury Assessment  Goal: Absence of physical injury  Outcome: Progressing     Problem: Nutrition Deficit:  Goal: Optimize nutritional status  Outcome: Progressing     Problem: Safety - Adult  Goal: Free from fall injury  Outcome: Progressing     Problem: Pain  Goal: Verbalizes/displays adequate comfort level or baseline comfort level  Outcome: Progressing

## 2022-11-07 VITALS
BODY MASS INDEX: 24.63 KG/M2 | DIASTOLIC BLOOD PRESSURE: 54 MMHG | SYSTOLIC BLOOD PRESSURE: 99 MMHG | TEMPERATURE: 97.4 F | WEIGHT: 125.44 LBS | HEART RATE: 70 BPM | OXYGEN SATURATION: 96 % | RESPIRATION RATE: 18 BRPM | HEIGHT: 60 IN

## 2022-11-07 LAB
ANION GAP SERPL CALCULATED.3IONS-SCNC: 15 MMOL/L (ref 3–16)
BUN BLDV-MCNC: 40 MG/DL (ref 7–20)
CALCIUM SERPL-MCNC: 8.7 MG/DL (ref 8.3–10.6)
CHLORIDE BLD-SCNC: 97 MMOL/L (ref 99–110)
CO2: 23 MMOL/L (ref 21–32)
CREAT SERPL-MCNC: 2 MG/DL (ref 0.6–1.2)
GFR SERPL CREATININE-BSD FRML MDRD: 24 ML/MIN/{1.73_M2}
GLUCOSE BLD-MCNC: 160 MG/DL (ref 70–99)
LV EF: 20 %
LVEF MODALITY: NORMAL
MAGNESIUM: 2.2 MG/DL (ref 1.8–2.4)
POTASSIUM SERPL-SCNC: 3.6 MMOL/L (ref 3.5–5.1)
PRO-BNP: 5830 PG/ML (ref 0–449)
SODIUM BLD-SCNC: 135 MMOL/L (ref 136–145)
TROPONIN: 0.04 NG/ML

## 2022-11-07 PROCEDURE — 97530 THERAPEUTIC ACTIVITIES: CPT

## 2022-11-07 PROCEDURE — 97166 OT EVAL MOD COMPLEX 45 MIN: CPT

## 2022-11-07 PROCEDURE — 83735 ASSAY OF MAGNESIUM: CPT

## 2022-11-07 PROCEDURE — 93306 TTE W/DOPPLER COMPLETE: CPT

## 2022-11-07 PROCEDURE — 6370000000 HC RX 637 (ALT 250 FOR IP): Performed by: STUDENT IN AN ORGANIZED HEALTH CARE EDUCATION/TRAINING PROGRAM

## 2022-11-07 PROCEDURE — 2580000003 HC RX 258: Performed by: STUDENT IN AN ORGANIZED HEALTH CARE EDUCATION/TRAINING PROGRAM

## 2022-11-07 PROCEDURE — 6360000002 HC RX W HCPCS: Performed by: STUDENT IN AN ORGANIZED HEALTH CARE EDUCATION/TRAINING PROGRAM

## 2022-11-07 PROCEDURE — 36415 COLL VENOUS BLD VENIPUNCTURE: CPT

## 2022-11-07 PROCEDURE — 80048 BASIC METABOLIC PNL TOTAL CA: CPT

## 2022-11-07 PROCEDURE — 83880 ASSAY OF NATRIURETIC PEPTIDE: CPT

## 2022-11-07 PROCEDURE — 84484 ASSAY OF TROPONIN QUANT: CPT

## 2022-11-07 PROCEDURE — 99232 SBSQ HOSP IP/OBS MODERATE 35: CPT | Performed by: STUDENT IN AN ORGANIZED HEALTH CARE EDUCATION/TRAINING PROGRAM

## 2022-11-07 RX ORDER — FUROSEMIDE 40 MG/1
20 TABLET ORAL DAILY
Qty: 30 TABLET | Refills: 11 | Status: SHIPPED | OUTPATIENT
Start: 2022-11-07

## 2022-11-07 RX ORDER — DICLOXACILLIN SODIUM 250 MG/1
250 CAPSULE ORAL 4 TIMES DAILY
Qty: 15 CAPSULE | Refills: 0 | Status: SHIPPED | OUTPATIENT
Start: 2022-11-07 | End: 2022-11-11

## 2022-11-07 RX ADMIN — DICLOXACILLIN SODIUM 250 MG: 250 CAPSULE ORAL at 14:50

## 2022-11-07 RX ADMIN — FUROSEMIDE 40 MG: 10 INJECTION, SOLUTION INTRAMUSCULAR; INTRAVENOUS at 09:42

## 2022-11-07 RX ADMIN — SODIUM CHLORIDE, PRESERVATIVE FREE 10 ML: 5 INJECTION INTRAVENOUS at 08:56

## 2022-11-07 RX ADMIN — DICLOXACILLIN SODIUM 250 MG: 250 CAPSULE ORAL at 05:25

## 2022-11-07 RX ADMIN — DICLOXACILLIN SODIUM 250 MG: 250 CAPSULE ORAL at 00:20

## 2022-11-07 RX ADMIN — CARVEDILOL 6.25 MG: 6.25 TABLET, FILM COATED ORAL at 09:42

## 2022-11-07 NOTE — PROGRESS NOTES
allopurinol  50 mg Oral Nightly    aspirin  81 mg Oral Nightly    amLODIPine  5 mg Oral Nightly    pantoprazole  40 mg Oral Nightly    memantine  5 mg Oral Nightly    furosemide  40 mg IntraVENous BID    dicloxacillin  250 mg Oral 4x Daily     Continuous Infusions:   sodium chloride Stopped (11/06/22 1948)     PRN Meds:albuterol sulfate HFA, sodium chloride flush, sodium chloride, ondansetron **OR** ondansetron, polyethylene glycol     CURRENT HOSPITAL PROBLEMS   Principal Problem:    HFrEF (heart failure with reduced ejection fraction) ([facility])  Active Problems:    Systolic heart failure, unspecified HF chronicity ([facility])    HTN (hypertension), benign    Coronary artery disease involving native coronary artery of native heart without angina pectoris    Stage 3b chronic kidney disease ([facility] [address]  Resolved Problems:    * No resolved hospital problems. *    Patient admitted with acute exacerbation of systolic heart failure. The patient's BNP is elevated to 22,000 from a recent baseline of 1800. She has demand ischemia but no chest pain. Continue her home regimen including amlodipine, lisinopril, Imdur and carvedilol. Treated with Lasix 40 mg twice per day IV while inpatient, resume home Lasix on discharge. Daily weights, low-sodium diet and 1500 mL fluid restriction. She is pending repeat echocardiogram.  For aortic stenosis     For dementia, continue donepezil and memantine. For GERD continue Protonix. CKD is lower than recent baseline, monitor renal function with diuresis     For hyperlipidemia and coronary artery disease, she is on statin. Continue aspirin. For olecranon bursitis of right arm, will start dicloxacillin. If she does not have improvement may refer to Ortho outpatient. Echo pending        For allergic rhinitis continue cetirizine.     Discharge home after echocardiogram.      Gearold Severe, MD 11/7/2022 7:21 AM

## 2022-11-07 NOTE — CONSULTS
HF RN consult noted (order set), chart reviewed. Pt has appropriate HF orders in place ie daily wts, I&Os, low Na diet and 1.5 liter FR. A dc order is noted. To be dc'd after echo completed. Prior echo in 2021 showed EF 35-40%, grade II diastolic dysfunction, aortic stenosis. Pt has dementia and is not a candidate for HF education. Staff has done bedside education with pt's dtr. Pt will return home with family, denies needs per SW/CM note. Pharmacy to do dc med rec & will reach out to Dr Argelia Lindo if needed. HF dc instructions are on AVS. Hospital follow up appt is in place for 11/14 at 2:40 pm with Dr Argelia Lindo (in place PTA).

## 2022-11-07 NOTE — CARE COORDINATION
CASE MANAGEMENT DISCHARGE SUMMARY: Discharge order noted. Patient will be discharging to home. Spoke to the daughter Katt Gabriel. Refused [facility] services. Patient has dementia.      DISCHARGE DATE: 11/7/22    DISCHARGED TO HOME     TRANSPORTATION: Daughter             TIME: Afternoon     PREFERRED PHARMACY: [address] [place] [address]       Kei PALACIOS, RN  Case Management  [phone]             Electronically signed by Kei Lynch RN on 11/7/2022 at 11:34 AM

## 2022-11-07 NOTE — PROGRESS NOTES
Occupational Therapy  Facility/Department: [facility] MED SURG  Occupational Therapy Initial Assessment    Name: Jasper Burrell  : 1935  MRN: [id number]  Date of Service: 2022    Discharge Recommendations:  Home with assist PRN     Jasper Burrell scored a 21/24 on the AM-PAC ADL Inpatient form. At this time, no further OT is recommended upon discharge due to pt near baseline. Recommend patient returns to prior setting with prior services. Patient Diagnosis(es): The primary encounter diagnosis was Elevated troponin. A diagnosis of Acute on chronic congestive heart failure, unspecified heart failure type [facility]) was also pertinent to this visit. Past Medical History:  has a past medical history of Chronic kidney disease (CKD), stage IV (severe) ([facility] [address] Coronary artery disease involving native coronary artery of native heart without angina pectoris, GERD (gastroesophageal reflux disease), Gout, chronic, Histoplasmosis, Hyperlipidemia, Hypertension, Irregular heart beat, Osteoarthritis, Osteopenia of hip, Pneumonia, Ulcer of jejunum, and Unspecified cerebral artery occlusion with cerebral infarction. Past Surgical History:  has a past surgical history that includes Hysterectomy; Cholecystectomy (Orthopedic surgery Left arm); Tubal ligation; bladder suspension; Upper gastrointestinal endoscopy (N/A, 9/10/2021); and Upper gastrointestinal endoscopy (9/10/2021). Treatment Diagnosis: Decreased: ADLs, fxl mobility      Assessment   Performance deficits / Impairments: Decreased functional mobility ; Decreased ADL status; Decreased high-level IADLs  Assessment: Pt is a 79 yo F admitted with elevated troponin, CHF exacerbation. PTA, pt lives in a house w/ her dtr where she is typically independent in self-care, fxl mobility, and homemaking. She is just slightly below baseline today, completing fxl transfers and mobility SBA using RW, setup LB dressing, and anticipate supervision/SBA for standing ADLs.  Will continue to see on acute in order to address the above deficits and maximize pt's fxl performance. Anticipate safe return home w/ assist PRN when medically stable. Treatment Diagnosis: Decreased: ADLs, fxl mobility  Prognosis: Fair  Decision Making: Medium Complexity  REQUIRES OT FOLLOW-UP: Yes  Activity Tolerance  Activity Tolerance: Patient Tolerated treatment well        Plan   Occupational Therapy Plan  Times Per Week: 3-5  Times Per Day: Once a day  Current Treatment Recommendations: Strengthening, ROM, Balance training, Functional mobility training, Endurance training, Self-Care / ADL, Safety education & training     Restrictions  Restrictions/Precautions  Restrictions/Precautions: Fall Risk    Subjective   General  Chart Reviewed: Yes  Patient assessed for rehabilitation services?: Yes  Additional Pertinent Hx: per H&P: \"Daisy Mayo is an 43-year-old lady with a past medical history significant for systolic heart failure, dementia, aortic stenosis, CKD who presented for sudden shortness of breath last evening. \"  Family / Caregiver Present: Yes (Dtr)  Referring Practitioner: Diaz  Diagnosis: Acute CHF  Subjective  Subjective: Pt met b/s for OT eval/tx. Pt in bed, agreeable to therapy. Pt denied pain     Social/Functional History  Social/Functional History  Lives With: Daughter  Type of Home: House  Home Access: Stairs to enter with rails  Entrance Stairs - Number of Steps: 4  Bathroom Shower/Tub: Tub/Shower unit  Bathroom Toilet: Standard  Home Equipment: Cade Sciara, rolling  Has the patient had two or more falls in the past year or any fall with injury in the past year?: No  ADL Assistance: Independent  Ambulation Assistance: Independent  Transfer Assistance: Independent  Additional Comments: Pt reports house has a lot of furniture so it is difficult to maneuver with walker       Objective   Safety Devices  Type of Devices: All fall risk precautions in place;Call light within reach; Left in chair;Chair alarm in place        AROM: Within functional limits  PROM: Within functional limits  Strength:  Within functional limits  Coordination: Within functional limits  ADL  Feeding: Independent  LE Dressing Skilled Clinical Factors: Pt donned footies sitting EOB  Additional Comments: Anticipate pt would require supervision for standing ADLs, setup for seated ADLs based on ROM, strength, endurance this session        Bed mobility  Supine to Sit: Modified independent  Sit to Supine: Unable to assess (in recliner at end of session)  Transfers  Sit to stand: Stand by assistance  Stand to sit: Stand by assistance  Transfer Comments: RW. She completed fxl mobility ~80 ft SBA using RW, no LOB noted  Vision  Vision: Within Functional Limits  Hearing  Hearing: Within functional limits  Cognition  Overall Cognitive Status: [facility]  Cognition Comment: Has h/o dementia, pleasant and able to follow commands for eval  Orientation  Overall Orientation Status: Within Functional Limits                  Education Given To: Patient  Education Provided: Role of Therapy;Transfer Training  Education Method: Verbal  Barriers to Learning: Cognition  Education Outcome: Verbalized understanding;Continued education needed        AM-PAC Score  AM-[facility] Inpatient Daily Activity Raw Score: 21 (11/07/22 0758)  AM-PAC Inpatient ADL T-Scale Score : 44.27 (11/07/22 0758)  ADL Inpatient CMS 0-100% Score: 32.79 (11/07/22 0758)  ADL Inpatient CMS G-Code Modifier : CJ (11/07/22 0758)      Goals  Short Term Goals  Time Frame for Short Term Goals: Prior to d/c  Short Term Goal 1: Pt will complete ADL transfers mod I  Short Term Goal 2: Pt will toilet mod I  Short Term Goal 3: Pt will bathe mod I  Short Term Goal 4: Pt will tolerate standing x5+ mins during ADL task mod I  Long Term Goals  Time Frame for Long Term Goals : LTG=STG  Patient Goals   Patient goals : to go home       Therapy Time   Individual Concurrent Group Co-treatment   Time In 0730         Time Out 9234 Minutes 25         Timed Code Treatment Minutes: [address], [address]

## 2022-11-07 NOTE — PLAN OF CARE
Problem: Discharge Planning  Goal: Discharge to home or other facility with appropriate resources  Outcome: Completed     Problem: ABCDS Injury Assessment  Goal: Absence of physical injury  Outcome: Completed     Problem: Nutrition Deficit:  Goal: Optimize nutritional status  Outcome: Completed     Problem: Safety - Adult  Goal: Free from fall injury  Outcome: Completed     Problem: Pain  Goal: Verbalizes/displays adequate comfort level or baseline comfort level  Outcome: Completed

## 2022-11-07 NOTE — PROGRESS NOTES
Physical Therapy  Werner Raul  Patient's chart was reviewed. Upon arrival this morning, patient reports feeling fatigue following OT session and would like to rest. Patient requested PT return in 2 hours. Upon arrival of second attempt, patient reports pain in B knees and continued fatigue. Patient states she is not interested in PT this date. Thank you.   Nelson Mondragon PT, DPT, [id number]

## 2022-11-07 NOTE — PROGRESS NOTES
Pharmacy Heart Failure Medication Reconciliation Note    Pt discharged from [facility] today. Chief Complaint   Patient presents with    Shortness of Breath     Pt states she was awakened suddenly by shortness of breath. Pt appears to have difficulty catching breath but is talking in full sentences. Pt asked if she has chest pain. Pt states yes and points to low sternum. \"Just a little. \"       Juan Luis Yadav has a diagnosis of systolic heart failure (last EF = 35-40% on 10-21-21).     Pertinent Labs:  BMP:   Lab Results   Component Value Date/Time     11/07/2022 09:14 AM    K 3.6 11/07/2022 09:14 AM    K 3.7 10/31/2021 05:13 AM    CL 97 11/07/2022 09:14 AM    CO2 23 11/07/2022 09:14 AM    BUN 40 11/07/2022 09:14 AM    CREATININE 2.0 11/07/2022 09:14 AM     BNP:   Lab Results   Component Value Date/Time    PROBNP 5,830 11/07/2022 09:14 AM    PROBNP 22,039 11/05/2022 05:30 AM    PROBNP 1,885 08/08/2022 10:00 PM       Patient taking an ACEI / ARB / Entresto for EF </= 40%:  Yes   Patient taking a BETA BLOCKER (Coreg, Toprol XL or bisoprolol) for EF </= 40%: Yes  Patient taking a LOOP DIURETIC: Yes  Patient taking a ALDOSTERONE RECEPTOR ANTAGONIST for EF </= 35%: No, CKD (SCr >2 in women; SCr > 2.5 in men)  Patient taking an SGLT2I for EF </= 40%: No, Chronic kidney disease     Patient has a diagnosis of Atrial Fibrillation: No.     Patient has a diagnosis of type 2 diabetes:  No.       Corrections to discharge medications include:      Discharge Medications:         Medication List        START taking these medications      dicloxacillin 250 MG capsule  Commonly known as: DYNAPEN  Take 1 capsule by mouth 4 times daily for 15 doses            CHANGE how you take these medications      allopurinol 100 MG tablet  Commonly known as: ZYLOPRIM  TAKE 1/2 TABLET BY MOUTH EVERY DAY  What changed:   how much to take  how to take this  when to take this     amLODIPine 5 MG tablet  Commonly known as: NORVASC  TAKE 1 TABLET BY MOUTH EVERY DAY  What changed: See the new instructions. FLUoxetine 10 MG capsule  Commonly known as: PROZAC  TAKE 1 CAPSULE BY MOUTH ONE TIME A DAY  What changed:   how much to take  how to take this  when to take this     furosemide 40 MG tablet  Commonly known as: LASIX  Take 0.5 tablets by mouth daily  What changed: when to take this     isosorbide mononitrate 30 MG extended release tablet  Commonly known as: IMDUR  Take 1 tablet by mouth daily Spacing out from other cardiac medications  What changed: when to take this     lisinopril 10 MG tablet  Commonly known as: PRINIVIL;ZESTRIL  What changed: Another medication with the same name was removed. Continue taking this medication, and follow the directions you see here. memantine 5 MG tablet  Commonly known as: NAMENDA  Take 1 tablet by mouth daily  What changed: when to take this     pantoprazole 40 MG tablet  Commonly known as: PROTONIX  TAKE 1 TABLET BY MOUTH EVERY DAY  What changed: See the new instructions. CONTINUE taking these medications      albuterol sulfate  (90 Base) MCG/ACT inhaler  Commonly known as: Ventolin HFA  Inhale 2 puffs into the lungs 4 times daily as needed for Wheezing     aspirin 81 MG chewable tablet     atorvastatin 20 MG tablet  Commonly known as: LIPITOR  TAKE 1 TABLET BY MOUTH AT BEDTIME     carvedilol 6.25 MG tablet  Commonly known as: COREG  TAKE 1 TABLET BY MOUTH TWO TIMES A DAY WITH MEALS     donepezil 10 MG tablet  Commonly known as: ARICEPT  TAKE 1 TABLET BY MOUTH nightly     ferrous sulfate 324 (65 Fe) MG EC tablet  Take 1 tablet by mouth 2 times daily (with meals)     Handicap Placard Misc  by Does not apply route Patient is unable to walk more than 250 feet before stopping to rest.    Not to exceed 5 years.   DX: CHF     ipratropium-albuterol 0.5-2.5 (3) MG/3ML Soln nebulizer solution  Commonly known as: DUONEB  Inhale 3 mLs into the lungs every 6 hours as needed for Shortness of Breath     loratadine 10 MG capsule  Commonly known as: CLARITIN     magnesium oxide 140 MG Caps  Commonly known as: MAG-OX  Take 14 mg by mouth daily     Nebulizer/Tubing/Mouthpiece Kit  1 kit by Does not apply route daily as needed (wheezing)     therapeutic multivitamin-minerals tablet     VITAMIN B 12 PO               Where to Get Your Medications        These medications were sent to [address] [place] [address] Rd, [address] [facility] [phone] [address] [place] [address], [address] [facility]      Phone: [phone]   dicloxacillin 250 MG capsule  furosemide 40 MG tablet         MARIA TERESA Harvey MIR [facility]  Heart Failure Discharge Medication Reconciliation Program  [phone]

## 2022-11-08 ENCOUNTER — CARE COORDINATION (OUTPATIENT)
Dept: CASE MANAGEMENT | Age: 87
End: 2022-11-08

## 2022-11-08 DIAGNOSIS — I50.20 HFREF (HEART FAILURE WITH REDUCED EJECTION FRACTION) (HCC): Primary | ICD-10-CM

## 2022-11-08 PROCEDURE — 1111F DSCHRG MED/CURRENT MED MERGE: CPT | Performed by: STUDENT IN AN ORGANIZED HEALTH CARE EDUCATION/TRAINING PROGRAM

## 2022-11-08 NOTE — CARE COORDINATION
[facility] Care Transitions Initial Follow Up Call    Call within 2 business days of discharge: Yes    LPN Care Coordinator contacted the family by telephone to perform post hospital discharge assessment. Verified name and  with family as identifiers. Provided introduction to self, and explanation of the LPN Care Coordinator role. Patient: Werner Carter Patient : 1935   MRN: [id number]  Reason for Admission: CHF  Discharge Date: 22 RARS: Readmission Risk Score: 15      Last Discharge [address]       Date Complaint Diagnosis Description Type Department Provider    22 Shortness of Breath Elevated troponin . .. ED to Hosp-Admission (Discharged) (ADMITTED) Tora Skiff, MD; Haroon Champion. .. Was this an external facility discharge? No Discharge Facility: NA    Challenges to be reviewed by the provider   Additional needs identified to be addressed with provider: No  none               Method of communication with provider: none. LPN CC spoke with daughter, Carlitos Kay, HIPAA verified. States patient is pretty good. Denies SOB, CP, cough, orthopnea, edema, N/V/D. Swelling to elbow is improving, seems to tolerate dicloxacillin so far. Has some diminished appetite. Supplementing with shakes. Weight \"checks out ok\" per daughter. States they don't weight her daily, but do check her weight periodically. Discussed monitoring other s/s fluid overload, but that weight was good indicator. Denies problems with bowels or bladder. Reviewed FR & Na limits. Full medication reconciliation and 1111f completed. PCP f/u . Denies needs. Jorge Peña  [facility]  Care Transitions  [phone]    LPN Care Coordinator reviewed discharge instructions, medical action plan, and red flags with family who verbalized understanding. The family was given an opportunity to ask questions and does not have any further questions or concerns at this time. Were discharge instructions available to patient? Yes. Reviewed appropriate site of care based on symptoms and resources available to patient including: PCP  Urgent care clinics  When to call 911. The family agrees to contact the PCP office for questions related to their healthcare. Advance Care Planning:   Does patient have an Advance Directive: reviewed and current. Medication reconciliation was performed with family, who verbalizes understanding of administration of home medications. Medications reviewed, 1111F entered: yes    Was patient discharged with a pulse oximeter? no    Non-face-to-face services provided:  Obtained and reviewed discharge summary and/or continuity of care documents  Education of patient/family/caregiver/guardian to support self-management-weight, FR, Na limits, f/u  Assessment and support for treatment adherence and medication management-full medication reconciliation    Offered patient enrollment in the Remote Patient Monitoring (RPM) program for in-home monitoring: Patient is not eligible for RPM program.    Care Transitions 24 Hour Call    Do you have all of your prescriptions and are they filled?: Yes  Post Acute Services: [address] you have support at home?: Partner/Spouse/SO, Child, Grandchild  Are you an active caregiver in your home?: No  Care Transitions Interventions         Follow Up  Future Appointments   Date Time Provider Helen Kaba   11/14/2022  2:40 PM Jc Marquez MD [address] [facility] Coordinator provided contact information. Plan for follow-up call in 5-7 days based on severity of symptoms and risk factors.   Plan for next call: symptom management-SOB, cough, edema, orthopnea  self management-weight, FR, Na  follow-up appointment-PCP 11/14  medication management-new or changed    [employer], LPN

## 2022-11-09 ENCOUNTER — TELEPHONE (OUTPATIENT)
Dept: OTHER | Facility: CLINIC | Age: 87
End: 2022-11-09

## 2022-11-09 ENCOUNTER — HOSPITAL ENCOUNTER (EMERGENCY)
Age: 87
Discharge: HOME | End: 2022-11-09
Payer: MEDICARE

## 2022-11-09 ENCOUNTER — APPOINTMENT (OUTPATIENT)
Dept: GENERAL RADIOLOGY | Age: 87
End: 2022-11-09
Payer: MEDICARE

## 2022-11-09 ENCOUNTER — APPOINTMENT (OUTPATIENT)
Dept: CT IMAGING | Age: 87
End: 2022-11-09
Payer: MEDICARE

## 2022-11-09 VITALS
HEART RATE: 74 BPM | DIASTOLIC BLOOD PRESSURE: 80 MMHG | RESPIRATION RATE: 18 BRPM | TEMPERATURE: 98 F | SYSTOLIC BLOOD PRESSURE: 132 MMHG | OXYGEN SATURATION: 96 %

## 2022-11-09 DIAGNOSIS — R79.89 ELEVATED BRAIN NATRIURETIC PEPTIDE (BNP) LEVEL: Primary | ICD-10-CM

## 2022-11-09 DIAGNOSIS — N39.0 URINARY TRACT INFECTION WITHOUT HEMATURIA, SITE UNSPECIFIED: ICD-10-CM

## 2022-11-09 DIAGNOSIS — R11.0 NAUSEA: ICD-10-CM

## 2022-11-09 LAB
ALBUMIN SERPL-MCNC: 3.5 G/DL (ref 3.4–5)
ALP BLD-CCNC: 67 U/L (ref 40–129)
ALT SERPL-CCNC: 7 U/L (ref 10–40)
ANION GAP SERPL CALCULATED.3IONS-SCNC: 15 MMOL/L (ref 3–16)
AST SERPL-CCNC: 18 U/L (ref 15–37)
BACTERIA: ABNORMAL /HPF
BASOPHILS ABSOLUTE: 0.1 K/UL (ref 0–0.2)
BASOPHILS RELATIVE PERCENT: 0.5 %
BILIRUB SERPL-MCNC: 0.3 MG/DL (ref 0–1)
BILIRUBIN DIRECT: <0.2 MG/DL (ref 0–0.3)
BILIRUBIN URINE: NEGATIVE
BILIRUBIN, INDIRECT: ABNORMAL MG/DL (ref 0–1)
BLOOD, URINE: NEGATIVE
BUN BLDV-MCNC: 46 MG/DL (ref 7–20)
CALCIUM SERPL-MCNC: 9.3 MG/DL (ref 8.3–10.6)
CHLORIDE BLD-SCNC: 100 MMOL/L (ref 99–110)
CLARITY: CLEAR
CO2: 23 MMOL/L (ref 21–32)
COLOR: YELLOW
CREAT SERPL-MCNC: 1.5 MG/DL (ref 0.6–1.2)
EOSINOPHILS ABSOLUTE: 0.2 K/UL (ref 0–0.6)
EOSINOPHILS RELATIVE PERCENT: 1.8 %
EPITHELIAL CELLS, UA: ABNORMAL /HPF (ref 0–5)
GFR SERPL CREATININE-BSD FRML MDRD: 33 ML/MIN/{1.73_M2}
GLUCOSE BLD-MCNC: 103 MG/DL (ref 70–99)
GLUCOSE URINE: NEGATIVE MG/DL
HCT VFR BLD CALC: 35.5 % (ref 36–48)
HEMATOLOGY PATH CONSULT: NO
HEMOGLOBIN: 12 G/DL (ref 12–16)
KETONES, URINE: NEGATIVE MG/DL
LEUKOCYTE ESTERASE, URINE: ABNORMAL
LIPASE: 60 U/L (ref 13–60)
LYMPHOCYTES ABSOLUTE: 2.9 K/UL (ref 1–5.1)
LYMPHOCYTES RELATIVE PERCENT: 25.7 %
MCH RBC QN AUTO: 30.3 PG (ref 26–34)
MCHC RBC AUTO-ENTMCNC: 33.8 G/DL (ref 31–36)
MCV RBC AUTO: 89.7 FL (ref 80–100)
MICROSCOPIC EXAMINATION: YES
MONOCYTES ABSOLUTE: 1.7 K/UL (ref 0–1.3)
MONOCYTES RELATIVE PERCENT: 15.6 %
NEUTROPHILS ABSOLUTE: 6.3 K/UL (ref 1.7–7.7)
NEUTROPHILS RELATIVE PERCENT: 56.4 %
NITRITE, URINE: NEGATIVE
PDW BLD-RTO: 14.6 % (ref 12.4–15.4)
PH UA: 6 (ref 5–8)
PLATELET # BLD: 359 K/UL (ref 135–450)
PMV BLD AUTO: 9.6 FL (ref 5–10.5)
POTASSIUM REFLEX MAGNESIUM: 4.4 MMOL/L (ref 3.5–5.1)
PRO-BNP: 9671 PG/ML (ref 0–449)
PROTEIN UA: NEGATIVE MG/DL
RAPID INFLUENZA  B AGN: NEGATIVE
RAPID INFLUENZA A AGN: NEGATIVE
RBC # BLD: 3.96 M/UL (ref 4–5.2)
RBC UA: ABNORMAL /HPF (ref 0–4)
SARS-COV-2, NAAT: NOT DETECTED
SODIUM BLD-SCNC: 138 MMOL/L (ref 136–145)
SPECIFIC GRAVITY UA: 1.01 (ref 1–1.03)
TOTAL PROTEIN: 7.3 G/DL (ref 6.4–8.2)
TROPONIN: 0.01 NG/ML
URINE REFLEX TO CULTURE: ABNORMAL
URINE TYPE: ABNORMAL
UROBILINOGEN, URINE: 0.2 E.U./DL
WBC # BLD: 11.1 K/UL (ref 4–11)
WBC UA: ABNORMAL /HPF (ref 0–5)

## 2022-11-09 PROCEDURE — 84484 ASSAY OF TROPONIN QUANT: CPT

## 2022-11-09 PROCEDURE — 93005 ELECTROCARDIOGRAM TRACING: CPT

## 2022-11-09 PROCEDURE — 83690 ASSAY OF LIPASE: CPT

## 2022-11-09 PROCEDURE — 87804 INFLUENZA ASSAY W/OPTIC: CPT

## 2022-11-09 PROCEDURE — 36415 COLL VENOUS BLD VENIPUNCTURE: CPT

## 2022-11-09 PROCEDURE — 80048 BASIC METABOLIC PNL TOTAL CA: CPT

## 2022-11-09 PROCEDURE — 99285 EMERGENCY DEPT VISIT HI MDM: CPT

## 2022-11-09 PROCEDURE — 85025 COMPLETE CBC W/AUTO DIFF WBC: CPT

## 2022-11-09 PROCEDURE — 87186 SC STD MICRODIL/AGAR DIL: CPT

## 2022-11-09 PROCEDURE — 96374 THER/PROPH/DIAG INJ IV PUSH: CPT

## 2022-11-09 PROCEDURE — 81001 URINALYSIS AUTO W/SCOPE: CPT

## 2022-11-09 PROCEDURE — 80076 HEPATIC FUNCTION PANEL: CPT

## 2022-11-09 PROCEDURE — 74176 CT ABD & PELVIS W/O CONTRAST: CPT

## 2022-11-09 PROCEDURE — 6360000002 HC RX W HCPCS: Performed by: NURSE PRACTITIONER

## 2022-11-09 PROCEDURE — 6370000000 HC RX 637 (ALT 250 FOR IP): Performed by: NURSE PRACTITIONER

## 2022-11-09 PROCEDURE — 71046 X-RAY EXAM CHEST 2 VIEWS: CPT

## 2022-11-09 PROCEDURE — 87635 SARS-COV-2 COVID-19 AMP PRB: CPT

## 2022-11-09 PROCEDURE — 87077 CULTURE AEROBIC IDENTIFY: CPT

## 2022-11-09 PROCEDURE — 83880 ASSAY OF NATRIURETIC PEPTIDE: CPT

## 2022-11-09 PROCEDURE — 87086 URINE CULTURE/COLONY COUNT: CPT

## 2022-11-09 RX ORDER — FUROSEMIDE 10 MG/ML
40 INJECTION INTRAMUSCULAR; INTRAVENOUS ONCE
Status: COMPLETED | OUTPATIENT
Start: 2022-11-09 | End: 2022-11-09

## 2022-11-09 RX ORDER — ONDANSETRON 4 MG/1
4-8 TABLET, ORALLY DISINTEGRATING ORAL EVERY 12 HOURS PRN
Qty: 12 TABLET | Refills: 0 | Status: SHIPPED | OUTPATIENT
Start: 2022-11-09

## 2022-11-09 RX ORDER — ONDANSETRON 4 MG/1
4 TABLET, ORALLY DISINTEGRATING ORAL ONCE
Status: COMPLETED | OUTPATIENT
Start: 2022-11-09 | End: 2022-11-09

## 2022-11-09 RX ORDER — FAMOTIDINE 20 MG/1
20 TABLET, FILM COATED ORAL 2 TIMES DAILY
Qty: 60 TABLET | Refills: 0 | Status: SHIPPED | OUTPATIENT
Start: 2022-11-09

## 2022-11-09 RX ORDER — LEVOFLOXACIN 250 MG/1
250 TABLET ORAL DAILY
Qty: 5 TABLET | Refills: 0 | Status: SHIPPED | OUTPATIENT
Start: 2022-11-09 | End: 2022-11-14

## 2022-11-09 RX ORDER — LEVOFLOXACIN 250 MG/1
500 TABLET ORAL ONCE
Status: COMPLETED | OUTPATIENT
Start: 2022-11-09 | End: 2022-11-09

## 2022-11-09 RX ADMIN — FUROSEMIDE 40 MG: 10 INJECTION, SOLUTION INTRAMUSCULAR; INTRAVENOUS at 17:59

## 2022-11-09 RX ADMIN — LEVOFLOXACIN 500 MG: 250 TABLET, FILM COATED ORAL at 17:59

## 2022-11-09 RX ADMIN — ONDANSETRON 4 MG: 4 TABLET, ORALLY DISINTEGRATING ORAL at 17:59

## 2022-11-09 ASSESSMENT — ENCOUNTER SYMPTOMS
BACK PAIN: 0
ABDOMINAL PAIN: 1
DIARRHEA: 0
VOMITING: 0
SHORTNESS OF BREATH: 1
SORE THROAT: 0
NAUSEA: 1
WHEEZING: 0
EYE PAIN: 0
CHEST TIGHTNESS: 0
COUGH: 0

## 2022-11-09 ASSESSMENT — LIFESTYLE VARIABLES
HOW MANY STANDARD DRINKS CONTAINING ALCOHOL DO YOU HAVE ON A TYPICAL DAY: PATIENT DOES NOT DRINK
HOW OFTEN DO YOU HAVE A DRINK CONTAINING ALCOHOL: NEVER

## 2022-11-09 ASSESSMENT — PAIN - FUNCTIONAL ASSESSMENT: PAIN_FUNCTIONAL_ASSESSMENT: NONE - DENIES PAIN

## 2022-11-09 NOTE — TELEPHONE ENCOUNTER
Writer contacted FERNANDO Driscoll to inform of 30 day readmission risk. Writer's attempt to contact ED provider was unsuccessful.     Call Back: If you need to call back to inform of disposition you can contact me at [phone]

## 2022-11-09 NOTE — DISCHARGE INSTRUCTIONS
Please take 40 mg of Lasix daily until your follow-up appointment at the heart failure clinic and/or follow-up with Dr. Miguelito Jeffery.

## 2022-11-09 NOTE — ED PROVIDER NOTES
[address] ENCOUNTER        Pt Name: Ivana Collins  MRN: [id number]  [place] 1935  Date of evaluation: 11/9/2022  Provider: FERNANDO Monzon CNP  PCP: Gearold Severe, MD  Note Started: 3:26 PM EST 11/9/2022        MARY. I have evaluated this patient. My supervising physician was available for consultation. Dr. Gray Perkins was attending physician in the department, he was familiar with the patient. I did consult him over the general plan of care    CHIEF COMPLAINT       Chief Complaint   Patient presents with    Shortness of Breath    Nausea     Family states this but she denies       HISTORY OF PRESENT ILLNESS   (Location, Timing/Onset, Context/Setting, Quality, Duration, Modifying Factors, Severity, Associated Signs and Symptoms)  Note limiting factors. Chief Complaint: Patient has no complaints, family brought in for intermittent shortness of breath and intermittent reported nausea    Ivana Collins is a 80 y.o. female who presents to the emergency department . granddaughter Joel Cabello brought the patient in for intermittent shortness of breath and intermittent nausea. No vomiting or diarrhea. No constipation, last bowel movement was this morning. Patient denies any pain, some mild irritation in her lower abdomen according to the patient. No chest pain. Patient denies any shortness of breath. She denies any nausea. No fevers or chills. Patient was subsequently just discharged from the hospital on the inpatient side 2 days ago. She was admitted for heart failure exacerbation. Granddaughter has no idea when her next appointment is with the heart failure clinic or cardiology. She states that she \"I do not know Dr. Neha Gilman kept her appointment on the 14th. \"  There was not a reported appointment made for the 14th prior to her admission.   Patient has no complaints on arrival.  She is very hard of hearing, and has a history of dementia, granddaughter Hedy Napier is primary caretaker at home. Brought her to the ED for further evaluation and treatment    Nursing Notes were all reviewed and agreed with or any disagreements were addressed in the HPI. REVIEW OF SYSTEMS    (2-9 systems for level 4, 10 or more for level 5)     Review of Systems   Constitutional:  Negative for chills and fever. Patient denies any symptoms on arrival.  Granddaughter states intermittent nausea and shortness of breath   HENT:  Negative for congestion and sore throat. Eyes:  Negative for pain and visual disturbance. Respiratory:  Positive for shortness of breath. Negative for cough, chest tightness and wheezing. Cardiovascular:  Negative for chest pain and leg swelling. Gastrointestinal:  Positive for abdominal pain and nausea. Negative for diarrhea and vomiting. Genitourinary:  Negative for dysuria and hematuria. Musculoskeletal:  Negative for back pain, myalgias, neck pain and neck stiffness. Skin:  Negative for rash and wound. Neurological:  Negative for dizziness and light-headedness. All other systems reviewed and are negative. Positives and Pertinent negatives as per HPI. Except as noted above in the ROS, all other systems were reviewed and negative.        PAST MEDICAL HISTORY     Past Medical History:   Diagnosis Date    Chronic kidney disease (CKD), stage IV (severe) (HCC)     Coronary artery disease involving native coronary artery of native heart without angina pectoris 1/5/2016    GERD (gastroesophageal reflux disease) acid reflux    Gout, chronic     Histoplasmosis     Hyperlipidemia     Hypertension     Irregular heart beat     Osteoarthritis 2/28/2019    Knee, leg     Osteopenia of hip 04/26/2019    Pneumonia     Ulcer of jejunum     Unspecified cerebral artery occlusion with cerebral infarction 2014 , ~ 2012         SURGICAL HISTORY     Past Surgical History:   Procedure Laterality Date    BLADDER SUSPENSION      CHOLECYSTECTOMY Orthopedic surgery Left arm    HYSTERECTOMY (CERVIX STATUS UNKNOWN)      TUBAL LIGATION      UPPER GASTROINTESTINAL ENDOSCOPY N/A 9/10/2021    EGD CONTROL HEMORRHAGE performed by Desirae Mccarthy DO at [facility] 86  9/10/2021    EGD SUBMUCOSAL/ EPINEPHRINE INJECTION performed by Desirae Mccarthy DO at [facility]       Previous Medications    ALBUTEROL SULFATE HFA (VENTOLIN HFA) 108 (90 BASE) MCG/ACT INHALER    Inhale 2 puffs into the lungs 4 times daily as needed for Wheezing    ALLOPURINOL (ZYLOPRIM) 100 MG TABLET    TAKE 1/2 TABLET BY MOUTH EVERY DAY    AMLODIPINE (NORVASC) 5 MG TABLET    TAKE 1 TABLET BY MOUTH EVERY DAY    ASPIRIN 81 MG CHEWABLE TABLET    Take 81 mg by mouth daily    ATORVASTATIN (LIPITOR) 20 MG TABLET    TAKE 1 TABLET BY MOUTH AT BEDTIME    CARVEDILOL (COREG) 6.25 MG TABLET    TAKE 1 TABLET BY MOUTH TWO TIMES A DAY WITH MEALS    CYANOCOBALAMIN (VITAMIN B 12 PO)    Take by mouth daily    DICLOXACILLIN (DYNAPEN) 250 MG CAPSULE    Take 1 capsule by mouth 4 times daily for 15 doses    DONEPEZIL (ARICEPT) 10 MG TABLET    TAKE 1 TABLET BY MOUTH nightly    FERROUS SULFATE 324 (65 FE) MG EC TABLET    Take 1 tablet by mouth 2 times daily (with meals)    FLUOXETINE (PROZAC) 10 MG CAPSULE    TAKE 1 CAPSULE BY MOUTH ONE TIME A DAY    FUROSEMIDE (LASIX) 40 MG TABLET    Take 0.5 tablets by mouth daily    HANDICAP PLACARD MISC    by Does not apply route Patient is unable to walk more than 250 feet before stopping to rest.    Not to exceed 5 years.   DX: CHF    IPRATROPIUM-ALBUTEROL (DUONEB) 0.5-2.5 (3) MG/3ML SOLN NEBULIZER SOLUTION    Inhale 3 mLs into the lungs every 6 hours as needed for Shortness of Breath    ISOSORBIDE MONONITRATE (IMDUR) 30 MG EXTENDED RELEASE TABLET    Take 1 tablet by mouth daily Spacing out from other cardiac medications    LISINOPRIL (PRINIVIL;ZESTRIL) 10 MG TABLET    Take 10 mg by mouth at bedtime LORATADINE (CLARITIN) 10 MG CAPSULE    Take 10 mg by mouth daily as needed     MAGNESIUM OXIDE (MAG-OX) 140 MG CAPS    Take 14 mg by mouth daily    MEMANTINE (NAMENDA) 5 MG TABLET    Take 1 tablet by mouth daily    MULTIPLE VITAMINS-MINERALS (THERAPEUTIC MULTIVITAMIN-MINERALS) TABLET    Take 1 tablet by mouth daily    PANTOPRAZOLE (PROTONIX) 40 MG TABLET    TAKE 1 TABLET BY MOUTH EVERY DAY    RESPIRATORY THERAPY SUPPLIES (NEBULIZER/TUBING/MOUTHPIECE) KIT    1 kit by Does not apply route daily as needed (wheezing)         ALLERGIES     Codeine, Ibuprofen, Morphine and related, and Oxycodone-acetaminophen    FAMILYHISTORY       Family History   Problem Relation Age of Onset    High Blood Pressure Mother     Heart Disease Mother     Diabetes Mother     High Blood Pressure Father     Heart Disease Father     Cancer Father         Brain    Cancer Sister         lymphoma    Cancer Brother         colon    Cancer Maternal Uncle           SOCIAL HISTORY       Social History     Tobacco Use    Smoking status: Never     Passive exposure: Yes    Smokeless tobacco: Never   Vaping Use    Vaping Use: Never used   Substance Use Topics    Alcohol use: No     Comment: 1 yearly    Drug use: No       SCREENINGS    [place] Coma Scale  Eye Opening: Spontaneous  Best Verbal Response: Oriented  Best Motor Response: Obeys commands  [place] Coma Scale Score: 15        PHYSICAL EXAM    (up to 7 for level 4, 8 or more for level 5)     ED Triage Vitals   BP Temp Temp src Pulse Resp SpO2 Height Weight   -- -- -- -- -- -- -- --       Physical Exam  Vitals and nursing note reviewed. Constitutional:       General: She is not in acute distress. Appearance: Normal appearance. She is not ill-appearing, toxic-appearing or diaphoretic. HENT:      Head: Normocephalic and atraumatic. No raccoon eyes, Donis's sign, right periorbital erythema or left periorbital erythema.       Right Ear: Hearing and external ear normal.      Left Ear: Hearing and external ear normal.      Ears:      Comments: Patient is extremely hard of hearing, this is her normal.  She states that her dogs ate her hearing aids. Nose: Nose normal. No laceration, nasal tenderness, mucosal edema, congestion or rhinorrhea. Right Nostril: No epistaxis. Left Nostril: No epistaxis. Mouth/Throat:      Lips: Pink. No lesions. Mouth: Mucous membranes are moist.      Tongue: No lesions. Tongue does not deviate from midline. Pharynx: Oropharynx is clear. Uvula midline. No pharyngeal swelling, oropharyngeal exudate, posterior oropharyngeal erythema or uvula swelling. Tonsils: No tonsillar exudate or tonsillar abscesses. Eyes:      General: Lids are normal.         Right eye: No discharge. Left eye: No discharge. Extraocular Movements: Extraocular movements intact. Pupils: Pupils are equal, round, and reactive to light. Neck:      Trachea: Phonation normal. No abnormal tracheal secretions or tracheal deviation. Comments: No meningismus   Cardiovascular:      Rate and Rhythm: Normal rate and regular rhythm. Pulses: Normal pulses. Heart sounds: Normal heart sounds. No murmur heard. No friction rub. No gallop. Comments: No notable bilateral lower extremity edema  Pulmonary:      Effort: Pulmonary effort is normal. No respiratory distress. Breath sounds: Normal breath sounds. No stridor. No wheezing, rhonchi or rales. Chest:      Chest wall: No tenderness. Abdominal:      General: Bowel sounds are normal. There is no distension. Palpations: Abdomen is soft. There is no mass. Tenderness: There is no abdominal tenderness. There is no right CVA tenderness, left CVA tenderness, guarding or rebound. Hernia: No hernia is present. Musculoskeletal:         General: Normal range of motion. Cervical back: Full passive range of motion without pain, normal range of motion and neck supple. No rigidity.  No spinous process tenderness or muscular tenderness. Right lower leg: No edema. Left lower leg: No edema. Lymphadenopathy:      Cervical: No cervical adenopathy. Skin:     General: Skin is warm and dry. Capillary Refill: Capillary refill takes less than 2 seconds. Neurological:      General: No focal deficit present. Mental Status: She is alert and oriented to person, place, and time. Mental status is at baseline. GCS: GCS eye subscore is 4. GCS verbal subscore is 5. GCS motor subscore is 6. Cranial Nerves: No cranial nerve deficit. Sensory: Sensation is intact. No sensory deficit. Motor: Motor function is intact. No weakness. Gait: Gait is intact.    Psychiatric:         Mood and Affect: Mood normal.         Behavior: Behavior normal.       DIAGNOSTIC RESULTS   LABS:    Labs Reviewed   CBC WITH AUTO DIFFERENTIAL - Abnormal; Notable for the following components:       Result Value    WBC 11.1 (*)     RBC 3.96 (*)     Hematocrit 35.5 (*)     Monocytes Absolute 1.7 (*)     All other components within normal limits   BASIC METABOLIC PANEL W/ REFLEX TO MG FOR LOW K - Abnormal; Notable for the following components:    Glucose 103 (*)     BUN 46 (*)     Creatinine 1.5 (*)     Est, Glom Filt Rate 33 (*)     All other components within normal limits   HEPATIC FUNCTION PANEL - Abnormal; Notable for the following components:    ALT 7 (*)     All other components within normal limits   URINALYSIS WITH REFLEX TO CULTURE - Abnormal; Notable for the following components:    Leukocyte Esterase, Urine TRACE (*)     All other components within normal limits   BRAIN NATRIURETIC PEPTIDE - Abnormal; Notable for the following components:    Pro-BNP 9,671 (*)     All other components within normal limits   MICROSCOPIC URINALYSIS - Abnormal; Notable for the following components:    Bacteria, UA 2+ (*)     All other components within normal limits   RAPID INFLUENZA A/B ANTIGENS   COVID-19, RAPID   CULTURE, URINE   LIPASE   TROPONIN       When ordered only abnormal lab results are displayed. All other labs were within normal range or not returned as of this dictation. EKG: When ordered, EKG's are interpreted by the Emergency Department Physician in the absence of a cardiologist.  Please see their note for interpretation of EKG. RADIOLOGY:   Non-plain film images such as CT, Ultrasound and MRI are read by the radiologist. Plain radiographic images are visualized and preliminarily interpreted by the ED Provider with the below findings:        Interpretation per the Radiologist below, if available at the time of this note:    CT ABDOMEN PELVIS WO CONTRAST Additional Contrast? None   Final Result   1. Bladder mucosal thickening appears to be chronic with trabecular changes   suggesting a history of bladder outlet obstruction. Superimposed cystitis   cannot be excluded. 2. Small right pleural effusion with interstitial changes partially   visualized in the right middle lobe. Correlate with any pulmonary symptoms. XR CHEST (2 VW)   Final Result   Persistent radiographic findings suggestive of mild pulmonary edema. XR CHEST PORTABLE    Result Date: 11/5/2022  EXAMINATION: ONE XRAY VIEW OF THE CHEST 11/5/2022 5:27 am COMPARISON: 08/08/2022 HISTORY: ORDERING SYSTEM PROVIDED HISTORY: SOB TECHNOLOGIST PROVIDED HISTORY: Reason for exam:->SOB Reason for Exam: SOB FINDINGS: External leads over the chest but no lines or tubes in the chest.  Clips in the upper abdomen suggest prior cholecystectomy. Some increased interstitial and vascular markings in both lungs centered in the perihilar regions. The diaphragm and costophrenic angles are acceptable without a significant pleural effusion. There is no sign of pneumothorax. Cardiac silhouette is not enlarged but does have the central vascular congestion. No definite fractures are identified. There is no soft tissue emphysema.  Hardware in the left humerus is not included on the current exam.     1.  There is central vascular congestion and increased interstitial markings compared to the prior exam indicating congestive changes. The cardiac silhouette is not enlarged and unsure if cardiogenic or from volume overload. 2.  No sign of focal pneumonia or pneumothorax. PROCEDURES   Unless otherwise noted below, none     Procedures    CRITICAL CARE TIME   CRITICAL CARE NOTE:    Aakash London CNP am the primary clinician of record. There was a high probability of clinically significant life-threatening deterioration of the patient's condition requiring my urgent intervention. Total critical care time was at least 21 minutes. Of nonconcurrent critical care time. This includes vital sign monitoring, pulse oximetry monitoring, telemetry monitoring, clinical response to the IV medications, reviewing the nursing notes, consultation time, dictation/documentation time, and interpretation of the labwork. This excludes any separately billable procedures performed. The critical care time above also includes time spent obtaining history from electronic chart and family at bedside, as the patient was unable to provide the history AND the history obtained was directly relevant to the care of the patient.        CONSULTS:  None      EMERGENCY DEPARTMENT COURSE and DIFFERENTIAL DIAGNOSIS/MDM:   Vitals:    Vitals:    11/09/22 1525 11/09/22 1715 11/09/22 1745   BP: 126/80 135/69 132/80   Pulse: 76 72 74   Resp: 16 23 18   Temp: 98 °F (36.7 °C)     TempSrc: Oral     SpO2: 98% 95% 96%       Patient was given the following medications:  Medications   ondansetron (ZOFRAN-ODT) disintegrating tablet 4 mg (4 mg Oral Given 11/9/22 1759)   levoFLOXacin (LEVAQUIN) tablet 500 mg (500 mg Oral Given 11/9/22 1759)   furosemide (LASIX) injection 40 mg (40 mg IntraVENous Given 11/9/22 1759)         Is this patient to be included in the SEP-1 Core Measure due to severe sepsis or septic shock? No   Exclusion criteria - the patient is NOT to be included for SEP-1 Core Measure due to: Infection is not suspected    DM: See HPI and above for full presentation physical exam.  Differential diagnoses included but not limited to CHF exacerbation, COPD, pneumonia, viral illness such as influenza or COVID-19, GERD, PUD, intra-abdominal emergency, UTI, dehydration, SULY, other    Rapid flu and COVID are both negative. Her leukocytosis is just slight, 11.8. She was 10.8 on discharge. She has no thrombocytopenia. No overt left shift. Blood work otherwise shows no significant electrolyte derangements. No SULY, her creatinine is actually improved from when she was discharged on 11/7. It was 2 at that time, is 1.5 currently. Her LFTs and lipase are unremarkable. Troponin is significantly improved at 0.01, her EKG shows no significant changes, still has a sinus rhythm. Ventricular rate of 73 with a QTC of 442. BNP is elevated above when she was discharged home, but is dramatically improved from when her admission numbers were . on 11/5 she was 2203 9, at discharge on 11/7 she was 5830. Today on 11/9 she is 9671. Her urine does show some leukocytes and 2+ bacteria, she is having some discomfort in the suprapubic region, likely secondary to a cystitis. We will start her on some Levaquin. We will send her urine culture and sensitivity. Patient does have a upward trending BNP, she was ambulated with a pulse ox, did not drop below 90%. She tolerated this well. I do not believe that another admission is warranted. She has an appointment for follow-up in 5 days on 11/15 with her PCP. I called the care coordination center with [address] access, they are moving her to the top of the list to be notified and called for an appointment in the heart failure clinic on an outpatient basis.   In the interim I did explain that if think she would benefit from an increased dose of Lasix to prevent any further admission for CHF exacerbation, her EF is only 20%. She is only on 20 mg of p.o. Lasix daily, although she does have a CKD, we would do want to protect the kidneys I do believe that it is best for the patient to have an increased Lasix dose given that she is having some increase in her trend of her BNP upwards. Thankfully she is not having any hypoxia or need for acute admission to the hospital.  I explained this all to her grandson, and Valentina Flores and her granddaughter at the bedside. Chen Downs verbalized understanding of follow-up with heart failure clinic, cardiology and PCP. She verbalized understanding to return immediately for any new or worsening symptoms. They do have a home pulse ox to monitor her home oxygen saturations. She was given strict return parameters, they verbalized understanding of all the above without any further questions or concerns. Patient remained afebrile and hemodynamically stable throughout her entire ED course and will be discharged home in stable condition    I estimate there is LOW risk for ACUTE CORONARY SYNDROME, CHRONIC OBSTRUCTIVE PULMONARY DISEASE, ACUTE CONGESTIVE HEART FAILURE, PERICARDIAL TAMPONADE, PNEUMONIA, PNEUMOTHORAX, SEPSIS, and THORACIC DISSECTION,  thus I consider the discharge disposition reasonable. Jasper Burrell and I have discussed the diagnosis and risks, and we agree with discharging home to follow-up with their primary doctor. We also discussed returning to the Emergency Department immediately if new or worsening symptoms occur. We have discussed the symptoms which are most concerning (e.g., bloody sputum, fever, worsening pain or shortness of breath, vomiting) that necessitate immediate return. FINAL IMPRESSION      1. Elevated brain natriuretic peptide (BNP) level    2. Nausea    3.  Urinary tract infection without hematuria, site unspecified          DISPOSITION/PLAN   DISPOSITION Decision To Discharge 11/09/2022 05:41:00 PM      PATIENT REFERRED TO:  Ruslan Álvarez MD  [address] [facility] [address] [place] [address]  [phone]    Call in 1 day      heart failure clinic  I have contacted them, they will be contacting you tomorrow on 11/10/2022 to make a follow-up appointment in the heart failure clinic    [address] [facility] [facility], [address] [facility]  [phone]    Go in 5 days      [facility]  [place] [id number] [phone]  Go to   As needed, If symptoms worsen    DISCHARGE MEDICATIONS:  New Prescriptions    FAMOTIDINE (PEPCID) 20 MG TABLET    Take 1 tablet by mouth 2 times daily    LEVOFLOXACIN (LEVAQUIN) 250 MG TABLET    Take 1 tablet by mouth daily for 5 days    ONDANSETRON (ZOFRAN ODT) 4 MG DISINTEGRATING TABLET    Take 1-2 tablets by mouth every 12 hours as needed for Nausea May Sub regular tablet (non-ODT) if insurance does not cover ODT.        DISCONTINUED MEDICATIONS:  Discontinued Medications    No medications on file              (Please note that portions of this note were completed with a voice recognition program.  Efforts were made to edit the dictations but occasionally words are mis-transcribed.)    FERNANDO Nichols CNP (electronically signed)            FERNANDO Nichols CNP  11/09/22 2374

## 2022-11-09 NOTE — ED NOTES
Resting comfortably. Right elbow pain at 4. Minimal right elbow redness/swelling. No known injury. Palpable distal pulses right arm. Mildly painful ROM. Natural senstion.  Skin color/warmth natural     Annia Gutierrez RN  11/09/22 1200

## 2022-11-09 NOTE — ED NOTES
Dc'd with family  She is aware she will urinate a lot tonight To follow up [facility] CHF clinic  To return any worsening or changes  Family states she is acting her normal self  Able to walk using cane     Yudi Bolton RN  11/09/22 8869

## 2022-11-09 NOTE — TELEPHONE ENCOUNTER
Colby Shone APRN calling regarding disposition of patient    Patient to be discharged, and to follow up with heart failure clinic, expedited if possible

## 2022-11-09 NOTE — ED NOTES
Discharge instructions with pt. Right elbow pain at 3-4. Explained rx's. Encouraged follow up with PCP and return to ED as needed.      Sugar Rogers RN  11/09/22 0163

## 2022-11-09 NOTE — ED NOTES
Up to bathroom jose well  Clear yellow urine  Hr 85 pulse ox 90-94  When walking and going to the bathroom     Tello Chino RN  11/09/22 6134

## 2022-11-10 ENCOUNTER — CARE COORDINATION (OUTPATIENT)
Dept: CASE MANAGEMENT | Age: 87
End: 2022-11-10

## 2022-11-10 ENCOUNTER — TELEPHONE (OUTPATIENT)
Dept: OTHER | Facility: CLINIC | Age: 87
End: 2022-11-10

## 2022-11-10 LAB
EKG ATRIAL RATE: 73 BPM
EKG DIAGNOSIS: NORMAL
EKG P AXIS: 101 DEGREES
EKG P-R INTERVAL: 222 MS
EKG Q-T INTERVAL: 402 MS
EKG QRS DURATION: 132 MS
EKG QTC CALCULATION (BAZETT): 442 MS
EKG R AXIS: -49 DEGREES
EKG T AXIS: 113 DEGREES
EKG VENTRICULAR RATE: 73 BPM

## 2022-11-10 PROCEDURE — 93010 ELECTROCARDIOGRAM REPORT: CPT | Performed by: INTERNAL MEDICINE

## 2022-11-10 NOTE — TELEPHONE ENCOUNTER
RN access attempted to contact patient regarding need for ED follow-up appointment. Attempt was successful. Patient able to contact PCP to schedule. No further help needed at this time. Spoke with patient's caregiver, daughter. She stated she will call patient's cardiologist to make a f/u appt.

## 2022-11-10 NOTE — CARE COORDINATION
[facility] Care Transitions Follow Up Call    Care Transition Nurse contacted the patient by telephone to follow up after admission on 2022 & ED visit 2022. Verified name and  with family as identifiers. Patient: Chad Petit  Patient : 1935   MRN: [id number]  Reason for Admission: elevated troponin with hospital stay/SOB & nausea with ED visit  Discharge Date: 22 RARS: Readmission Risk Score: 15      Needs to be reviewed by the provider   Additional needs identified to be addressed with provider: Yes  Potential for palliative care             Method of communication with provider: staff message. Spoke with Leonides Kimbrough - daughter (HIPPA verified). She states her mother is doing \"okay\". Leonides Tristanlianna confirms her mother having an appt with the PCP on 2022. She states she will provide her mother's transportation. She states she will reach out to the cardiac physician but states that they have told them there is nothing more they can do for her mother. Encouraged Karissa to discuss the possibility of palliative care with the PCP next week. Nicola Velasquez states her mother had bursitis in the elbow - but that is much better. She states they have a new scale but she did not weigh her mother today, but they will begin doing so. Leonides Tristanlianna states her mother was slightly SOB this morning but is better now. She states they have a pulse oximeter at home and her mother's most recent O2 sat = 98%. She states they just \"panicked\" when her mother was c/o not being able to breathe. Leonides Kimbrough denies her mother having a cough, any edema, or nausea. She states her mother follows a low sodium diet and they monitor/restrict her fluids daily. Leonides Kimbrough denies any needs for her mother at this time.       Follow Up  Future Appointments   Date Time Provider Helen Kaba   2022  2:40 PM Yanni Cole MD [facility] [place]       Advance Care Planning   Healthcare Decision Maker:    Primary Decision Maker: Yair Lucero Child - [phone]    Secondary Decision Maker: Jyoti Milligan - [phone]    Updated Healthcare decision makers - removed Brooks Cassidy as supplemental decision maker - he is . Gerhardt Rings states her mother does have a Living Will and Healthcare POA. Care Transitions Subsequent and Final Call    Subsequent and Final Calls  Do you have any ongoing symptoms?: No  Have your medications changed?: No  Do you have any questions related to your medications?: No  Do you currently have any active services?: No  Are you currently active with any services?: Home Health  Do you have any needs or concerns that I can assist you with?: No  Identified Barriers: Stress  Care Transitions Interventions  Other Interventions:             Care Transition Nurse provided contact information for future needs.    Plan for next call:  SOB - nausea  -follow up on PCP appt    Annmarie Fontaine RN BSN  Care Transition Nurse  [phone]

## 2022-11-11 LAB
ORGANISM: ABNORMAL
URINE CULTURE, ROUTINE: ABNORMAL

## 2022-11-18 ENCOUNTER — CARE COORDINATION (OUTPATIENT)
Dept: CASE MANAGEMENT | Age: 87
End: 2022-11-18

## 2022-11-18 NOTE — CARE COORDINATION
[facility] Transitions Follow Up Call    2022    Patient: Rohan Pak  Patient : 1935   MRN: [id number]  Reason for Admission: elevated troponin with hospital stay/SOB & nausea with ED visit  Discharge Date: 22 RARS: Readmission Risk Score: 15         Spoke with: Reanna Davis (daughter) who reported that patient is doing ok. She stated that patient has been having a lot of indigestion and nausea and the doctor ordered Pepcid and Zofran. She stated that it is helping and patient appetite is improving and her fluid intake is good. She stated that she is following all fluid and sodium restrictions and states that patient is weighing herself daily. Daughter did not give a value but stated that patient has lost 1 lb. Daughter denied any c/o cp, sob, cough, dizziness, headache, diarrhea, abdominal pains, fever, or chills from patient. Daughter denied any problems with bowel or bladder. Daughter reported that patient is taking all medications as ordered. Daughter stated that patient refused to go to her HFU on 2022 and appointment was rescheduled for 2022. Daughter denied any other needs at this time. Patient instructed to continue to monitor s/s, reporting any that may present to MD immediately for early intervention. Patient is agreeable to f/u calls. Care Transitions Subsequent and Final Call    Subsequent and Final Calls  Are you currently active with any services?: Home Health  Care Transitions Interventions  Other Interventions:              Follow Up  Future Appointments   Date Time Provider Helen Kaba   2022  2:20 PM Nichelle Fenton MD [facility] [place] LPN

## 2022-11-25 ENCOUNTER — CARE COORDINATION (OUTPATIENT)
Dept: CASE MANAGEMENT | Age: 87
End: 2022-11-25

## 2022-11-25 NOTE — CARE COORDINATION
[facility] Care Transitions Follow Up Call    Care Transition Nurse contacted the patient by telephone. Patient: Solange Fails  Patient : 1935   MRN: [id number]  Reason for Admission: elevated troponin with hospital stay/SOB & nausea with ED visit  Discharge Date: 22 RARS: Readmission Risk Score: 15      Attempted to reach patient via phone for care transition. VM left stating purpose of call along with my contact information requesting a return call.           Follow Up  Future Appointments   Date Time Provider Helen Kaba   2022  2:20 PM Aaron Toledo MD [facility] [place]          Care Transitions Subsequent and Final Call    Subsequent and Final Calls  Are you currently active with any services?: Home Health  Care Transitions Interventions  Other Interventions:             Laurent PALACIOS, RN, [facility]  Care Transition Nurse  [phone] mobile

## 2022-12-01 ENCOUNTER — CARE COORDINATION (OUTPATIENT)
Dept: CASE MANAGEMENT | Age: 87
End: 2022-12-01

## 2022-12-01 NOTE — CARE COORDINATION
[facility] Care Transitions Follow Up Call    Care Transition Nurse contacted the patient by telephone to follow up after admission on 2022 & ED visit 2022. Verified name and  with family as identifiers. Patient: Nayeli Singleton  Patient : 1935   MRN: [id number]  Reason for Admission: Elevated troponin & SOB/Nausea  Discharge Date: 22 RARS: Readmission Risk Score: 15      Needs to be reviewed by the provider   Additional needs identified to be addressed with provider: No               Method of communication with provider: none. Spoke with Manohar Collins. She states her mother is sleeping - she states [place] is doing \"fine\". Manohar Guadalupe states her mother did refuse to go to the PCP HFU appt. It has been rescheduled for 2022. Discussed  note recommending the possibility of Visiting Physicians. Encouraged Manohar Guadalupe to reach out to her mother's insurance provider first to see if there is a Visiting Physician that is in network. Manohar Guadalupe verbalized understanding and was grateful for the information. Manohar Guadalupe states her mother's elbow(bursitis) is gone. She states daisy has been staying @ 120lbs. Manohar Guadalupe denies her mother being SOB, having a cough, or having any edema at this time. She states Daisy has an occasional episode of nausea - no emesis, and it is usually because [place] has not had much po intake. Manohar Guadalupe states they have a pulse oximeter but she has not felt the need to check her mother's O2 sat today. She denies any needs for Daisy at this time.     Follow Up  Future Appointments   Date Time Provider Helen Kaba   2022  2:20 PM Brock Pete MD [facility] [place]          Care Transitions Subsequent and Final Call    Subsequent and Final Calls  Do you have any ongoing symptoms?: No  Have your medications changed?: No  Do you have any questions related to your medications?: No  Do you currently have any active services?: No  Are you currently active with any services?: Home Health  Do you have any needs or concerns that I can assist you with?: No  Identified Barriers: Stress  Care Transitions Interventions  Other Interventions:             Care Transition Nurse provided contact information for future needs. Plan for next call:  final outreach if patient is stable.     Zayda Johnson RN BSN  Care Transition Nurse  [phone]

## 2022-12-08 ENCOUNTER — HOSPITAL ENCOUNTER (INPATIENT)
Age: 87
LOS: 3 days | Discharge: HOME | DRG: 204 | End: 2022-12-11
Attending: EMERGENCY MEDICINE | Admitting: STUDENT IN AN ORGANIZED HEALTH CARE EDUCATION/TRAINING PROGRAM
Payer: MEDICARE

## 2022-12-08 ENCOUNTER — APPOINTMENT (OUTPATIENT)
Dept: CT IMAGING | Age: 87
DRG: 204 | End: 2022-12-08
Payer: MEDICARE

## 2022-12-08 ENCOUNTER — CARE COORDINATION (OUTPATIENT)
Dept: CASE MANAGEMENT | Age: 87
End: 2022-12-08

## 2022-12-08 ENCOUNTER — APPOINTMENT (OUTPATIENT)
Dept: GENERAL RADIOLOGY | Age: 87
DRG: 204 | End: 2022-12-08
Payer: MEDICARE

## 2022-12-08 DIAGNOSIS — I50.9 ACUTE ON CHRONIC CONGESTIVE HEART FAILURE, UNSPECIFIED HEART FAILURE TYPE (HCC): ICD-10-CM

## 2022-12-08 DIAGNOSIS — R74.8 ELEVATED LIPASE: ICD-10-CM

## 2022-12-08 DIAGNOSIS — Z71.89 GOALS OF CARE, COUNSELING/DISCUSSION: ICD-10-CM

## 2022-12-08 DIAGNOSIS — N39.0 COMPLICATED UTI (URINARY TRACT INFECTION): Primary | ICD-10-CM

## 2022-12-08 DIAGNOSIS — J98.59 MEDIASTINAL MASS: ICD-10-CM

## 2022-12-08 LAB
A/G RATIO: 1 (ref 1.1–2.2)
ALBUMIN SERPL-MCNC: 3.7 G/DL (ref 3.4–5)
ALP BLD-CCNC: 69 U/L (ref 40–129)
ALT SERPL-CCNC: 8 U/L (ref 10–40)
ANION GAP SERPL CALCULATED.3IONS-SCNC: 13 MMOL/L (ref 3–16)
AST SERPL-CCNC: 18 U/L (ref 15–37)
BACTERIA: ABNORMAL /HPF
BASOPHILS ABSOLUTE: 0 K/UL (ref 0–0.2)
BASOPHILS RELATIVE PERCENT: 0.4 %
BILIRUB SERPL-MCNC: <0.2 MG/DL (ref 0–1)
BILIRUBIN URINE: NEGATIVE
BLOOD, URINE: NEGATIVE
BUN BLDV-MCNC: 42 MG/DL (ref 7–20)
CALCIUM SERPL-MCNC: 9.1 MG/DL (ref 8.3–10.6)
CHLORIDE BLD-SCNC: 94 MMOL/L (ref 99–110)
CLARITY: ABNORMAL
CO2: 27 MMOL/L (ref 21–32)
COLOR: YELLOW
CREAT SERPL-MCNC: 1.6 MG/DL (ref 0.6–1.2)
D DIMER: 1.17 UG/ML FEU (ref 0–0.6)
EOSINOPHILS ABSOLUTE: 0.3 K/UL (ref 0–0.6)
EOSINOPHILS RELATIVE PERCENT: 3.5 %
EPITHELIAL CELLS, UA: 2 /HPF (ref 0–5)
GFR SERPL CREATININE-BSD FRML MDRD: 31 ML/MIN/{1.73_M2}
GLUCOSE BLD-MCNC: 91 MG/DL (ref 70–99)
GLUCOSE URINE: NEGATIVE MG/DL
HCT VFR BLD CALC: 36.6 % (ref 36–48)
HEMOGLOBIN: 11.7 G/DL (ref 12–16)
HYALINE CASTS: 1 /LPF (ref 0–8)
KETONES, URINE: NEGATIVE MG/DL
LACTIC ACID: 1.1 MMOL/L (ref 0.4–2)
LEUKOCYTE ESTERASE, URINE: ABNORMAL
LIPASE: 73 U/L (ref 13–60)
LYMPHOCYTES ABSOLUTE: 2.3 K/UL (ref 1–5.1)
LYMPHOCYTES RELATIVE PERCENT: 28.2 %
MCH RBC QN AUTO: 29 PG (ref 26–34)
MCHC RBC AUTO-ENTMCNC: 32 G/DL (ref 31–36)
MCV RBC AUTO: 90.8 FL (ref 80–100)
MICROSCOPIC EXAMINATION: YES
MONOCYTES ABSOLUTE: 1.1 K/UL (ref 0–1.3)
MONOCYTES RELATIVE PERCENT: 13.3 %
NEUTROPHILS ABSOLUTE: 4.5 K/UL (ref 1.7–7.7)
NEUTROPHILS RELATIVE PERCENT: 54.6 %
NITRITE, URINE: NEGATIVE
PDW BLD-RTO: 14.1 % (ref 12.4–15.4)
PH UA: 6 (ref 5–8)
PLATELET # BLD: 312 K/UL (ref 135–450)
PMV BLD AUTO: 9.7 FL (ref 5–10.5)
POTASSIUM REFLEX MAGNESIUM: 3.8 MMOL/L (ref 3.5–5.1)
PRO-BNP: 7873 PG/ML (ref 0–449)
PROTEIN UA: NEGATIVE MG/DL
RBC # BLD: 4.04 M/UL (ref 4–5.2)
RBC UA: 2 /HPF (ref 0–4)
SODIUM BLD-SCNC: 134 MMOL/L (ref 136–145)
SPECIFIC GRAVITY UA: 1.01 (ref 1–1.03)
TOTAL PROTEIN: 7.5 G/DL (ref 6.4–8.2)
TROPONIN: <0.01 NG/ML
URINE REFLEX TO CULTURE: YES
URINE TYPE: ABNORMAL
UROBILINOGEN, URINE: 0.2 E.U./DL
WBC # BLD: 8.2 K/UL (ref 4–11)
WBC UA: 118 /HPF (ref 0–5)

## 2022-12-08 PROCEDURE — 2580000003 HC RX 258

## 2022-12-08 PROCEDURE — 83690 ASSAY OF LIPASE: CPT

## 2022-12-08 PROCEDURE — 83605 ASSAY OF LACTIC ACID: CPT

## 2022-12-08 PROCEDURE — 85025 COMPLETE CBC W/AUTO DIFF WBC: CPT

## 2022-12-08 PROCEDURE — 71260 CT THORAX DX C+: CPT

## 2022-12-08 PROCEDURE — 80053 COMPREHEN METABOLIC PANEL: CPT

## 2022-12-08 PROCEDURE — 87077 CULTURE AEROBIC IDENTIFY: CPT

## 2022-12-08 PROCEDURE — 81001 URINALYSIS AUTO W/SCOPE: CPT

## 2022-12-08 PROCEDURE — 96375 TX/PRO/DX INJ NEW DRUG ADDON: CPT

## 2022-12-08 PROCEDURE — 6360000002 HC RX W HCPCS

## 2022-12-08 PROCEDURE — 93005 ELECTROCARDIOGRAM TRACING: CPT

## 2022-12-08 PROCEDURE — 6360000004 HC RX CONTRAST MEDICATION

## 2022-12-08 PROCEDURE — 85379 FIBRIN DEGRADATION QUANT: CPT

## 2022-12-08 PROCEDURE — 96365 THER/PROPH/DIAG IV INF INIT: CPT

## 2022-12-08 PROCEDURE — 84484 ASSAY OF TROPONIN QUANT: CPT

## 2022-12-08 PROCEDURE — 1200000000 HC SEMI PRIVATE

## 2022-12-08 PROCEDURE — 71046 X-RAY EXAM CHEST 2 VIEWS: CPT

## 2022-12-08 PROCEDURE — 83880 ASSAY OF NATRIURETIC PEPTIDE: CPT

## 2022-12-08 PROCEDURE — 74177 CT ABD & PELVIS W/CONTRAST: CPT

## 2022-12-08 PROCEDURE — 99285 EMERGENCY DEPT VISIT HI MDM: CPT

## 2022-12-08 PROCEDURE — 87086 URINE CULTURE/COLONY COUNT: CPT

## 2022-12-08 RX ORDER — MORPHINE SULFATE 2 MG/ML
2 INJECTION, SOLUTION INTRAMUSCULAR; INTRAVENOUS ONCE
Status: COMPLETED | OUTPATIENT
Start: 2022-12-08 | End: 2022-12-08

## 2022-12-08 RX ORDER — ONDANSETRON 2 MG/ML
4 INJECTION INTRAMUSCULAR; INTRAVENOUS ONCE
Status: COMPLETED | OUTPATIENT
Start: 2022-12-08 | End: 2022-12-08

## 2022-12-08 RX ADMIN — MORPHINE SULFATE 2 MG: 2 INJECTION, SOLUTION INTRAMUSCULAR; INTRAVENOUS at 22:02

## 2022-12-08 RX ADMIN — CEFTRIAXONE 1000 MG: 1 INJECTION, POWDER, FOR SOLUTION INTRAMUSCULAR; INTRAVENOUS at 21:20

## 2022-12-08 RX ADMIN — ONDANSETRON 4 MG: 2 INJECTION INTRAMUSCULAR; INTRAVENOUS at 22:02

## 2022-12-08 RX ADMIN — IOPAMIDOL 75 ML: 755 INJECTION, SOLUTION INTRAVENOUS at 21:29

## 2022-12-08 ASSESSMENT — PAIN DESCRIPTION - LOCATION
LOCATION: BACK
LOCATION: BACK

## 2022-12-08 ASSESSMENT — PAIN DESCRIPTION - DESCRIPTORS
DESCRIPTORS: DISCOMFORT
DESCRIPTORS: ACHING

## 2022-12-08 ASSESSMENT — PAIN - FUNCTIONAL ASSESSMENT: PAIN_FUNCTIONAL_ASSESSMENT: 0-10

## 2022-12-08 ASSESSMENT — PAIN SCALES - GENERAL
PAINLEVEL_OUTOF10: 6
PAINLEVEL_OUTOF10: 8

## 2022-12-08 NOTE — ED PROVIDER NOTES
ED Attending Attestation Note    This patient was seen by the advanced practice provider. I personally saw the patient and performed a substantive portion of the visit including all aspects of the medical decision making. Briefly, 80 y.o. female who  has a past medical history of Chronic kidney disease (CKD), stage IV (severe) ([facility] [address] Coronary artery disease involving native coronary artery of native heart without angina pectoris, Dementia ([facility] [address] GERD (gastroesophageal reflux disease), Gout, chronic, Histoplasmosis, Hyperlipidemia, Hypertension, Irregular heart beat, Osteoarthritis, Osteopenia of hip, Pneumonia, Ulcer of jejunum, and Unspecified cerebral artery occlusion with cerebral infarction. presents with shortness of breath. Does have history of CHF. Also having some left-sided abdominal pain as well as left mid back pain. Patient's daughter at bedside providing history secondary to patient's history of Alzheimer's. No falls or trauma per daughter. States that patient has been complaining of mid back pain on the left side. Patient has been tolerating oral intake. No documented fevers at home. Does have history of heart failure. Focused exam:   Gen: 80 y.o. female, NAD  HEENT: NCAT. PERRL. EOMI. CV: RRR w/o MRG  Lungs: Faint rales in the lung bases bilaterally, no wheezing, speaking in full sentences, no rhonchi  Abdomen: Soft, nontender, nondistended. No rebound/guarding. ,  Left CVA tenderness, no right CVA tenderness      EKG interpreted by myself. Sinus rhythm with first-degree AV block, rate of 72, left axis deviation, , , QTc 464, no ST elevations, nonspecific ST changes, LVH present, body left bundle branch block present, no acute change compared EKG from 11/9/2022. MDM:   Patient seen and evaluated. History and physical as above. Nontoxic and afebrile. Patient tolerating room air with O2 saturation 100%.   Has faint rales in the lung bases consistent with her history of CHF. No tachycardia, tachypnea or fever. Elevated BNP at 7873. Elevated lipase as well at 73. Creatinine 1.6 which is close to patient's baseline. BUN of 42. Sodium 134, potassium 3.8. Troponin negative. D-dimer elevated 1.17. Found to have urinary tract infection. CT abdomen pelvis unremarkable except for bladder thickening. CT chest without any evidence of PE. 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 Patient admitted to the hospital to her primary care physician service, Dr. Sloan Novak. For further details of the patient's emergency department visit, please see the advanced practice provider's documentation. Elvera Brunner, MD     This report has been produced using speech recognition software and may contain errors related to that system including errors in grammar, punctuation, and spelling, as well as words and phrases that may be inappropriate. If there are any questions or concerns please feel free to contact the dictating provider for clarification.          Elvera Brunner, MD  12/13/22 [id number]

## 2022-12-08 NOTE — ED PROVIDER NOTES
[address]        Pt Name: Shandra Roque  MRN: [id number]  [place] 1935  Date of evaluation: 12/8/2022  Provider: FERNANDO Sosa - ELLIS  PCP: Fiona Singh MD  Note Started: 6:26 PM EST12/9/2022        I have seen and evaluated this patient with my supervising physician Dr Bella Bradley       Chief Complaint   Patient presents with    Shortness of Breath    Abdominal Pain         HISTORY OF PRESENT ILLNESS   (Location/Symptom, Timing/Onset, Context/Setting, Quality, Duration, Modifying Factors, Severity)  Note limiting factors. Chief Complaint: Sharon Roque is a 80 y.o. female who presents to the ED with multiple concerns. Shortness of breath for the past 2 to 3 days. Also having left upper abdominal pain radiating to ribs on that side. No nausea or vomiting. Is having some urinary frequency. History of frequent UTIs. Nothing makes pain better or worse. Has a history of heart failure and has been taking increased dose of diuretic over the past several days    Nursing Notes were all reviewed and agreed with or any disagreements were addressed in the HPI. REVIEW OF SYSTEMS    (2-9 systems for level 4, 10 or more for level 5)     Review of Systems   Constitutional:  Negative for chills, diaphoresis and fever. HENT:  Negative for congestion, rhinorrhea and sore throat. Eyes:  Negative for pain and visual disturbance. Respiratory:  Positive for shortness of breath. Negative for cough. Cardiovascular:  Positive for leg swelling. Negative for chest pain. Gastrointestinal:  Positive for abdominal pain and nausea. Negative for constipation, diarrhea and vomiting. Genitourinary:  Positive for frequency. Negative for decreased urine volume, dysuria and hematuria. Musculoskeletal:  Negative for back pain and neck pain. Skin:  Negative for rash and wound.    Neurological:  Negative for dizziness and light-headedness.      PAST MEDICAL HISTORY     Past Medical History:   Diagnosis Date    Chronic kidney disease (CKD), stage IV (severe) (HCC)     Coronary artery disease involving native coronary artery of native heart without angina pectoris 01/05/2016    Dementia ([facility] [address]     GERD (gastroesophageal reflux disease) acid reflux    Gout, chronic     Histoplasmosis     Hyperlipidemia     Hypertension     Irregular heart beat     Osteoarthritis 02/28/2019    Knee, leg     Osteopenia of hip 04/26/2019    Pneumonia     Ulcer of jejunum     Unspecified cerebral artery occlusion with cerebral infarction 2014 , ~ 2012       SURGICAL HISTORY     Past Surgical History:   Procedure Laterality Date    BLADDER SUSPENSION      CHOLECYSTECTOMY  Orthopedic surgery Left arm    HYSTERECTOMY (CERVIX STATUS UNKNOWN)      TUBAL LIGATION      UPPER GASTROINTESTINAL ENDOSCOPY N/A 9/10/2021    EGD CONTROL HEMORRHAGE performed by [facility] DO at [facility]  9/10/2021    EGD SUBMUCOSAL/ [facility]INEPHRINE [facility]       Current Discharge Medication List        CONTINUE these medications which have NOT CHANGED    Details   diclofenac sodium (VOLTAREN) 1 % GEL Apply 4 g topically 4 times daily  Qty: 150 g, Refills: 1      promethazine (PHENERGAN) 12.5 MG tablet Take 1 tablet by mouth 3 times daily as needed for Nausea  Qty: 12 tablet, Refills: 0      furosemide (LASIX) 40 MG tablet Take 1 tablet by mouth daily  Qty: 90 tablet, Refills: 2      amLODIPine (NORVASC) 5 MG tablet TAKE 1 TABLET BY MOUTH EVERY DAY  Qty: 90 tablet, Refills: 0      atorvastatin (LIPITOR) 20 MG tablet TAKE 1 TABLET BY MOUTH AT BEDTIME  Qty: 90 tablet, Refills: 0      pantoprazole (PROTONIX) 40 MG tablet TAKE 1 TABLET BY MOUTH EVERY DAY  Qty: 90 tablet, Refills: 0      famotidine (PEPCID) 20 MG tablet Take 1 tablet by mouth 2 times daily  Qty: 60 tablet, Refills: 0      ondansetron (ZOFRAN ODT) 4 MG disintegrating tablet Take 1-2 tablets by mouth every 12 hours as needed for Nausea May Sub regular tablet (non-ODT) if insurance does not cover ODT. Qty: 12 tablet, Refills: 0      lisinopril (PRINIVIL;ZESTRIL) 10 MG tablet Take 10 mg by mouth at bedtime      magnesium oxide (MAG-OX) 140 MG CAPS Take 14 mg by mouth daily  Qty: 90 capsule, Refills: 2      allopurinol (ZYLOPRIM) 100 MG tablet TAKE 1/2 TABLET BY MOUTH EVERY DAY  Qty: 90 tablet, Refills: 2    Associated Diagnoses: Hyperuricemia      carvedilol (COREG) 6.25 MG tablet TAKE 1 TABLET BY MOUTH TWO TIMES A DAY WITH MEALS  Qty: 180 tablet, Refills: 2      memantine (NAMENDA) 5 MG tablet Take 1 tablet by mouth daily  Qty: 180 tablet, Refills: 1    Associated Diagnoses: Dementia without behavioral disturbance, unspecified dementia type      donepezil (ARICEPT) 10 MG tablet TAKE 1 TABLET BY MOUTH nightly  Qty: 90 tablet, Refills: 0    Associated Diagnoses: Dementia without behavioral disturbance, unspecified dementia type      albuterol sulfate HFA (VENTOLIN HFA) 108 (90 Base) MCG/ACT inhaler Inhale 2 puffs into the lungs 4 times daily as needed for Wheezing  Qty: 18 g, Refills: 0      FLUoxetine (PROZAC) 10 MG capsule TAKE 1 CAPSULE BY MOUTH ONE TIME A DAY  Qty: 30 capsule, Refills: 2    Associated Diagnoses:  Moderate single current episode of major depressive disorder (HCC)      Cyanocobalamin (VITAMIN B 12 PO) Take by mouth daily      isosorbide mononitrate (IMDUR) 30 MG extended release tablet Take 1 tablet by mouth daily Spacing out from other cardiac medications  Qty: 30 tablet, Refills: 6      aspirin 81 MG chewable tablet Take 81 mg by mouth daily      ferrous sulfate 324 (65 Fe) MG EC tablet Take 1 tablet by mouth 2 times daily (with meals)  Qty: 60 tablet, Refills: 1      Respiratory Therapy Supplies (NEBULIZER/TUBING/MOUTHPIECE) KIT 1 kit by Does not apply route daily as needed (wheezing)  Qty: 1 kit, Refills: 0      ipratropium-albuterol (DUONEB) 0.5-2.5 (3) MG/3ML SOLN nebulizer solution Inhale 3 mLs into the lungs every 6 hours as needed for Shortness of Breath  Qty: 360 mL, Refills: 0      Multiple Vitamins-Minerals (THERAPEUTIC MULTIVITAMIN-MINERALS) tablet Take 1 tablet by mouth daily      loratadine (CLARITIN) 10 MG capsule Take 10 mg by mouth daily as needed       Handicap Placard MISC by Does not apply route Patient is unable to walk more than 250 feet before stopping to rest.    Not to exceed 5 years. DX: CHF  Qty: 1 each, Refills: 0    Associated Diagnoses: Primary localized osteoarthrosis, lower leg, unspecified laterality             ALLERGIES     Codeine, Ibuprofen, Morphine and related, and Oxycodone-acetaminophen    FAMILYHISTORY       Family History   Problem Relation Age of Onset    High Blood Pressure Mother     Heart Disease Mother     Diabetes Mother     High Blood Pressure Father     Heart Disease Father     Cancer Father         Brain    Cancer Sister         lymphoma    Cancer Brother         colon    Cancer Maternal Uncle         SOCIAL HISTORY       Social History     Socioeconomic History    Marital status:       Spouse name: None    Number of children: None    Years of education: None    Highest education level: None   Tobacco Use    Smoking status: Never     Passive exposure: Yes    Smokeless tobacco: Never   Vaping Use    Vaping Use: Never used   Substance and Sexual Activity    Alcohol use: No     Comment: 1 yearly    Drug use: No    Sexual activity: Not Currently     Partners: Male     Social Determinants of Health     Financial Resource Strain: Low Risk     Difficulty of Paying Living Expenses: Not hard at all   Food Insecurity: No Food Insecurity    Worried About Running Out of Food in the Last Year: Never true    [address] in the Last Year: Never true       SCREENINGS    [place] Coma Scale  Eye Opening: Spontaneous  Best Verbal Response: Oriented  Best Motor Response: Obeys commands  [place] Coma Scale Score: 15        PHYSICAL EXAM    (up to 7 for level 4, 8 or more for level 5)     ED Triage Vitals [12/08/22 1819]   BP Temp Temp Source Heart Rate Resp SpO2 Height Weight   129/70 97.2 °F (36.2 °C) Temporal 76 17 98 % -- --       Physical Exam  Vitals and nursing note reviewed. Constitutional:       Appearance: Normal appearance. She is obese. She is ill-appearing. She is not toxic-appearing or diaphoretic. HENT:      Head: Normocephalic and atraumatic. Right Ear: External ear normal.      Left Ear: External ear normal.      Nose: Nose normal. No congestion or rhinorrhea. Mouth/Throat:      Mouth: Mucous membranes are moist.      Pharynx: Oropharynx is clear. No oropharyngeal exudate or posterior oropharyngeal erythema. Eyes:      General: No scleral icterus. Right eye: No discharge. Left eye: No discharge. Extraocular Movements: Extraocular movements intact. Conjunctiva/sclera: Conjunctivae normal.      Pupils: Pupils are equal, round, and reactive to light. Cardiovascular:      Rate and Rhythm: Normal rate and regular rhythm. Pulses: Normal pulses. Heart sounds: Normal heart sounds. No murmur heard. No friction rub. No gallop. Pulmonary:      Effort: Pulmonary effort is normal. No respiratory distress. Breath sounds: Normal breath sounds. No stridor. No wheezing, rhonchi or rales. Abdominal:      General: Abdomen is flat. Bowel sounds are normal. There is no distension. Palpations: Abdomen is soft. Tenderness: There is abdominal tenderness in the left upper quadrant. There is left CVA tenderness. There is no right CVA tenderness or guarding. Musculoskeletal:         General: Normal range of motion. Cervical back: Normal range of motion and neck supple. No rigidity or tenderness. Right lower leg: Edema present. Left lower leg: Edema present.    Skin:     General: Skin is warm and dry.      Capillary Refill: Capillary refill takes less than 2 seconds. Coloration: Skin is pale. Skin is not jaundiced. Findings: No rash. Neurological:      General: No focal deficit present. Mental Status: She is alert and oriented to person, place, and time. Mental status is at baseline. Psychiatric:         Mood and Affect: Mood normal.         Behavior: Behavior normal.       DIAGNOSTIC RESULTS   LABS:    Labs Reviewed   COMPREHENSIVE METABOLIC PANEL W/ REFLEX TO MG FOR LOW K - Abnormal; Notable for the following components:       Result Value    Sodium 134 (*)     Chloride 94 (*)     BUN 42 (*)     Creatinine 1.6 (*)     Est, Glom Filt Rate 31 (*)     Albumin/Globulin Ratio 1.0 (*)     ALT 8 (*)     All other components within normal limits   CBC WITH AUTO DIFFERENTIAL - Abnormal; Notable for the following components:    Hemoglobin 11.7 (*)     All other components within normal limits   LIPASE - Abnormal; Notable for the following components:    Lipase 73.0 (*)     All other components within normal limits   URINALYSIS WITH REFLEX TO CULTURE - Abnormal; Notable for the following components:    Clarity, UA CLOUDY (*)     Leukocyte Esterase, Urine LARGE (*)     All other components within normal limits   BRAIN NATRIURETIC PEPTIDE - Abnormal; Notable for the following components:    Pro-BNP 7,873 (*)     All other components within normal limits   D-DIMER, QUANTITATIVE - Abnormal; Notable for the following components:    D-Dimer, Quant 1.17 (*)     All other components within normal limits   MICROSCOPIC URINALYSIS - Abnormal; Notable for the following components:    Bacteria, UA 1+ (*)     WBC,  (*)     All other components within normal limits   CULTURE, URINE   LACTIC ACID   TROPONIN   TROPONIN       When ordered, only abnormal lab results are displayed. All other labs were within normal range or not returned as of this dictation. EKG:  When ordered, EKG's are interpreted by the Emergency Department Physician in the absence of a cardiologist.  Please see their note for interpretation of EKG. RADIOLOGY:   Non-plain film images such as CT, Ultrasound and MRI are read by the radiologist. Plain radiographic images are visualized and preliminarily interpreted by the  ED Provider with the below findings:        Interpretation perthe Radiologist below, if available at the time of this note:    CT CHEST PULMONARY EMBOLISM W CONTRAST   Final Result   1. No pulmonary embolism. 2. 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 This is likely malignant and could   represent lymphoma or metastatic disease. A mass arising from the esophagus   is considered less likely. Tissue sampling is recommended. 3. Central airway thickening suggesting bronchitis or reactive airways   disease. 4. Borderline cardiomegaly and advanced coronary arterial calcifications. CT ABDOMEN PELVIS W IV CONTRAST Additional Contrast? Radiologist Recommendation   Final Result   1. Thickening of the bladder similar to prior studies which could reflect   hypertrophy or cystitis. Multiple bladder diverticula are again noted. 2. No acute findings elsewhere in the abdomen or pelvis. 3. Colonic diverticulosis. XR CHEST (2 VW)   Final Result   Mild right basilar airspace opacities. These are favored to represent   atelectasis. However, in the appropriate clinical setting early pneumonia is   not fully excluded. No results found. PROCEDURES   Unless otherwise noted below, none     Procedures    CRITICAL CARE TIME   Miguel A BUNCH CNP, am the primary clinician of record  I provided 30 minutes of non concurrent Critical Care time, excluding separately reportable procedures.   There was a high probability of clinically significant/life threatening deterioration in the patient's condition which required my urgent intervention.   This time spent assessing, reassessing patient, chart review and discussing case with other providers      CONSULTS:  IP CONSULT TO PRIMARY CARE PROVIDER      EMERGENCY DEPARTMENT COURSE and DIFFERENTIAL DIAGNOSIS/MDM:   Vitals:    Vitals:    12/08/22 2200 12/08/22 2230 12/09/22 0002 12/09/22 0025   BP: (!) 133/51 (!) 106/38 (!) 146/74    Pulse: 77 70 69    Resp: 26 16 15    Temp:   97.8 °F (36.6 °C)    TempSrc:   Oral    SpO2: 98% 95% 98%    Weight:    122 lb (55.3 kg)   Height:    5' 1\" (1.549 m)       Patient was given the following medications:  Medications   sodium chloride flush 0.9 % injection 5-40 mL (has no administration in time range)   sodium chloride flush 0.9 % injection 5-40 mL (has no administration in time range)   0.9 % sodium chloride infusion (has no administration in time range)   enoxaparin Sodium (LOVENOX) injection 30 mg (has no administration in time range)   ondansetron (ZOFRAN-ODT) disintegrating tablet 4 mg (has no administration in time range)     Or   ondansetron (ZOFRAN) injection 4 mg (has no administration in time range)   polyethylene glycol (GLYCOLAX) packet 17 g (has no administration in time range)   acetaminophen (TYLENOL) tablet 650 mg (has no administration in time range)     Or   acetaminophen (TYLENOL) suppository 650 mg (has no administration in time range)   allopurinol (ZYLOPRIM) tablet 50 mg (has no administration in time range)   amLODIPine (NORVASC) tablet 5 mg (has no administration in time range)   aspirin chewable tablet 81 mg (has no administration in time range)   atorvastatin (LIPITOR) tablet 20 mg (has no administration in time range)   carvedilol (COREG) tablet 6.25 mg (has no administration in time range)   donepezil (ARICEPT) tablet 10 mg (has no administration in time range)   famotidine (PEPCID) tablet 20 mg (has no administration in time range)   FLUoxetine (PROZAC) capsule 10 mg (has no administration in time range)   furosemide (LASIX) tablet 40 mg (has no administration in time range)   ipratropium-albuterol (DUONEB) nebulizer solution 3 mL (has no administration in time range)   isosorbide mononitrate (IMDUR) extended release tablet 30 mg (has no administration in time range)   lisinopril (PRINIVIL;ZESTRIL) tablet 10 mg (has no administration in time range)   memantine (NAMENDA) tablet 5 mg (has no administration in time range)   pantoprazole (PROTONIX) tablet 40 mg (has no administration in time range)   iopamidol (ISOVUE-370) 76 % injection 75 mL (75 mLs IntraVENous Given 12/8/22 2129)   cefTRIAXone (ROCEPHIN) 1,000 mg in dextrose 5 % 50 mL IVPB mini-bag (0 mg IntraVENous Stopped 12/8/22 2152)   morphine (PF) injection 2 mg (2 mg IntraVENous Given 12/8/22 2202)   ondansetron (ZOFRAN) injection 4 mg (4 mg IntraVENous Given 12/8/22 2202)     ED Course as of 12/09/22 0041   Thu Dec 08, 2022   2257 I talked with the patient's PCP, Dr. Brain Barron. Patient is accepted for admission [KM]      ED Course User Index  [KM] FERNANDO Sanchez - CNP      Is this patient to be included in the SEP-1 Core Measure due to severe sepsis or septic shock? No   Exclusion criteria - the patient is NOT to be included for SEP-1 Core Measure due to:  2+ SIRS criteria are not met    80year-old coming to ED with shortness of breath and abdominal pain as above. Labs:  Urinalysis is concerning for acute infection. Her lactic acid is within normal limits. CMP consistent with her chronic kidney disease. There is no transaminitis. Her CBC reveals chronic, stable anemia. There is no leukocytosis or elevated ANC. Lipase is elevated. BNP 7800 troponin within normal limits. D-dimer is elevated to 1.17. CT of the chest as above. No PE but large mediastinal mass. This was discussed with the patient and her family.     CT scan of the abdomen pelvis reveals no acute findings other than thickened bladder    We held off hydration given her elevated BNP and concern for fluid overload causing her shortness of breath today. Her pain was controlled well with morphine. Zofran given for nausea. Started on Rocephin. Given the concerning findings on the chest CT I talked with her PCP, Dr. Tierra Ferguson. Patient will be admitted to his service. Goals of care were discussed with the patient. She prefers to be a DNR. This was conveyed with Dr. Nahomy Guzman. Given her CKD and home diuretic and relatively improving leg edema, will hold off acute diuresis. Defer further diuresis to the medicine team    I am the Primary Clinician of Record. FINAL IMPRESSION      1. Complicated UTI (urinary tract infection)    2. Elevated lipase    3. Acute on chronic congestive heart failure, unspecified heart failure type ([facility] [address]    4. Mediastinal mass    5. Goals of care, counseling/discussion          DISPOSITION/PLAN   DISPOSITION Admitted 12/08/2022 10:55:29 PM      PATIENT REFERREDTO:  No follow-up provider specified.     DISCHARGE MEDICATIONS:  Current Discharge Medication List          DISCONTINUED MEDICATIONS:  Current Discharge Medication List                 (Please note that portions ofthis note were completed with a voice recognition program.  Efforts were made to edit the dictations but occasionally words are mis-transcribed.)    FERNANDO Bolden CNP (electronically signed)              FERNANDO Bolden CNP  12/09/22 0044

## 2022-12-08 NOTE — CARE COORDINATION
[facility] Transitions Follow Up Call    2022    Patient: Brain Quiñones  Patient : 1935   MRN: [id number]  Reason for Admission: elevated troponin, SOB, nausea  Discharge Date: 22 RARS: Readmission Risk Score: 15         Spoke with: NA    Attempted to reach patient via phone for transition call. VM left stating purpose of call along with my contact information requesting a return call. Alexa Hui LPN [address] [place] [address]  [phone]    Care Transitions Subsequent and Final Call    Subsequent and Final Calls  Are you currently active with any services?: Home Health  Care Transitions Interventions  Other Interventions: Follow Up  No future appointments.     Alexa Hui LPN

## 2022-12-09 PROBLEM — R91.8 LUNG MASS: Status: ACTIVE | Noted: 2022-12-09

## 2022-12-09 PROBLEM — J98.59 MEDIASTINAL MASS: Status: ACTIVE | Noted: 2022-12-09

## 2022-12-09 LAB
EKG ATRIAL RATE: 72 BPM
EKG DIAGNOSIS: NORMAL
EKG P-R INTERVAL: 210 MS
EKG Q-T INTERVAL: 424 MS
EKG QRS DURATION: 138 MS
EKG QTC CALCULATION (BAZETT): 464 MS
EKG R AXIS: -51 DEGREES
EKG T AXIS: 107 DEGREES
EKG VENTRICULAR RATE: 72 BPM
ORGANISM: ABNORMAL
TROPONIN: <0.01 NG/ML
URINE CULTURE, ROUTINE: ABNORMAL
URINE CULTURE, ROUTINE: ABNORMAL

## 2022-12-09 PROCEDURE — 1200000000 HC SEMI PRIVATE

## 2022-12-09 PROCEDURE — 99223 1ST HOSP IP/OBS HIGH 75: CPT | Performed by: INTERNAL MEDICINE

## 2022-12-09 PROCEDURE — 84484 ASSAY OF TROPONIN QUANT: CPT

## 2022-12-09 PROCEDURE — 99222 1ST HOSP IP/OBS MODERATE 55: CPT | Performed by: STUDENT IN AN ORGANIZED HEALTH CARE EDUCATION/TRAINING PROGRAM

## 2022-12-09 PROCEDURE — 93010 ELECTROCARDIOGRAM REPORT: CPT | Performed by: INTERNAL MEDICINE

## 2022-12-09 PROCEDURE — 6360000002 HC RX W HCPCS: Performed by: STUDENT IN AN ORGANIZED HEALTH CARE EDUCATION/TRAINING PROGRAM

## 2022-12-09 PROCEDURE — 2580000003 HC RX 258: Performed by: STUDENT IN AN ORGANIZED HEALTH CARE EDUCATION/TRAINING PROGRAM

## 2022-12-09 PROCEDURE — 6370000000 HC RX 637 (ALT 250 FOR IP): Performed by: STUDENT IN AN ORGANIZED HEALTH CARE EDUCATION/TRAINING PROGRAM

## 2022-12-09 PROCEDURE — 36415 COLL VENOUS BLD VENIPUNCTURE: CPT

## 2022-12-09 RX ORDER — FAMOTIDINE 20 MG/1
20 TABLET, FILM COATED ORAL NIGHTLY
Status: DISCONTINUED | OUTPATIENT
Start: 2022-12-09 | End: 2022-12-11 | Stop reason: HOSPADM

## 2022-12-09 RX ORDER — FLUOXETINE 10 MG/1
10 CAPSULE ORAL NIGHTLY
Status: DISCONTINUED | OUTPATIENT
Start: 2022-12-09 | End: 2022-12-09

## 2022-12-09 RX ORDER — ALLOPURINOL 100 MG/1
50 TABLET ORAL NIGHTLY
Status: DISCONTINUED | OUTPATIENT
Start: 2022-12-09 | End: 2022-12-11 | Stop reason: HOSPADM

## 2022-12-09 RX ORDER — FUROSEMIDE 40 MG/1
40 TABLET ORAL DAILY
Status: DISCONTINUED | OUTPATIENT
Start: 2022-12-09 | End: 2022-12-11 | Stop reason: HOSPADM

## 2022-12-09 RX ORDER — POLYETHYLENE GLYCOL 3350 17 G/17G
17 POWDER, FOR SOLUTION ORAL DAILY PRN
Status: DISCONTINUED | OUTPATIENT
Start: 2022-12-09 | End: 2022-12-11 | Stop reason: HOSPADM

## 2022-12-09 RX ORDER — SODIUM CHLORIDE 0.9 % (FLUSH) 0.9 %
5-40 SYRINGE (ML) INJECTION PRN
Status: DISCONTINUED | OUTPATIENT
Start: 2022-12-09 | End: 2022-12-11 | Stop reason: HOSPADM

## 2022-12-09 RX ORDER — LISINOPRIL 10 MG/1
10 TABLET ORAL NIGHTLY
Status: DISCONTINUED | OUTPATIENT
Start: 2022-12-09 | End: 2022-12-09

## 2022-12-09 RX ORDER — AMLODIPINE BESYLATE 5 MG/1
5 TABLET ORAL NIGHTLY
Status: DISCONTINUED | OUTPATIENT
Start: 2022-12-09 | End: 2022-12-11 | Stop reason: HOSPADM

## 2022-12-09 RX ORDER — SODIUM CHLORIDE 9 MG/ML
INJECTION, SOLUTION INTRAVENOUS PRN
Status: DISCONTINUED | OUTPATIENT
Start: 2022-12-09 | End: 2022-12-11 | Stop reason: HOSPADM

## 2022-12-09 RX ORDER — CARVEDILOL 6.25 MG/1
6.25 TABLET ORAL 2 TIMES DAILY WITH MEALS
Status: DISCONTINUED | OUTPATIENT
Start: 2022-12-09 | End: 2022-12-11 | Stop reason: HOSPADM

## 2022-12-09 RX ORDER — MEMANTINE HYDROCHLORIDE 5 MG/1
5 TABLET ORAL NIGHTLY
Status: DISCONTINUED | OUTPATIENT
Start: 2022-12-09 | End: 2022-12-11 | Stop reason: HOSPADM

## 2022-12-09 RX ORDER — HYDROCODONE BITARTRATE AND ACETAMINOPHEN 5; 325 MG/1; MG/1
1 TABLET ORAL EVERY 12 HOURS PRN
Status: DISCONTINUED | OUTPATIENT
Start: 2022-12-09 | End: 2022-12-10

## 2022-12-09 RX ORDER — ACETAMINOPHEN 650 MG/1
650 SUPPOSITORY RECTAL EVERY 6 HOURS PRN
Status: DISCONTINUED | OUTPATIENT
Start: 2022-12-09 | End: 2022-12-11 | Stop reason: HOSPADM

## 2022-12-09 RX ORDER — ATORVASTATIN CALCIUM 20 MG/1
20 TABLET, FILM COATED ORAL NIGHTLY
Status: DISCONTINUED | OUTPATIENT
Start: 2022-12-09 | End: 2022-12-11 | Stop reason: HOSPADM

## 2022-12-09 RX ORDER — ACETAMINOPHEN 325 MG/1
650 TABLET ORAL EVERY 6 HOURS PRN
Status: DISCONTINUED | OUTPATIENT
Start: 2022-12-09 | End: 2022-12-11 | Stop reason: HOSPADM

## 2022-12-09 RX ORDER — ENOXAPARIN SODIUM 100 MG/ML
30 INJECTION SUBCUTANEOUS DAILY
Status: DISCONTINUED | OUTPATIENT
Start: 2022-12-09 | End: 2022-12-11 | Stop reason: HOSPADM

## 2022-12-09 RX ORDER — FAMOTIDINE 20 MG/1
20 TABLET, FILM COATED ORAL 2 TIMES DAILY
Status: DISCONTINUED | OUTPATIENT
Start: 2022-12-09 | End: 2022-12-09

## 2022-12-09 RX ORDER — ONDANSETRON 4 MG/1
4 TABLET, ORALLY DISINTEGRATING ORAL EVERY 8 HOURS PRN
Status: DISCONTINUED | OUTPATIENT
Start: 2022-12-09 | End: 2022-12-11 | Stop reason: HOSPADM

## 2022-12-09 RX ORDER — SODIUM CHLORIDE 0.9 % (FLUSH) 0.9 %
5-40 SYRINGE (ML) INJECTION EVERY 12 HOURS SCHEDULED
Status: DISCONTINUED | OUTPATIENT
Start: 2022-12-09 | End: 2022-12-11 | Stop reason: HOSPADM

## 2022-12-09 RX ORDER — PANTOPRAZOLE SODIUM 40 MG/1
40 TABLET, DELAYED RELEASE ORAL NIGHTLY
Status: DISCONTINUED | OUTPATIENT
Start: 2022-12-09 | End: 2022-12-11 | Stop reason: HOSPADM

## 2022-12-09 RX ORDER — ISOSORBIDE MONONITRATE 30 MG/1
30 TABLET, EXTENDED RELEASE ORAL NIGHTLY
Status: DISCONTINUED | OUTPATIENT
Start: 2022-12-09 | End: 2022-12-09

## 2022-12-09 RX ORDER — ASPIRIN 81 MG/1
81 TABLET, CHEWABLE ORAL NIGHTLY
Status: DISCONTINUED | OUTPATIENT
Start: 2022-12-09 | End: 2022-12-11 | Stop reason: HOSPADM

## 2022-12-09 RX ORDER — IPRATROPIUM BROMIDE AND ALBUTEROL SULFATE 2.5; .5 MG/3ML; MG/3ML
1 SOLUTION RESPIRATORY (INHALATION) EVERY 6 HOURS PRN
Status: DISCONTINUED | OUTPATIENT
Start: 2022-12-09 | End: 2022-12-11 | Stop reason: HOSPADM

## 2022-12-09 RX ORDER — ONDANSETRON 2 MG/ML
4 INJECTION INTRAMUSCULAR; INTRAVENOUS EVERY 6 HOURS PRN
Status: DISCONTINUED | OUTPATIENT
Start: 2022-12-09 | End: 2022-12-11 | Stop reason: HOSPADM

## 2022-12-09 RX ORDER — DONEPEZIL HYDROCHLORIDE 10 MG/1
10 TABLET, FILM COATED ORAL NIGHTLY
Status: DISCONTINUED | OUTPATIENT
Start: 2022-12-09 | End: 2022-12-11 | Stop reason: HOSPADM

## 2022-12-09 RX ADMIN — DONEPEZIL HYDROCHLORIDE 10 MG: 10 TABLET, FILM COATED ORAL at 21:48

## 2022-12-09 RX ADMIN — HYDROCODONE BITARTRATE AND ACETAMINOPHEN 1 TABLET: 5; 325 TABLET ORAL at 16:49

## 2022-12-09 RX ADMIN — ATORVASTATIN CALCIUM 20 MG: 20 TABLET, FILM COATED ORAL at 21:47

## 2022-12-09 RX ADMIN — ACETAMINOPHEN 650 MG: 325 TABLET, FILM COATED ORAL at 15:02

## 2022-12-09 RX ADMIN — ACETAMINOPHEN 650 MG: 325 TABLET, FILM COATED ORAL at 08:43

## 2022-12-09 RX ADMIN — ENOXAPARIN SODIUM 30 MG: 100 INJECTION SUBCUTANEOUS at 08:39

## 2022-12-09 RX ADMIN — CEFTRIAXONE 1000 MG: 1 INJECTION, POWDER, FOR SOLUTION INTRAMUSCULAR; INTRAVENOUS at 14:10

## 2022-12-09 RX ADMIN — SODIUM CHLORIDE, PRESERVATIVE FREE 10 ML: 5 INJECTION INTRAVENOUS at 08:40

## 2022-12-09 RX ADMIN — FAMOTIDINE 20 MG: 20 TABLET, FILM COATED ORAL at 21:47

## 2022-12-09 RX ADMIN — CARVEDILOL 6.25 MG: 6.25 TABLET, FILM COATED ORAL at 08:39

## 2022-12-09 RX ADMIN — SODIUM CHLORIDE, PRESERVATIVE FREE 5 ML: 5 INJECTION INTRAVENOUS at 21:47

## 2022-12-09 RX ADMIN — AMLODIPINE BESYLATE 5 MG: 5 TABLET ORAL at 21:51

## 2022-12-09 RX ADMIN — ALLOPURINOL 50 MG: 100 TABLET ORAL at 21:47

## 2022-12-09 RX ADMIN — CARVEDILOL 6.25 MG: 6.25 TABLET, FILM COATED ORAL at 16:50

## 2022-12-09 RX ADMIN — MEMANTINE HYDROCHLORIDE 5 MG: 5 TABLET ORAL at 21:48

## 2022-12-09 RX ADMIN — FUROSEMIDE 40 MG: 40 TABLET ORAL at 08:39

## 2022-12-09 RX ADMIN — SODIUM CHLORIDE: 9 INJECTION, SOLUTION INTRAVENOUS at 14:09

## 2022-12-09 RX ADMIN — ASPIRIN 81 MG: 81 TABLET, CHEWABLE ORAL at 21:47

## 2022-12-09 RX ADMIN — PANTOPRAZOLE SODIUM 40 MG: 40 TABLET, DELAYED RELEASE ORAL at 21:48

## 2022-12-09 ASSESSMENT — PAIN SCALES - GENERAL
PAINLEVEL_OUTOF10: 3
PAINLEVEL_OUTOF10: 0
PAINLEVEL_OUTOF10: 0
PAINLEVEL_OUTOF10: 10
PAINLEVEL_OUTOF10: 3
PAINLEVEL_OUTOF10: 0

## 2022-12-09 ASSESSMENT — ENCOUNTER SYMPTOMS
CONSTIPATION: 0
SHORTNESS OF BREATH: 1
VOMITING: 0
RHINORRHEA: 0
COUGH: 0
BACK PAIN: 0
NAUSEA: 1
EYE PAIN: 0
DIARRHEA: 0
SORE THROAT: 0
ABDOMINAL PAIN: 1

## 2022-12-09 ASSESSMENT — PAIN DESCRIPTION - ONSET
ONSET: ON-GOING

## 2022-12-09 ASSESSMENT — PAIN DESCRIPTION - ORIENTATION
ORIENTATION: LOWER

## 2022-12-09 ASSESSMENT — PAIN DESCRIPTION - FREQUENCY
FREQUENCY: CONTINUOUS

## 2022-12-09 ASSESSMENT — PAIN - FUNCTIONAL ASSESSMENT
PAIN_FUNCTIONAL_ASSESSMENT: PREVENTS OR INTERFERES SOME ACTIVE ACTIVITIES AND ADLS

## 2022-12-09 ASSESSMENT — PAIN DESCRIPTION - LOCATION
LOCATION: BACK

## 2022-12-09 ASSESSMENT — PAIN DESCRIPTION - PAIN TYPE
TYPE: ACUTE PAIN
TYPE: ACUTE PAIN

## 2022-12-09 ASSESSMENT — PAIN DESCRIPTION - DESCRIPTORS
DESCRIPTORS: ACHING

## 2022-12-09 NOTE — H&P
[address] [facility] Internal Medicine  History and Physical    Patient's PCP: Vaishali Ryan MD  Code Status: Full Code  History Obtained From:  chart review     CC: abdominal pain     HPI:        Bandar Eden is an 22-year-old lady with a past medical history significant for systolic heart failure, dementia, aortic stenosis, CKD who presented for abdominal pain and shortness of breath. History is obtained through chart review. Patient is oriented to self but not place or time this morning. She does not report any current complaints. Apparently patient had abdominal pain and chest wall pain yesterday. She may have had increased urinary frequency, although her diuretic dosing was recently increased outpatient. There were no reports of fever or chills. It is unclear whether she was having episodes of chest pain. She has limited mobility at home and severe dementia. In the emergency department, she had serial imaging of her chest and abdomen and pelvis that discovered a mediastinal mass.       Past Medical / Surgical History:    Past Medical History:   Diagnosis Date    Chronic kidney disease (CKD), stage IV (severe) (HCC)     Coronary artery disease involving native coronary artery of native heart without angina pectoris 01/05/2016    Dementia ([address]     GERD (gastroesophageal reflux disease) acid reflux    Gout, chronic     Histoplasmosis     Hyperlipidemia     Hypertension     Irregular heart beat     Osteoarthritis 02/28/2019    Knee, leg     Osteopenia of hip 04/26/2019    Pneumonia     Ulcer of jejunum     Unspecified cerebral artery occlusion with cerebral infarction 2014 , ~ 2012     Past Surgical History:   Procedure Laterality Date    BLADDER SUSPENSION      CHOLECYSTECTOMY  Orthopedic surgery Left arm    HYSTERECTOMY (CERVIX STATUS UNKNOWN)      TUBAL LIGATION      UPPER GASTROINTESTINAL ENDOSCOPY N/A 9/10/2021    EGD CONTROL HEMORRHAGE performed by Zoe Saez DO at [facility] 19 GASTROINTESTINAL ENDOSCOPY  9/10/2021    EGD SUBMUCOSAL/ EPINEPHRINE INJECTION performed by Ana Husain., DO at [facility]       Medications Prior to Admission:    No current facility-administered medications on file prior to encounter. Current Outpatient Medications on File Prior to Encounter   Medication Sig Dispense Refill    diclofenac sodium (VOLTAREN) 1 % GEL Apply 4 g topically 4 times daily 150 g 1    promethazine (PHENERGAN) 12.5 MG tablet Take 1 tablet by mouth 3 times daily as needed for Nausea 12 tablet 0    furosemide (LASIX) 40 MG tablet Take 1 tablet by mouth daily 90 tablet 2    amLODIPine (NORVASC) 5 MG tablet TAKE 1 TABLET BY MOUTH EVERY DAY 90 tablet 0    atorvastatin (LIPITOR) 20 MG tablet TAKE 1 TABLET BY MOUTH AT BEDTIME 90 tablet 0    pantoprazole (PROTONIX) 40 MG tablet TAKE 1 TABLET BY MOUTH EVERY DAY 90 tablet 0    famotidine (PEPCID) 20 MG tablet Take 1 tablet by mouth 2 times daily 60 tablet 0    ondansetron (ZOFRAN ODT) 4 MG disintegrating tablet Take 1-2 tablets by mouth every 12 hours as needed for Nausea May Sub regular tablet (non-ODT) if insurance does not cover ODT.  12 tablet 0    lisinopril (PRINIVIL;ZESTRIL) 10 MG tablet Take 10 mg by mouth at bedtime      magnesium oxide (MAG-OX) 140 MG CAPS Take 14 mg by mouth daily 90 capsule 2    allopurinol (ZYLOPRIM) 100 MG tablet TAKE 1/2 TABLET BY MOUTH EVERY DAY (Patient taking differently: Take 50 mg by mouth at bedtime TAKE 1/2 TABLET BY MOUTH EVERY DAY) 90 tablet 2    carvedilol (COREG) 6.25 MG tablet TAKE 1 TABLET BY MOUTH TWO TIMES A DAY WITH MEALS 180 tablet 2    memantine (NAMENDA) 5 MG tablet Take 1 tablet by mouth daily (Patient taking differently: Take 5 mg by mouth at bedtime) 180 tablet 1    donepezil (ARICEPT) 10 MG tablet TAKE 1 TABLET BY MOUTH nightly 90 tablet 0    albuterol sulfate HFA (VENTOLIN HFA) 108 (90 Base) MCG/ACT inhaler Inhale 2 puffs into the lungs 4 times daily as needed for Wheezing 18 g 0 FLUoxetine (PROZAC) 10 MG capsule TAKE 1 CAPSULE BY MOUTH ONE TIME A DAY (Patient taking differently: Take 10 mg by mouth at bedtime TAKE 1 CAPSULE BY MOUTH ONE TIME A DAY) 30 capsule 2    Cyanocobalamin (VITAMIN B 12 PO) Take by mouth daily      isosorbide mononitrate (IMDUR) 30 MG extended release tablet Take 1 tablet by mouth daily Spacing out from other cardiac medications (Patient taking differently: Take 30 mg by mouth at bedtime Spacing out from other cardiac medications) 30 tablet 6    aspirin 81 MG chewable tablet Take 81 mg by mouth daily      ferrous sulfate 324 (65 Fe) MG EC tablet Take 1 tablet by mouth 2 times daily (with meals) 60 tablet 1    Respiratory Therapy Supplies (NEBULIZER/TUBING/MOUTHPIECE) KIT 1 kit by Does not apply route daily as needed (wheezing) 1 kit 0    ipratropium-albuterol (DUONEB) 0.5-2.5 (3) MG/3ML SOLN nebulizer solution Inhale 3 mLs into the lungs every 6 hours as needed for Shortness of Breath 360 mL 0    Multiple Vitamins-Minerals (THERAPEUTIC MULTIVITAMIN-MINERALS) tablet Take 1 tablet by mouth daily      loratadine (CLARITIN) 10 MG capsule Take 10 mg by mouth daily as needed       Handicap Placard MISC by Does not apply route Patient is unable to walk more than 250 feet before stopping to rest.    Not to exceed 5 years. DX: CHF 1 each 0       Allergies:  Codeine, Ibuprofen, Morphine and related, and Oxycodone-acetaminophen    Social History:   TOBACCO:   reports that she has never smoked. She has been exposed to tobacco smoke. She has never used smokeless tobacco.     ETOH:   reports no history of alcohol use.       Family History:       Problem Relation Age of Onset    High Blood Pressure Mother     Heart Disease Mother     Diabetes Mother     High Blood Pressure Father     Heart Disease Father     Cancer Father         Brain    Cancer Sister         lymphoma    Cancer Brother         colon    Cancer Maternal Uncle        ROS:   All other systems reviewed and are negative. PHYSICAL EXAM:  /74   Pulse 79   Temp 98.3 °F (36.8 °C) (Oral)   Resp 16   Ht 5' 1\" (1.549 m)   Wt 122 lb 12.7 oz (55.7 kg)   SpO2 96%   BMI 23.20 kg/m²       General: No acute distress  HEENT: PERRL, EOMI, moist oral mucosa  Chest: Right-sided chest wall tenderness  Cardiac: Regular rate and rhythm, systolic murmur, + peripheral edema in legs  Lungs: Clear to auscultation bilaterally  Abdomen: Soft nontender, nondistended  Musculoskeletal: No joint effusions  Neuro: Nonfocal, oriented to self and place       LABS:  Recent Labs     12/08/22 1850   WBC 8.2   HGB 11.7*   HCT 36.6                                                                     Recent Labs     12/08/22 1850   *   K 3.8   CL 94*   CO2 27   BUN 42*   CREATININE 1.6*   GLUCOSE 91     Recent Labs     12/08/22 1850   AST 18   ALT 8*   BILITOT <0.2   ALKPHOS 69     Recent Labs     12/08/22 1850 12/09/22  0140   TROPONINI <0.01 <0.01     No results for input(s): BNP in the last 72 hours. Lab Results   Component Value Date/Time    PHART 7.292 10/20/2020 02:00 AM    UCR0BFQ 44.2 10/20/2020 02:00 AM    PO2ART 156.0 10/20/2020 02:00 AM     No results for input(s): INR in the last 72 hours.   Recent Labs     12/08/22 2028   COLORU Yellow   PHUR 6.0   WBCUA 118*   RBCUA 2   BACTERIA 1+*   CLARITYU CLOUDY*   SPECGRAV 1.006   LEUKOCYTESUR LARGE*   UROBILINOGEN 0.2   BILIRUBINUR Negative   BLOODU Negative   GLUCOSEU Negative         U/A:    Lab Results   Component Value Date/Time    COLORU Yellow 12/08/2022 08:28 PM    WBCUA 118 12/08/2022 08:28 PM    RBCUA 2 12/08/2022 08:28 PM    MUCUS Rare 08/24/2021 08:22 PM    BACTERIA 1+ 12/08/2022 08:28 PM    CLARITYU CLOUDY 12/08/2022 08:28 PM    SPECGRAV 1.006 12/08/2022 08:28 PM    LEUKOCYTESUR LARGE 12/08/2022 08:28 PM    BLOODU Negative 12/08/2022 08:28 PM    GLUCOSEU Negative 12/08/2022 08:28 PM    GLUCOSEU Negative 06/13/2011 04:20 PM    AMORPHOUS Rare 10/26/2021 04:30 PM CT abdomen pelvis with IV contrast:     Impression   1. Thickening of the bladder similar to prior studies which could reflect   hypertrophy or cystitis. Multiple bladder diverticula are again noted. 2. No acute findings elsewhere in the abdomen or pelvis. 3. Colonic diverticulosis. CT chest   Impression   1. No pulmonary embolism. 2. 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 This is likely malignant and could   represent lymphoma or metastatic disease. A mass arising from the esophagus   is considered less likely. Tissue sampling is recommended. 3. Central airway thickening suggesting bronchitis or reactive airways   disease. 4. Borderline cardiomegaly and advanced coronary arterial calcifications. EKG:  Clinical Impression: Sinus rhythm, first-degree AV block        Assessment & Plan:    Faraz Hale is a 80 y.o. female who was admitted with Mediastinal mass. Principal Problem:     Mediastinal mass  -Incidentally discovered mediastinal mass  -Appreciate oncology assistance    HFrEF  -Continue lisinopril, Imdur and carvedilol  -Serial troponins unremarkable  - Lasix 40 mg daily (recently increased outpatient)    UTI  -Chronic bladder wall thickening on imaging and vague symptoms  -Continue ceftriaxone  -Urine culture pending    Dementia with behavioral disturbance  -Continue donepezil memantine    GERD  - Continue Protonix    CKD  - Monitor renal function    Hyperlipidemia  -Continue statin    CAD  - Aspirin/statin    F/E/N: Cardiac diet  PPx: PPI, Lovenox  Dispo: Pending improvement abdominal pain, family decision on work-up of mediastinal mass. Was already recommended that the patient should consider hospice based on the severity of her heart failure. She has severe dementia, CKD and systolic heart failure.   She has had many recent hospital admissions and ER visits. Brock Pete MD   12/9/2022 9:57 AM    Documentation was done using voice recognition dragon software. Every effort was made to ensure accuracy; however, inadvertent unintentional computerized transcription errors may be present.

## 2022-12-09 NOTE — ACP (ADVANCE CARE PLANNING)
Advance Care Planning     Advance Care Planning Activator (Inpatient)  Conversation Note      Date of ACP Conversation: 12/9/2022     Conversation Conducted with: Patient with Decision Making Capacity    ACP Activator: Clementina ZAMBRANO [facility] Decision Maker:     Current Designated Health Care Decision Maker:     Primary Decision Maker: Cheko Kirby - [phone]    Secondary Decision Maker: Maxwell Monzon - [phone]    Care Preferences    Ventilation: \"If you were in your present state of health and suddenly became very ill and were unable to breathe on your own, what would your preference be about the use of a ventilator (breathing machine) if it were available to you? \"      Would the patient desire the use of ventilator (breathing machine)?: no    \"If your health worsens and it becomes clear that your chance of recovery is unlikely, what would your preference be about the use of a ventilator (breathing machine) if it were available to you? \"     Would the patient desire the use of ventilator (breathing machine)?: No      Resuscitation  \"CPR works best to restart the heart when there is a sudden event, like a heart attack, in someone who is otherwise healthy. Unfortunately, CPR does not typically restart the heart for people who have serious health conditions or who are very sick. \"    \"In the event your heart stopped as a result of an underlying serious health condition, would you want attempts to be made to restart your heart (answer \"yes\" for attempt to resuscitate) or would you prefer a natural death (answer \"no\" for do not attempt to resuscitate)? \" no       [] Yes   [x] No   Educated Patient / Darinel Morel regarding differences between Advance Directives and portable DNR orders.     Length of ACP Conversation in minutes:  9    Conversation Outcomes:  [] ACP discussion completed  [] Existing advance directive reviewed with patient; no changes to patient's previously recorded wishes  [] New Advance Directive completed  [] Portable Do Not Rescitate prepared for Provider review and signature  [] POLST/POST/MOLST/MOST prepared for Provider review and signature    RN to call MD to get order as dtr Ryley Barriga reports pt is a DNR and she can bring up DNR paperwork tonight. Dtr is also POA.     Follow-up plan:    [] Schedule follow-up conversation to continue planning  [] Referred individual to Provider for additional questions/concerns   [] Advised patient/agent/surrogate to review completed ACP document and update if needed with changes in condition, patient preferences or care setting    [x] This note routed to one or more involved healthcare providers    Electronically signed by [id number] LUIGI Hagen on 12/9/2022 at 12:50 PM

## 2022-12-09 NOTE — PROGRESS NOTES
Medication Reconciliation    List of medications patient is currently taking is complete. Source of information: 1. [facility] records           Notes regarding home medications:   1. Patient has not filled isosorbide mononitrate or fluoxetine since 2021. These were removed from list.  2. Lisinopril was d/c'd during admission earlier this year due to SULY and soft BP. Confirmed with Desiree that she is no longer filling this and refills were refused.  Removed from list.       Kiley Chavez, [facility], PharmD, 12/9/2022 11:37 AM

## 2022-12-09 NOTE — PROGRESS NOTES
Clinical Pharmacy Note  Renal Dose Adjustment    [place] is receiving famotidine. This medication is renally eliminated. Based on the patient's Estimated Creatinine Clearance: 19 mL/min (A) (based on SCr of 1.6 mg/dL (H)). and urine output, the dose has been adjusted to famotidine 20mg daily per protocol. Pharmacy will continue to monitor and adjust dose as needed for changes in renal function.       Karmen Hinton PharmD  12/9/2022 12:57 AM

## 2022-12-09 NOTE — PROGRESS NOTES
Patient complaining of severe back and abdominal pain. Frequently ambulating from bed to chair to bed for comfort. PRN Tylenol given. Requesting something stronger for pain. Dr. Dwight Sylvester paged regarding pain control. Waiting for call back.      Electronically signed by Guillermina Pryor RN on 12/9/2022 at 4:03 PM

## 2022-12-09 NOTE — CONSULTS
Oncology Consult    See dictation    A/P:  Mediastinal mass. I spoke with the patient and her daughter Margie Retana who is in the room. The patient is not sure what she would do if this was cancer but would probably elect for no treatment. The patient and her daughter seem to be interested in knowing what it is before making any decisions. They are willing to go through a bronchoscopy if it is accessible and safe. I will consult pulmonology. Thank you for the consultation. We will follow closely.     Natacha Norman MD

## 2022-12-09 NOTE — PROGRESS NOTES
Norco given for 10/10 back pain per MD orders. Will monitor.      Electronically signed by Aileen Mcguire RN on 12/9/2022 at 5:27 PM

## 2022-12-09 NOTE — PROGRESS NOTES
4 Eyes Admission Assessment     I agree as the admission nurse that 2 RN's have performed a thorough Head to Toe Skin Assessment on the patient. ALL assessment sites listed below have been assessed on admission. Areas assessed by both nurses:   [x]   Head, Face, and Ears   [x]   Shoulders, Back, and Chest  [x]   Arms, Elbows, and Hands      Coccyx, Sacrum, and Ischum  [x][]   Legs, Feet, and Heels        Does the Patient have Skin Breakdown?   No         Edwardo Prevention initiated:  No   Wound Care Orders initiated:  No      [facility] nurse consulted for Pressure Injury (Stage 3,4, Unstageable, DTI, NWPT, and Complex wounds):  No      Nurse 1 eSignature: Electronically signed by Eliana Callejas RN on 12/9/22 at 4:59 AM EST    **SHARE this note so that the co-signing nurse is able to place an eSignature**    Nurse 2 eSignature: Electronically signed by Colin Oropeza RN on 12/9/22 at 5:03 AM EST

## 2022-12-09 NOTE — PROGRESS NOTES
Patient alert to self and place. Assisted to bathroom with walker x1. Tylenol given for lower back pain. VSS. Breakfast ordered. Bed alarm on. Call light and belongings within reach. Will monitor.      Electronically signed by Dequan Gutierres RN on 12/9/2022 at 8:49 AM

## 2022-12-09 NOTE — CONSULTS
[address]                                  CONSULTATION    PATIENT NAME: Jessica Lundberg                     :        1935  MED REC NO:   [id number]                          ROOM:       3531  ACCOUNT NO:   [de-identified]                           ADMIT DATE: 2022  PROVIDER:     Kelly Teran MD    CONSULT DATE:  2022    ONCOLOGY CONSULTATION    CONSULTING PROVIDER:  John Perales MD    REASON FOR CONSULTATION:  Mediastinal mass. HISTORY OF PRESENT ILLNESS:  The patient is an 80-year-old female with  history of congestive heart failure and dementia who presented to the  hospital with back pain and upper abdominal back/lower chest wall. A CT  pulmonary embolus study was done that did not show PE, but showed a  large mass at the central aspect of the mediastinum that at least  partially surrounds the trachea and narrows the right and left bronchi  measuring 6 x 4.9 x 8 cm. This is most likely malignant and could  represent lymphoma or metastatic disease. The mass arising from the  esophagus is less likely. A CT of the abdomen and pelvis showed bladder  wall thickening consistent with cystitis. She is being treated for UTI. She is feeling a bit better. She is a very poor historian. Her  daughter states she is present. PAST MEDICAL HISTORY:  1. Congestive heart failure. 2.  Chronic kidney disease. 3.  Dementia. 4.  GERD. 5.  Gout. 6.  Hyperlipidemia. 7.  Histoplasmosis. 8.  Hypertension. 9.  Osteoarthritis. PAST SURGICAL HISTORY:  1.  Bladder suspension surgery. 2.  Hysterectomy. 3.  Cholecystectomy. 4.  Tubal ligation. FAMILY HISTORY:  She has a sister that had lymphoma and a brother that  colon cancer. SOCIAL HISTORY:  She is . She does not smoke or drink. She is  retired.     MEDICATIONS:  Aspirin 81 mg p.o. daily, ferrous sulfate 325 mg p.o.  daily, Imdur 30 mg p.o. daily, Aricept 10 mg p.o. daily, Prozac 10 mg  p.o. daily, Namenda 5 mg p.o. daily, Coreg 6.25 mg p.o. b.i.d.,  allopurinol 100 mg daily, lisinopril 10 mg p.o. daily, Lipitor 20 mg  p.o. daily, Norvasc 5 mg p.o. daily, Lasix 40 mg p.o. daily. ALLERGIES:  She is allergic to CODEINE, MORPHINE, which cause unknown  reactions. REVIEW OF SYSTEMS:  She denies any recent fever, chills, sweats, nausea,  vomiting, abdominal pain, headaches, any new bone aches, dysphagia,  odynophagia, diarrhea, constipation, hemoptysis, hematemesis, change in  vision/hearing/smell/taste, neuropathy, skin rashes, productive cough,  urinary or bowel prolapse or incontinence, petechiae, purpura, skin  rashes, vaginal bleed, pruritus, hallucinations, nasal congestion or  drainage, seizures, strokes, syncope, depression, anxiety, suicidal  ideations, melena, or hematochezia. She has mild-to-moderate fatigue. She has pain in the middle of her back and some upper abdominal pain. Her 10-system review of systems is otherwise negative. PHYSICAL EXAMINATION:  GENERAL:  She is in no acute distress. VITAL SIGNS:  She is afebrile with normal vital signs. HEENT:  Her pupils are round and reactive to light and accommodation. Extraocular muscles are intact. NECK:  She has no jugular venous distention. No thyromegaly. Oropharynx is clear. She has no carotid bruits. She has no palpable  lymphadenopathy. CHEST:  Lungs are clear to auscultation bilaterally. CARDIOVASCULAR:  Heart is regular rate and rhythm. ABDOMEN:  Nondistended, nontender with bowel sounds x4. No  hepatosplenomegaly. EXTREMITIES:  She has no peripheral clubbing, cyanosis or edema. NEUROLOGIC:  Significant for obvious dementia. LABORATORY DATA:  Her white blood cell count is 3.2, hemoglobin _____,  platelets 237. ASSESSMENT AND PLAN:  Mediastinal mass. I spoke with the patient and  her daughter Jenny Redd, who was in the room.   The patient is not sure what  she would do if this was cancer, but would probably elect for no  treatment. The patient and her daughter seemed to be interested in  knowing it is before making any decisions. They are willing to go  through a bronchoscopy, if it successful and safe. I will consult  Pulmonology. Thank you for the consultation. We will follow closely. Clem Clifton MD    D: 12/09/2022 15:38:04       T: 12/09/2022 17:56:58     [email or web address]  Job#: [id number]     Doc#: [id number]    CC:  Ifeoma Osborne.  Md Diza

## 2022-12-09 NOTE — PROGRESS NOTES
Patient admitted to [phone] from ED . Patient oriented to room mechanics . Call light in reach. Siderails up x3. Fall precautions in place.  Electronically signed by Omar Vazquez RN on 12/9/2022 at 4:58 AM

## 2022-12-09 NOTE — PLAN OF CARE
Problem: Discharge Planning  Goal: Discharge to home or other facility with appropriate resources  12/9/2022 0748 by Promise Owen RN  Outcome: Progressing  Flowsheets (Taken 12/9/2022 0371)  Discharge to home or other facility with appropriate resources:   Identify barriers to discharge with patient and caregiver   Identify discharge learning needs (meds, wound care, etc)   Arrange for needed discharge resources and transportation as appropriate     Problem: Safety - Adult  Goal: Free from fall injury  12/9/2022 0748 by Promise Owen RN  Outcome: Progressing  Flowsheets (Taken 12/9/2022 0748)  Free From Fall Injury: Instruct family/caregiver on patient safety     Problem: ABCDS Injury Assessment  Goal: Absence of physical injury  Outcome: Progressing  Flowsheets (Taken 12/9/2022 0748)  Absence of Physical Injury: Implement safety measures based on patient assessment

## 2022-12-09 NOTE — PLAN OF CARE
Problem: Discharge Planning  Goal: Discharge to home or other facility with appropriate resources  Flowsheets (Taken 12/9/2022 0455)  Discharge to home or other facility with appropriate resources:   Identify barriers to discharge with patient and caregiver   Arrange for needed discharge resources and transportation as appropriate   Identify discharge learning needs (meds, wound care, etc)   Refer to discharge planning if patient needs post-hospital services based on physician order or complex needs related to functional status, cognitive ability or social support system     Problem: Safety - Adult  Goal: Free from fall injury  Flowsheets (Taken 12/9/2022 0455)  Free From Fall Injury: Instruct family/caregiver on patient safety

## 2022-12-09 NOTE — CONSULTS
REASON FOR CONSULTATION/CC: He has not all mass      Consult at request of Fiona Singh MD     PCP: Fiona Singh MD  Established Pulmonologist:  None    Chief Complaint   Patient presents with    Shortness of Breath    Abdominal Pain       HISTORY OF PRESENT ILLNESS: Shandra Roque is a 80y.o. year old female with a history of systolic heart failure, CKD, hard of hearing who presents with shortness of breath and abdominal pain. Pulmonary consulted after CT of the chest revealed a large paratracheal mass. Discussion with patient and daughter at bedside today. Patient is hard of hearing and provides limited HPI other than stated above. Ongoing discussions regarding biopsy of this mass. Patient is unsure that she would pursue treatment but would like to know what she is dealing with and the least invasive way possible. During my visit she denies shortness of breath, chest pain. She denies cough or hemoptysis. She denies unintentional weight loss. Previously echocardiogram with a EF of about 20%.       Past Medical History:   Diagnosis Date    Chronic kidney disease (CKD), stage IV (severe) (HCC)     Coronary artery disease involving native coronary artery of native heart without angina pectoris 01/05/2016    Dementia ([address]     GERD (gastroesophageal reflux disease) acid reflux    Gout, chronic     Histoplasmosis     Hyperlipidemia     Hypertension     Irregular heart beat     Osteoarthritis 02/28/2019    Knee, leg     Osteopenia of hip 04/26/2019    Pneumonia     Ulcer of jejunum     Unspecified cerebral artery occlusion with cerebral infarction 2014 , ~ 2012         Past Surgical History:   Procedure Laterality Date    BLADDER SUSPENSION      CHOLECYSTECTOMY  Orthopedic surgery Left arm    HYSTERECTOMY (CERVIX STATUS UNKNOWN)      TUBAL LIGATION      UPPER GASTROINTESTINAL ENDOSCOPY N/A 9/10/2021    EGD CONTROL HEMORRHAGE performed by Alix Funes DO at [facility] GASTROINTESTINAL ENDOSCOPY  9/10/2021    EGD SUBMUCOSAL/ EPINEPHRINE INJECTION performed by Gaby Ma DO at [address] Hx  family history includes Cancer in her brother, father, maternal uncle, and sister; Diabetes in her mother; Heart Disease in her father and mother; High Blood Pressure in her father and mother. Social Hx   reports that she has never smoked. She has been exposed to tobacco smoke. She has never used smokeless tobacco.    Scheduled Meds:   sodium chloride flush  5-40 mL IntraVENous 2 times per day    enoxaparin  30 mg SubCUTAneous Daily    allopurinol  50 mg Oral Nightly    amLODIPine  5 mg Oral Nightly    aspirin  81 mg Oral Nightly    atorvastatin  20 mg Oral Nightly    carvedilol  6.25 mg Oral BID WC    donepezil  10 mg Oral Nightly    furosemide  40 mg Oral Daily    memantine  5 mg Oral Nightly    pantoprazole  40 mg Oral Nightly    famotidine  20 mg Oral Nightly    cefTRIAXone (ROCEPHIN) IV  1,000 mg IntraVENous Q24H       Continuous Infusions:   sodium chloride         PRN Meds:  sodium chloride flush, sodium chloride, ondansetron **OR** ondansetron, polyethylene glycol, acetaminophen **OR** acetaminophen, ipratropium-albuterol    ALLERGIES:  Patient is allergic to codeine, ibuprofen, morphine and related, and oxycodone-acetaminophen. REVIEW OF SYSTEMS:  Constitutional: Negative for fever  HENT: Negative for sore throat  Eyes: Negative for redness   Respiratory: Negative for dyspnea, cough  Cardiovascular: Negative for chest pain  Gastrointestinal: Negative for vomiting, diarrhea   Genitourinary: Negative for hematuria   Musculoskeletal: Negative for arthralgias   Skin: Negative for rash  Neurological: Negative for syncope  Hematological: Negative for adenopathy  Psychiatric/Behavorial: Negative for anxiety    Objective:   PHYSICAL EXAM:  Blood pressure 133/74, pulse 79, temperature 98.3 °F (36.8 °C), temperature source Oral, resp.  rate 16, height 5' 1\" (1.549 m), weight 122 lb 12.7 oz (55.7 kg), SpO2 96 %, not currently breastfeeding.'  Gen:  No acute distress. Eyes: PERRL. Anicteric sclera. No conjunctival injection. ENT: No discharge. Posterior oropharynx clear. External appearance of ears and nose normal.  Neck: Trachea midline. No mass   Resp:  No crackles. No wheezes. No rhonchi. No dullness on percussion. CV: Regular rate. Regular rhythm. No murmur or rub. No edema. GI: Soft, Non-tender. Non-distended. +BS  Skin: Warm, dry, w/o erythema. Lymph: No cervical or supraclavicular LAD. M/S: No cyanosis. No clubbing. Neuro: [place], otherwise no focal neurologic deficit. Moves all extremities  Psych: Awake and alert,  Mood stable. Data Reviewed:   LABS:  CBC:   Recent Labs     12/08/22 1850   WBC 8.2   HGB 11.7*   HCT 36.6   MCV 90.8        BMP:   Recent Labs     12/08/22 1850   *   K 3.8   CL 94*   CO2 27   BUN 42*   CREATININE 1.6*     LIVER PROFILE:   Recent Labs     12/08/22 1850   AST 18   ALT 8*   LIPASE 73.0*   BILITOT <0.2   ALKPHOS 69     PT/INR: No results for input(s): PROTIME, INR in the last 72 hours. APTT: No results for input(s): APTT in the last 72 hours. UA:  Recent Labs     12/08/22 2028   COLORU Yellow   PHUR 6.0   WBCUA 118*   RBCUA 2   BACTERIA 1+*   CLARITYU CLOUDY*   SPECGRAV 1.006   LEUKOCYTESUR LARGE*   UROBILINOGEN 0.2   BILIRUBINUR Negative   BLOODU Negative   GLUCOSEU Negative     No results for input(s): PHART, YKN1TZQ, PO2ART in the last 72 hours. Radiology Review:  Pertinent images / reports were reviewed as a part of this visit. Chest CT images reviewed personally by me, interpretation as follows:  -Large paratracheal mass greater than 6 cm cephalad to the keely. Minimal airspace disease otherwise      ECHO   Summary   Left ventricular cavity size is mildly dilated. Ejection fraction is visually estimated to be 20%. There is severe diffuse hypokinesis.    Grade II diastolic dysfunction with elevated LV filling pressures. The left atrium is mildly dilated. Mild to moderate aortic stenosis with a peak velocity of 2.7m/s and a mean   pressure gradient of 20mmHg, max gradient 29mmHg. SV Index= 30.1mL/m2   Normal right ventricular size. Right ventricular systolic function is moderately reduced. Assessment/Plan:       Para tracheal mediastinal mass  -Concern for malignancy patient agreeable to bronchoscopy with EBUS  -Has already eaten today, there is no urgency can plan to perform on Monday  -She is on schedule for Monday at 1 PM should she remain inpatient. Alternatively, this can also be arranged as an outpatient if patient is not here over the weekend.  -She will need to be n.p.o. at midnight on Sunday all holding chemical DVT prophylaxis after midnight as well. Chronic biventricular systolic congestive heart failure  -LVEF 20%  -Medical management, Lasix daily    This note was transcribed using [id number] TiVo. Please disregard any translational errors.     Thank you for the consult    [address]  Pulmonary, Sleep and Critical Care Medicine

## 2022-12-09 NOTE — CARE COORDINATION
INITIAL CASE MANAGEMENT ASSESSMENT    Reviewed chart, met with patient to assess possible discharge needs. Explained Case Management role/services. Living Situation: lives with dtr Stephen Rosas, Guido Ruth and Toma Zamora. Pt has someone with her 24 hr/day    ADLs: family helps as needed     DME: walker that she uses for far distances, not always in the house, w/c shower chair, grab bars, BSC    PT/OT Recs: not yet seeing     Active Services: none and dtr reports no services needed, pt doesn't like services in the home     Transportation: family transports     Medications: Desiree on [place], no issues obtaining or taking meds    PCP: Dr Antonio Henderson      HD/PD: na    PLAN/COMMENTS: home at dc with dtr and family support, no home care needed, no other needs for dc. Confirmed with dtr that pt is a DNR. Called MD and RN  SW/CM provided contact information for patient or family to call with any questions. SW/CM will follow and assist as needed.   Electronically signed by [id number] LUIGI Maradiaga on 12/9/2022 at 12:41 PM

## 2022-12-09 NOTE — DISCHARGE INSTR - COC
Continuity of Care Form    Patient Name: Solange Mcgraw   :  1935  MRN:  [id number]    Admit date:  2022  Discharge date:  ***    Code Status Order: Full Code   Advance Directives:     Admitting Physician:  Aaron Toledo MD  PCP: Aaron Toledo MD    Discharging Nurse: [facility] Unit/Room#: M2R-8901/3113-01  Discharging Unit Phone Number: ***    Emergency Contact:   Extended Emergency Contact Information  Primary Emergency Contact: Karissa Martinez  Address: [address] Phone: [phone]  Relation: Child  Secondary Emergency Contact: Dell Manzano Phone: [phone]  Relation: Grandchild    Past Surgical History:  Past Surgical History:   Procedure Laterality Date    BLADDER SUSPENSION      CHOLECYSTECTOMY  Orthopedic surgery Left arm    HYSTERECTOMY (CERVIX STATUS UNKNOWN)      TUBAL LIGATION      UPPER GASTROINTESTINAL ENDOSCOPY N/A 9/10/2021    EGD CONTROL HEMORRHAGE performed by Kai Fitzgerald DO at [place] [address]  9/10/2021    EGD SUBMUCOSAL/ EPINEPHRINE INJECTION performed by Kai [facility] DO at [facility] ENDOSCOPY       Immunization History:   Immunization History   Administered Date(s) Administered    COVID-19, [place] Vaccine, Vaccine Unspecified 2021, 2021    Influenza, FLUAD, (age 72 y+), Adjuvanted, 0.5mL 2022, 2022    Influenza, FLUBLOK, (age 25 y+), PF, 0.5mL 10/30/2018    Pneumococcal Conjugate 13-valent ([id number]) 2016    Pneumococcal Polysaccharide (Dmitlmeuw45) 2017       Active Problems:  Patient Active Problem List   Diagnosis Code    HTN (hypertension), benign I10    SULY (acute kidney injury) ([facility] N17.9    Elbow pain M25.529    Knee pain, bilateral M25.561, M25.562    Primary localized osteoarthrosis, lower leg M17.10    Rotator cuff (capsule) sprain Y05.198K    Coronary artery disease involving native coronary artery of native heart without angina pectoris I25.10    Idiopathic chronic gout of right foot M1A.0710    Chronic kidney disease (CKD), stage IV (severe) ([facility]) N18.4    Moderate single current episode of major depressive disorder ([facility] F32.1    TIA (transient ischemic attack) G45.9    GERD (gastroesophageal reflux disease) K21.9    Osteoarthritis M19.90    Osteopenia of hip M85.859    Acute on chronic heart failure (HCC) I50.9    Acute on chronic congestive heart failure ([facility]) I50.9    Primary hypertension I10    Stage 3b chronic kidney disease ([facility] N18.32    Paronychia of finger, right L03.011    GI bleed K92.2    Diarrhea R19.7    Chronic heart failure ([facility]) I50.9    Sepsis ([facility] A41.9    Upper GI bleed K92.2    CHF (congestive heart failure), NYHA class I, acute on chronic, combined ([facility] I50.43    Suspected COVID-19 virus infection Z20.822    Acute respiratory failure with hypoxia ([facility]) J96.01    COVID-19 U07.1    Generalized weakness R53.1    HFrEF (heart failure with reduced ejection fraction) ([facility]) P50.95    Systolic heart failure, unspecified HF chronicity ([facility]) I50.20    Mediastinal mass J98.59       Isolation/Infection:   Isolation            No Isolation          Patient Infection Status       Infection Onset Added Last Indicated Last Indicated By Review Planned Expiration Resolved Resolved By    None active    Resolved    COVID-19 (Rule Out) 22 COVID-19, Rapid (Ordered)   22 Rule-Out Test Resulted    COVID-19 (Rule Out) 22 COVID-19, Rapid (Ordered)   22 Rule-Out Test Resulted    C-diff Rule Out 22 Clostridium Difficile Toxin/Antigen (Ordered)   22 Rule-Out Test Resulted    COVID-19 22 COVID-19, Rapid   22     COVID-19 (Rule Out) 22 COVID-19, Rapid (Ordered)   22 Rule-Out Test Resulted    COVID-19 (Rule Out) 10/26/21 10/26/21 10/26/21 COVID-19 (Ordered)   10/27/21 Rule-Out Test Resulted    COVID-19 (Rule Out) 10/26/21 10/26/21 10/26/21 COVID-19, Rapid (Ordered)   10/26/21 Rule-Out Test Resulted    C-diff Rule Out 09/10/21 09/10/21 10/26/21 Clostridium difficile toxin/antigen (Ordered)   10/27/21 Rule-Out Test Resulted    COVID-19 (Rule Out) 21 COVID-19 (Ordered)   21 Rule-Out Test Resulted    COVID-19 (Rule Out) 20 COVID-19 (Ordered)   20 Rule-Out Test Resulted    C-diff Rule Out 10/20/20 10/20/20 10/20/20 Clostridium difficile toxin/antigen (Ordered)   10/20/20 Rule-Out Test Resulted    COVID-19 (Rule Out) 10/19/20 10/20/20 10/19/20 COVID-19 (Ordered)   10/20/20 Rule-Out Test Resulted    COVID-19 (Rule Out) 10/19/20 10/19/20 10/19/20 COVID-19 (Ordered)   10/19/20 Rule-Out Test Resulted            Nurse Assessment:  Last Vital Signs: /74   Pulse 79   Temp 98.3 °F (36.8 °C) (Oral)   Resp 16   Ht 5' 1\" (1.549 m)   Wt 122 lb 12.7 oz (55.7 kg)   SpO2 96%   BMI 23.20 kg/m²     Last documented pain score (0-10 scale): Pain Level: 0  Last Weight:   Wt Readings from Last 1 Encounters:   22 122 lb 12.7 oz (55.7 kg)     Mental Status:  {IP PT MENTAL STATUS:21651}    IV Access:  { MARCIAL IV ACCESS:709416068}    Nursing Mobility/ADLs:  Walking   {CHP DME EGEL:420825124}  Transfer  {CHP DME ESBJ:997670019}  Bathing  {CHP DME DEXF:638137351}  Dressing  {CHP DME MNKN:745862628}  Toileting  {CHP DME KUGC:192935353}  Feeding  {CHP DME MZCD:345863895}  Med Admin  {CHP DME TU:207443132}  Med Delivery   {MH MARCIAL MED Delivery:920764520}    Wound Care Documentation and Therapy:        Elimination:  Continence:    Bowel: {YES / VN:42857}  Bladder: {YES / B}  Urinary Catheter: {Urinary Catheter:512564928}   Colostomy/Ileostomy/Ileal Conduit: {YES / FX:28994}       Date of Last BM: ***    Intake/Output Summary (Last 24 hours) at 2022 1218  Last data filed at 2022 1044  Gross per 24 hour   Intake 530 ml   Output --   Net 530 ml     I/O last 3 completed shifts: In: 200 [P.O.:240; IV Piggyback:50]  Out: -     Safety Concerns:     [employer] Safety Concerns:397022901}    Impairments/Disabilities:      [employer] Impairments/Disabilities:763438103}    Nutrition Therapy:  Current Nutrition Therapy:   [employer] Diet List:726319181}    Routes of Feeding: {CHP DME Other Feedings:560020584}  Liquids: {Slp liquid thickness:89657}  Daily Fluid Restriction: {CHP DME Yes amt example:351742413}  Last Modified Barium Swallow with Video (Video Swallowing Test): {Done Not Done XEBN:580711399}    Treatments at the Time of Hospital Discharge:   Respiratory Treatments: ***  Oxygen Therapy:  {Therapy; copd oxygen:91699}  Ventilator:    {[facility] Vent NATHAN:858422557}    Rehab Therapies: {THERAPEUTIC INTERVENTION:8786332411}  Weight Bearing Status/Restrictions: {[facility] Weight Bearin}  Other Medical Equipment (for information only, NOT a DME order):  {EQUIPMENT:228165181}  Other Treatments: ***    Heart Failure Instructions for Daily Management  Patient was treated for acute on chronic systolic heart failure. she  will require the following:    Please weigh daily on the same scale and approximately the same time of day. Report weight gain of 3 pounds/day or 5 pounds/week to : tho MCMAHON, Lorena Dyer [phone], and [address] [phone]. Please use hospital discharge weight as baseline reference. Please monitor for signs and symptoms of and report to MD:  Worsening Heart Failure: sudden weight gain, shortness of breath, lower extremity or general edema/swelling, abdominal bloating/swelling, inability to lie flat, intolerance to usual activity, or cough (especially at night). Report these finding even if no increase in weight. Dehydration:  having difficulty or a decrease in urination, dizziness, worsening fatigue, or new onset/worsening of generalized weakness.      Please continue a LOW SODIUM diet and LIMIT fluid intake to 48 - 64 ounces ( 1.5 - 2 liters) per day. Call Fernandez Smiley [phone], facility MD, and [address] [phone] and/or Sushma Yusuf Lazdonavon @ [phone] with any questions or concerns. Please continue heart failure education to patient and family/support system. See After Visit Summary for hospital follow up appointment details. Consider spiritual care referral for support and/or completion of advance directives [phone]. Consider: having the facility MD complete required 7 day follow up, [place] [address] telehealth program if patient agreeable and able to participate, and palliative care consult for ongoing goals of care, end of life, and/or chronic disease management discussions. Patient's personal belongings (please select all that are sent with patient):  {CHP DME Belongings:391051874}    RN SIGNATURE:  {Esignature:613166578}    CASE MANAGEMENT/SOCIAL WORK SECTION    Inpatient Status Date: ***    Readmission Risk Assessment Score:  Readmission Risk              Risk of Unplanned Readmission:  30           Discharging to Facility/ Agency   Name:   Address:  Phone:  Fax:    Dialysis Facility (if applicable)   Name:  Address:  Dialysis Schedule:  Phone:  Fax:    / signature: {Esignature:300378283}    PHYSICIAN SECTION    Prognosis: {Prognosis:2349001409}    Condition at Discharge: Wilfrid Marks Patient Condition:183482172}    Rehab Potential (if transferring to Rehab): {Prognosis:1561727701}    Recommended Labs or Other Treatments After Discharge: ***    Physician Certification: I certify the above information and transfer of Moe Sanchez  is necessary for the continuing treatment of the diagnosis listed and that she requires {Admit to Appropriate Level of Care:51927} for {GREATER/LESS:257336361} 30 days.      Update Admission H&P: {CHP DME Changes in [facility]:918687015}    PHYSICIAN SIGNATURE:  {Esignature:930603754}

## 2022-12-10 LAB
ANION GAP SERPL CALCULATED.3IONS-SCNC: 15 MMOL/L (ref 3–16)
BASOPHILS ABSOLUTE: 0.1 K/UL (ref 0–0.2)
BASOPHILS RELATIVE PERCENT: 0.6 %
BUN BLDV-MCNC: 30 MG/DL (ref 7–20)
CALCIUM SERPL-MCNC: 9.4 MG/DL (ref 8.3–10.6)
CHLORIDE BLD-SCNC: 98 MMOL/L (ref 99–110)
CO2: 24 MMOL/L (ref 21–32)
CREAT SERPL-MCNC: 1.3 MG/DL (ref 0.6–1.2)
EOSINOPHILS ABSOLUTE: 0.3 K/UL (ref 0–0.6)
EOSINOPHILS RELATIVE PERCENT: 3.7 %
GFR SERPL CREATININE-BSD FRML MDRD: 40 ML/MIN/{1.73_M2}
GLUCOSE BLD-MCNC: 110 MG/DL (ref 70–99)
HCT VFR BLD CALC: 35.9 % (ref 36–48)
HEMOGLOBIN: 12 G/DL (ref 12–16)
LYMPHOCYTES ABSOLUTE: 2.3 K/UL (ref 1–5.1)
LYMPHOCYTES RELATIVE PERCENT: 27 %
MCH RBC QN AUTO: 29.7 PG (ref 26–34)
MCHC RBC AUTO-ENTMCNC: 33.3 G/DL (ref 31–36)
MCV RBC AUTO: 89.1 FL (ref 80–100)
MONOCYTES ABSOLUTE: 1.4 K/UL (ref 0–1.3)
MONOCYTES RELATIVE PERCENT: 15.8 %
NEUTROPHILS ABSOLUTE: 4.6 K/UL (ref 1.7–7.7)
NEUTROPHILS RELATIVE PERCENT: 52.9 %
PDW BLD-RTO: 14.2 % (ref 12.4–15.4)
PLATELET # BLD: 298 K/UL (ref 135–450)
PMV BLD AUTO: 9.8 FL (ref 5–10.5)
POTASSIUM SERPL-SCNC: 4.5 MMOL/L (ref 3.5–5.1)
RBC # BLD: 4.02 M/UL (ref 4–5.2)
SODIUM BLD-SCNC: 137 MMOL/L (ref 136–145)
WBC # BLD: 8.7 K/UL (ref 4–11)

## 2022-12-10 PROCEDURE — 6370000000 HC RX 637 (ALT 250 FOR IP): Performed by: STUDENT IN AN ORGANIZED HEALTH CARE EDUCATION/TRAINING PROGRAM

## 2022-12-10 PROCEDURE — 80048 BASIC METABOLIC PNL TOTAL CA: CPT

## 2022-12-10 PROCEDURE — 2580000003 HC RX 258: Performed by: STUDENT IN AN ORGANIZED HEALTH CARE EDUCATION/TRAINING PROGRAM

## 2022-12-10 PROCEDURE — 6360000002 HC RX W HCPCS: Performed by: STUDENT IN AN ORGANIZED HEALTH CARE EDUCATION/TRAINING PROGRAM

## 2022-12-10 PROCEDURE — 1200000000 HC SEMI PRIVATE

## 2022-12-10 PROCEDURE — 6360000002 HC RX W HCPCS: Performed by: INTERNAL MEDICINE

## 2022-12-10 PROCEDURE — 99233 SBSQ HOSP IP/OBS HIGH 50: CPT | Performed by: INTERNAL MEDICINE

## 2022-12-10 PROCEDURE — 94760 N-INVAS EAR/PLS OXIMETRY 1: CPT

## 2022-12-10 PROCEDURE — 6370000000 HC RX 637 (ALT 250 FOR IP): Performed by: INTERNAL MEDICINE

## 2022-12-10 PROCEDURE — 85025 COMPLETE CBC W/AUTO DIFF WBC: CPT

## 2022-12-10 PROCEDURE — 36415 COLL VENOUS BLD VENIPUNCTURE: CPT

## 2022-12-10 RX ORDER — MORPHINE SULFATE 2 MG/ML
2 INJECTION, SOLUTION INTRAMUSCULAR; INTRAVENOUS
Status: DISCONTINUED | OUTPATIENT
Start: 2022-12-10 | End: 2022-12-11 | Stop reason: HOSPADM

## 2022-12-10 RX ORDER — HYDROCODONE BITARTRATE AND ACETAMINOPHEN 10; 325 MG/1; MG/1
1 TABLET ORAL EVERY 6 HOURS PRN
Status: DISCONTINUED | OUTPATIENT
Start: 2022-12-10 | End: 2022-12-11 | Stop reason: HOSPADM

## 2022-12-10 RX ADMIN — CARVEDILOL 6.25 MG: 6.25 TABLET, FILM COATED ORAL at 08:17

## 2022-12-10 RX ADMIN — HYDROCODONE BITARTRATE AND ACETAMINOPHEN 1 TABLET: 10; 325 TABLET ORAL at 21:31

## 2022-12-10 RX ADMIN — PANTOPRAZOLE SODIUM 40 MG: 40 TABLET, DELAYED RELEASE ORAL at 21:28

## 2022-12-10 RX ADMIN — DONEPEZIL HYDROCHLORIDE 10 MG: 10 TABLET, FILM COATED ORAL at 21:28

## 2022-12-10 RX ADMIN — CEFTRIAXONE 1000 MG: 1 INJECTION, POWDER, FOR SOLUTION INTRAMUSCULAR; INTRAVENOUS at 14:07

## 2022-12-10 RX ADMIN — MEMANTINE HYDROCHLORIDE 5 MG: 5 TABLET ORAL at 21:28

## 2022-12-10 RX ADMIN — MORPHINE SULFATE 2 MG: 2 INJECTION, SOLUTION INTRAMUSCULAR; INTRAVENOUS at 14:35

## 2022-12-10 RX ADMIN — ENOXAPARIN SODIUM 30 MG: 100 INJECTION SUBCUTANEOUS at 08:24

## 2022-12-10 RX ADMIN — ASPIRIN 81 MG: 81 TABLET, CHEWABLE ORAL at 21:28

## 2022-12-10 RX ADMIN — FAMOTIDINE 20 MG: 20 TABLET, FILM COATED ORAL at 21:28

## 2022-12-10 RX ADMIN — FUROSEMIDE 40 MG: 40 TABLET ORAL at 08:17

## 2022-12-10 RX ADMIN — SODIUM CHLORIDE, PRESERVATIVE FREE 10 ML: 5 INJECTION INTRAVENOUS at 21:33

## 2022-12-10 RX ADMIN — SODIUM CHLORIDE, PRESERVATIVE FREE 10 ML: 5 INJECTION INTRAVENOUS at 08:17

## 2022-12-10 RX ADMIN — HYDROCODONE BITARTRATE AND ACETAMINOPHEN 1 TABLET: 5; 325 TABLET ORAL at 04:26

## 2022-12-10 RX ADMIN — CARVEDILOL 6.25 MG: 6.25 TABLET, FILM COATED ORAL at 17:56

## 2022-12-10 RX ADMIN — HYDROCODONE BITARTRATE AND ACETAMINOPHEN 1 TABLET: 5; 325 TABLET ORAL at 15:14

## 2022-12-10 RX ADMIN — ATORVASTATIN CALCIUM 20 MG: 20 TABLET, FILM COATED ORAL at 21:28

## 2022-12-10 RX ADMIN — AMLODIPINE BESYLATE 5 MG: 5 TABLET ORAL at 21:29

## 2022-12-10 RX ADMIN — ALLOPURINOL 50 MG: 100 TABLET ORAL at 21:28

## 2022-12-10 ASSESSMENT — PAIN DESCRIPTION - FREQUENCY
FREQUENCY: CONTINUOUS

## 2022-12-10 ASSESSMENT — PAIN DESCRIPTION - DESCRIPTORS
DESCRIPTORS: ACHING
DESCRIPTORS: SHARP
DESCRIPTORS: ACHING
DESCRIPTORS: ACHING

## 2022-12-10 ASSESSMENT — PAIN DESCRIPTION - PAIN TYPE
TYPE: ACUTE PAIN

## 2022-12-10 ASSESSMENT — PAIN SCALES - GENERAL
PAINLEVEL_OUTOF10: 0
PAINLEVEL_OUTOF10: 0
PAINLEVEL_OUTOF10: 10
PAINLEVEL_OUTOF10: 6
PAINLEVEL_OUTOF10: 7
PAINLEVEL_OUTOF10: 0
PAINLEVEL_OUTOF10: 10
PAINLEVEL_OUTOF10: 7

## 2022-12-10 ASSESSMENT — PAIN DESCRIPTION - ONSET
ONSET: ON-GOING

## 2022-12-10 ASSESSMENT — PAIN - FUNCTIONAL ASSESSMENT
PAIN_FUNCTIONAL_ASSESSMENT: PREVENTS OR INTERFERES SOME ACTIVE ACTIVITIES AND ADLS
PAIN_FUNCTIONAL_ASSESSMENT: PREVENTS OR INTERFERES SOME ACTIVE ACTIVITIES AND ADLS
PAIN_FUNCTIONAL_ASSESSMENT: ACTIVITIES ARE NOT PREVENTED

## 2022-12-10 ASSESSMENT — PAIN DESCRIPTION - ORIENTATION
ORIENTATION: LEFT
ORIENTATION: LEFT;UPPER
ORIENTATION: LEFT

## 2022-12-10 ASSESSMENT — PAIN DESCRIPTION - LOCATION
LOCATION: ABDOMEN
LOCATION: BACK;ABDOMEN

## 2022-12-10 ASSESSMENT — PAIN SCALES - WONG BAKER
WONGBAKER_NUMERICALRESPONSE: 0
WONGBAKER_NUMERICALRESPONSE: 0

## 2022-12-10 NOTE — PROGRESS NOTES
Patient resting in bed. Denies pain at this time. Alert oriented, hard of hearing. Transfers with walker x 1. Daughter by bedside. Takes medications whole with water. Respirations unlabored regular heart rate and rhythm. Lungs Clear bilaterally to auscultation, abdomen soft rounded non tender,non distended with active bowel sound x 4. No acute distress at this time. Will monitor.

## 2022-12-10 NOTE — PLAN OF CARE
Problem: Discharge Planning  Goal: Discharge to home or other facility with appropriate resources  12/9/2022 2058 by Clarisse Curry RN  Outcome: Progressing  Discharge to home or other facility with appropriate resources:   Identify barriers to discharge with patient and caregiver   Identify discharge learning needs (meds, wound care, etc)   Arrange for needed discharge resources and transportation as appropriate     Problem: Safety - Adult  Goal: Free from fall injury  12/9/2022 2058 by Clarisse Curry RN  Outcome: Progressing  Free From Fall Injury: Instruct family/caregiver on patient safety     Problem: ABCDS Injury Assessment  Goal: Absence of physical injury  12/9/2022 2058 by Clarisse Curry RN  Outcome: Progressing  Absence of Physical Injury: Implement safety measures based on patient assessment     Problem: Pain  Goal: Verbalizes/displays adequate comfort level or baseline comfort level  Outcome: Progressing     Problem: Respiratory - Adult  Goal: Achieves optimal ventilation and oxygenation  Outcome: Progressing     Problem: Cardiovascular - Adult  Goal: Maintains optimal cardiac output and hemodynamic stability  Outcome: Progressing  Goal: Absence of cardiac dysrhythmias or at baseline  Outcome: Progressing     Problem: Metabolic/Fluid and Electrolytes - Adult  Goal: Electrolytes maintained within normal limits  Outcome: Progressing  Goal: Hemodynamic stability and optimal renal function maintained  Outcome: Progressing

## 2022-12-10 NOTE — PROGRESS NOTES
Patient alert and oriented x2, stable on room, ambulatory with walker. Daughter at bedside. Bed in lowest position. Call light within reach. Patient instructed to call out for needs.

## 2022-12-10 NOTE — PROGRESS NOTES
Pulmonary Progress Note    Date of Admission: 12/8/2022   LOS: 2 days     Chief Complaint   Patient presents with    Shortness of Breath    Abdominal Pain       Assessment/Plan:     Mediastinal mass  -Concern for malignancy given growth since the previous CT scan  -Had a long discussion with the daughter today. It seems that patient would not want any therapy should the biopsy be positive for malignancy.  -I discussed the role of EBUS would be for tissue sampling, but carries with it risks associated with anesthesia, mechanical ventilation, and fine-needle aspiration.  -Patient and daughter also explained to me that the reason that they want a biopsy is more so that they can have a better diagnosis to receive medications to help patient's pain. -Given her underlying severe cardiomyopathy and valvulopathy with critical AS I am extremely hesitant to subject patient to a procedure which very well may be fatal to derive a diagnosis that we would do nothing about anyway. I discussed this with the daughter and she is agreeable to defer biopsy at this time. I will cancel her EBUS for Monday and patient can go home from a pulmonary standpoint and follow-up as an outpatient should she choose. Cardiomyopathy, aortic stenosis  -Given her underlying cardiomyopathy and valvulopathy I think she is a candidate for hospice anyway and pain can be treated aggressively without a cancer diagnosis. I spent 37 minutes on this case today, >50% was face-to-face in discussion of the diagnosis and the coordination of care in regards to the treatment plan. All questions answered. 24 Hour Events/Subjective  No acute events overnight. Patient has had severe pain overnight.     ROS:   No nausea  No Vomiting  No chest pain      Intake/Output Summary (Last 24 hours) at 12/10/2022 1043  Last data filed at 12/10/2022 0939  Gross per 24 hour   Intake 700 ml   Output --   Net 700 ml         PHYSICAL EXAM:   Blood pressure (!) 143/75, pulse 74, temperature 97.9 °F (36.6 °C), temperature source Oral, resp. rate 16, height 5' 1\" (1.549 m), weight 121 lb 7.6 oz (55.1 kg), SpO2 94 %, not currently breastfeeding.'  Gen:  No acute distress. Eyes: PERRL. Anicteric sclera. No conjunctival injection. ENT: No discharge. Posterior oropharynx clear. External appearance of ears and nose normal.  Neck: Trachea midline. No mass   Resp:  No crackles. No wheezes. No rhonchi. No dullness on percussion. CV: Regular rate. Regular rhythm. No murmur or rub. No edema. GI: Soft, Non-tender. Non-distended. +BS  Skin: Warm, dry, w/o erythema. Lymph: No cervical or supraclavicular LAD. M/S: No cyanosis. No clubbing. Neuro:  no focal neurologic deficit. Moves all extremities  Psych: Awake and alert, Oriented x 3. Judgement and insight appropriate. Mood stable.       Medications:    Scheduled Meds:   sodium chloride flush  5-40 mL IntraVENous 2 times per day    enoxaparin  30 mg SubCUTAneous Daily    allopurinol  50 mg Oral Nightly    amLODIPine  5 mg Oral Nightly    aspirin  81 mg Oral Nightly    atorvastatin  20 mg Oral Nightly    carvedilol  6.25 mg Oral BID WC    donepezil  10 mg Oral Nightly    furosemide  40 mg Oral Daily    memantine  5 mg Oral Nightly    pantoprazole  40 mg Oral Nightly    famotidine  20 mg Oral Nightly    cefTRIAXone (ROCEPHIN) IV  1,000 mg IntraVENous Q24H       Continuous Infusions:   sodium chloride 25 mL/hr at 12/09/22 1409       PRN Meds:  morphine, sodium chloride flush, sodium chloride, ondansetron **OR** ondansetron, polyethylene glycol, acetaminophen **OR** acetaminophen, ipratropium-albuterol, HYDROcodone 5 mg - acetaminophen    Labs reviewed:  CBC:   Recent Labs     12/08/22  1850 12/10/22  0527   WBC 8.2 8.7   HGB 11.7* 12.0   HCT 36.6 35.9*   MCV 90.8 89.1    298     BMP:   Recent Labs     12/08/22  1850 12/10/22  0527   * 137   K 3.8 4.5   CL 94* 98*   CO2 27 24   BUN 42* 30*   CREATININE 1.6* 1.3* LIVER PROFILE:   Recent Labs     12/08/22  1850   AST 18   ALT 8*   LIPASE 73.0*   BILITOT <0.2   ALKPHOS 69     PT/INR: No results for input(s): PROTIME, INR in the last 72 hours. Films:  Radiology Review:  Pertinent images / reports were reviewed as a part of this visit. This note was transcribed using [employer]. Please disregard any translational errors.     Thank you for this consult,    Jesus Zelaya MD  [facility] Pulmonary, Critical Care, and Sleep Medicine

## 2022-12-10 NOTE — PROGRESS NOTES
[facility] Progress Note  12/10/2022 8:58 AM  Subjective:   Admit Date: 12/8/2022      Chief Complaint: Sleeping soundly--dw jose luis at bedside-jose luis req I not awaken her     Interval History: Some c/o pain overnite--jose luis states [employer] took sev hours to work--mult narcotic allergies --per jose luis--she received MS in ER w/o issues   As per pulm--planning Bx on Monday     Diet: ADULT DIET; Easy to Chew; Low Sodium (2 gm)  Medications:   Scheduled Meds:   sodium chloride flush  5-40 mL IntraVENous 2 times per day    enoxaparin  30 mg SubCUTAneous Daily    allopurinol  50 mg Oral Nightly    amLODIPine  5 mg Oral Nightly    aspirin  81 mg Oral Nightly    atorvastatin  20 mg Oral Nightly    carvedilol  6.25 mg Oral BID WC    donepezil  10 mg Oral Nightly    furosemide  40 mg Oral Daily    memantine  5 mg Oral Nightly    pantoprazole  40 mg Oral Nightly    famotidine  20 mg Oral Nightly    cefTRIAXone (ROCEPHIN) IV  1,000 mg IntraVENous Q24H     Continuous Infusions:   sodium chloride 25 mL/hr at 12/09/22 1409       Review of Systems -   General ROS: afebrile  Respiratory ROS: no cough, shortness of breath or wheezing  Cardiovascular ROS: no chest pain  Musculoskeletal ROS:positive for - :joint pain  Neurological ROS: no TIA or stroke symptoms    Objective:   Vitals: BP (!) 143/75   Pulse 74   Temp 97.9 °F (36.6 °C) (Oral)   Resp 16   Ht 5' 1\" (1.549 m)   Wt 121 lb 7.6 oz (55.1 kg)   SpO2 94%   BMI 22.95 kg/m²   General appearance: alert and cooperative with exam  HEENT: Head: Normocephalic, no lesions, without obvious abnormality.   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    Fanny Dominguez MD

## 2022-12-10 NOTE — PLAN OF CARE
Problem: Discharge Planning  Goal: Discharge to home or other facility with appropriate resources  12/9/2022 2058 by Patrick Lin RN  Outcome: Progressing     Problem: Safety - Adult  Goal: Free from fall injury  12/10/2022 1015 by Deven Santana RN  Flowsheets (Taken 12/9/2022 2145 by Patrick Lin RN)  Free From Fall Injury: Instruct family/caregiver on patient safety  Note: Fall risk band on patient. Orange light on outside of room. Non skid footwear in place. Alarms used appropriately. Patient instructed to call and wait for staff before getting up. Rounding done to anticipate needs. Appropriate safety devices used for transfers.    12/9/2022 2058 by Patrick Lin RN  Outcome: Progressing     Problem: ABCDS Injury Assessment  Goal: Absence of physical injury  12/9/2022 2058 by Patrick Lin RN  Outcome: Progressing     Problem: Pain  Goal: Verbalizes/displays adequate comfort level or baseline comfort level  12/10/2022 1015 by Deven Santana RN  Flowsheets (Taken 12/10/2022 1015)  Verbalizes/displays adequate comfort level or baseline comfort level:   Encourage patient to monitor pain and request assistance   Assess pain using appropriate pain scale   Administer analgesics based on type and severity of pain and evaluate response  12/9/2022 2058 by Patrick Lin RN  Outcome: Progressing     Problem: Respiratory - Adult  Goal: Achieves optimal ventilation and oxygenation  12/9/2022 2058 by Patrick Lin RN  Outcome: Progressing     Problem: Cardiovascular - Adult  Goal: Maintains optimal cardiac output and hemodynamic stability  12/9/2022 2058 by Patrick Lin RN  Outcome: Progressing  Goal: Absence of cardiac dysrhythmias or at baseline  12/9/2022 2058 by Patrick Lin RN  Outcome: Progressing     Problem: Metabolic/Fluid and Electrolytes - Adult  Goal: Electrolytes maintained within normal limits  12/9/2022 2058 by Patrick Lin RN  Outcome: Progressing  Goal: Hemodynamic stability and optimal renal function maintained  12/9/2022 2058 by Marcio Martinez RN  Outcome: Progressing

## 2022-12-10 NOTE — PROGRESS NOTES
ONCOLOGY HEMATOLOGY CARE PROGRESS NOTE      SUBJECTIVE:    Events noted. Pulmonary note appreciated. The daughter does not want to pursue biopsy as it appears to be risky. She is interested in best supportive care. OBJECTIVE        Physical    VITALS:  Patient Vitals for the past 24 hrs:   BP Temp Temp src Pulse Resp SpO2 Weight   12/10/22 0732 (!) 143/75 97.9 °F (36.6 °C) Oral 74 16 94 % --   12/10/22 0441 -- -- -- -- -- -- 121 lb 7.6 oz (55.1 kg)   12/10/22 0426 -- -- -- -- 18 -- --   12/09/22 2054 130/68 98 °F (36.7 °C) Oral 69 16 98 % --   12/09/22 1649 130/64 -- -- 72 -- -- --       24HR INTAKE/OUTPUT:    Intake/Output Summary (Last 24 hours) at 12/10/2022 0941  Last data filed at 12/10/2022 3991  Gross per 24 hour   Intake 810 ml   Output --   Net 810 ml         HEENT: No pallor or scleral icterus. Oral mucosa: No mucositis. No thrush. Neck: Supple, no lymphadenopathy. No thyromegaly  Lungs: No rales, wheezes, respiratory efforts are normal.  Abdomen: nOT DISTENDED  Neuro: No focal deficits. sleeping  Skin: No rashes, petechiae, ecchymosis. Extremities: No edema, tenderness, erythema. DATA:  CBC:    Recent Labs     12/10/22  0527 12/08/22  1850   WBC 8.7 8.2   NEUTROABS 4.6 4.5   LYMPHOPCT 27.0 28.2   RBC 4.02 4.04   HGB 12.0 11.7*   HCT 35.9* 36.6   MCV 89.1 90.8   MCH 29.7 29.0   MCHC 33.3 32.0   RDW 14.2 14.1    312       PT/INR:    Recent Labs     12/08/22  1850   PROT 7.5     PTT:  No results for input(s): APTT in the last 720 hours.     CMP:    Recent Labs     12/10/22  0527 12/08/22  1850 12/05/22  1618    134* 139   K 4.5 3.8 4.3   CL 98* 94* 95*   CO2 24 27 25   GLUCOSE 110* 91 102*   BUN 30* 42* 39*   CREATININE 1.3* 1.6* 1.8*   LABGLOM 40* 31* 27*   CALCIUM 9.4 9.1 10.0   PROT  --  7.5  --    LABALBU  --  3.7  --    AGRATIO  --  1.0*  --    BILITOT  --  <0.2  --    ALKPHOS  --  69  --    ALT  --  8*  --    AST  --  18  --        Lab Results   Component Value Date    CALCIUM 9.4 12/10/2022    PHOS 3.6 05/21/2014       LDH:No results for input(s): LDH in the last 720 hours. Radiology Review:  CT ABDOMEN PELVIS W IV CONTRAST Additional Contrast? Radiologist Recommendation  Narrative: EXAMINATION:  CT OF THE ABDOMEN AND PELVIS WITH CONTRAST 12/8/2022 7:18 pm    TECHNIQUE:  CT of the abdomen and pelvis was performed with the administration of  intravenous contrast. Multiplanar reformatted images are provided for review. Automated exposure control, iterative reconstruction, and/or weight based  adjustment of the mA/kV was utilized to reduce the radiation dose to as low  as reasonably achievable. COMPARISON:  09/10/2021 and 11/09/2022. HISTORY:  ORDERING SYSTEM PROVIDED HISTORY: LUQ, LCVA pain  TECHNOLOGIST PROVIDED HISTORY:  Reason for exam:->LUQ, LCVA pain  Additional Contrast?->Radiologist Recommendation  Decision Support Exception - unselect if not a suspected or confirmed  emergency medical condition->Emergency Medical Condition (MA)  Reason for Exam: LUQ, LCVA pain    FINDINGS:  Lower Chest: The lung bases are unremarkable. There is no pleural effusion. Organs: The liver appears normal.  There has been a cholecystectomy. The  spleen, adrenal glands and pancreas are unremarkable. There is no suspicious  renal parenchymal lesion. Areas of renal cortical thinning appear similar to  the prior studies. There is no ureteral stone or hydronephrosis. GI/Bowel: There is no bowel dilatation or bowel wall thickening. There are  colonic diverticula. The appendix appears normal.    Pelvis: There are numerous bladder diverticula. The bladder again appears  thickened. Peritoneum/Retroperitoneum: No evidence of lymphadenopathy. Aorta is normal  in caliber. No fat stranding, free fluid, free air or focal fluid collection  is identified. Bones/Soft Tissues: There is no destructive bone lesion. Impression: 1.  Thickening of the bladder similar to prior studies which could reflect  hypertrophy or cystitis. Multiple bladder diverticula are again noted. 2. No acute findings elsewhere in the abdomen or pelvis. 3. Colonic diverticulosis. CT CHEST PULMONARY EMBOLISM W CONTRAST  Narrative: EXAMINATION:  CTA OF THE CHEST 12/8/2022 5:19 pm    TECHNIQUE:  CTA of the chest was performed after the administration of intravenous  contrast.  Multiplanar reformatted images are provided for review. MIP  images are provided for review. Automated exposure control, iterative  reconstruction, and/or weight based adjustment of the mA/kV was utilized to  reduce the radiation dose to as low as reasonably achievable. COMPARISON:  CT 10/26/2021 and radiograph 12/08/2022. HISTORY:  ORDERING SYSTEM PROVIDED HISTORY: elevated dimer, sob  TECHNOLOGIST PROVIDED HISTORY:  Reason for exam:->elevated dimer, sob  Decision Support Exception - unselect if not a suspected or confirmed  emergency medical condition->Emergency Medical Condition (MA)  Reason for Exam: elevated dimer, sob    FINDINGS:  Pulmonary Arteries: Pulmonary arteries are adequately opacified for  evaluation. No evidence of intraluminal filling defect to suggest pulmonary  embolism. Main pulmonary artery is normal in caliber. Mediastinum: There is a mass at the central aspect the mediastinum that at  least partially surrounds the trachea and the right and left bronchi. It  measures up to 6.0 x 4.9 cm on the axial images and up to 8 cm in  longitudinal dimension. No enlarged lymph nodes are noted elsewhere. There  is borderline cardiomegaly. There are advanced coronary arterial  calcifications. There is no pericardial effusion. Lungs/pleura: No suspicious lung nodule is identified. Mild areas of  linearity at the lung bases likely represent atelectasis or scarring. The  central airways are thickened. The right and left bronchi appear narrowed.     Upper Abdomen: Limited images of the upper abdomen are unremarkable. Soft Tissues/Bones: No acute bone or soft tissue abnormality. Impression: 1. No pulmonary embolism. 2. Large mass at the central aspect of the mediastinum that at least  partially surrounds the trachea and surrounds and narrows the right and left  bronchi measuring 6 x 4.9 x 8 cm. This is likely malignant and could  represent lymphoma or metastatic disease. A mass arising from the esophagus  is considered less likely. Tissue sampling is recommended. 3. Central airway thickening suggesting bronchitis or reactive airways  disease. 4. Borderline cardiomegaly and advanced coronary arterial calcifications. XR CHEST (2 VW)  Narrative: EXAMINATION:  TWO XRAY VIEWS OF THE CHEST    12/8/2022 6:34 pm    COMPARISON:  Chest x-ray dated 9 November 2022    HISTORY:  ORDERING SYSTEM PROVIDED HISTORY: SOB  TECHNOLOGIST PROVIDED HISTORY:  Reason for exam:->SOB    FINDINGS:  Normal cardiomediastinal silhouette. No pneumothorax or pleural effusion. Mild right basilar airspace opacities. Impression: Mild right basilar airspace opacities. These are favored to represent  atelectasis. However, in the appropriate clinical setting early pneumonia is  not fully excluded.       Problem List  Patient Active Problem List   Diagnosis    HTN (hypertension), benign    SULY (acute kidney injury) ([address]    Elbow pain    Knee pain, bilateral    Primary localized osteoarthrosis, lower leg    Rotator cuff (capsule) sprain    Coronary artery disease involving native coronary artery of native heart without angina pectoris    Idiopathic chronic gout of right foot    Chronic kidney disease (CKD), stage IV (severe) (HCC)    Moderate single current episode of major depressive disorder (HCC)    TIA (transient ischemic attack)    GERD (gastroesophageal reflux disease)    Osteoarthritis    Osteopenia of hip    Acute on chronic heart failure (HCC)    Acute on chronic congestive heart failure ([address]    Primary hypertension    Stage 3b chronic kidney disease (HCC)    Paronychia of finger, right    GI bleed    Diarrhea    Chronic heart failure (HCC)    Sepsis (HCC)    Upper GI bleed    CHF (congestive heart failure), NYHA class I, acute on chronic, combined ([facility]    Suspected COVID-19 virus infection    Acute respiratory failure with hypoxia (HCC)    COVID-19    Generalized weakness    HFrEF (heart failure with reduced ejection fraction) (HCC)    Systolic heart failure, unspecified HF chronicity ([address]    Mediastinal mass       ASSESSMENT AND PLAN:    59-year-old lady has    1. Mediastinal mass:    -Presented with abdominal pain and shortness of breath. -CT chest showed large mass at the central aspect of the mediastinum partially surrounds the trachea.  -Evaluated by pulmonologist TODAY  - EBUS deemed to be high risk. No other areas to biopsy.  - Daughter would not like to have aggressive diagnostic and therapeutic measures. - OK for best supportive care. - I can be reached on [phone] if there are any questions.           ONCOLOGIC DISPOSITION:      Too Lewis MD  Please contact through [facility]

## 2022-12-11 VITALS
OXYGEN SATURATION: 95 % | WEIGHT: 121.91 LBS | TEMPERATURE: 97.6 F | SYSTOLIC BLOOD PRESSURE: 120 MMHG | RESPIRATION RATE: 14 BRPM | DIASTOLIC BLOOD PRESSURE: 75 MMHG | HEIGHT: 61 IN | HEART RATE: 75 BPM | BODY MASS INDEX: 23.02 KG/M2

## 2022-12-11 LAB
ANION GAP SERPL CALCULATED.3IONS-SCNC: 16 MMOL/L (ref 3–16)
ATYPICAL LYMPHOCYTE RELATIVE PERCENT: 1 % (ref 0–6)
BASOPHILS ABSOLUTE: 0 K/UL (ref 0–0.2)
BASOPHILS RELATIVE PERCENT: 0 %
BUN BLDV-MCNC: 34 MG/DL (ref 7–20)
CALCIUM SERPL-MCNC: 9.4 MG/DL (ref 8.3–10.6)
CHLORIDE BLD-SCNC: 97 MMOL/L (ref 99–110)
CO2: 26 MMOL/L (ref 21–32)
CREAT SERPL-MCNC: 1.4 MG/DL (ref 0.6–1.2)
EOSINOPHILS ABSOLUTE: 0.3 K/UL (ref 0–0.6)
EOSINOPHILS RELATIVE PERCENT: 3 %
GFR SERPL CREATININE-BSD FRML MDRD: 36 ML/MIN/{1.73_M2}
GLUCOSE BLD-MCNC: 98 MG/DL (ref 70–99)
HCT VFR BLD CALC: 34.9 % (ref 36–48)
HEMOGLOBIN: 11.5 G/DL (ref 12–16)
LYMPHOCYTES ABSOLUTE: 2.2 K/UL (ref 1–5.1)
LYMPHOCYTES RELATIVE PERCENT: 24 %
MCH RBC QN AUTO: 29.3 PG (ref 26–34)
MCHC RBC AUTO-ENTMCNC: 32.9 G/DL (ref 31–36)
MCV RBC AUTO: 89.1 FL (ref 80–100)
MONOCYTES ABSOLUTE: 1.4 K/UL (ref 0–1.3)
MONOCYTES RELATIVE PERCENT: 16 %
NEUTROPHILS ABSOLUTE: 5 K/UL (ref 1.7–7.7)
NEUTROPHILS RELATIVE PERCENT: 56 %
PDW BLD-RTO: 14.2 % (ref 12.4–15.4)
PLATELET # BLD: 318 K/UL (ref 135–450)
PLATELET SLIDE REVIEW: ADEQUATE
PMV BLD AUTO: 9.6 FL (ref 5–10.5)
POTASSIUM SERPL-SCNC: 4.4 MMOL/L (ref 3.5–5.1)
RBC # BLD: 3.91 M/UL (ref 4–5.2)
SODIUM BLD-SCNC: 139 MMOL/L (ref 136–145)
WBC # BLD: 8.9 K/UL (ref 4–11)

## 2022-12-11 PROCEDURE — 99239 HOSP IP/OBS DSCHRG MGMT >30: CPT | Performed by: INTERNAL MEDICINE

## 2022-12-11 PROCEDURE — 6370000000 HC RX 637 (ALT 250 FOR IP): Performed by: STUDENT IN AN ORGANIZED HEALTH CARE EDUCATION/TRAINING PROGRAM

## 2022-12-11 PROCEDURE — 2580000003 HC RX 258: Performed by: STUDENT IN AN ORGANIZED HEALTH CARE EDUCATION/TRAINING PROGRAM

## 2022-12-11 PROCEDURE — 80048 BASIC METABOLIC PNL TOTAL CA: CPT

## 2022-12-11 PROCEDURE — 6360000002 HC RX W HCPCS: Performed by: STUDENT IN AN ORGANIZED HEALTH CARE EDUCATION/TRAINING PROGRAM

## 2022-12-11 PROCEDURE — 36415 COLL VENOUS BLD VENIPUNCTURE: CPT

## 2022-12-11 PROCEDURE — 99232 SBSQ HOSP IP/OBS MODERATE 35: CPT | Performed by: INTERNAL MEDICINE

## 2022-12-11 PROCEDURE — 85025 COMPLETE CBC W/AUTO DIFF WBC: CPT

## 2022-12-11 PROCEDURE — 94760 N-INVAS EAR/PLS OXIMETRY 1: CPT

## 2022-12-11 PROCEDURE — 6370000000 HC RX 637 (ALT 250 FOR IP): Performed by: INTERNAL MEDICINE

## 2022-12-11 RX ORDER — HYDROCODONE BITARTRATE AND ACETAMINOPHEN 10; 325 MG/1; MG/1
1 TABLET ORAL EVERY 6 HOURS PRN
Qty: 21 TABLET | Refills: 0 | Status: SHIPPED | OUTPATIENT
Start: 2022-12-11 | End: 2022-12-12 | Stop reason: SDUPTHER

## 2022-12-11 RX ORDER — CEFDINIR 300 MG/1
300 CAPSULE ORAL 2 TIMES DAILY
Qty: 10 CAPSULE | Refills: 0 | Status: ON HOLD | OUTPATIENT
Start: 2022-12-11 | End: 2022-12-14 | Stop reason: HOSPADM

## 2022-12-11 RX ADMIN — SODIUM CHLORIDE, PRESERVATIVE FREE 10 ML: 5 INJECTION INTRAVENOUS at 08:28

## 2022-12-11 RX ADMIN — HYDROCODONE BITARTRATE AND ACETAMINOPHEN 1 TABLET: 10; 325 TABLET ORAL at 04:58

## 2022-12-11 RX ADMIN — FUROSEMIDE 40 MG: 40 TABLET ORAL at 08:14

## 2022-12-11 RX ADMIN — CARVEDILOL 6.25 MG: 6.25 TABLET, FILM COATED ORAL at 08:14

## 2022-12-11 RX ADMIN — CARVEDILOL 6.25 MG: 6.25 TABLET, FILM COATED ORAL at 17:09

## 2022-12-11 RX ADMIN — ENOXAPARIN SODIUM 30 MG: 100 INJECTION SUBCUTANEOUS at 08:27

## 2022-12-11 RX ADMIN — CEFTRIAXONE 1000 MG: 1 INJECTION, POWDER, FOR SOLUTION INTRAMUSCULAR; INTRAVENOUS at 14:13

## 2022-12-11 ASSESSMENT — PAIN SCALES - GENERAL
PAINLEVEL_OUTOF10: 0
PAINLEVEL_OUTOF10: 6
PAINLEVEL_OUTOF10: 0

## 2022-12-11 ASSESSMENT — PAIN - FUNCTIONAL ASSESSMENT: PAIN_FUNCTIONAL_ASSESSMENT: PREVENTS OR INTERFERES SOME ACTIVE ACTIVITIES AND ADLS

## 2022-12-11 ASSESSMENT — PAIN DESCRIPTION - ONSET: ONSET: ON-GOING

## 2022-12-11 ASSESSMENT — PAIN DESCRIPTION - FREQUENCY: FREQUENCY: CONTINUOUS

## 2022-12-11 ASSESSMENT — PAIN DESCRIPTION - LOCATION: LOCATION: FLANK;ABDOMEN

## 2022-12-11 ASSESSMENT — PAIN DESCRIPTION - PAIN TYPE: TYPE: ACUTE PAIN

## 2022-12-11 ASSESSMENT — PAIN DESCRIPTION - ORIENTATION: ORIENTATION: LEFT

## 2022-12-11 ASSESSMENT — PAIN DESCRIPTION - DESCRIPTORS: DESCRIPTORS: PRESSURE

## 2022-12-11 NOTE — PROGRESS NOTES
Pulmonary Progress Note    Date of Admission: 12/8/2022   LOS: 3 days     Chief Complaint   Patient presents with    Shortness of Breath    Abdominal Pain       Assessment/Plan:     Mediastinal mass  -Concern for malignancy given growth since the previous CT scan  -But given patient's severe comorbidities and goals of care that do not include chemotherapy/radiation have decided to defer biopsy at this time. I think this is reasonable.  -Can follow-up in the clinic if she wishes    Cardiomyopathy, aortic stenosis  -Very poor EF on top of tight valve    No further inpatient recommendations, we will sign off at this time. Please let us know if we can be of any further assistance. 24 Hour Events/Subjective  No acute events overnight. Pain much improved on increase Norco dose. ROS:   No nausea  No Vomiting  No chest pain      Intake/Output Summary (Last 24 hours) at 12/11/2022 1146  Last data filed at 12/11/2022 0843  Gross per 24 hour   Intake 837 ml   Output --   Net 837 ml         PHYSICAL EXAM:   Blood pressure (!) 143/70, pulse 78, temperature 97.6 °F (36.4 °C), temperature source Oral, resp. rate 16, height 5' 1\" (1.549 m), weight 121 lb 14.6 oz (55.3 kg), SpO2 95 %, not currently breastfeeding.'  Gen:  No acute distress. Eyes: PERRL. Anicteric sclera. No conjunctival injection. ENT: No discharge. Posterior oropharynx clear. External appearance of ears and nose normal.  Neck: Trachea midline. No mass   Resp:  No crackles. No wheezes. No rhonchi. No dullness on percussion. CV: Regular rate. Regular rhythm. No murmur or rub. No edema. GI: Soft, Non-tender. Non-distended. +BS  Skin: Warm, dry, w/o erythema. Lymph: No cervical or supraclavicular LAD. M/S: No cyanosis. No clubbing. Neuro: [place] moves all extremities  Psych: Awake and alert, Oriented x 3. Judgement and insight appropriate. Mood stable.       Medications:    Scheduled Meds:   sodium chloride flush  5-40 mL IntraVENous 2 times per day enoxaparin  30 mg SubCUTAneous Daily    allopurinol  50 mg Oral Nightly    amLODIPine  5 mg Oral Nightly    aspirin  81 mg Oral Nightly    atorvastatin  20 mg Oral Nightly    carvedilol  6.25 mg Oral BID WC    donepezil  10 mg Oral Nightly    furosemide  40 mg Oral Daily    memantine  5 mg Oral Nightly    pantoprazole  40 mg Oral Nightly    famotidine  20 mg Oral Nightly    cefTRIAXone (ROCEPHIN) IV  1,000 mg IntraVENous Q24H       Continuous Infusions:   sodium chloride 25 mL/hr at 12/09/22 1409       PRN Meds:  morphine, HYDROcodone-acetaminophen, sodium chloride flush, sodium chloride, ondansetron **OR** ondansetron, polyethylene glycol, acetaminophen **OR** acetaminophen, ipratropium-albuterol    Labs reviewed:  CBC:   Recent Labs     12/08/22  1850 12/10/22  0527 12/11/22  0456   WBC 8.2 8.7 8.9   HGB 11.7* 12.0 11.5*   HCT 36.6 35.9* 34.9*   MCV 90.8 89.1 89.1    298 318     BMP:   Recent Labs     12/08/22  1850 12/10/22  0527 12/11/22  0456   * 137 139   K 3.8 4.5 4.4   CL 94* 98* 97*   CO2 27 24 26   BUN 42* 30* 34*   CREATININE 1.6* 1.3* 1.4*     LIVER PROFILE:   Recent Labs     12/08/22 1850   AST 18   ALT 8*   LIPASE 73.0*   BILITOT <0.2   ALKPHOS 69     PT/INR: No results for input(s): PROTIME, INR in the last 72 hours. Films:  Radiology Review:  Pertinent images / reports were reviewed as a part of this visit. This note was transcribed using [id number]. Please disregard any translational errors.     Thank you for this consult,    Don Mcnulty MD  [facility] Pulmonary, Critical Care, and Sleep Medicine

## 2022-12-11 NOTE — PLAN OF CARE
Problem: Discharge Planning  Goal: Discharge to home or other facility with appropriate resources  12/11/2022 1805 by Heron Ocampo RN  Outcome: Adequate for Discharge  12/11/2022 1219 by Heron Ocampo RN  Outcome: Progressing  Flowsheets (Taken 12/9/2022 0748 by Leticia Ramon RN)  Discharge to home or other facility with appropriate resources:   Identify barriers to discharge with patient and caregiver   Identify discharge learning needs (meds, wound care, etc)   Arrange for needed discharge resources and transportation as appropriate     Problem: Safety - Adult  Goal: Free from fall injury  12/11/2022 1805 by Heron Ocampo RN  Outcome: Adequate for Discharge  12/11/2022 1219 by Heron Ocampo RN  Outcome: Progressing  Flowsheets (Taken 12/11/2022 1219)  Free From Fall Injury: Instruct family/caregiver on patient safety     Problem: ABCDS Injury Assessment  Goal: Absence of physical injury  12/11/2022 1805 by Herno Ocampo RN  Outcome: Adequate for Discharge  12/11/2022 1219 by Heron Ocampo RN  Outcome: Progressing  [address] (Taken 12/10/2022 2000 by Liss Otoole RN)  Absence of Physical Injury: Implement safety measures based on patient assessment     Problem: Pain  Goal: Verbalizes/displays adequate comfort level or baseline comfort level  12/11/2022 1805 by Heron Ocampo RN  Outcome: Adequate for Discharge  12/11/2022 1219 by Heron Ocampo RN  Outcome: Progressing  Flowsheets (Taken 12/11/2022 0353 by Liss Otoole RN)  Verbalizes/displays adequate comfort level or baseline comfort level:   Encourage patient to monitor pain and request assistance   Assess pain using appropriate pain scale   Administer analgesics based on type and severity of pain and evaluate response   Implement non-pharmacological measures as appropriate and evaluate response     Problem: Respiratory - Adult  Goal: Achieves optimal ventilation and oxygenation  Outcome: Adequate for Discharge     Problem: Cardiovascular - Adult  Goal: Maintains optimal cardiac output and hemodynamic stability  Outcome: Adequate for Discharge  Goal: Absence of cardiac dysrhythmias or at baseline  Outcome: Adequate for Discharge     Problem: Metabolic/Fluid and Electrolytes - Adult  Goal: Electrolytes maintained within normal limits  Outcome: Adequate for Discharge  Goal: Hemodynamic stability and optimal renal function maintained  Outcome: Adequate for Discharge

## 2022-12-11 NOTE — PROGRESS NOTES
Checking on patient Q2H for nutrition needs, hygiene needs, comfort measures, mobility, fall risk interventions, and safe environment. All precautions and interventions in place. Educated patient on use of call light and telephone. Patient verbalizes understanding. Call light/telephone in reach. Medications given as ordered. No further needs at this moment. Will continue to monitor.

## 2022-12-11 NOTE — PROGRESS NOTES
Pt AAO x3. No c/o pain at this time. Assessment completed and charted. Daughter at bedside. Denies any needs. Call light in reach. Will monitor.

## 2022-12-11 NOTE — CARE COORDINATION
CASE MANAGEMENT DISCHARGE SUMMARY:    DISCHARGE DATE: 12/11/22    DISCHARGED TO HOME     TRANSPORTATION: family, dtr Shobha Vides  Spoke with Shobha Vides who is at bedside. She reports she will be bringing pt home today to dtr [facility] house where pt lives. No home care is requested. Dtr asked about hospice care and reports she talked to pulmonology about it. SW suggested follow up with primary MD as family reports this does not have to be set up right away.   Hospice list provided and family will follow up once pt is home          Electronically signed by [id number] Cannon on 12/11/2022 at 10:23 AM

## 2022-12-11 NOTE — PLAN OF CARE
Problem: Discharge Planning  Goal: Discharge to home or other facility with appropriate resources  12/11/2022 1219 by Carlee Templeton RN  Outcome: Progressing  Flowsheets (Taken 12/9/2022 0748 by Anurag Husain RN)  Discharge to home or other facility with appropriate resources:   Identify barriers to discharge with patient and caregiver   Identify discharge learning needs (meds, wound care, etc)   Arrange for needed discharge resources and transportation as appropriate  12/11/2022 0353 by Toña Menjivar RN  Outcome: Progressing     Problem: Safety - Adult  Goal: Free from fall injury  12/11/2022 1219 by Carlee Templeton RN  Outcome: Progressing  [employer] (Taken 12/11/2022 1219)  Free From Fall Injury: Instruct family/caregiver on patient safety  12/11/2022 0353 by Toña Menjivar RN  Outcome: Progressing     Problem: ABCDS Injury Assessment  Goal: Absence of physical injury  12/11/2022 1219 by Carlee Templeton RN  Outcome: Progressing  Flowsheets (Taken 12/10/2022 2000 by Toña Menjivar RN)  Absence of Physical Injury: Implement safety measures based on patient assessment  12/11/2022 0353 by Toña Menjivar RN  Outcome: Progressing  Flowsheets (Taken 12/10/2022 2000)  Absence of Physical Injury: Implement safety measures based on patient assessment     Problem: Pain  Goal: Verbalizes/displays adequate comfort level or baseline comfort level  12/11/2022 1219 by Carlee Templeton RN  Outcome: Progressing  Flowsheets (Taken 12/11/2022 0353 by Toña Menjivar RN)  Verbalizes/displays adequate comfort level or baseline comfort level:   Encourage patient to monitor pain and request assistance   Assess pain using appropriate pain scale   Administer analgesics based on type and severity of pain and evaluate response   Implement non-pharmacological measures as appropriate and evaluate response  12/11/2022 0353 by Toña Menjivar RN  Outcome: Progressing  Flowsheets (Taken 12/11/2022 3726)  Verbalizes/displays adequate comfort level or baseline comfort level:   Encourage patient to monitor pain and request assistance   Assess pain using appropriate pain scale   Administer analgesics based on type and severity of pain and evaluate response   Implement non-pharmacological measures as appropriate and evaluate response     Problem: Respiratory - Adult  Goal: Achieves optimal ventilation and oxygenation  12/11/2022 0353 by Greg Caldera RN  Outcome: Progressing     Problem: Cardiovascular - Adult  Goal: Maintains optimal cardiac output and hemodynamic stability  12/11/2022 0353 by Greg Caldera RN  Outcome: Progressing  Goal: Absence of cardiac dysrhythmias or at baseline  12/11/2022 0353 by Greg Caldera RN  Outcome: Progressing     Problem: Metabolic/Fluid and Electrolytes - Adult  Goal: Electrolytes maintained within normal limits  12/11/2022 0353 by Greg Caldera RN  Outcome: Progressing  Goal: Hemodynamic stability and optimal renal function maintained  12/11/2022 0353 by Greg Caldera RN  Outcome: Progressing

## 2022-12-11 NOTE — PROGRESS NOTES
Patient discharged with belongings with daughter. Patient given discharge instructions, patient verbalized understanding, no questions at this time. Prescriptions given to patient/family. IV D/Cd patient tolerated well, no complications.

## 2022-12-11 NOTE — PLAN OF CARE
Problem: Discharge Planning  Goal: Discharge to home or other facility with appropriate resources  Outcome: Progressing     Problem: Safety - Adult  Goal: Free from fall injury  Outcome: Progressing     Problem: ABCDS Injury Assessment  Goal: Absence of physical injury  Outcome: Progressing  Flowsheets (Taken 12/10/2022 2000)  Absence of Physical Injury: Implement safety measures based on patient assessment     Problem: Pain  Goal: Verbalizes/displays adequate comfort level or baseline comfort level  Outcome: Progressing  Flowsheets (Taken 12/11/2022 0353)  Verbalizes/displays adequate comfort level or baseline comfort level:   Encourage patient to monitor pain and request assistance   Assess pain using appropriate pain scale   Administer analgesics based on type and severity of pain and evaluate response   Implement non-pharmacological measures as appropriate and evaluate response     Problem: Respiratory - Adult  Goal: Achieves optimal ventilation and oxygenation  Outcome: Progressing     Problem: Cardiovascular - Adult  Goal: Maintains optimal cardiac output and hemodynamic stability  Outcome: Progressing     Problem: Cardiovascular - Adult  Goal: Absence of cardiac dysrhythmias or at baseline  Outcome: Progressing     Problem: Metabolic/Fluid and Electrolytes - Adult  Goal: Electrolytes maintained within normal limits  Outcome: Progressing     Problem: Metabolic/Fluid and Electrolytes - Adult  Goal: Hemodynamic stability and optimal renal function maintained  Outcome: Progressing

## 2022-12-11 NOTE — CARE COORDINATION
12/11/22 1025   IMM Letter   IMM Letter given to Patient/Family/Significant other/Guardian/POA/by: Fady MEYERS to pt/dtr Geri at bedside, copy left with them   IMM Letter date given: 12/11/22   IMM Letter time given: 1026   Education provided to patient. Patient reported no questions and verbalized understanding. Patient made aware that he/she has 4 hours prior to discharging from hospital to decide if he/she wishes to pursue the Medicare appeal process.       Electronically signed by [id number] Liana Goodell, LSW on 12/11/2022 at 10:26 AM

## 2022-12-11 NOTE — PROGRESS NOTES
[facility] Progress Note  12/11/2022 9:50 AM  Subjective:   Admit Date: 12/8/2022      Chief Complaint: Sleeping soundly--dw jose luis at bedside-jose luis req I not awaken her     Interval History: Some c/o pain overnite--jose luis states norco took sev hours to work--mult narcotic allergies --per jose luis--she received MS in ER w/o issues   Now not getting scoped or biopsied at this point and norco is helping her pain. Ambulating without difficulty. Will go home to live with daughter    Diet: ADULT DIET; Easy to Chew; Low Sodium (2 gm)  Medications:   Scheduled Meds:   sodium chloride flush  5-40 mL IntraVENous 2 times per day    enoxaparin  30 mg SubCUTAneous Daily    allopurinol  50 mg Oral Nightly    amLODIPine  5 mg Oral Nightly    aspirin  81 mg Oral Nightly    atorvastatin  20 mg Oral Nightly    carvedilol  6.25 mg Oral BID WC    donepezil  10 mg Oral Nightly    furosemide  40 mg Oral Daily    memantine  5 mg Oral Nightly    pantoprazole  40 mg Oral Nightly    famotidine  20 mg Oral Nightly    cefTRIAXone (ROCEPHIN) IV  1,000 mg IntraVENous Q24H     Continuous Infusions:   sodium chloride 25 mL/hr at 12/09/22 1409       Review of Systems -   General ROS: afebrile  Respiratory ROS: no cough, shortness of breath or wheezing  Cardiovascular ROS: no chest pain  Musculoskeletal ROS:positive for - :joint pain  Neurological ROS: no TIA or stroke symptoms    Objective:   Vitals: BP (!) 143/70   Pulse 78   Temp 97.6 °F (36.4 °C) (Oral)   Resp 16   Ht 5' 1\" (1.549 m)   Wt 121 lb 14.6 oz (55.3 kg)   SpO2 95%   BMI 23.04 kg/m²   General appearance: alert and cooperative with exam  HEENT: Head: Normocephalic, no lesions, without obvious abnormality.   Neck: no adenopathy, no carotid bruit, no JVD, supple, symmetrical, trachea midline, and thyroid not enlarged, symmetric, no tenderness/mass/nodules  Lungs: clear to auscultation bilaterally  Heart: regular rate and rhythm, S1, S2 normal, no murmur, click, rub or gallop  Abdomen: soft, non-tender; bowel sounds normal; no masses,  no organomegaly  Extremities: extremities normal, atraumatic, no cyanosis or edema  Neurologic: Mental status: Alert, oriented, thought content appropriate          Assessment & Plan:   Principal Problem:    Mediastinal mass--conservative therapy unless decided upon differently as op, dc home to live with daughter  Active Problems:    Chronic kidney disease (CKD), stage IV (severe) (HCC)--improving     GERD (gastroesophageal reflux disease)    Osteoarthritis    Chronic heart failure (HCC)--EF=20%--stable currently   Resolved Problems:    Complicated UTI (urinary tract infection)  Chest pain overnite--jose luis states mom received MS in ER and jose it well--was listed as an allergy--will delete nd order prn pain   Please note that over 35 minutes was spent in evaluating the patient, review of records and results, discussion with staff/family, etc.    Amber Dunn MD

## 2022-12-11 NOTE — PROGRESS NOTES
Pt received PRN Norco 2 times during the night. Daughter states pt has been up about every 1 hour to urinate, which was better than the previous night when she urinated every 30 minutes. Denies any needs. Will continue to monitor.

## 2022-12-12 ENCOUNTER — CARE COORDINATION (OUTPATIENT)
Dept: CASE MANAGEMENT | Age: 87
End: 2022-12-12

## 2022-12-12 NOTE — CARE COORDINATION
[facility] Care Transitions Initial Follow Up Call    Call within 2 business days of discharge: Yes    Patient: Kaia Scanlon Patient : 1935   MRN: [id number]  Reason for Admission: UTI, ABD pain, possible malignant mass  Discharge Date: 22 RARS: Readmission Risk Score: 19.1      Last Discharge  Street       Date Complaint Diagnosis Description Type Department Provider    22 Shortness of Breath; Abdominal Pain Complicated UTI (urinary tract infection) . .. ED to Hosp-Admission (Discharged) (ADMITTED) Kathy Pan MD; Blas Bundy. .. First initial 24 hr follow up outreach call attempt, no answer. CTN tried to leave VM but during the vm received an incoming call. Accepted incoming call but then did not receive an answer. Home number rang and rang. CTN will continue with outreach call attempts. Care Transitions 24 Hour Call    Do you have all of your prescriptions and are they filled?: Yes  Post Acute Services: Home Health  Do you have support at home?: Partner/Spouse/SO, Child, Grandchild  Are you an active caregiver in your home?: No  Care Transitions Interventions         Follow Up  No future appointments.     PHILIP Vora, RN   Care Transition Nurse  Mobile: [phone]

## 2022-12-13 ENCOUNTER — APPOINTMENT (OUTPATIENT)
Dept: CT IMAGING | Age: 87
DRG: 391 | End: 2022-12-13
Payer: MEDICARE

## 2022-12-13 ENCOUNTER — APPOINTMENT (OUTPATIENT)
Dept: GENERAL RADIOLOGY | Age: 87
DRG: 391 | End: 2022-12-13
Payer: MEDICARE

## 2022-12-13 ENCOUNTER — HOSPITAL ENCOUNTER (INPATIENT)
Age: 87
LOS: 1 days | Discharge: HOSPICE | DRG: 391 | End: 2022-12-14
Attending: STUDENT IN AN ORGANIZED HEALTH CARE EDUCATION/TRAINING PROGRAM | Admitting: STUDENT IN AN ORGANIZED HEALTH CARE EDUCATION/TRAINING PROGRAM
Payer: MEDICARE

## 2022-12-13 ENCOUNTER — TELEPHONE (OUTPATIENT)
Dept: OTHER | Facility: CLINIC | Age: 87
End: 2022-12-13

## 2022-12-13 DIAGNOSIS — N39.0 COMPLICATED UTI (URINARY TRACT INFECTION): ICD-10-CM

## 2022-12-13 DIAGNOSIS — R07.9 ACUTE CHEST PAIN: ICD-10-CM

## 2022-12-13 DIAGNOSIS — B02.9 HERPES ZOSTER WITHOUT COMPLICATION: ICD-10-CM

## 2022-12-13 DIAGNOSIS — I44.7 LEFT BUNDLE BRANCH BLOCK: ICD-10-CM

## 2022-12-13 DIAGNOSIS — R55 NEAR SYNCOPE: ICD-10-CM

## 2022-12-13 DIAGNOSIS — K56.41 FECAL IMPACTION (HCC): Primary | ICD-10-CM

## 2022-12-13 LAB
ALBUMIN SERPL-MCNC: 3.9 G/DL (ref 3.4–5)
ALP BLD-CCNC: 71 U/L (ref 40–129)
ALT SERPL-CCNC: 10 U/L (ref 10–40)
ANION GAP SERPL CALCULATED.3IONS-SCNC: 15 MMOL/L (ref 3–16)
ANION GAP SERPL CALCULATED.3IONS-SCNC: 16 MMOL/L (ref 3–16)
AST SERPL-CCNC: 21 U/L (ref 15–37)
BACTERIA: ABNORMAL /HPF
BASOPHILS ABSOLUTE: 0 K/UL (ref 0–0.2)
BASOPHILS ABSOLUTE: 0 K/UL (ref 0–0.2)
BASOPHILS RELATIVE PERCENT: 0.5 %
BASOPHILS RELATIVE PERCENT: 0.6 %
BILIRUB SERPL-MCNC: 0.3 MG/DL (ref 0–1)
BILIRUBIN DIRECT: <0.2 MG/DL (ref 0–0.3)
BILIRUBIN URINE: NEGATIVE
BILIRUBIN, INDIRECT: NORMAL MG/DL (ref 0–1)
BLOOD, URINE: NEGATIVE
BUN BLDV-MCNC: 32 MG/DL (ref 7–20)
BUN BLDV-MCNC: 36 MG/DL (ref 7–20)
CALCIUM SERPL-MCNC: 9.6 MG/DL (ref 8.3–10.6)
CALCIUM SERPL-MCNC: 9.9 MG/DL (ref 8.3–10.6)
CHLORIDE BLD-SCNC: 96 MMOL/L (ref 99–110)
CHLORIDE BLD-SCNC: 97 MMOL/L (ref 99–110)
CLARITY: ABNORMAL
CO2: 25 MMOL/L (ref 21–32)
CO2: 27 MMOL/L (ref 21–32)
COLOR: YELLOW
CREAT SERPL-MCNC: 1.6 MG/DL (ref 0.6–1.2)
CREAT SERPL-MCNC: 1.7 MG/DL (ref 0.6–1.2)
EKG ATRIAL RATE: 95 BPM
EKG DIAGNOSIS: NORMAL
EKG P AXIS: 110 DEGREES
EKG P-R INTERVAL: 204 MS
EKG Q-T INTERVAL: 394 MS
EKG QRS DURATION: 156 MS
EKG QTC CALCULATION (BAZETT): 495 MS
EKG R AXIS: -66 DEGREES
EKG T AXIS: 90 DEGREES
EKG VENTRICULAR RATE: 95 BPM
EOSINOPHILS ABSOLUTE: 0.1 K/UL (ref 0–0.6)
EOSINOPHILS ABSOLUTE: 0.2 K/UL (ref 0–0.6)
EOSINOPHILS RELATIVE PERCENT: 1 %
EOSINOPHILS RELATIVE PERCENT: 1.8 %
EPITHELIAL CELLS, UA: 29 /HPF (ref 0–5)
GFR SERPL CREATININE-BSD FRML MDRD: 29 ML/MIN/{1.73_M2}
GFR SERPL CREATININE-BSD FRML MDRD: 31 ML/MIN/{1.73_M2}
GLUCOSE BLD-MCNC: 126 MG/DL (ref 70–99)
GLUCOSE BLD-MCNC: 170 MG/DL (ref 70–99)
GLUCOSE URINE: NEGATIVE MG/DL
HCT VFR BLD CALC: 38.2 % (ref 36–48)
HCT VFR BLD CALC: 39.5 % (ref 36–48)
HEMATOLOGY PATH CONSULT: NO
HEMOGLOBIN: 12.4 G/DL (ref 12–16)
HEMOGLOBIN: 13 G/DL (ref 12–16)
HYALINE CASTS: 2 /LPF (ref 0–8)
KETONES, URINE: NEGATIVE MG/DL
LACTIC ACID: 1.7 MMOL/L (ref 0.4–2)
LEUKOCYTE ESTERASE, URINE: NEGATIVE
LIPASE: 31 U/L (ref 13–60)
LYMPHOCYTES ABSOLUTE: 1 K/UL (ref 1–5.1)
LYMPHOCYTES ABSOLUTE: 1.3 K/UL (ref 1–5.1)
LYMPHOCYTES RELATIVE PERCENT: 12.7 %
LYMPHOCYTES RELATIVE PERCENT: 12.8 %
MCH RBC QN AUTO: 28.9 PG (ref 26–34)
MCH RBC QN AUTO: 29.3 PG (ref 26–34)
MCHC RBC AUTO-ENTMCNC: 32.4 G/DL (ref 31–36)
MCHC RBC AUTO-ENTMCNC: 33 G/DL (ref 31–36)
MCV RBC AUTO: 89 FL (ref 80–100)
MCV RBC AUTO: 89.2 FL (ref 80–100)
MICROSCOPIC EXAMINATION: YES
MONOCYTES ABSOLUTE: 1.3 K/UL (ref 0–1.3)
MONOCYTES ABSOLUTE: 1.8 K/UL (ref 0–1.3)
MONOCYTES RELATIVE PERCENT: 17.2 %
MONOCYTES RELATIVE PERCENT: 17.7 %
NEUTROPHILS ABSOLUTE: 5.2 K/UL (ref 1.7–7.7)
NEUTROPHILS ABSOLUTE: 6.7 K/UL (ref 1.7–7.7)
NEUTROPHILS RELATIVE PERCENT: 67.2 %
NEUTROPHILS RELATIVE PERCENT: 68.5 %
NITRITE, URINE: NEGATIVE
PDW BLD-RTO: 14.1 % (ref 12.4–15.4)
PDW BLD-RTO: 14.2 % (ref 12.4–15.4)
PH UA: 7.5 (ref 5–8)
PLATELET # BLD: 312 K/UL (ref 135–450)
PLATELET # BLD: 322 K/UL (ref 135–450)
PMV BLD AUTO: 9.2 FL (ref 5–10.5)
PMV BLD AUTO: 9.5 FL (ref 5–10.5)
POTASSIUM REFLEX MAGNESIUM: 4.3 MMOL/L (ref 3.5–5.1)
POTASSIUM SERPL-SCNC: 4.8 MMOL/L (ref 3.5–5.1)
PROTEIN UA: NEGATIVE MG/DL
RBC # BLD: 4.28 M/UL (ref 4–5.2)
RBC # BLD: 4.43 M/UL (ref 4–5.2)
RBC UA: 2 /HPF (ref 0–4)
SODIUM BLD-SCNC: 137 MMOL/L (ref 136–145)
SODIUM BLD-SCNC: 139 MMOL/L (ref 136–145)
SPECIFIC GRAVITY UA: 1.01 (ref 1–1.03)
TOTAL PROTEIN: 8.1 G/DL (ref 6.4–8.2)
TROPONIN: <0.01 NG/ML
URINE REFLEX TO CULTURE: YES
URINE TYPE: ABNORMAL
UROBILINOGEN, URINE: 0.2 E.U./DL
WBC # BLD: 10 K/UL (ref 4–11)
WBC # BLD: 7.6 K/UL (ref 4–11)
WBC UA: 10 /HPF (ref 0–5)

## 2022-12-13 PROCEDURE — 99222 1ST HOSP IP/OBS MODERATE 55: CPT | Performed by: STUDENT IN AN ORGANIZED HEALTH CARE EDUCATION/TRAINING PROGRAM

## 2022-12-13 PROCEDURE — 6370000000 HC RX 637 (ALT 250 FOR IP): Performed by: STUDENT IN AN ORGANIZED HEALTH CARE EDUCATION/TRAINING PROGRAM

## 2022-12-13 PROCEDURE — 6360000002 HC RX W HCPCS: Performed by: STUDENT IN AN ORGANIZED HEALTH CARE EDUCATION/TRAINING PROGRAM

## 2022-12-13 PROCEDURE — 70450 CT HEAD/BRAIN W/O DYE: CPT

## 2022-12-13 PROCEDURE — 99285 EMERGENCY DEPT VISIT HI MDM: CPT

## 2022-12-13 PROCEDURE — 96365 THER/PROPH/DIAG IV INF INIT: CPT

## 2022-12-13 PROCEDURE — 85025 COMPLETE CBC W/AUTO DIFF WBC: CPT

## 2022-12-13 PROCEDURE — 97530 THERAPEUTIC ACTIVITIES: CPT

## 2022-12-13 PROCEDURE — 97116 GAIT TRAINING THERAPY: CPT

## 2022-12-13 PROCEDURE — 84484 ASSAY OF TROPONIN QUANT: CPT

## 2022-12-13 PROCEDURE — 83605 ASSAY OF LACTIC ACID: CPT

## 2022-12-13 PROCEDURE — 71045 X-RAY EXAM CHEST 1 VIEW: CPT

## 2022-12-13 PROCEDURE — 94761 N-INVAS EAR/PLS OXIMETRY MLT: CPT

## 2022-12-13 PROCEDURE — 97166 OT EVAL MOD COMPLEX 45 MIN: CPT

## 2022-12-13 PROCEDURE — 83690 ASSAY OF LIPASE: CPT

## 2022-12-13 PROCEDURE — 81001 URINALYSIS AUTO W/SCOPE: CPT

## 2022-12-13 PROCEDURE — 1200000000 HC SEMI PRIVATE

## 2022-12-13 PROCEDURE — 6360000004 HC RX CONTRAST MEDICATION: Performed by: STUDENT IN AN ORGANIZED HEALTH CARE EDUCATION/TRAINING PROGRAM

## 2022-12-13 PROCEDURE — 36415 COLL VENOUS BLD VENIPUNCTURE: CPT

## 2022-12-13 PROCEDURE — 97535 SELF CARE MNGMENT TRAINING: CPT

## 2022-12-13 PROCEDURE — 93010 ELECTROCARDIOGRAM REPORT: CPT | Performed by: INTERNAL MEDICINE

## 2022-12-13 PROCEDURE — 87086 URINE CULTURE/COLONY COUNT: CPT

## 2022-12-13 PROCEDURE — 2580000003 HC RX 258: Performed by: STUDENT IN AN ORGANIZED HEALTH CARE EDUCATION/TRAINING PROGRAM

## 2022-12-13 PROCEDURE — 93005 ELECTROCARDIOGRAM TRACING: CPT | Performed by: STUDENT IN AN ORGANIZED HEALTH CARE EDUCATION/TRAINING PROGRAM

## 2022-12-13 PROCEDURE — 80048 BASIC METABOLIC PNL TOTAL CA: CPT

## 2022-12-13 PROCEDURE — 80076 HEPATIC FUNCTION PANEL: CPT

## 2022-12-13 PROCEDURE — 74177 CT ABD & PELVIS W/CONTRAST: CPT

## 2022-12-13 PROCEDURE — 97162 PT EVAL MOD COMPLEX 30 MIN: CPT

## 2022-12-13 PROCEDURE — 2700000000 HC OXYGEN THERAPY PER DAY

## 2022-12-13 RX ORDER — ASPIRIN 81 MG/1
81 TABLET, CHEWABLE ORAL DAILY
Status: DISCONTINUED | OUTPATIENT
Start: 2022-12-13 | End: 2022-12-14 | Stop reason: HOSPADM

## 2022-12-13 RX ORDER — PANTOPRAZOLE SODIUM 40 MG/1
40 TABLET, DELAYED RELEASE ORAL DAILY
Status: DISCONTINUED | OUTPATIENT
Start: 2022-12-13 | End: 2022-12-14 | Stop reason: HOSPADM

## 2022-12-13 RX ORDER — SODIUM CHLORIDE 0.9 % (FLUSH) 0.9 %
5-40 SYRINGE (ML) INJECTION EVERY 12 HOURS SCHEDULED
Status: DISCONTINUED | OUTPATIENT
Start: 2022-12-13 | End: 2022-12-14 | Stop reason: HOSPADM

## 2022-12-13 RX ORDER — FAMOTIDINE 20 MG/1
20 TABLET, FILM COATED ORAL 2 TIMES DAILY
Status: DISCONTINUED | OUTPATIENT
Start: 2022-12-13 | End: 2022-12-13 | Stop reason: DRUGHIGH

## 2022-12-13 RX ORDER — FAMOTIDINE 20 MG/1
20 TABLET, FILM COATED ORAL NIGHTLY
Status: DISCONTINUED | OUTPATIENT
Start: 2022-12-13 | End: 2022-12-14 | Stop reason: HOSPADM

## 2022-12-13 RX ORDER — POLYETHYLENE GLYCOL 3350 17 G/17G
17 POWDER, FOR SOLUTION ORAL DAILY PRN
Status: DISCONTINUED | OUTPATIENT
Start: 2022-12-13 | End: 2022-12-13 | Stop reason: SDUPTHER

## 2022-12-13 RX ORDER — FERROUS SULFATE TAB EC 324 MG (65 MG FE EQUIVALENT) 324 (65 FE) MG
324 TABLET DELAYED RESPONSE ORAL 2 TIMES DAILY WITH MEALS
Status: DISCONTINUED | OUTPATIENT
Start: 2022-12-13 | End: 2022-12-14 | Stop reason: HOSPADM

## 2022-12-13 RX ORDER — ONDANSETRON 2 MG/ML
4 INJECTION INTRAMUSCULAR; INTRAVENOUS EVERY 6 HOURS PRN
Status: DISCONTINUED | OUTPATIENT
Start: 2022-12-13 | End: 2022-12-14 | Stop reason: HOSPADM

## 2022-12-13 RX ORDER — ONDANSETRON 4 MG/1
4 TABLET, ORALLY DISINTEGRATING ORAL EVERY 8 HOURS PRN
Status: DISCONTINUED | OUTPATIENT
Start: 2022-12-13 | End: 2022-12-14 | Stop reason: HOSPADM

## 2022-12-13 RX ORDER — ACETAMINOPHEN 325 MG/1
650 TABLET ORAL EVERY 6 HOURS PRN
Status: DISCONTINUED | OUTPATIENT
Start: 2022-12-13 | End: 2022-12-14 | Stop reason: HOSPADM

## 2022-12-13 RX ORDER — VALACYCLOVIR HYDROCHLORIDE 500 MG/1
1000 TABLET, FILM COATED ORAL 3 TIMES DAILY
Status: DISCONTINUED | OUTPATIENT
Start: 2022-12-13 | End: 2022-12-14 | Stop reason: HOSPADM

## 2022-12-13 RX ORDER — SIMETHICONE 80 MG
80 TABLET,CHEWABLE ORAL EVERY 6 HOURS PRN
Status: DISCONTINUED | OUTPATIENT
Start: 2022-12-13 | End: 2022-12-14 | Stop reason: HOSPADM

## 2022-12-13 RX ORDER — POLYETHYLENE GLYCOL 3350 17 G/17G
17 POWDER, FOR SOLUTION ORAL DAILY PRN
Status: DISCONTINUED | OUTPATIENT
Start: 2022-12-13 | End: 2022-12-14 | Stop reason: HOSPADM

## 2022-12-13 RX ORDER — SODIUM CHLORIDE 9 MG/ML
INJECTION, SOLUTION INTRAVENOUS PRN
Status: DISCONTINUED | OUTPATIENT
Start: 2022-12-13 | End: 2022-12-14 | Stop reason: HOSPADM

## 2022-12-13 RX ORDER — IPRATROPIUM BROMIDE AND ALBUTEROL SULFATE 2.5; .5 MG/3ML; MG/3ML
1 SOLUTION RESPIRATORY (INHALATION) EVERY 6 HOURS PRN
Status: DISCONTINUED | OUTPATIENT
Start: 2022-12-13 | End: 2022-12-14 | Stop reason: HOSPADM

## 2022-12-13 RX ORDER — ATORVASTATIN CALCIUM 20 MG/1
20 TABLET, FILM COATED ORAL NIGHTLY
Status: DISCONTINUED | OUTPATIENT
Start: 2022-12-13 | End: 2022-12-14 | Stop reason: HOSPADM

## 2022-12-13 RX ORDER — ALBUTEROL SULFATE 90 UG/1
2 AEROSOL, METERED RESPIRATORY (INHALATION) 4 TIMES DAILY PRN
Status: DISCONTINUED | OUTPATIENT
Start: 2022-12-13 | End: 2022-12-14 | Stop reason: HOSPADM

## 2022-12-13 RX ORDER — SODIUM CHLORIDE 0.9 % (FLUSH) 0.9 %
5-40 SYRINGE (ML) INJECTION PRN
Status: DISCONTINUED | OUTPATIENT
Start: 2022-12-13 | End: 2022-12-14 | Stop reason: HOSPADM

## 2022-12-13 RX ORDER — ACETAMINOPHEN 650 MG/1
650 SUPPOSITORY RECTAL EVERY 6 HOURS PRN
Status: DISCONTINUED | OUTPATIENT
Start: 2022-12-13 | End: 2022-12-14 | Stop reason: HOSPADM

## 2022-12-13 RX ORDER — MEMANTINE HYDROCHLORIDE 5 MG/1
5 TABLET ORAL NIGHTLY
Status: DISCONTINUED | OUTPATIENT
Start: 2022-12-13 | End: 2022-12-14 | Stop reason: HOSPADM

## 2022-12-13 RX ORDER — DONEPEZIL HYDROCHLORIDE 10 MG/1
10 TABLET, FILM COATED ORAL NIGHTLY
Status: DISCONTINUED | OUTPATIENT
Start: 2022-12-13 | End: 2022-12-14 | Stop reason: HOSPADM

## 2022-12-13 RX ORDER — CARVEDILOL 6.25 MG/1
6.25 TABLET ORAL 2 TIMES DAILY WITH MEALS
Status: DISCONTINUED | OUTPATIENT
Start: 2022-12-13 | End: 2022-12-14 | Stop reason: HOSPADM

## 2022-12-13 RX ORDER — ENOXAPARIN SODIUM 100 MG/ML
30 INJECTION SUBCUTANEOUS DAILY
Status: DISCONTINUED | OUTPATIENT
Start: 2022-12-13 | End: 2022-12-14 | Stop reason: HOSPADM

## 2022-12-13 RX ORDER — ALLOPURINOL 100 MG/1
50 TABLET ORAL EVERY OTHER DAY
Status: DISCONTINUED | OUTPATIENT
Start: 2022-12-13 | End: 2022-12-14 | Stop reason: HOSPADM

## 2022-12-13 RX ADMIN — FERROUS SULFATE TAB EC 324 MG (65 MG FE EQUIVALENT) 324 MG: 324 (65 FE) TABLET DELAYED RESPONSE at 16:03

## 2022-12-13 RX ADMIN — ASPIRIN 81 MG: 81 TABLET, CHEWABLE ORAL at 08:12

## 2022-12-13 RX ADMIN — SODIUM CHLORIDE, PRESERVATIVE FREE 10 ML: 5 INJECTION INTRAVENOUS at 08:14

## 2022-12-13 RX ADMIN — MEMANTINE 5 MG: 5 TABLET ORAL at 20:07

## 2022-12-13 RX ADMIN — ALLOPURINOL 50 MG: 100 TABLET ORAL at 20:07

## 2022-12-13 RX ADMIN — SIMETHICONE 80 MG: 80 TABLET, CHEWABLE ORAL at 16:44

## 2022-12-13 RX ADMIN — ACETAMINOPHEN 650 MG: 325 TABLET, FILM COATED ORAL at 20:08

## 2022-12-13 RX ADMIN — SIMETHICONE 80 MG: 80 TABLET, CHEWABLE ORAL at 22:09

## 2022-12-13 RX ADMIN — SODIUM CHLORIDE, PRESERVATIVE FREE 10 ML: 5 INJECTION INTRAVENOUS at 20:20

## 2022-12-13 RX ADMIN — PANTOPRAZOLE SODIUM 40 MG: 40 TABLET, DELAYED RELEASE ORAL at 08:13

## 2022-12-13 RX ADMIN — FAMOTIDINE 20 MG: 20 TABLET, FILM COATED ORAL at 20:07

## 2022-12-13 RX ADMIN — VALACYCLOVIR HYDROCHLORIDE 1000 MG: 500 TABLET, FILM COATED ORAL at 08:59

## 2022-12-13 RX ADMIN — ACETAMINOPHEN 650 MG: 325 TABLET, FILM COATED ORAL at 13:37

## 2022-12-13 RX ADMIN — DONEPEZIL HYDROCHLORIDE 10 MG: 10 TABLET, FILM COATED ORAL at 20:07

## 2022-12-13 RX ADMIN — IOPAMIDOL 75 ML: 755 INJECTION, SOLUTION INTRAVENOUS at 02:14

## 2022-12-13 RX ADMIN — CARVEDILOL 6.25 MG: 6.25 TABLET, FILM COATED ORAL at 08:12

## 2022-12-13 RX ADMIN — VALACYCLOVIR HYDROCHLORIDE 1000 MG: 500 TABLET, FILM COATED ORAL at 13:35

## 2022-12-13 RX ADMIN — CEFTRIAXONE 1000 MG: 1 INJECTION, POWDER, FOR SOLUTION INTRAMUSCULAR; INTRAVENOUS at 03:51

## 2022-12-13 RX ADMIN — VALACYCLOVIR HYDROCHLORIDE 1000 MG: 500 TABLET, FILM COATED ORAL at 20:20

## 2022-12-13 RX ADMIN — ATORVASTATIN CALCIUM 20 MG: 20 TABLET, FILM COATED ORAL at 20:07

## 2022-12-13 RX ADMIN — CARVEDILOL 6.25 MG: 6.25 TABLET, FILM COATED ORAL at 16:03

## 2022-12-13 RX ADMIN — FERROUS SULFATE TAB EC 324 MG (65 MG FE EQUIVALENT) 324 MG: 324 (65 FE) TABLET DELAYED RESPONSE at 08:13

## 2022-12-13 RX ADMIN — ENOXAPARIN SODIUM 30 MG: 100 INJECTION SUBCUTANEOUS at 08:12

## 2022-12-13 ASSESSMENT — PAIN - FUNCTIONAL ASSESSMENT
PAIN_FUNCTIONAL_ASSESSMENT: ACTIVITIES ARE NOT PREVENTED
PAIN_FUNCTIONAL_ASSESSMENT: ACTIVITIES ARE NOT PREVENTED
PAIN_FUNCTIONAL_ASSESSMENT: 0-10

## 2022-12-13 ASSESSMENT — PAIN DESCRIPTION - LOCATION
LOCATION: BACK
LOCATION: BACK

## 2022-12-13 ASSESSMENT — PAIN DESCRIPTION - ORIENTATION
ORIENTATION: LOWER
ORIENTATION: LOWER

## 2022-12-13 ASSESSMENT — PAIN SCALES - WONG BAKER
WONGBAKER_NUMERICALRESPONSE: 2
WONGBAKER_NUMERICALRESPONSE: 0

## 2022-12-13 ASSESSMENT — PAIN SCALES - GENERAL
PAINLEVEL_OUTOF10: 0
PAINLEVEL_OUTOF10: 9

## 2022-12-13 ASSESSMENT — PAIN DESCRIPTION - DESCRIPTORS: DESCRIPTORS: ACHING

## 2022-12-13 NOTE — PROGRESS NOTES
Patient admitted to room 3267. Patient oriented to call light system, telephone, television, thermostat, bed controls, bathroom, and emergency cord. Patient verbalized, demonstrated understanding of all, continues re-education needed related to history of dementia. VSS. Patient able to ambulate with a walker x1 assist. Medications taken whole with thin liquids. Call light in reach, bed in lowest position, alarm activated, and bedside table in reach. Patient has no complaints or concerns voiced. Will continue to monitor.  Electronically signed by Joan Thapa RN on 12/13/22 at 9:58 AM EST

## 2022-12-13 NOTE — PLAN OF CARE
Problem: Discharge Planning  Goal: Discharge to home or other facility with appropriate resources  12/13/2022 1109 by Royal De Los Santos, RN  Outcome: Progressing     Problem: Pain  Goal: Verbalizes/displays adequate comfort level or baseline comfort level  12/13/2022 1109 by Royal De Los Santos, RN  Outcome: Progressing

## 2022-12-13 NOTE — ED PROVIDER NOTES
[facility]      Pt Name: Brain Quiñones  MRN: [id number]  [place] 1935  Date of evaluation: 12/13/2022  Provider: Marco Antonio Miranda MD    CHIEF COMPLAINT       Chief Complaint   Patient presents with    Abdominal Pain     Pt complaining of abdominal pain. She is rubbing her lower abdomen. Pt is only alert to self     Abdominal pain, near syncope, chest pain    HISTORY OF PRESENT ILLNESS   (Location/Symptom, Timing/Onset,Context/Setting, Quality, Duration, Modifying Factors, Severity)  Note limiting factors. Brain Quiñones is a 80 y.o. female who presents to the ED with a chief complaint of abdominal pain since just prior to arrival.  Per EMS, pt trying to use the restroom and nearly passed out. Also c/o CP en route, recent MI per report. Pt also recently dx with herpes zoster but abd pain is lower diffusely in the bilat lower quadrants. Pt with dementia and is altered, hallucinating, this limits the history. Pts family arrives during initial evaluation, confirms EMS report, pt's AMS. NursingNotes were reviewed.     REVIEW OF SYSTEMS    (2-9 systems for level 4, 10 or more for level 5)     UTO 2/2 AMS    PAST MEDICAL HISTORY     Past Medical History:   Diagnosis Date    Chronic kidney disease (CKD), stage IV (severe) (HCC)     Coronary artery disease involving native coronary artery of native heart without angina pectoris 01/05/2016    Dementia ([facility] [address]     GERD (gastroesophageal reflux disease) acid reflux    Gout, chronic     Histoplasmosis     Hyperlipidemia     Hypertension     Irregular heart beat     Osteoarthritis 02/28/2019    Knee, leg     Osteopenia of hip 04/26/2019    Pneumonia     Ulcer of jejunum     Unspecified cerebral artery occlusion with cerebral infarction 2014 , ~ 2012         SURGICALHISTORY       Past Surgical History:   Procedure Laterality Date    BLADDER SUSPENSION      CHOLECYSTECTOMY  Orthopedic surgery Left arm    HYSTERECTOMY (CERVIX STATUS UNKNOWN)      TUBAL LIGATION      UPPER GASTROINTESTINAL ENDOSCOPY N/A 9/10/2021    EGD CONTROL HEMORRHAGE performed by Amalia Salcido DO at [address]  9/10/2021    EGD SUBMUCOSAL/ EPINEPHRINE INJECTION performed by Amalia Salcido DO at [address]        Previous Medications    ALBUTEROL SULFATE HFA (VENTOLIN HFA) 108 (90 BASE) MCG/ACT INHALER    Inhale 2 puffs into the lungs 4 times daily as needed for Wheezing    ALLOPURINOL (ZYLOPRIM) 100 MG TABLET    TAKE 1/2 TABLET BY MOUTH EVERY DAY    AMLODIPINE (NORVASC) 5 MG TABLET    TAKE 1 TABLET BY MOUTH EVERY DAY    ASPIRIN 81 MG CHEWABLE TABLET    Take 81 mg by mouth daily    ATORVASTATIN (LIPITOR) 20 MG TABLET    TAKE 1 TABLET BY MOUTH AT BEDTIME    CARVEDILOL (COREG) 6.25 MG TABLET    TAKE 1 TABLET BY MOUTH TWO TIMES A DAY WITH MEALS    CEFDINIR (OMNICEF) 300 MG CAPSULE    Take 1 capsule by mouth 2 times daily for 5 days    CYANOCOBALAMIN (VITAMIN B 12 PO)    Take 1 tablet by mouth daily    DICLOFENAC SODIUM (VOLTAREN) 1 % GEL    Apply 4 g topically 4 times daily    DONEPEZIL (ARICEPT) 10 MG TABLET    TAKE 1 TABLET BY MOUTH nightly    FAMOTIDINE (PEPCID) 20 MG TABLET    Take 1 tablet by mouth 2 times daily    FERROUS SULFATE 324 (65 FE) MG EC TABLET    Take 1 tablet by mouth 2 times daily (with meals)    FUROSEMIDE (LASIX) 40 MG TABLET    Take 1 tablet by mouth daily    HYDROCODONE-ACETAMINOPHEN (NORCO)  MG PER TABLET    Take 1 tablet by mouth every 6 hours as needed for Pain for up to 7 days.     IPRATROPIUM-ALBUTEROL (DUONEB) 0.5-2.5 (3) MG/3ML SOLN NEBULIZER SOLUTION    Inhale 3 mLs into the lungs every 6 hours as needed for Shortness of Breath    LORATADINE (CLARITIN) 10 MG CAPSULE    Take 10 mg by mouth daily as needed     MAGNESIUM OXIDE (MAG-OX) 140 MG CAPS    Take 14 mg by mouth daily    MEMANTINE (NAMENDA) 5 MG TABLET    Take 1 tablet by mouth daily    MULTIPLE VITAMINS-MINERALS (THERAPEUTIC MULTIVITAMIN-MINERALS) TABLET    Take 1 tablet by mouth daily    ONDANSETRON (ZOFRAN ODT) 4 MG DISINTEGRATING TABLET    Take 1-2 tablets by mouth every 12 hours as needed for Nausea May Sub regular tablet (non-ODT) if insurance does not cover ODT. PANTOPRAZOLE (PROTONIX) 40 MG TABLET    TAKE 1 TABLET BY MOUTH EVERY DAY    RESPIRATORY THERAPY SUPPLIES (NEBULIZER/TUBING/MOUTHPIECE) KIT    1 kit by Does not apply route daily as needed (wheezing)    VALACYCLOVIR (VALTREX) 1 G TABLET    Take 1 tablet by mouth 3 times daily for 7 days       ALLERGIES     Codeine, Ibuprofen, and Oxycodone-acetaminophen    FAMILY HISTORY       Family History   Problem Relation Age of Onset    High Blood Pressure Mother     Heart Disease Mother     Diabetes Mother     High Blood Pressure Father     Heart Disease Father     Cancer Father         Brain    Cancer Sister         lymphoma    Cancer Brother         colon    Cancer Maternal Uncle           SOCIAL HISTORY       Social History     Socioeconomic History    Marital status:       Spouse name: None    Number of children: None    Years of education: None    Highest education level: None   Tobacco Use    Smoking status: Never     Passive exposure: Yes    Smokeless tobacco: Never   Vaping Use    Vaping Use: Never used   Substance and Sexual Activity    Alcohol use: No     Comment: 1 yearly    Drug use: No    Sexual activity: Not Currently     Partners: Male     Social Determinants of Health     Financial Resource Strain: Low Risk     Difficulty of Paying Living Expenses: Not hard at all   Food Insecurity: No Food Insecurity    Worried About [address] in the Last Year: Never true    [address] in the Last Year: Never true       SCREENINGS             PHYSICAL EXAM    (up to 7 for level 4, 8 or more for level 5)     ED Triage Vitals   BP Temp Temp src Heart Rate Resp SpO2 Height Weight   12/13/22 0031 12/13/22 0034 -- 12/13/22 0031 12/13/22 0031 12/13/22 0031 -- --   (!) 156/74 98.1 °F (36.7 °C)  99 22 98 %         General: Alert to self, in  distress 2/2 pain. Eye: Normal conjunctiva. Sclera anicteric. PERRL  HENT: Oral mucosa is moist.  Normocephalic, atraumatic. Respiratory: Respirations even and non-labored. CTAB. Cardiovascular: Normal rate, Regular rhythm. Intact peripheral pulses. No edema, no JVD. Gastrointestinal: Soft, ttp bilateral lower quadrants. Non-distended. : deferred. Musculoskeletal: No swelling. Integumentary: Warm, Dry. Vesicuolopapular rash L upper abd dermatomal distribution, approx T8 on the left. Ttp. No superimposed erythema. Neurologic: Alert to self. No focal deficits. Psychiatric: Cooperative. DIAGNOSTIC RESULTS     EKG: All EKG's are interpreted by the Emergency Department Physician who either signs or Co-signsthis chart in the absence of a cardiologist.      The Ekg interpreted by me shows  Rhythm NSR  Rate of 95 bpm  Axis is  left axis deviation  Intervals and durations remarkable for 1st deg AV block, LBBB and prolonged QTc  ST Segments: repolarization abnl 2/2 LBBB  Compared to prior EKG dated 12/8/22, LBBB now present. RADIOLOGY:   Non-plain filmimages such as CT, Ultrasound and MRI are read by the radiologist. Plain radiographic images are visualized and preliminarily interpreted by the emergency physician with the below findings:      Interpretation per the Radiologist below, if available at the time ofthis note:    CT ABDOMEN PELVIS W IV CONTRAST Additional Contrast? None   Final Result   1. No ileus, obstruction, or acute inflammatory bowel process. Mild stool   volume within the colon. 2. No urinary tract calculi or obstruction. 3. Cholecystectomy and hysterectomy. 4. Multiple bladder diverticula are noted which may be related to chronic   outlet obstruction.    5. Other nonacute incidental findings as above including atherosclerosis with coronary artery involvement. Aortic valve calcification noted as well. CT Head W/O Contrast   Final Result   1. No acute intracranial abnormality. 2.  Generalized age-related cortical atrophy, with intracranial   atherosclerosis and CT findings suggestive of chronic microvascular ischemic   change. XR CHEST PORTABLE   Final Result   COPD without acute focal infiltrate. ED BEDSIDE ULTRASOUND:   Performed by ED Physician - none    LABS:  Labs Reviewed   CBC WITH AUTO DIFFERENTIAL - Abnormal; Notable for the following components:       Result Value    Monocytes Absolute 1.8 (*)     All other components within normal limits   BASIC METABOLIC PANEL W/ REFLEX TO MG FOR LOW K - Abnormal; Notable for the following components:    Chloride 97 (*)     Glucose 126 (*)     BUN 36 (*)     Creatinine 1.7 (*)     Est, Glom Filt Rate 29 (*)     All other components within normal limits   URINALYSIS WITH REFLEX TO CULTURE - Abnormal; Notable for the following components:    Clarity, UA CLOUDY (*)     All other components within normal limits   MICROSCOPIC URINALYSIS - Abnormal; Notable for the following components:    Bacteria, UA 1+ (*)     WBC, UA 10 (*)     Epithelial Cells, UA 29 (*)     All other components within normal limits   CULTURE, URINE   HEPATIC FUNCTION PANEL   LACTIC ACID   LIPASE   TROPONIN   CBC WITH AUTO DIFFERENTIAL   BASIC METABOLIC PANEL       All other labs were within normal range or not returned as of this dictation. EMERGENCY DEPARTMENT COURSE and DIFFERENTIAL DIAGNOSIS/MDM:   Vitals:    Vitals:    22 0245 22 0310 22 0340 22 0350   BP: (!) 163/76 (!) 145/70 (!) 143/68 138/68   Pulse: 92 92 88 90   Resp: 19 21 30 22   Temp:       SpO2: 98% 96% 95% 98%         Medical decision makin yo F p/w abd pain and near syncope, EKG shows LBBB which is new, did c/o CP to EMS, now no longer with CP.   Has significant lower abd ttp on exam.  H/o UTI, poss ttp from UTI but c/f SBO or other acute intraabdominal process. HDS. Afebrile. Altered per family. CTH, CT a/p. CHT neg. Found to have fecal impaction. Likely etiology of near syncope. Attempted disimpaction but consistency of stool prohibits effective digital disimpaction. Rocephin for UTI.  + SULY. As pt altered and needs mgmt of constipation to prevent further vasovagal events. Admitted to PCP, went up in stable condition. Medications   iopamidol (ISOVUE-370) 76 % injection 75 mL (75 mLs IntraVENous Given 12/13/22 0314)   cefTRIAXone (ROCEPHIN) 1,000 mg in dextrose 5 % 50 mL IVPB mini-bag (0 mg IntraVENous Stopped 12/13/22 0488)       CONSULTS:  IP CONSULT TO PRIMARY CARE PROVIDER        FINAL IMPRESSION      1. Fecal impaction ([address]    2. Near syncope    3. Acute chest pain    4. Left bundle branch block    5. Herpes zoster without complication    6.  Complicated UTI (urinary tract infection)          DISPOSITION/PLAN   DISPOSITION Admitted 12/13/2022 04:57:58 AM        (Please note that portions of this note were completed with a voice recognition program.Efforts were made to edit the dictations but occasionally words are mis-transcribed.)    Yaya Live MD (electronically signed)  Attending Emergency Physician          Yaya Live MD  12/15/22 0397

## 2022-12-13 NOTE — TELEPHONE ENCOUNTER
Writer contacted ED provider to inform of 30 day readmission risk. No Decision on disposition at this time.     Call Back: If you need to call back to inform of disposition you can contact me at [phone]

## 2022-12-13 NOTE — FLOWSHEET NOTE
Patient arrived from the ER to room 3267 at [facility].    Patient came in the ER with daughter who stated that her mom has been having ABD pain x 4 days plus manifestation of rashes on her left rib cage area. She was just recently discharged from [facility]     Patient has dementia and couldn't answer admission questions. ER nurse reported that she would say \"Ow\" when her abd is assessed. Daughter went home and wasn't asked if her mom had a BM. Dr. Hermelinda Pickard tried to assess for constipation but patient just passed flatus. Patient is incontinent of urine and had to be changed twice. On purewick now. On O2 2LPM via nasal cannula to keep pulse ox above 90%    Patient's bed alarm on. Assessed for pain and patient denies this and just went back to sleep. Call light within reach. May have prn tylenol for pain. Received IVPB rocephine while at the ER and is due every 24 hours for dx of UTI. Continue to monitor for safety. Skin assessment done by two RN's . Skin unremarkable except for the rashes on her left side. Mid front towards the back. On valtrex for herpes zoster.

## 2022-12-13 NOTE — CARE COORDINATION
Chart Reviewed. Call placed to Dgtr, Donald Durham, to discuss DC planning but she states she had been in the ED most of the night and is sleeping. I agreed to call her back but wanted to clarify the readmision assessment and her code Status. Dgtr reports she is a DNR. Informed bedside RN and perferct served Dr Arjun Saleh.   Betty Rogers, [facility], [place]     Case Management   [phone]    12/13/2022  10:24 AM

## 2022-12-13 NOTE — CARE COORDINATION
12/13/22 1024   Readmission Assessment   Number of Days since last admission? 1-7 days   Previous Disposition Home with Family   Who is being Interviewed Caregiver  (Dgtr, Ella Ayala)   What was the patient's/caregiver's perception as to why they think they needed to return back to the hospital? Other (Comment)  (abdominal pain and shingles rash)   Did you visit your Primary Care Physician after you left the hospital, before you returned this time? Yes   Did you see a specialist, such as Cardiac, Pulmonary, Orthopedic Physician, etc. after you left the hospital? No   Who advised the patient to return to the hospital? Caregiver   Does the patient report anything that got in the way of taking their medications?  No

## 2022-12-13 NOTE — PROGRESS NOTES
Occupational Therapy  Facility/Department: [facility]  Occupational Therapy Initial Assessment    Name: Nithya Ambrosio  : 1935  MRN: [id number]  Date of Service: 2022    Discharge Recommendations:  Continue to assess pending progress, 24 hour supervision or assist, Home with assist PRN, Home with nursing aide, Home with Home health OT, S Level 2  OT Equipment Recommendations  Other: TBD (need to clarify DME w/ family--pt is a poor historian)     Nithya Ambrosio scored a 19/24 on the AM-PAC ADL Inpatient form. Current research shows that an AM-PAC score of 18 or more is typically associated with a discharge to the patient's home setting if she receives 24 hr supervision, HHA and Home OT services. Based on the patient's AM-PAC score and their current ADL deficits, it is recommended that the patient have 3-5 sessions per week of Occupational Therapy at d/c to increase the patient's independence. Please see assessment section for further patient specific details. If patient discharges prior to next session this note will serve as a discharge summary. Please see below for the latest assessment towards goals. Patient Diagnosis(es): The primary encounter diagnosis was Fecal impaction ([address] Diagnoses of Near syncope, Acute chest pain, Left bundle branch block, Herpes zoster without complication, and Complicated UTI (urinary tract infection) were also pertinent to this visit. Past Medical History:  has a past medical history of Chronic kidney disease (CKD), stage IV (severe) ([address] Coronary artery disease involving native coronary artery of native heart without angina pectoris, Dementia ([address] GERD (gastroesophageal reflux disease), Gout, chronic, Histoplasmosis, Hyperlipidemia, Hypertension, Irregular heart beat, Osteoarthritis, Osteopenia of hip, Pneumonia, Ulcer of jejunum, and Unspecified cerebral artery occlusion with cerebral infarction.   Past Surgical History:  has a past surgical history that includes Hysterectomy; Cholecystectomy (Orthopedic surgery Left arm); Tubal ligation; bladder suspension; Upper gastrointestinal endoscopy (N/A, 9/10/2021); and Upper gastrointestinal endoscopy (9/10/2021). Treatment Diagnosis: impaired ADLs, balance      Assessment   Performance deficits / Impairments: Decreased functional mobility ; Decreased safe awareness;Decreased balance;Decreased ADL status; Decreased endurance;Decreased strength  Assessment: pt is an 79 y/o female who was admitted to hospital after syncopal episode; pt also having abdominal pain. PTA she reports living w/ her daughter & being IND w/ all ADLs, toileting & household t/f using RW. Currently she is requiring up to min A w/ ADLs, toileting, CGA using RW for short distance t/f, reports mild dizziness & is unsteady.  Pt is functioniong slightly below previous occupational performance baseline & would benefit from OT services while in hosp at 3-5x a wk frequency to improve IND w/ above stated deficits; anticipate she can return home IF provided w/ 24 hr Supervision, HHA and  OT services  Treatment Diagnosis: impaired ADLs, balance  Prognosis: Fair  Decision Making: Medium Complexity  REQUIRES OT FOLLOW-UP: Yes  Activity Tolerance  Activity Tolerance: Patient Tolerated treatment well;Treatment limited secondary to decreased cognition  Activity Tolerance Comments: pleasantly confused, is [place]; mild dizziness reported w/ transitional movmts        Plan   Occupational Therapy Plan  Times Per Week: 3-5  Specific Instructions for Next Treatment: sponge bathing/dressing  Current Treatment Recommendations: Strengthening, Cognitive reorientation, Functional mobility training, Endurance training, Safety education & training, Self-Care / ADL     Restrictions  Restrictions/Precautions  Restrictions/Precautions: Fall Risk  Position Activity Restriction  Other position/activity restrictions: has dementia, is [place]; on teley,reg diet, I&O, IV R hand, 4L O2    Subjective   General  Chart Reviewed: Yes  Patient assessed for rehabilitation services?: Yes  Additional Pertinent Hx: per [facility] note:\"Patient came in the ER with daughter who stated that her mom has been having ABD pain x 4 days plus manifestation of rashes on her left rib cage area. \" PMhx: dementia, CKD 4, CHF  Family / Caregiver Present: No  Referring Practitioner: Dr Dwight Sylvester  Diagnosis: syncope & collapse, abdominal pain  Subjective  Subjective: met in room, pt in bed wanting to sit up, belching & nausea noted  General Comment  Comments: she is [place], has baseline dementia, agreeable for OT evaluation     Social/Functional History  Social/Functional History  Lives With: Daughter  Type of Home: House  Home Layout: Two level  Home Access: Stairs to enter with rails  Entrance Stairs - Number of Steps: 4  Bathroom Shower/Tub: Tub/Shower unit  Bathroom Toilet: Standard  Bathroom Equipment: Shower chair  Bathroom Accessibility: Walker accessible  Home Equipment: Meenakshi Baugh, [address] Help From: Family  ADL Assistance: Needs assistance  Bath:  (has needed assistance over the past few months)  Toileting: Independent  Ambulation Assistance: Independent (using RW)  Transfer Assistance: Independent  Active : No  Occupation: Retired  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  Leisure & [address]: used to like needle point sewing  IADL Comments: pt is somewhat a poor historian (info taken from chart)  Additional Comments: sleeps in reg bed       Objective   Heart Rate: 92  Heart Rate Source: Monitor  BP: 132/69  BP Location: Left Arm  Patient Position: Supine  MAP (Calculated): 90  Resp: 18  SpO2: 99 %  O2 Device: Nasal cannula          Observation/Palpation  Posture: Good  Observation: on 3L O2 (RT weaned down x 1 L after pt had 99% pulse ox), on teley, IV R hand  Body Mechanics: mild dizziness w/ supine to sit; slow to move legs out of bed  Safety Devices  Type of Devices:  All unique prominences offloaded;Call light within reach; Chair alarm in place; Left in chair;Nurse notified     Toilet Transfers  Toilet Transfers Comments: anticipate she would need CGA for toilet t/f due to mild unsteadiness when on her feet  Wheelchair Bed Transfers  Wheelchair/Bed - Technique: Ambulating  Equipment Used: Bed;Other  Level of Asssistance: Contact guard assistance  Wheelchair Transfers Comments: using RW for short distance from bed to recliner, OT managed O2 cord; mild unsteadiness noted     ADL  Feeding: Setup;Stand by assistance  Feeding Skilled Clinical Factors: only wanted to drink hot tea, ate 1/2 banana w/ cues & set up; refused remainder of her meal  Grooming: Stand by assistance;Setup;Verbal cueing  Grooming Skilled Clinical Factors: able to wash face & hands w/ set up & cues to initiate  LE Dressing: Minimal assistance  LE Dressing Skilled Clinical Factors: she was able to don her non skid socks w/ set up; needed min A to thread B feet into brief, she stood w/ CGA to manage over hips  Toileting Skilled Clinical Factors: anticipate she would need CGA for steadying during clothing management  Additional Comments: offered assistance to toilet & w/ bathing tasks however pt declined; anticipate she would need up to CG/min A based on endurance & balance/unsteadiness        Bed mobility  Rolling to Left: Contact guard assistance;Minimal assistance  Supine to Sit: Minimal assistance  Bed Mobility Comments: difficulty moving LEs to EOB, OT gave hand so she could pull herself up to seated; mild dizziness reported  Transfers  Sit to stand: Contact guard assistance  Stand to sit: Contact guard assistance  Transfer Comments: mild unsteadiness w/ sit<>stand transitions  Vision  Vision: Impaired  Vision Exceptions: Wears glasses at all times  Hearing  Hearing: Exceptions to [facility]  Hearing Exceptions: Hard of hearing/hearing concerns  Cognition  Overall Cognitive Status: Exceptions  Following Commands: Inconsistently follows commands (due to being [place], has baseline dementia)  Memory: Decreased recall of biographical Information;Decreased short term memory;Decreased recall of recent events  Safety Judgement: Decreased awareness of need for assistance;Decreased awareness of need for safety  Insights: Decreased awareness of deficits  Initiation: Requires cues for some  Sequencing: Requires cues for some  Cognition Comment: pleasantly confused, is very [place], difficulty following some commands; poor historian  Orientation  Overall Orientation Status: Impaired  Orientation Level: Oriented to place;Oriented to person (did not know the date or correct  (stated , cues for ) doesn't know why she is in the hospital)                  Education Given To: Patient  Education Provided: Role of Therapy  Education Provided Comments: educated on purpose of OT services  Education Method: Demonstration  Barriers to Learning: Cognition; Hearing  Education Outcome: Unable to verbalize;Continued education needed  LUE AROM (degrees)  LUE AROM : WNL  LUE General AROM: L shld weakness, compensation; rest WFLs  Left Hand AROM (degrees)  Left Hand AROM: WNL  RUE AROM (degrees)  RUE AROM : WNL  Right Hand AROM (degrees)  Right Hand AROM: WNL                        AM-PAC Score        AM-PAC Inpatient Daily Activity Raw Score: 19 (22)  AM-PAC Inpatient ADL T-Scale Score : 40.22 (22)  ADL Inpatient CMS 0-100% Score: 42.8 (22)  ADL Inpatient CMS G-Code Modifier : CK (22)           Goals  Short Term Goals  Time Frame for Short Term Goals: by 3-5 sessions pt will complete  Short Term Goal 1: Feeding & grooming IND'ly  Short Term Goal 2: UB dressing w/ set up  Short Term Goal 3: LB dressing w/ set up  Short Term Goal 4: toilet tasks w/ set up  Short Term Goal 5: household transfers using RW w/ SBA  Long Term Goals  Time Frame for Long Term Goals : STGs=LTGs  Patient Goals   Patient goals : pt didn't verbalize goals but wants to [place] home\"       Therapy Time   Individual Concurrent Group Co-treatment   Time In 0840         Time Out 0940         Minutes 60         Timed Code Treatment Minutes: [address] [place] [id number]

## 2022-12-13 NOTE — PROGRESS NOTES
Clinical Pharmacy Note  Renal Dose Adjustment    Moe Sanchez is receiving famotidine. This medication is renally eliminated. Based on the patient's Estimated Creatinine Clearance: 18 mL/min (A) (based on SCr of 1.7 mg/dL (H)). and urine output, the dose has been adjusted to famotidine 20mg daily per protocol. Pharmacy will continue to monitor and adjust dose as needed for changes in renal function.       Clementina Antonio, PharmD  12/13/2022 5:47 AM

## 2022-12-13 NOTE — H&P
[facility] Internal Medicine  History and Physical    Patient's PCP: Mohit Pacheco MD  Code Status: Full Code  History Obtained From:  electronic medical record    CC: Abdominal pain    HPI:     Lennox Woodward is an 49-year-old lady with a past medical history significant for systolic heart failure, dementia, aortic stenosis, CKD who presented for syncope and weakness. History is obtained through chart review. Patient is currently sitting in bed without any complaints. She was seen in the clinic yesterday and diagnosed with herpes zoster and started treatment. Apparently patient developed weakness and abdominal pain this morning. Has stool burden on CT and unremarkable CT head. Recently diagnosed mediastinal mass, family elected not to pursue work-up of this.       Past Medical / Surgical History:    Past Medical History:   Diagnosis Date    Chronic kidney disease (CKD), stage IV (severe) (HCC)     Coronary artery disease involving native coronary artery of native heart without angina pectoris 01/05/2016    Dementia ([facility] [address]     GERD (gastroesophageal reflux disease) acid reflux    Gout, chronic     Histoplasmosis     Hyperlipidemia     Hypertension     Irregular heart beat     Osteoarthritis 02/28/2019    Knee, leg     Osteopenia of hip 04/26/2019    Pneumonia     Ulcer of jejunum     Unspecified cerebral artery occlusion with cerebral infarction 2014 , ~ 2012     Past Surgical History:   Procedure Laterality Date    BLADDER SUSPENSION      CHOLECYSTECTOMY  Orthopedic surgery Left arm    HYSTERECTOMY (CERVIX STATUS UNKNOWN)      TUBAL LIGATION      UPPER GASTROINTESTINAL ENDOSCOPY N/A 9/10/2021    EGD CONTROL HEMORRHAGE performed by Zachary Shaw DO at [address] [place] [address] [place]  9/10/2021    EGD SUBMUCOSAL/ EPINEPHRINE INJECTION performed by Zachary Shaw DO at [facility] ENDOSCOPY       Medications Prior to Admission:    No current facility-administered medications on file prior to encounter. Current Outpatient Medications on File Prior to Encounter   Medication Sig Dispense Refill    valACYclovir (VALTREX) 1 g tablet Take 1 tablet by mouth 3 times daily for 7 days 21 tablet 0    HYDROcodone-acetaminophen (NORCO)  MG per tablet Take 1 tablet by mouth every 6 hours as needed for Pain for up to 7 days. 21 tablet 0    cefdinir (OMNICEF) 300 MG capsule Take 1 capsule by mouth 2 times daily for 5 days 10 capsule 0    diclofenac sodium (VOLTAREN) 1 % GEL Apply 4 g topically 4 times daily 150 g 1    furosemide (LASIX) 40 MG tablet Take 1 tablet by mouth daily 90 tablet 2    amLODIPine (NORVASC) 5 MG tablet TAKE 1 TABLET BY MOUTH EVERY DAY (Patient taking differently: Take 5 mg by mouth at bedtime) 90 tablet 0    atorvastatin (LIPITOR) 20 MG tablet TAKE 1 TABLET BY MOUTH AT BEDTIME 90 tablet 0    pantoprazole (PROTONIX) 40 MG tablet TAKE 1 TABLET BY MOUTH EVERY DAY 90 tablet 0    famotidine (PEPCID) 20 MG tablet Take 1 tablet by mouth 2 times daily 60 tablet 0    ondansetron (ZOFRAN ODT) 4 MG disintegrating tablet Take 1-2 tablets by mouth every 12 hours as needed for Nausea May Sub regular tablet (non-ODT) if insurance does not cover ODT.  12 tablet 0    magnesium oxide (MAG-OX) 140 MG CAPS Take 14 mg by mouth daily 90 capsule 2    allopurinol (ZYLOPRIM) 100 MG tablet TAKE 1/2 TABLET BY MOUTH EVERY DAY (Patient taking differently: Take 50 mg by mouth every other day TAKE 1/2 TABLET BY MOUTH EVERY OTHER DAY AT NIGHT) 90 tablet 2    carvedilol (COREG) 6.25 MG tablet TAKE 1 TABLET BY MOUTH TWO TIMES A DAY WITH MEALS 180 tablet 2    memantine (NAMENDA) 5 MG tablet Take 1 tablet by mouth daily (Patient taking differently: Take 5 mg by mouth at bedtime) 180 tablet 1    donepezil (ARICEPT) 10 MG tablet TAKE 1 TABLET BY MOUTH nightly 90 tablet 0    albuterol sulfate HFA (VENTOLIN HFA) 108 (90 Base) MCG/ACT inhaler Inhale 2 puffs into the lungs 4 times daily as needed for Wheezing 18 g 0 Cyanocobalamin (VITAMIN B 12 PO) Take 1 tablet by mouth daily      aspirin 81 MG chewable tablet Take 81 mg by mouth daily      ferrous sulfate 324 (65 Fe) MG EC tablet Take 1 tablet by mouth 2 times daily (with meals) 60 tablet 1    Respiratory Therapy Supplies (NEBULIZER/TUBING/MOUTHPIECE) KIT 1 kit by Does not apply route daily as needed (wheezing) 1 kit 0    ipratropium-albuterol (DUONEB) 0.5-2.5 (3) MG/3ML SOLN nebulizer solution Inhale 3 mLs into the lungs every 6 hours as needed for Shortness of Breath 360 mL 0    Multiple Vitamins-Minerals (THERAPEUTIC MULTIVITAMIN-MINERALS) tablet Take 1 tablet by mouth daily      loratadine (CLARITIN) 10 MG capsule Take 10 mg by mouth daily as needed          Allergies:  Codeine, Ibuprofen, and Oxycodone-acetaminophen    Social History:   TOBACCO:   reports that she has never smoked. She has been exposed to tobacco smoke. She has never used smokeless tobacco.     ETOH:   reports no history of alcohol use. Family History:       Problem Relation Age of Onset    High Blood Pressure Mother     Heart Disease Mother     Diabetes Mother     High Blood Pressure Father     Heart Disease Father     Cancer Father         Brain    Cancer Sister         lymphoma    Cancer Brother         colon    Cancer Maternal Uncle        ROS:     All other systems reviewed and are negative.     PHYSICAL EXAM:  /69   Pulse 92   Temp 98.3 °F (36.8 °C) (Oral)   Resp 18   Ht 5' 1\" (1.549 m)   Wt 127 lb (57.6 kg)   SpO2 96%   BMI 24.00 kg/m²        General: No acute distress  HEENT: PERRL, EOMI, moist oral mucosa  Chest: Left-sided vesicular rash in dermatomal distribution  Cardiac: Regular rate and rhythm, systolic murmur, + peripheral edema in legs  Lungs: Clear to auscultation bilaterally  Abdomen: Soft nontender, nondistended  Musculoskeletal: No joint effusions  Neuro: Nonfocal, oriented to self, not place or time      LABS:  Recent Labs     12/11/22  0456 12/13/22  0044   WBC 8.9 10. 0   HGB 11.5* 13.0   HCT 34.9* 39.5    322                                                                  Recent Labs     12/11/22  0456 12/13/22  0044    139   K 4.4 4.3   CL 97* 97*   CO2 26 27   BUN 34* 36*   CREATININE 1.4* 1.7*   GLUCOSE 98 126*     Recent Labs     12/13/22  0044   AST 21   ALT 10   BILITOT 0.3   ALKPHOS 71     Recent Labs     12/13/22  0044   TROPONINI <0.01     No results for input(s): BNP in the last 72 hours. Lab Results   Component Value Date/Time    PHART 7.292 10/20/2020 02:00 AM    VGH6UCB 44.2 10/20/2020 02:00 AM    PO2ART 156.0 10/20/2020 02:00 AM     No results for input(s): INR in the last 72 hours. Recent Labs     12/13/22 0044   COLORU Yellow   PHUR 7.5   WBCUA 10*   RBCUA 2   BACTERIA 1+*   CLARITYU CLOUDY*   SPECGRAV 1.014   LEUKOCYTESUR Negative   UROBILINOGEN 0.2   BILIRUBINUR Negative   BLOODU Negative   GLUCOSEU Negative         U/A:    Lab Results   Component Value Date/Time    COLORU Yellow 12/13/2022 12:44 AM    WBCUA 10 12/13/2022 12:44 AM    RBCUA 2 12/13/2022 12:44 AM    MUCUS Rare 08/24/2021 08:22 PM    BACTERIA 1+ 12/13/2022 12:44 AM    CLARITYU CLOUDY 12/13/2022 12:44 AM    SPECGRAV 1.014 12/13/2022 12:44 AM    LEUKOCYTESUR Negative 12/13/2022 12:44 AM    BLOODU Negative 12/13/2022 12:44 AM    GLUCOSEU Negative 12/13/2022 12:44 AM    GLUCOSEU Negative 06/13/2011 04:20 PM    AMORPHOUS Rare 10/26/2021 04:30 PM     ABG:    Lab Results   Component Value Date/Time    IRC6SHX 21.3 10/20/2020 02:00 AM    BEART -5.2 10/20/2020 02:00 AM    F5FDMSZR 100.0 10/20/2020 02:00 AM    PHART 7.292 10/20/2020 02:00 AM    LIZ3SYM 44.2 10/20/2020 02:00 AM    PO2ART 156.0 10/20/2020 02:00 AM    RIJ4HPY 22.6 10/20/2020 02:00 AM       CT head without contrast  Impression   1. No acute intracranial abnormality. 2.  Generalized age-related cortical atrophy, with intracranial   atherosclerosis and CT findings suggestive of chronic microvascular ischemic   change. CT abdomen pelvis with IV contrast  Impression   1. No ileus, obstruction, or acute inflammatory bowel process. Mild stool   volume within the colon. 2. No urinary tract calculi or obstruction. 3. Cholecystectomy and hysterectomy. 4. Multiple bladder diverticula are noted which may be related to chronic   outlet obstruction. 5. Other nonacute incidental findings as above including atherosclerosis with   coronary artery involvement. Aortic valve calcification noted as well. Assessment & Plan:    Joceline Beck is a 80 y.o. female who was admitted with Syncope and collapse. Joceline Beck is a 80 y.o. female who was admitted with Mediastinal mass. Principal Problem:     Mediastinal mass  -Incidentally discovered mediastinal mass  - Plan was to just monitor and treat for symptoms including pain, continue Norco    Herpes zoster  - Continue antiviral, Norco for pain control    Constipation  - Bowel regimen    HFrEF, chronic  -Continue lisinopril, Imdur and carvedilol,lasix    Chronic bladder wall thickening  - Discontinue ceftriaxone    Dementia with behavioral disturbance  -Continue donepezil memantine     GERD  - Continue Protonix     CKD  - Monitor renal function     Hyperlipidemia  -Continue statin     CAD  - Aspirin/statin     F/E/N: Cardiac diet  PPx: PPI, Lovenox  Dispo: Patient has severe heart failure, new mediastinal mass and dementia with behavioral disturbance. She has been admitted twice in the past week. She has had multiple ER visits. Need to have goals of care conversations again with families, seems that patient's family is having difficulty caring for patient at home      Vaishali Ryan MD   12/13/2022 8:33 AM    Documentation was done using voice recognition dragon software. Every effort was made to ensure accuracy; however, inadvertent unintentional computerized transcription errors may be present.

## 2022-12-13 NOTE — PLAN OF CARE
Problem: Discharge Planning  Goal: Discharge to home or other facility with appropriate resources  Outcome: Progressing  Flowsheets  Taken 12/13/2022 0720  Discharge to home or other facility with appropriate resources: Identify barriers to discharge with patient and caregiver  Taken 12/13/2022 0603  Discharge to home or other facility with appropriate resources: Identify barriers to discharge with patient and caregiver     Problem: Pain  Goal: Verbalizes/displays adequate comfort level or baseline comfort level  Outcome: Progressing

## 2022-12-13 NOTE — PROGRESS NOTES
Physical Therapy  Facility/Department: [address] IP REHAB  (Inpatient bed)  Physical Therapy Initial Assessment  This note serves as patient discharge summary if pt discharges prior to next PT visit    Name: Yoon Alvarado  : 1935  MRN: [id number]  Date of Service: 2022    Discharge Recommendations:  Continue to assess pending progress, Patient would benefit from continued therapy after discharge (2-3)   Yoon Alvarado scored a 17/24 on the AM-PAC short mobility form. Current research shows that an AM-PAC score of 18 or greater is typically associated with a discharge to the patient's home setting. Based on the patient's AM-PAC score and their current functional mobility deficits, it is recommended that the patient have 2-3 sessions per week of Physical Therapy at d/c to increase the patient's independence. PT Equipment Recommendations  Other: cont to monitor. Has RW. Trial Rollator? Patient Diagnosis(es): The primary encounter diagnosis was Fecal impaction ([facility] [address] Diagnoses of Near syncope, Acute chest pain, Left bundle branch block, Herpes zoster without complication, and Complicated UTI (urinary tract infection) were also pertinent to this visit. Past Medical History:  has a past medical history of Chronic kidney disease (CKD), stage IV (severe) ([address] Coronary artery disease involving native coronary artery of native heart without angina pectoris, Dementia ([address] GERD (gastroesophageal reflux disease), Gout, chronic, Histoplasmosis, Hyperlipidemia, Hypertension, Irregular heart beat, Osteoarthritis, Osteopenia of hip, Pneumonia, Ulcer of jejunum, and Unspecified cerebral artery occlusion with cerebral infarction. Past Surgical History:  has a past surgical history that includes Hysterectomy; Cholecystectomy (Orthopedic surgery Left arm); Tubal ligation; bladder suspension; Upper gastrointestinal endoscopy (N/A, 9/10/2021); and Upper gastrointestinal endoscopy (9/10/2021).     Assessment Body Structures, Functions, Activity Limitations Requiring Skilled Therapeutic Intervention: Decreased functional mobility ; Decreased safe awareness;Decreased cognition;Decreased balance  Assessment: per PREM [facility]ana note:\"Patient came in the ER with daughter who stated that her mom has been having ABD pain x 4 days plus manifestation of rashes on her left rib cage area. \"  Patient with very recent hospitalization at this facility 12-9 > 12-11-22 with UTI. 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 PMHx as noted. Prior status: lives with dtr in home (seems to have 24 hour supervision/assist). Requires assist with ADLs, and states independent in transfers and RW gait (should clarify with family). Status 12-13-22: Transfers CGA-SBA and cues. Gait RW up to 22' with CGA with Fair quality and tolerance. Patient limited by B LE functional weakness. Patient lacks safety awareness and insight to her limitations. Anticipate patient will require 24 hour supervision and assist for safety at home. Would also recommend home therapy to maximize safety and functional capabilities. Will continue to see and progress to tolerance. Medical needs may impact DC needs and timing. Treatment Diagnosis: Impaired activity toleance. Therapy Prognosis: Good;Fair  Decision Making: Medium Complexity  History: as noted  Clinical Presentation: Evolving  Requires PT Follow-Up: Yes  Activity Tolerance  Activity Tolerance: Patient tolerated evaluation without incident  Activity Tolerance Comments: 3 L O2.  97% HR 81 resting.      Plan   Physcial Therapy Plan  General Plan: 3-5 times per week  Current Treatment Recommendations: Functional mobility training, Gait training, Stair training, Cognitive reorientation, Patient/Caregiver education & training, Safety education & training  Safety Devices  Type of Devices: Gait belt, Left in bed, Bed alarm in place, Call light within reach, Patient at risk for falls (PREM Schultz informed) Restrictions  Restrictions/Precautions  Restrictions/Precautions: Fall Risk  Position Activity Restriction  Other position/activity restrictions: has dementia, is [place]; on teley,reg diet, I&O, IV R hand, 12-13-22: 3L O2     Subjective   General  Chart Reviewed: Yes  Patient assessed for rehabilitation services?: Yes  Additional Pertinent Hx: per PREM [facility] note:\"Patient came in the ER with daughter who stated that her mom has been having ABD pain x 4 days plus manifestation of rashes on her left rib cage area. \"  Patient with very recent hospitalization at this facility 12-9 > 12-11-22 with UTI. 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 PMHx as noted. Response To Previous Treatment: Not applicable  Family / Caregiver Present: No  Referring Practitioner: Karla Gonzalez MD  Referral Date : 12/13/22  Subjective  Subjective: Patient lightly sleeping in BS chair at arrival. Awakens easily for therapist. Reports mid central abdominal pain, does not rate. Agreeable to therapy. Social/Functional History  Social/Functional History  Lives With: Daughter  Type of Home: House  Home Layout: Two level  Home Access: Stairs to enter with rails  Entrance Stairs - Number of Steps: 4  Bathroom Shower/Tub: Tub/Shower unit  Bathroom Toilet: Standard  Bathroom Equipment: Shower chair  Bathroom Accessibility: Walker accessible  Home Equipment: [employer], [address] Help From: Family  ADL Assistance: Needs assistance  Bath:  (has needed assistance over the past few months)  Toileting: Independent  Ambulation Assistance: Independent (using RW)  Transfer Assistance: Independent  Active : No  Occupation: Retired  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 At [facility] 12-13-22, patient states not home alone.     Vision/Hearing  Vision  Vision: Impaired  Vision Exceptions: Wears glasses at all times  Hearing  Hearing: Exceptions to [facility]  Hearing Exceptions: Hard of hearing/hearing concerns      Cognition   Orientation  Overall Orientation Status: Impaired  Orientation Level: Oriented to place;Oriented to person (did not know the date or correct  (stated , cues for ) doesn't know why she is in the hospital)  Cognition  Overall Cognitive Status: Exceptions  Following Commands: Inconsistently follows commands (due to being [facility], has baseline dementia)  Memory: Decreased recall of biographical Information;Decreased short term memory;Decreased recall of recent events  Safety Judgement: Decreased awareness of need for assistance;Decreased awareness of need for safety  Insights: Decreased awareness of deficits  Initiation: Requires cues for some  Sequencing: Requires cues for some  Cognition Comment: pleasantly confused, is very [place], difficulty following some commands; poor historian     Objective   AROM RLE (degrees)  RLE AROM: WFL  AROM LLE (degrees)  LLE AROM : WFL  Strength RLE  Strength RLE: WFL  Strength LLE  Strength LLE: WFL  Bed mobility  Supine to Sit: Unable to assess (In BS chair at start of session)  Sit to Supine: Modified independent (bed flat. No rails. Min extra time)  Scooting: Stand by assistance (and Mod cue to pull to bed rails, and bend B hips/knees and push with LEs.)  Transfers  Sit to Stand: Stand by assistance (cues for hand placement, which patient does not follow (in part due to [facility]). )  Stand to Sit: Stand by assistance  Ambulation  Surface: Level tile  Device: Rolling Walker  Assistance: Contact guard assistance  Quality of Gait: Min flexed trunk, discontinuous yvon. Stands to rear of RW. Tends to set walker aside as she approaches end destination. Gait Deviations: Decreased step length;Decreased step height  Distance: 25', 21\"  Comments: Patient sits on commode and urinates / has small BM (RN informed).   Manages briefs/pants, seated edmar care, and standing at sink to wash hands, with SBA. Patient LEs B seem to feel functionally weak by end of gait. Patient appears tentative, with some leg tremoring, but no LE buckling. Reports unable to walk any further, and requests return to bed. Balance  Posture: Fair  Sitting - Static: Good  Sitting - Dynamic: Good  Standing - Static: Good (supported)  Standing - Dynamic: Good (at RW)    AM-PAC Score  AM-PAC Inpatient Mobility Raw Score : 17 (12/13/22 1203)  AM-PAC Inpatient T-Scale Score : 42.13 (12/13/22 1203)  Mobility Inpatient CMS 0-100% Score: 50.57 (12/13/22 1203)  Mobility Inpatient CMS G-Code Modifier : CK (12/13/22 1203)    Goals  Short Term Goals  Time Frame for Short Term Goals: By Acute DC  Short Term Goal 1: Transfers SBA-Supervision  Short Term Goal 2: Gait RW 40' SBA  Short Term Goal 3: 4 steps with rails as at home, CGA-SBA  Patient Goals   Patient Goals : \"Go home with my dtr. \"       Education  Patient Education  Education Given To: Patient  Education Provided: Plan of Care;Orientation;Transfer Training  Education Method: Verbal  Barriers to Learning: Cognition  Education Outcome: Continued education needed      Therapy Time   Individual Concurrent Group Co-treatment   Time In 0873         Time Out 1135         Minutes 44            Ev 15; Gt 14;  Home Tai Otto Rivera PT  Electronically signed by Sharmin Worthy, [address] [place] [address] Drive [phone])  on 12/13/2022 at 12:06 PM

## 2022-12-14 VITALS
WEIGHT: 127.43 LBS | OXYGEN SATURATION: 95 % | BODY MASS INDEX: 24.06 KG/M2 | HEART RATE: 78 BPM | SYSTOLIC BLOOD PRESSURE: 142 MMHG | DIASTOLIC BLOOD PRESSURE: 73 MMHG | RESPIRATION RATE: 18 BRPM | HEIGHT: 61 IN | TEMPERATURE: 98.1 F

## 2022-12-14 PROBLEM — R55 SYNCOPE AND COLLAPSE: Status: RESOLVED | Noted: 2022-12-13 | Resolved: 2022-12-14

## 2022-12-14 LAB
ANION GAP SERPL CALCULATED.3IONS-SCNC: 15 MMOL/L (ref 3–16)
BASOPHILS ABSOLUTE: 0 K/UL (ref 0–0.2)
BASOPHILS RELATIVE PERCENT: 0.4 %
BUN BLDV-MCNC: 27 MG/DL (ref 7–20)
CALCIUM SERPL-MCNC: 9.6 MG/DL (ref 8.3–10.6)
CHLORIDE BLD-SCNC: 92 MMOL/L (ref 99–110)
CO2: 25 MMOL/L (ref 21–32)
CREAT SERPL-MCNC: 1.3 MG/DL (ref 0.6–1.2)
EOSINOPHILS ABSOLUTE: 0 K/UL (ref 0–0.6)
EOSINOPHILS RELATIVE PERCENT: 0.6 %
GFR SERPL CREATININE-BSD FRML MDRD: 40 ML/MIN/{1.73_M2}
GLUCOSE BLD-MCNC: 117 MG/DL (ref 70–99)
HCT VFR BLD CALC: 37.1 % (ref 36–48)
HEMOGLOBIN: 12 G/DL (ref 12–16)
LYMPHOCYTES ABSOLUTE: 1.2 K/UL (ref 1–5.1)
LYMPHOCYTES RELATIVE PERCENT: 14.8 %
MCH RBC QN AUTO: 29.1 PG (ref 26–34)
MCHC RBC AUTO-ENTMCNC: 32.5 G/DL (ref 31–36)
MCV RBC AUTO: 89.7 FL (ref 80–100)
MONOCYTES ABSOLUTE: 1.2 K/UL (ref 0–1.3)
MONOCYTES RELATIVE PERCENT: 14.9 %
NEUTROPHILS ABSOLUTE: 5.6 K/UL (ref 1.7–7.7)
NEUTROPHILS RELATIVE PERCENT: 69.3 %
PDW BLD-RTO: 14.2 % (ref 12.4–15.4)
PLATELET # BLD: 317 K/UL (ref 135–450)
PMV BLD AUTO: 9.6 FL (ref 5–10.5)
POTASSIUM SERPL-SCNC: 3.8 MMOL/L (ref 3.5–5.1)
RBC # BLD: 4.13 M/UL (ref 4–5.2)
SODIUM BLD-SCNC: 132 MMOL/L (ref 136–145)
URINE CULTURE, ROUTINE: NORMAL
WBC # BLD: 8.1 K/UL (ref 4–11)

## 2022-12-14 PROCEDURE — 80048 BASIC METABOLIC PNL TOTAL CA: CPT

## 2022-12-14 PROCEDURE — 6360000002 HC RX W HCPCS: Performed by: STUDENT IN AN ORGANIZED HEALTH CARE EDUCATION/TRAINING PROGRAM

## 2022-12-14 PROCEDURE — 6370000000 HC RX 637 (ALT 250 FOR IP): Performed by: STUDENT IN AN ORGANIZED HEALTH CARE EDUCATION/TRAINING PROGRAM

## 2022-12-14 PROCEDURE — 2700000000 HC OXYGEN THERAPY PER DAY

## 2022-12-14 PROCEDURE — 2580000003 HC RX 258: Performed by: STUDENT IN AN ORGANIZED HEALTH CARE EDUCATION/TRAINING PROGRAM

## 2022-12-14 PROCEDURE — 99232 SBSQ HOSP IP/OBS MODERATE 35: CPT | Performed by: STUDENT IN AN ORGANIZED HEALTH CARE EDUCATION/TRAINING PROGRAM

## 2022-12-14 PROCEDURE — 94761 N-INVAS EAR/PLS OXIMETRY MLT: CPT

## 2022-12-14 PROCEDURE — 36415 COLL VENOUS BLD VENIPUNCTURE: CPT

## 2022-12-14 PROCEDURE — 85025 COMPLETE CBC W/AUTO DIFF WBC: CPT

## 2022-12-14 RX ORDER — POLYETHYLENE GLYCOL 3350 17 G/17G
17 POWDER, FOR SOLUTION ORAL DAILY
Qty: 527 G | Refills: 1 | Status: SHIPPED | OUTPATIENT
Start: 2022-12-14 | End: 2023-01-13

## 2022-12-14 RX ADMIN — PANTOPRAZOLE SODIUM 40 MG: 40 TABLET, DELAYED RELEASE ORAL at 08:44

## 2022-12-14 RX ADMIN — CEFTRIAXONE 1000 MG: 1 INJECTION, POWDER, FOR SOLUTION INTRAMUSCULAR; INTRAVENOUS at 08:44

## 2022-12-14 RX ADMIN — SODIUM CHLORIDE, PRESERVATIVE FREE 10 ML: 5 INJECTION INTRAVENOUS at 08:45

## 2022-12-14 RX ADMIN — CARVEDILOL 6.25 MG: 6.25 TABLET, FILM COATED ORAL at 08:44

## 2022-12-14 RX ADMIN — VALACYCLOVIR HYDROCHLORIDE 1000 MG: 500 TABLET, FILM COATED ORAL at 08:45

## 2022-12-14 RX ADMIN — FERROUS SULFATE TAB EC 324 MG (65 MG FE EQUIVALENT) 324 MG: 324 (65 FE) TABLET DELAYED RESPONSE at 08:45

## 2022-12-14 RX ADMIN — ENOXAPARIN SODIUM 30 MG: 100 INJECTION SUBCUTANEOUS at 08:45

## 2022-12-14 RX ADMIN — ASPIRIN 81 MG: 81 TABLET, CHEWABLE ORAL at 08:44

## 2022-12-14 RX ADMIN — SODIUM CHLORIDE 25 ML: 9 INJECTION, SOLUTION INTRAVENOUS at 08:43

## 2022-12-14 NOTE — CARE COORDINATION
12/14/22 1027   IMM Letter   IMM Letter given to Patient/Family/Significant other/Guardian/POA/by: presented to [facility], Bob Mckeon, over the phone, gave her a window of four hours to determine dc for today by LUIGI Collazo. Emailed copy to her at:  [email or web address]   IMM Letter date given: 12/14/22   IMM Letter time given: 7266

## 2022-12-14 NOTE — PROGRESS NOTES
Name: Phani Don  /Age/Sex: 1935 (80 y.o. female)   MRN & CSN: [id number] & [id number]  Admission Date/Time: 2022 12:24 AM   Location: [id number]  Current Hospital Day: Hospital Day: 2   Principal Problem: Syncope and collapse  HPI       Patient seen and examined. Patient was confused overnight and try to get out of bed. She is sleeping comfortably this morning. Nasal cannula is in place but she has not required oxygen. Patient does not report any complaints today. She did have a bowel movement yesterday. Spoke with grandson at bedside. All other review of systems negative unless noted above.   VITALS     Vitals:    22 0354   BP: 136/73   Pulse: 82   Resp: 16   Temp: 98.2 °F (36.8 °C)   SpO2: 97%         PHYSICAL EXAM        General: No acute distress  HEENT: PERRL, EOMI, moist oral mucosa  Chest: Left-sided vesicular rash in dermatomal distribution  Cardiac: Regular rate and rhythm, systolic murmur, + peripheral edema in legs  Lungs: Clear to auscultation bilaterally  Abdomen: Soft nontender, nondistended  Musculoskeletal: No joint effusions  Neuro: Nonfocal, oriented to self, not place or time      LABS   BMP  Recent Labs     22  0044 22  0845    137   K 4.3 4.8   CL 97* 96*   CO2 27 25   BUN 36* 32*   CREATININE 1.7* 1.6*   CALCIUM 9.9 9.6     CBC/COAGS  Recent Labs     22  0044 22  0845   WBC 10.0 7.6   HCT 39.5 38.2    312     Liver & Pancreas  Recent Labs     224   AST 21   ALT 10   ALKPHOS 71   BILIDIR <0.2   LIPASE 31.0          IMAGING   No new imaging    Above studies and images were personally reviewed by myself    MEDS   Scheduled Meds:   sodium chloride flush  5-40 mL IntraVENous 2 times per day    enoxaparin  30 mg SubCUTAneous Daily    allopurinol  50 mg Oral Every Other Day    aspirin  81 mg Oral Daily    atorvastatin  20 mg Oral Nightly    carvedilol  6.25 mg Oral BID WC    donepezil  10 mg Oral Nightly ferrous sulfate  324 mg Oral BID WC    memantine  5 mg Oral Nightly    pantoprazole  40 mg Oral Daily    valACYclovir  1,000 mg Oral TID    cefTRIAXone (ROCEPHIN) IV  1,000 mg IntraVENous Q24H    famotidine  20 mg Oral Nightly     Continuous Infusions:   sodium chloride       PRN Meds:sodium chloride flush, sodium chloride, ondansetron **OR** ondansetron, acetaminophen **OR** acetaminophen, albuterol sulfate HFA, ipratropium-albuterol, polyethylene glycol, simethicone     CURRENT HOSPITAL PROBLEMS   Principal Problem:    Syncope and collapse  Resolved Problems:    * No resolved hospital problems. *      Christian Singh is a 80 y.o. female who was admitted with Mediastinal mass.   Principal Problem:     Mediastinal mass  -Incidentally discovered mediastinal mass  - Plan was to just monitor and treat for symptoms including pain, continue Norco     Herpes zoster  - Continue antiviral, Norco for pain control     Constipation  - Bowel regimen, continue MiraLAX daily outpatient as she will be on long-term pain control     HFrEF, chronic  -Continue lisinopril, Imdur and carvedilol,lasix     Chronic bladder wall thickening  - Discontinue ceftriaxone     Dementia with behavioral disturbance  -Continue donepezil memantine     GERD  - Continue Protonix     CKD  - Monitor renal function     Hyperlipidemia  -Continue statin     CAD  - Aspirin/statin     F/E/N: Cardiac diet  PPx: PPI, Lovenox  Dispo: Dc today with hospice    Juanis Pro MD 12/14/2022 8:14 AM

## 2022-12-14 NOTE — DISCHARGE INSTR - DIET

## 2022-12-14 NOTE — PLAN OF CARE
Problem: Discharge Planning  Goal: Discharge to home or other facility with appropriate resources  12/14/2022 1201 by Saundra Conti RN  Outcome: Completed  12/14/2022 1156 by Saundra Conti RN  Outcome: Adequate for Discharge  12/14/2022 0128 by Srikanth Carrasquillo RN  Outcome: Progressing     Problem: Pain  Goal: Verbalizes/displays adequate comfort level or baseline comfort level  12/14/2022 1201 by Saundra Conti RN  Outcome: Completed  12/14/2022 1156 by Saundra Conti RN  Outcome: Adequate for Discharge  12/14/2022 0128 by Srikanth Carrasquillo RN  Outcome: Progressing     Problem: ABCDS Injury Assessment  Goal: Absence of physical injury  12/14/2022 1201 by Saundra Conti RN  Outcome: Completed  12/14/2022 1156 by Saundra Conti RN  Outcome: Adequate for Discharge  12/14/2022 0128 by Srikanth Carrasquillo RN  Outcome: Progressing     Problem: Safety - Adult  Goal: Free from fall injury  12/14/2022 1201 by Saundra Conti RN  Outcome: Completed  12/14/2022 1156 by Saundra Conti RN  Outcome: Adequate for Discharge  12/14/2022 0128 by Srikanth Carrasquillo RN  Outcome: Progressing     Problem: Chronic Conditions and Co-morbidities  Goal: Patient's chronic conditions and co-morbidity symptoms are monitored and maintained or improved  12/14/2022 1201 by Saundra Conti RN  Outcome: Completed  12/14/2022 1156 by Saundra Conti RN  Outcome: Adequate for Discharge

## 2022-12-14 NOTE — PROGRESS NOTES
Patient is an 80 y.o female admitted with syncope and collapse with a history of dementia. She is confused and keeps asking repeated questions about where she is at. Patient  reported  pain 9/10 to abdomen and lower back, prn meds given, see MAR. Patient is very hard of hearing. Her grandson is at the bedside. Patient only has tylenol ordered for pain. Perfect served NP Katie, no response yet.

## 2022-12-14 NOTE — PROGRESS NOTES
Discharge orders acknowledged by RN . Discharge teaching completed with pt and caregiver. AVS reviewed and all questions answered. Medication regimen reviewed and pt  and caregiver understands schedule. PIV and Telemetry monitor removed from pt . Patient and caregiver  received CHF education . Required core measures completed. Pt vitals WDL. Pt discharged with all belongings to home with family. Pt transported off of unit via wheelchair. No complications.

## 2022-12-14 NOTE — PROGRESS NOTES
New order received for Palliative care consult.  Message left for Wilmer Kirkland #[phone]( palliative care)

## 2022-12-14 NOTE — CARE COORDINATION
SOCIAL WORK DISCHARGE SUMMARY        DATE OF DISCHARGE:       12-        LOCATION:    Home         TIME:      family      Bernadette Donnelly [place]     Case Management   [phone]    12/14/2022  10:34 AM

## 2022-12-14 NOTE — PLAN OF CARE
Problem: Discharge Planning  Goal: Discharge to home or other facility with appropriate resources  12/14/2022 1156 by Natalie Nava RN  Outcome: Adequate for Discharge    Problem: Pain  Goal: Verbalizes/displays adequate comfort level or baseline comfort level  12/14/2022 1156 by Natalie Nava RN  Outcome: Adequate for Discharge     Problem: ABCDS Injury Assessment  Goal: Absence of physical injury  12/14/2022 1156 by Natalie Nava RN  Outcome: Adequate for Discharge     Problem: Safety - Adult  Goal: Free from fall injury  12/14/2022 1156 by Natalie Nava RN  Outcome: Adequate for Discharge     Problem: Chronic Conditions and Co-morbidities  Goal: Patient's chronic conditions and co-morbidity symptoms are monitored and maintained or improved  Outcome: Adequate for Discharge

## 2022-12-14 NOTE — CONSULTS
PALLIATIVE MEDICINE CONSULTATION     Patient name:Daisy Nunez   PWT:[id number]    :1935  Room/Bed:I2Y-6375/3267-01   LOS: 1 day         Date of consult:2022    Consult Information  Palliative Medicine Consult performed by: FERNANDO Washburn CNP     Inpatient consult to Palliative Care  Consult performed by: FERNANDO Washburn CNP  Consult ordered by: Barrett Vasquez MD  Reason for consult: Goals of care and symptom management. ASSESSMENT/RECOMMENDATIONS     80 y.o. female with abdominal pain, newly diagnosed mediastinal mass. Symptom Management:  Abdominal pain - Improved, Imaging noted stool burden at the rectum. Debility - Discharge home with daughter, plans to provide / support with family. Goals of Care - Family does not wish to pursue any additional aggressive management of their mother's health care. Hospice referral previously placed, however family was unable to enroll prior to the patient's readmission. Case management had previously discussed hospice services with patient's daughter, they would like to pursue a hospice with an inpatient option. Per daughter's report, case management gave them the locations of the [facility] [place], they would like to pursue [facility] at this time due to location. Referral placed. Case management notified. Daughter will meet with them prior to taking the patient home. Patient/Family Goals of Care :    Family does not wish to pursue any additional aggressive management of their mother's health care. Hospice referral previously placed, however family was unable to enroll prior to the patient's readmission. Case management had previously discussed hospice services with patient's daughter, they would like to pursue a hospice with an inpatient option.   Per daughter's report, case management gave them the locations of the [facility] [place], they would like to pursue [facility] at this time due to location. Referral placed. Case management notified. Daughter will meet with them prior to taking the patient home. Disposition/Discharge Plan:   Home with daughter, plans to enroll in hospice services. Advance Directives:  Surrogate Decision Maker: Daniel Bell  Code status:  DNR-CC    Case discussed with: patient, floor RN, case management  Thank you for allowing us to participate in the care of this patient. HISTORY     CC: Abdominal pain  HPI: The patient is a 80 y.o. female with past medical history significant for chronic kidney disease stage IV, CAD, dementia, hyperlipidemia, hypertension, CHF (EF 20%), aortic stenosis, dementia, and new mediastinal mass. Patient was brought to the ED due to abdominal pain, found to have fecal impaction. Of note, patient was discharged 2 days prior to this admission, incidental finding of mediastinal mass during that hospitalization. Family decided upon conservative therapy, no aggressive work-up or measure. Patient's family had previously discussed hospice enrollment. Palliative Medicine SymptomScreening/ROS:    Review of Systems   Unable to perform ROS: Dementia     Patient unable to complete full ROS due to current cognitive status. Information that is obtained from nursing and chart. Palliative Performance Scale:     [] 60%  Amb reduced; Sig dz. Can't do hobbies/housework; Intake normal or reduced, Occasional assist; LOC full/confusion   [] 50%  Mainly sit/lie; Extensive disease. Mainly assist, Intake normal or reduced; Occasional assist; LOC full/confusion   [x] 40%  Mainly in bed; Extensive disease; Mainly assist; Intake normal or reduced; Occasional assist; LOC full/confusion   [] 30%  Bed bound, Extensive disease; Total care; Intake reduced; LOC full/confusion   [] 20%  Bed bound; Extensive disease; Total care; Intake minimal; Drowsy/coma   [] 10%  Bed bound; Extensive disease;  Total care; Mouth care only; Drowsy/coma   []  0%   Death       Home med list and hospital medications reviewed in chart as of 12/14/2022     EXAM     Vitals:    12/14/22 1145   BP: (!) 142/73   Pulse: 78   Resp: 18   Temp: 98.1 °F (36.7 °C)   SpO2: 95%       Physical Exam  Constitutional:       General: She is sleeping. Pulmonary:      Effort: Pulmonary effort is normal.   Musculoskeletal:      Right lower leg: No edema. Left lower leg: No edema. Neurological:      Motor: Weakness present.         Current labs in the epic chart reviewed as of 12/14/2022   Review of previous notes, admits, labs, radiology and testing relevant to this consult done in this chart today 12/14/2022      Total time: 50 minutes  >50% of time spent counseling patient at bedside or POA/family member if applicable , reviewing information and discussing care, coordinating with care team  Signed By: Electronically signed by FERNANDO Gracia CNP on 12/14/2022 at 12:15 PM  Palliative Medicine   [phone]    December 14, 2022

## 2022-12-14 NOTE — CARE COORDINATION
Spoke with Palliative Care RN who states she has spoken with Casey after my call to her. She did review Hospice services with her and eloina wants to use [facility]. Referral faxed. The Plan for Transition of Care is related to the following treatment goals: To have end of life care in her home from Hospice services. The Patient  representative Casey, Vic Varela was provided with a choice of provider and agrees   with the discharge plan. [x] Yes [] No    Freedom of choice list was provided with basic dialogue that supports the patient's individualized plan of care/goals, treatment preferences and shares the quality data associated with the providers. [x] Yes [] No  Daughter already had the list of Hospice Agencies that had been provided to her on 12- by .   [place], [place]     Case Management   [phone]    12/14/2022  12:00 PM

## 2022-12-14 NOTE — CARE COORDINATION
Discharge Order rec'd. Call  placed to daughter, Pam Booth to review. Informed her of recommendation for home care. She states she has refused in the past.    Also, [facility] reports she is to meet with Hospice once pt returns home as this was previously set up. Imm Letter presented to [facility], provided a four hour window to determine DC for today. Emailed a copy of this to her at:    [email or web address]  Family will pick her up when she is ready to [address]     Case Management   [phone]    12/14/2022  10:33 AM

## 2022-12-15 ENCOUNTER — CARE COORDINATION (OUTPATIENT)
Dept: CASE MANAGEMENT | Age: 87
End: 2022-12-15

## 2022-12-15 NOTE — DISCHARGE SUMMARY
[facility] Internal Medicine Discharge Summary      Patient ID: Cephus Ar      Patient's PCP: Eduardo Quarles MD    Admit Date: 11/5/2022     Discharge Date: 11/7/2022  The patient was seen and examined on day of discharge and this discharge summary is in conjunction with any daily progress note from day of discharge. Admitting Physician: Eduardo Quarles MD    Discharge Physician: Eduardo Quarles MD     Admitted for   Chief Complaint   Patient presents with    Shortness of Breath     Pt states she was awakened suddenly by shortness of breath. Pt appears to have difficulty catching breath but is talking in full sentences. Pt asked if she has chest pain. Pt states yes and points to low sternum. \"Just a little. \"       Admitting Diagnosis Elevated troponin [R77.8]  HFrEF (heart failure with reduced ejection fraction) ([facility]) [I50.20]  Acute on chronic congestive heart failure, unspecified heart failure type ([facility] [I94.5]  Systolic heart failure, unspecified HF chronicity ([facility] [I50.20]    Discharge Diagnoses: Active Hospital Problems    Diagnosis Date Noted    HFrEF (heart failure with reduced ejection fraction) ([facility] [I50.20] 11/05/2022     Priority: Medium    Systolic heart failure, unspecified HF chronicity ([facility] [I50.20] 11/05/2022     Priority: Medium    Stage 3b chronic kidney disease ([facility] [N18.32]     Coronary artery disease involving native coronary artery of native heart without angina pectoris [I25.10] 01/05/2016    HTN (hypertension), benign [I10] 06/13/2011       Follow Up: Primary Care Physician in one week    PCP to Follow up on   HF          Hospital Course:   Patient admitted with acute exacerbation of systolic heart failure. The patient's BNP is elevated to 22,000 from a recent baseline of 1800. She has demand ischemia but no chest pain. Continue her home regimen including amlodipine, lisinopril, Imdur and carvedilol.   Treated with Lasix 40 mg twice per day IV while inpatient, resume home 37.1 12/14/2022 07:44 AM     12/14/2022 07:44 AM       RENAL:   Lab Results   Component Value Date/Time     12/14/2022 07:44 AM    K 3.8 12/14/2022 07:44 AM    K 4.3 12/13/2022 12:44 AM    CL 92 12/14/2022 07:44 AM    CO2 25 12/14/2022 07:44 AM    BUN 27 12/14/2022 07:44 AM    CREATININE 1.3 12/14/2022 07:44 AM           Discharge Medications:      Medication List        CHANGE how you take these medications      allopurinol 100 MG tablet  Commonly known as: ZYLOPRIM  TAKE 1/2 TABLET BY MOUTH EVERY DAY  What changed:   how much to take  how to take this  when to take this     memantine 5 MG tablet  Commonly known as: NAMENDA  Take 1 tablet by mouth daily  What changed: when to take this            CONTINUE taking these medications      albuterol sulfate  (90 Base) MCG/ACT inhaler  Commonly known as: Ventolin HFA  Inhale 2 puffs into the lungs 4 times daily as needed for Wheezing     aspirin 81 MG chewable tablet     carvedilol 6.25 MG tablet  Commonly known as: COREG  TAKE 1 TABLET BY MOUTH TWO TIMES A DAY WITH MEALS     donepezil 10 MG tablet  Commonly known as: ARICEPT  TAKE 1 TABLET BY MOUTH nightly     ferrous sulfate 324 (65 Fe) MG EC tablet  Take 1 tablet by mouth 2 times daily (with meals)     ipratropium-albuterol 0.5-2.5 (3) MG/3ML Soln nebulizer solution  Commonly known as: DUONEB  Inhale 3 mLs into the lungs every 6 hours as needed for Shortness of Breath     loratadine 10 MG capsule  Commonly known as: CLARITIN     magnesium oxide 140 MG Caps  Commonly known as: MAG-OX  Take 14 mg by mouth daily     Nebulizer/Tubing/Mouthpiece Kit  1 kit by Does not apply route daily as needed (wheezing)     therapeutic multivitamin-minerals tablet     VITAMIN B 12 PO            STOP taking these medications      amLODIPine 5 MG tablet  Commonly known as: NORVASC     atorvastatin 20 MG tablet  Commonly known as: LIPITOR     furosemide 40 MG tablet  Commonly known as: LASIX     lisinopril 10 MG tablet  Commonly known as: PRINIVIL;ZESTRIL     pantoprazole 40 MG tablet  Commonly known as: PROTONIX            ASK your doctor about these medications      dicloxacillin 250 MG capsule  Commonly known as: DYNAPEN  Take 1 capsule by mouth 4 times daily for 15 doses  Ask about: Should I take this medication? Where to Get Your Medications        These medications were sent to [address] [place] [address] & [facility]  [address] [facility], [address] [facility]      Phone: [phone]   dicloxacillin 250 MG capsule         No future appointments. Time Spent on discharge is more than 20 minutes in the examination, evaluation, counseling and review of medications and discharge plan. Signed:  Brock Pete MD   12/15/2022    The note was completed using EMR. Every effort was made to ensure accuracy; however, inadvertent computerized transcription errors may be present.

## 2022-12-15 NOTE — PROGRESS NOTES
Physician Progress Note      PATIENT:               Radha Bundy  CSN #:                  [id number]  :                       1935  ADMIT DATE:       2022 6:15 PM  [address] [place] DATE:        2022 5:31 PM  RESPONDING  PROVIDER #:        Benitez Donald MD          QUERY TEXT:    Pt admitted with Mediastinal Mass. Noted documentation of concerned for   malignancy  on 22 Pulmonology consultant. 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     Query created by: Serina Doran on 12/15/2022 4:19 PM      Electronically signed by:  Benitez Donald MD 12/15/2022 4:51 PM

## 2022-12-15 NOTE — CARE COORDINATION
[facility] Transitions Initial Follow Up Call    Call within 2 business days of discharge: Yes    Patient: Jasper Burrell Patient : 1935   MRN: [id number]  Reason for Admission: fecal impaction  Discharge Date: 22 RARS: Readmission Risk Score: 18.7      Last Discharge  Street       Date Complaint Diagnosis Description Type Department Provider    22 Abdominal Pain Fecal impaction ([facility] [address] . .. ED to Hosp-Admission (Discharged) (ADMITTED) Eunice Busby MD; Koby Feldman. .. Facility: [facility]    TRISHA CC spoke with Subhash Mccain at [facility] who states patient is admitted into care as of 22. CT episode ended at this time. Radha Andrade LPN [address] [place] [address]  [phone]    Care Transitions 24 Hour Call    Do you have all of your prescriptions and are they filled?: Yes  Post Acute Services: Home Health  Do you have support at home?: Partner/Spouse/SO, Child, Grandchild  Are you an active caregiver in your home?: No  Care Transitions Interventions         Follow Up  No future appointments.     Jc Rivera LPN

## 2023-05-11 NOTE — TELEPHONE ENCOUNTER
Daisy's granddaughter [place] (verbal HIPAA ok'd from PT) called in and is very upset because a 3-4 wk f/u appt was not scheduled after Daisy was seen on 11/4. Dr. Rehan Arnold has no availability at [facility] and they were not willing to go to a different location. Please facilitate if necessary. She also has questions as to why she was taken off of Lasix and Lisinopril. She states they saw her PCP and he saw notes fromt he cardiologist saying she as supposed to stop taking these?      Fran helms can be reached back at [phone] Patient does not exhibit any risk factors
